# Patient Record
Sex: MALE | Race: BLACK OR AFRICAN AMERICAN | Employment: UNEMPLOYED | URBAN - METROPOLITAN AREA
[De-identification: names, ages, dates, MRNs, and addresses within clinical notes are randomized per-mention and may not be internally consistent; named-entity substitution may affect disease eponyms.]

---

## 2017-03-13 ENCOUNTER — HOSPITAL ENCOUNTER (EMERGENCY)
Age: 42
Discharge: HOME | End: 2017-03-13
Attending: EMERGENCY MEDICINE
Payer: SELF-PAY

## 2017-03-13 ENCOUNTER — APPOINTMENT (OUTPATIENT)
Dept: GENERAL RADIOLOGY | Age: 42
End: 2017-03-13
Attending: EMERGENCY MEDICINE
Payer: SELF-PAY

## 2017-03-13 VITALS
TEMPERATURE: 99.5 F | SYSTOLIC BLOOD PRESSURE: 170 MMHG | HEART RATE: 96 BPM | DIASTOLIC BLOOD PRESSURE: 92 MMHG | RESPIRATION RATE: 20 BRPM | OXYGEN SATURATION: 97 % | HEIGHT: 64 IN | BODY MASS INDEX: 48.4 KG/M2 | WEIGHT: 283.51 LBS

## 2017-03-13 DIAGNOSIS — R06.2 WHEEZING: ICD-10-CM

## 2017-03-13 DIAGNOSIS — J18.9 PNEUMONIA OF LEFT UPPER LOBE DUE TO INFECTIOUS ORGANISM: Primary | ICD-10-CM

## 2017-03-13 LAB
FLUAV AG NPH QL IA: NEGATIVE
FLUBV AG NOSE QL IA: NEGATIVE
GLUCOSE BLD STRIP.AUTO-MCNC: 81 MG/DL (ref 65–100)
SERVICE CMNT-IMP: NORMAL

## 2017-03-13 PROCEDURE — 74011000250 HC RX REV CODE- 250: Performed by: EMERGENCY MEDICINE

## 2017-03-13 PROCEDURE — 74011250637 HC RX REV CODE- 250/637: Performed by: EMERGENCY MEDICINE

## 2017-03-13 PROCEDURE — 71020 XR CHEST PA LAT: CPT

## 2017-03-13 PROCEDURE — 94640 AIRWAY INHALATION TREATMENT: CPT

## 2017-03-13 PROCEDURE — 93005 ELECTROCARDIOGRAM TRACING: CPT

## 2017-03-13 PROCEDURE — 99284 EMERGENCY DEPT VISIT MOD MDM: CPT

## 2017-03-13 PROCEDURE — 74011636637 HC RX REV CODE- 636/637: Performed by: EMERGENCY MEDICINE

## 2017-03-13 PROCEDURE — 82962 GLUCOSE BLOOD TEST: CPT

## 2017-03-13 PROCEDURE — 77030013140 HC MSK NEB VYRM -A

## 2017-03-13 PROCEDURE — 87804 INFLUENZA ASSAY W/OPTIC: CPT | Performed by: EMERGENCY MEDICINE

## 2017-03-13 PROCEDURE — 77030012343 HC CHMB SPCR OPTC MDI MONA -A

## 2017-03-13 RX ORDER — PREDNISONE 10 MG/1
TABLET ORAL
Qty: 21 TAB | Refills: 0 | Status: SHIPPED | OUTPATIENT
Start: 2017-03-13 | End: 2021-02-02 | Stop reason: ALTCHOICE

## 2017-03-13 RX ORDER — ALBUTEROL SULFATE 90 UG/1
2 AEROSOL, METERED RESPIRATORY (INHALATION)
Status: COMPLETED | OUTPATIENT
Start: 2017-03-13 | End: 2017-03-13

## 2017-03-13 RX ORDER — PREDNISONE 20 MG/1
60 TABLET ORAL
Status: COMPLETED | OUTPATIENT
Start: 2017-03-13 | End: 2017-03-13

## 2017-03-13 RX ORDER — AZITHROMYCIN 250 MG/1
500 TABLET, FILM COATED ORAL
Status: COMPLETED | OUTPATIENT
Start: 2017-03-13 | End: 2017-03-13

## 2017-03-13 RX ORDER — ALBUTEROL SULFATE 90 UG/1
2 AEROSOL, METERED RESPIRATORY (INHALATION)
Qty: 1 INHALER | Refills: 0 | Status: SHIPPED | OUTPATIENT
Start: 2017-03-13 | End: 2021-02-02 | Stop reason: ALTCHOICE

## 2017-03-13 RX ORDER — IPRATROPIUM BROMIDE AND ALBUTEROL SULFATE 2.5; .5 MG/3ML; MG/3ML
3 SOLUTION RESPIRATORY (INHALATION)
Status: COMPLETED | OUTPATIENT
Start: 2017-03-13 | End: 2017-03-13

## 2017-03-13 RX ORDER — AZITHROMYCIN 250 MG/1
250 TABLET, FILM COATED ORAL DAILY
Qty: 4 TAB | Refills: 0 | Status: SHIPPED | OUTPATIENT
Start: 2017-03-13 | End: 2021-02-02 | Stop reason: ALTCHOICE

## 2017-03-13 RX ADMIN — ALBUTEROL SULFATE 2 PUFF: 90 AEROSOL, METERED RESPIRATORY (INHALATION) at 22:10

## 2017-03-13 RX ADMIN — AZITHROMYCIN 500 MG: 250 TABLET, FILM COATED ORAL at 21:28

## 2017-03-13 RX ADMIN — ALBUTEROL SULFATE 1 DOSE: 2.5 SOLUTION RESPIRATORY (INHALATION) at 20:54

## 2017-03-13 RX ADMIN — PREDNISONE 60 MG: 20 TABLET ORAL at 21:28

## 2017-03-13 RX ADMIN — IPRATROPIUM BROMIDE AND ALBUTEROL SULFATE 3 ML: .5; 2.5 SOLUTION RESPIRATORY (INHALATION) at 21:28

## 2017-03-13 NOTE — LETTER
[facility] EMERGENCY DEPT 
[address] [facility] [address] [id number] 
[phone] Work/School Note Date: 3/13/2017 To Whom It May concern: 
 
Carmelina Boone was seen and treated today in the emergency room by the following provider(s): 
Attending Provider: Meche Ramon MD.   
 
Carmelina Boone may return to work on 4/15/17. Sincerely, Meche Ramon MD

## 2017-03-13 NOTE — LETTER
21 [facility] EMERGENCY DEPT 
[address] [place] [address] [address] 
[phone] Work/School Note Date: 3/13/2017 To Whom It May concern: 
 
Mana Peterson was seen and treated today in the emergency room by the following provider(s): 
No providers found. Mana Peterson may return to work on 3/17/2017.  
 
Sincerely, 
 
 
 
 
Jenna Saleem MD

## 2017-03-14 LAB
ATRIAL RATE: 108 BPM
CALCULATED P AXIS, ECG09: 47 DEGREES
CALCULATED R AXIS, ECG10: -28 DEGREES
CALCULATED T AXIS, ECG11: 75 DEGREES
DIAGNOSIS, 93000: NORMAL
P-R INTERVAL, ECG05: 126 MS
Q-T INTERVAL, ECG07: 336 MS
QRS DURATION, ECG06: 86 MS
QTC CALCULATION (BEZET), ECG08: 450 MS
VENTRICULAR RATE, ECG03: 108 BPM

## 2017-03-14 NOTE — DISCHARGE INSTRUCTIONS
Pneumonia: Care Instructions  Your Care Instructions    Pneumonia is an infection of the lungs. Most cases are caused by infections from bacteria or viruses. Pneumonia may be mild or very severe. If it is caused by bacteria, you will be treated with antibiotics. It may take a few weeks to a few months to recover fully from pneumonia, depending on how sick you were and whether your overall health is good. Follow-up care is a key part of your treatment and safety. Be sure to make and go to all appointments, and call your doctor if you are having problems. Its also a good idea to know your test results and keep a list of the medicines you take. How can you care for yourself at home? · Take your antibiotics exactly as directed. Do not stop taking the medicine just because you are feeling better. You need to take the full course of antibiotics. · Take your medicines exactly as prescribed. Call your doctor if you think you are having a problem with your medicine. · Get plenty of rest and sleep. You may feel weak and tired for a while, but your energy level will improve with time. · To prevent dehydration, drink plenty of fluids, enough so that your urine is light yellow or clear like water. Choose water and other caffeine-free clear liquids until you feel better. If you have kidney, heart, or liver disease and have to limit fluids, talk with your doctor before you increase the amount of fluids you drink. · Take care of your cough so you can rest. A cough that brings up mucus from your lungs is common with pneumonia. It is one way your body gets rid of the infection. But if coughing keeps you from resting or causes severe fatigue and chest-wall pain, talk to your doctor. He or she may suggest that you take a medicine to reduce the cough. · Use a vaporizer or humidifier to add moisture to your bedroom. Follow the directions for cleaning the machine. · Do not smoke or allow others to smoke around you.  Smoke will make your cough last longer. If you need help quitting, talk to your doctor about stop-smoking programs and medicines. These can increase your chances of quitting for good. · Take an over-the-counter pain medicine, such as acetaminophen (Tylenol), ibuprofen (Advil, Motrin), or naproxen (Aleve). Read and follow all instructions on the label. · Do not take two or more pain medicines at the same time unless the doctor told you to. Many pain medicines have acetaminophen, which is Tylenol. Too much acetaminophen (Tylenol) can be harmful. · If you were given a spirometer to measure how well your lungs are working, use it as instructed. This can help your doctor tell how your recovery is going. · To prevent pneumonia in the future, talk to your doctor about getting a flu vaccine (once a year) and a pneumococcal vaccine (one time only for most people). When should you call for help? Call 911 anytime you think you may need emergency care. For example, call if:  · You have severe trouble breathing. Call your doctor now or seek immediate medical care if:  · You cough up dark brown or bloody mucus (sputum). · You have new or worse trouble breathing. · You are dizzy or lightheaded, or you feel like you may faint. Watch closely for changes in your health, and be sure to contact your doctor if:  · You have a new or higher fever. · You are coughing more deeply or more often. · You are not getting better after 2 days (48 hours). · You do not get better as expected. Where can you learn more? Go to http://gayatri-alli.info/. Enter [id number] in the search box to learn more about \"Pneumonia: Care Instructions. \"  Current as of: May 23, 2016  Content Version: 11.1  © 2074-1468 [employer]. Care instructions adapted under license by [employer] (which disclaims liability or warranty for this information).  If you have questions about a medical condition or this instruction, always ask your healthcare professional. [place] [address] any warranty or liability for your use of this information. Wheezing or Bronchoconstriction: Care Instructions  Your Care Instructions  Wheezing is a whistling noise made during breathing. It occurs when the small airways, or bronchial tubes, that lead to your lungs swell or contract (spasm) and become narrow. This narrowing is called bronchoconstriction. When your airways constrict, it is hard for air to pass through and this makes it hard for you to breathe. Wheezing and bronchoconstriction can be caused by many problems, including:  · An infection such as the flu or a cold. · Allergies such as hay fever. · Diseases such as asthma or chronic obstructive pulmonary disease. · Smoking. Treatment for your wheezing depends on what is causing the problem. Your wheezing may get better without treatment. But you may need to pay attention to things that cause your wheezing and avoid them. Or you may need medicine to help treat the wheezing and to reduce the swelling or to relieve spasms in your lungs. Follow-up care is a key part of your treatment and safety. Be sure to make and go to all appointments, and call your doctor if you are having problems. It is also a good idea to know your test results and keep a list of the medicines you take. How can you care for yourself at home? · Take your medicine exactly as prescribed. Call your doctor if you think you are having a problem with your medicine. You will get more details on the specific medicine your doctor prescribes. · If your doctor prescribed antibiotics, take them as directed. Do not stop taking them just because you feel better. You need to take the full course of antibiotics. · Breathe moist air from a humidifier, hot shower, or sink filled with hot water. This may help ease your symptoms and make it easier for you to breathe.   · If you have congestion in your nose and throat, drinking plenty of fluids, especially hot fluids, may help relieve your symptoms. If you have kidney, heart, or liver disease and have to limit fluids, talk with your doctor before you increase the amount of fluids you drink. · If you have mucus in your airways, it may help to breathe deeply and cough. · Do not smoke or allow others to smoke around you. Smoking can make your wheezing worse. If you need help quitting, talk to your doctor about stop-smoking programs and medicines. These can increase your chances of quitting for good. · Avoid things that may cause your wheezing. These may include colds, smoke, air pollution, dust, pollen, pets, cockroaches, stress, and cold air. When should you call for help? Call 911 anytime you think you may need emergency care. For example, call if:  · You have severe trouble breathing. · You passed out (lost consciousness). Call your doctor now or seek immediate medical care if:  · You cough up yellow, dark brown, or bloody mucus (sputum). · You have new or worse shortness of breath. · Your wheezing is not getting better or it gets worse after you start taking your medicine. Watch closely for changes in your health, and be sure to contact your doctor if:  · You do not get better as expected. Where can you learn more? Go to http://gayatri-alli.info/. Enter 454 [id number] in the search box to learn more about \"Wheezing or Bronchoconstriction: Care Instructions. \"  Current as of: May 23, 2016  Content Version: 11.1  © 9507-6357 [employer]. Care instructions adapted under license by [employer] (which disclaims liability or warranty for this information). If you have questions about a medical condition or this instruction, always ask your healthcare professional. [address] any warranty or liability for your use of this information. We hope that we have addressed all of your medical concerns.  The examination and treatment you received in the Emergency Department were for an emergent problem and were not intended as complete care. It is important that you follow up with your healthcare provider(s) for ongoing care. If your symptoms worsen or do not improve as expected, and you are unable to reach your usual health care provider(s), you should return to the Emergency Department. Today's healthcare is undergoing tremendous change, and patient satisfaction surveys are one of the many tools to assess the quality of medical care. You may receive a survey from the [employer] regarding your experience in the Emergency Department. I hope that your experience has been completely positive, particularly the medical care that I provided. As such, please participate in the survey; anything less than excellent does not meet my expectations or intentions. [facility] and [facility] participate in nationally recognized quality of care measures. If your blood pressure is greater than 120/80, as reported below, we urge that you seek medical care to address the potential of high blood pressure, commonly known as hypertension. Hypertension can be hereditary or can be caused by certain medical conditions, pain, stress, or \"white coat syndrome. \"       Please make an appointment with your health care provider(s) for follow up of your Emergency Department visit. VITALS:   Patient Vitals for the past 8 hrs:   Temp Pulse Resp BP SpO2   03/13/17 2152 - - 20 (!) 170/92 97 %   03/13/17 2028 99.5 °F (37.5 °C) 96 22 (!) 195/113 95 %          Thank you for allowing us to provide you with medical care today. We realize that you have many choices for your emergency care needs. Please choose us in the future for any continued health care needs. Christa Bosch, [address] [place], [place] [address]y 20.   Office: [phone]            Recent Results (from the past 24 hour(s))   GLUCOSE, POC    Collection Time: 03/13/17  8:51 PM   Result Value Ref Range    Glucose (POC) 81 65 - 100 mg/dL    Performed by [facility]    INFLUENZA A & B AG (RAPID TEST)    Collection Time: 03/13/17  8:52 PM   Result Value Ref Range    Influenza A Antigen NEGATIVE  NEG      Influenza B Antigen NEGATIVE  NEG     EKG, 12 LEAD, INITIAL    Collection Time: 03/13/17  9:16 PM   Result Value Ref Range    Ventricular Rate 108 BPM    Atrial Rate 108 BPM    P-R Interval 126 ms    QRS Duration 86 ms    Q-T Interval 336 ms    QTC Calculation (Bezet) 450 ms    Calculated P Axis 47 degrees    Calculated R Axis -28 degrees    Calculated T Axis 75 degrees    Diagnosis       Sinus tachycardia  Possible Left atrial enlargement  Left ventricular hypertrophy  Nonspecific T wave abnormality  Abnormal ECG  When compared with ECG of 25-JUL-2014 10:49,  T wave inversion no longer evident in Inferior leads         Xr Chest Pa Lat    Result Date: 3/13/2017  EXAM:  XR CHEST PA LAT INDICATION:  shortness of breath congestion for 2 days COMPARISON: 7/25/2014. FINDINGS: A single view of the chest demonstrates a subtle area of early airspace process which is most likely in the lingula but may be in the superior segment of the left lower lobe. It is in the left midlung. .  The cardiac and mediastinal contours and pulmonary vascularity are normal.  The bones and soft tissues are within normal limits. IMPRESSION: Subtle area of developing airspace process in the left midlung. Ana Canela

## 2017-03-14 NOTE — ED TRIAGE NOTES
My breathing has been getting bad all of a sudden 30 minutes ago. Gets worse when walking or getting into a vehicle. Having a cold for 2 days.

## 2017-03-14 NOTE — ED PROVIDER NOTES
HPI Comments: Radha Baltazar is a 43 yo M with shortness of breath. He states that 2 days ago he developed flu like symptoms with cough, congestion and chills. He has not checked his temperature at home. Tonight he went to work but on his way there he started to fell short of breath. When he walked back to his car it became worse and so he brought himself to the ED for evaluation. He states that he has noticed wheezing in the past when he is ill but has never been diagnosed with asthma or treated for wheezing before. He denies diabetes. Past Medical History:   Diagnosis Date    Hypertension        History reviewed. No pertinent surgical history. History reviewed. No pertinent family history. Social History     Social History    Marital status: SINGLE     Spouse name: N/A    Number of children: N/A    Years of education: N/A     Occupational History    Not on file. Social History Main Topics    Smoking status: Never Smoker    Smokeless tobacco: Never Used    Alcohol use 0.5 oz/week     1 Cans of beer per week      Comment: rare    Drug use: No    Sexual activity: Not on file     Other Topics Concern    Not on file     Social History Narrative         ALLERGIES: Review of patient's allergies indicates no known allergies. Review of Systems   Constitutional: Positive for chills. Negative for fever. HENT: Positive for congestion. Negative for sore throat. Eyes: Negative for visual disturbance. Respiratory: Positive for cough, shortness of breath and wheezing. Cardiovascular: Negative for chest pain. Gastrointestinal: Negative for abdominal pain. Genitourinary: Negative for dysuria. Musculoskeletal: Negative for back pain. Skin: Negative for rash. Neurological: Negative for headaches.        Vitals:    03/13/17 2028 03/13/17 2152   BP: (!) 195/113 (!) 170/92   Pulse: 96    Resp: 22 20   Temp: 99.5 °F (37.5 °C)    SpO2: 95% 97%   Weight: 128.6 kg (283 lb 8.2 oz) Height: 5' 4\" (1.626 m)             Physical Exam   Constitutional: He appears well-developed and well-nourished. No distress. HENT:   Head: Normocephalic and atraumatic. Nose: Rhinorrhea present. Mouth/Throat: Oropharynx is clear and moist.   Eyes: Conjunctivae and EOM are normal.   Neck: Normal range of motion and phonation normal.   Cardiovascular: Normal rate and intact distal pulses. Pulmonary/Chest: Effort normal. No respiratory distress. He has wheezes. Abdominal: He exhibits no distension. Musculoskeletal: Normal range of motion. He exhibits no tenderness. Neurological: He is alert. He is not disoriented. He exhibits normal muscle tone. Skin: Skin is warm and dry. Nursing note and vitals reviewed. [facility]  ED Course     ED EKG interpretation:  Rhythm: sinus tachycardia; and regular . Rate (approx.): 108; Axis: normal; P wave: normal; QRS interval: normal ; ST/T wave: non-specific changes; Other findings: left ventricular hypertrophy, obtained after albuterol administration. This EKG was interpreted by Pradip Becker MD,ED Provider. 9:21 PM  PAtient reassessed and wheezing has increased after initial duoneb. CXR with small ROBBIN/lingula pneumonia. Flu swab negative. Glucose normal.  Will give azithromycin, prednisone and repeat duoneb and reassess. 10:05 PM  PAtient reassessed and states that he feels much better. Faint wheezing on exam.  Will give Albuterol MDI with teaching and discharge home with prescription for prednisone taper, azithromycin 250x4 days.     Procedures

## 2017-03-15 NOTE — ED NOTES
Pt called and states he just got his medication filled this morning and is not feeling better. Diagnosed with pneumonia 2 days ago. Requesting a note for return to work Friday. I advised pt if he is not feeling better he needs to f/u with pcp or return to ED for reevaluation. Note given for return to work Friday 3/17/2017.

## 2021-02-02 ENCOUNTER — APPOINTMENT (OUTPATIENT)
Dept: GENERAL RADIOLOGY | Age: 46
End: 2021-02-02
Attending: EMERGENCY MEDICINE
Payer: MEDICAID

## 2021-02-02 ENCOUNTER — HOSPITAL ENCOUNTER (EMERGENCY)
Age: 46
Discharge: HOME | End: 2021-02-03
Attending: EMERGENCY MEDICINE
Payer: MEDICAID

## 2021-02-02 VITALS
DIASTOLIC BLOOD PRESSURE: 106 MMHG | HEIGHT: 64 IN | TEMPERATURE: 98 F | HEART RATE: 98 BPM | OXYGEN SATURATION: 95 % | RESPIRATION RATE: 24 BRPM | SYSTOLIC BLOOD PRESSURE: 156 MMHG | BODY MASS INDEX: 53.78 KG/M2 | WEIGHT: 315 LBS

## 2021-02-02 DIAGNOSIS — R06.02 SOB (SHORTNESS OF BREATH): ICD-10-CM

## 2021-02-02 DIAGNOSIS — J45.909 UNCOMPLICATED ASTHMA, UNSPECIFIED ASTHMA SEVERITY, UNSPECIFIED WHETHER PERSISTENT: ICD-10-CM

## 2021-02-02 DIAGNOSIS — R60.9 PERIPHERAL EDEMA: Primary | ICD-10-CM

## 2021-02-02 LAB
ANION GAP SERPL CALC-SCNC: 2 MMOL/L (ref 5–15)
BASOPHILS # BLD: 0 K/UL (ref 0–0.1)
BASOPHILS NFR BLD: 0 % (ref 0–1)
BNP SERPL-MCNC: 93 PG/ML
BUN SERPL-MCNC: 13 MG/DL (ref 6–20)
BUN/CREAT SERPL: 12 (ref 12–20)
CALCIUM SERPL-MCNC: 8.9 MG/DL (ref 8.5–10.1)
CHLORIDE SERPL-SCNC: 106 MMOL/L (ref 97–108)
CO2 SERPL-SCNC: 33 MMOL/L (ref 21–32)
CREAT SERPL-MCNC: 1.11 MG/DL (ref 0.7–1.3)
DIFFERENTIAL METHOD BLD: ABNORMAL
EOSINOPHIL # BLD: 0.3 K/UL (ref 0–0.4)
EOSINOPHIL NFR BLD: 3 % (ref 0–7)
ERYTHROCYTE [DISTWIDTH] IN BLOOD BY AUTOMATED COUNT: 12.6 % (ref 11.5–14.5)
GLUCOSE SERPL-MCNC: 95 MG/DL (ref 65–100)
HCT VFR BLD AUTO: 48 % (ref 36.6–50.3)
HGB BLD-MCNC: 15.4 G/DL (ref 12.1–17)
IMM GRANULOCYTES # BLD AUTO: 0 K/UL (ref 0–0.04)
IMM GRANULOCYTES NFR BLD AUTO: 1 % (ref 0–0.5)
LYMPHOCYTES # BLD: 2.6 K/UL (ref 0.8–3.5)
LYMPHOCYTES NFR BLD: 32 % (ref 12–49)
MCH RBC QN AUTO: 30.5 PG (ref 26–34)
MCHC RBC AUTO-ENTMCNC: 32.1 G/DL (ref 30–36.5)
MCV RBC AUTO: 95 FL (ref 80–99)
MONOCYTES # BLD: 1.1 K/UL (ref 0–1)
MONOCYTES NFR BLD: 13 % (ref 5–13)
NEUTS SEG # BLD: 4.3 K/UL (ref 1.8–8)
NEUTS SEG NFR BLD: 51 % (ref 32–75)
NRBC # BLD: 0 K/UL (ref 0–0.01)
NRBC BLD-RTO: 0 PER 100 WBC
PLATELET # BLD AUTO: 229 K/UL (ref 150–400)
PMV BLD AUTO: 10.9 FL (ref 8.9–12.9)
POTASSIUM SERPL-SCNC: 3.9 MMOL/L (ref 3.5–5.1)
RBC # BLD AUTO: 5.05 M/UL (ref 4.1–5.7)
SODIUM SERPL-SCNC: 141 MMOL/L (ref 136–145)
TROPONIN I SERPL-MCNC: <0.05 NG/ML
WBC # BLD AUTO: 8.3 K/UL (ref 4.1–11.1)

## 2021-02-02 PROCEDURE — 85025 COMPLETE CBC W/AUTO DIFF WBC: CPT

## 2021-02-02 PROCEDURE — 99283 EMERGENCY DEPT VISIT LOW MDM: CPT

## 2021-02-02 PROCEDURE — 36415 COLL VENOUS BLD VENIPUNCTURE: CPT

## 2021-02-02 PROCEDURE — 74011250636 HC RX REV CODE- 250/636: Performed by: EMERGENCY MEDICINE

## 2021-02-02 PROCEDURE — 71045 X-RAY EXAM CHEST 1 VIEW: CPT

## 2021-02-02 PROCEDURE — 83880 ASSAY OF NATRIURETIC PEPTIDE: CPT

## 2021-02-02 PROCEDURE — 84484 ASSAY OF TROPONIN QUANT: CPT

## 2021-02-02 PROCEDURE — 80048 BASIC METABOLIC PNL TOTAL CA: CPT

## 2021-02-02 PROCEDURE — 93005 ELECTROCARDIOGRAM TRACING: CPT

## 2021-02-02 PROCEDURE — 96374 THER/PROPH/DIAG INJ IV PUSH: CPT

## 2021-02-02 RX ORDER — FUROSEMIDE 10 MG/ML
40 INJECTION INTRAMUSCULAR; INTRAVENOUS ONCE
Status: COMPLETED | OUTPATIENT
Start: 2021-02-02 | End: 2021-02-02

## 2021-02-02 RX ORDER — ALBUTEROL SULFATE 90 UG/1
2 AEROSOL, METERED RESPIRATORY (INHALATION)
Qty: 1 INHALER | Refills: 0 | Status: SHIPPED | OUTPATIENT
Start: 2021-02-02 | End: 2022-02-21 | Stop reason: ALTCHOICE

## 2021-02-02 RX ORDER — FUROSEMIDE 40 MG/1
40 TABLET ORAL DAILY
Qty: 20 TAB | Refills: 0 | Status: SHIPPED | OUTPATIENT
Start: 2021-02-02 | End: 2021-02-22

## 2021-02-02 RX ADMIN — FUROSEMIDE 40 MG: 10 INJECTION, SOLUTION INTRAVENOUS at 22:52

## 2021-02-02 NOTE — LETTER
[address] 
[facility] EMERGENCY DEPT 
[address] [facility] [place] [id number] [phone] Work/School Note Date: 2/2/2021 To Whom It May concern: 
 
Marty Hansen was seen and treated today in the emergency room by the following provider(s): 
Attending Provider: Theodore Clemente DO. Marty Hansen may return to work on Thursday 02/04/21. Sincerely, Marilou Luna PA-C

## 2021-02-02 NOTE — ED TRIAGE NOTES
Patient arrives with complaint of SOB, worse with ambulation, abdominal distention, bilateral leg swelling. States that six months ago, PCP had diagnosed \"fluid around my lungs. \" States that he was unable to follow-up with treatment due to financial reasons. Denies chest pain, N/V, abdominal pain. Hx: asthma, HTN.

## 2021-02-03 NOTE — ED PROVIDER NOTES
Patient is a 74-year-old gentleman with a history of hypertension currently on lisinopril and 20 mg of furosemide daily who comes in with worsening dyspnea on exertion and peripheral edema. Patient reports that he was diagnosed with fluid retention 6 months ago but has not followed up with cardiology secondary to financial constraints. He has had progressively worsening symptoms over the last few months but his shortness of breath is acutely worsened to the point where he cannot walk across the room without having to pause. He also reports a history of asthma and that his wheezing has been worsening; he does not have albuterol or any other medications for his asthma. The history is provided by the patient. Shortness of Breath  Associated symptoms include leg swelling. Pertinent negatives include no fever, no chest pain, no vomiting and no abdominal pain. Swelling   Pertinent negatives include no fever, no diarrhea, no vomiting, no constipation and no chest pain. Past Medical History:   Diagnosis Date    Hypertension        No past surgical history on file. No family history on file. Social History     Socioeconomic History    Marital status: SINGLE     Spouse name: Not on file    Number of children: Not on file    Years of education: Not on file    Highest education level: Not on file   Occupational History    Not on file   Social Needs    Financial resource strain: Not on file    Food insecurity     Worry: Not on file     Inability: Not on file    Transportation needs     Medical: Not on file     Non-medical: Not on file   Tobacco Use    Smoking status: Never Smoker    Smokeless tobacco: Never Used   Substance and Sexual Activity    Alcohol use:  Yes     Alcohol/week: 0.8 standard drinks     Types: 1 Cans of beer per week     Comment: rare    Drug use: No    Sexual activity: Not on file   Lifestyle    Physical activity     Days per week: Not on file     Minutes per session: Not on file    Stress: Not on file   Relationships    Social connections     Talks on phone: Not on file     Gets together: Not on file     Attends Zoroastrian service: Not on file     Active member of club or organization: Not on file     Attends meetings of clubs or organizations: Not on file     Relationship status: Not on file    Intimate partner violence     Fear of current or ex partner: Not on file     Emotionally abused: Not on file     Physically abused: Not on file     Forced sexual activity: Not on file   Other Topics Concern    Not on file   Social History Narrative    Not on file         ALLERGIES: Patient has no known allergies. Review of Systems   Constitutional: Negative for chills and fever. Respiratory: Positive for chest tightness and shortness of breath. Cardiovascular: Positive for leg swelling. Negative for chest pain. Gastrointestinal: Negative for abdominal pain, constipation, diarrhea and vomiting. Neurological: Negative for dizziness and light-headedness. All other systems reviewed and are negative. Vitals:    02/02/21 1833   BP: (!) 156/106   Pulse: 98   Resp: 24   Temp: 98 °F (36.7 °C)   SpO2: 95%   Weight: 143.3 kg (316 lb)   Height: 5' 4\" (1.626 m)            Physical Exam  Vitals signs and nursing note reviewed. Constitutional:       General: He is not in acute distress. Appearance: He is well-developed. He is obese. HENT:      Head: Normocephalic and atraumatic. Eyes:      Conjunctiva/sclera: Conjunctivae normal.      Pupils: Pupils are equal, round, and reactive to light. Neck:      Musculoskeletal: Normal range of motion. Cardiovascular:      Rate and Rhythm: Normal rate and regular rhythm. Pulmonary:      Effort: Tachypnea and accessory muscle usage present. Breath sounds: Wheezing present. Abdominal:      General: There is no distension. Palpations: Abdomen is soft. Tenderness: There is no abdominal tenderness. Musculoskeletal: Normal range of motion. General: Swelling present. Right lower leg: Edema present. Left lower leg: Edema present. Skin:     General: Skin is warm and dry. Capillary Refill: Capillary refill takes less than 2 seconds. Neurological:      General: No focal deficit present. Mental Status: He is alert and oriented to person, place, and time. Psychiatric:         Mood and Affect: Mood normal.         Behavior: Behavior normal.          MDM       Procedures    EKG at 2250  Normal sinus rhythm, 89 bpm, no ST changes, no T wave changes, prolonged QT, no ectopy  EKG interpreted by Sanaz Palubmo MD     11:01 PM  Change of shift. Care of patient signed over to Dr. Lindsey Carmona. Bedside handoff complete. Awaiting ambulatory oxygen saturations.

## 2021-02-04 LAB
ATRIAL RATE: 89 BPM
CALCULATED P AXIS, ECG09: 55 DEGREES
CALCULATED R AXIS, ECG10: -3 DEGREES
CALCULATED T AXIS, ECG11: 27 DEGREES
DIAGNOSIS, 93000: NORMAL
P-R INTERVAL, ECG05: 142 MS
Q-T INTERVAL, ECG07: 384 MS
QRS DURATION, ECG06: 90 MS
QTC CALCULATION (BEZET), ECG08: 467 MS
VENTRICULAR RATE, ECG03: 89 BPM

## 2022-02-21 ENCOUNTER — HOSPITAL ENCOUNTER (INPATIENT)
Age: 47
LOS: 4 days | Discharge: HOME | DRG: 058 | End: 2022-02-25
Attending: FAMILY MEDICINE | Admitting: STUDENT IN AN ORGANIZED HEALTH CARE EDUCATION/TRAINING PROGRAM
Payer: MEDICAID

## 2022-02-21 ENCOUNTER — APPOINTMENT (OUTPATIENT)
Dept: CT IMAGING | Age: 47
End: 2022-02-21
Attending: EMERGENCY MEDICINE
Payer: MEDICAID

## 2022-02-21 ENCOUNTER — APPOINTMENT (OUTPATIENT)
Dept: GENERAL RADIOLOGY | Age: 47
End: 2022-02-21
Attending: EMERGENCY MEDICINE
Payer: MEDICAID

## 2022-02-21 ENCOUNTER — HOSPITAL ENCOUNTER (EMERGENCY)
Age: 47
Discharge: ANOTHER HOSPITAL | End: 2022-02-21
Attending: EMERGENCY MEDICINE
Payer: MEDICAID

## 2022-02-21 VITALS
WEIGHT: 314 LBS | BODY MASS INDEX: 53.61 KG/M2 | HEIGHT: 64 IN | TEMPERATURE: 98.9 F | DIASTOLIC BLOOD PRESSURE: 98 MMHG | RESPIRATION RATE: 19 BRPM | HEART RATE: 74 BPM | SYSTOLIC BLOOD PRESSURE: 161 MMHG | OXYGEN SATURATION: 98 %

## 2022-02-21 DIAGNOSIS — G93.89 BRAIN MASS: ICD-10-CM

## 2022-02-21 DIAGNOSIS — G93.89 BRAIN MASS: Primary | ICD-10-CM

## 2022-02-21 DIAGNOSIS — I25.10 CORONARY ARTERY DISEASE INVOLVING NATIVE CORONARY ARTERY OF NATIVE HEART, UNSPECIFIED WHETHER ANGINA PRESENT: ICD-10-CM

## 2022-02-21 DIAGNOSIS — I50.22 HEART FAILURE WITH MID-RANGE EJECTION FRACTION (HCC): ICD-10-CM

## 2022-02-21 DIAGNOSIS — I10 PRIMARY HYPERTENSION: ICD-10-CM

## 2022-02-21 DIAGNOSIS — E78.5 HYPERLIPIDEMIA, UNSPECIFIED HYPERLIPIDEMIA TYPE: ICD-10-CM

## 2022-02-21 LAB
ALBUMIN SERPL-MCNC: 3.7 G/DL (ref 3.5–5)
ALBUMIN/GLOB SERPL: 0.9 {RATIO} (ref 1.1–2.2)
ALP SERPL-CCNC: 64 U/L (ref 45–117)
ALT SERPL-CCNC: 28 U/L (ref 12–78)
ANION GAP SERPL CALC-SCNC: 3 MMOL/L (ref 5–15)
AST SERPL-CCNC: 22 U/L (ref 15–37)
BASOPHILS # BLD: 0.1 K/UL (ref 0–0.1)
BASOPHILS NFR BLD: 1 % (ref 0–1)
BILIRUB SERPL-MCNC: 0.9 MG/DL (ref 0.2–1)
BUN SERPL-MCNC: 16 MG/DL (ref 6–20)
BUN/CREAT SERPL: 14 (ref 12–20)
CALCIUM SERPL-MCNC: 9.3 MG/DL (ref 8.5–10.1)
CHLORIDE SERPL-SCNC: 101 MMOL/L (ref 97–108)
CO2 SERPL-SCNC: 34 MMOL/L (ref 21–32)
COMMENT, HOLDF: NORMAL
COVID-19 RAPID TEST, COVR: NOT DETECTED
CREAT SERPL-MCNC: 1.16 MG/DL (ref 0.7–1.3)
DIFFERENTIAL METHOD BLD: ABNORMAL
EOSINOPHIL # BLD: 0.2 K/UL (ref 0–0.4)
EOSINOPHIL NFR BLD: 2 % (ref 0–7)
ERYTHROCYTE [DISTWIDTH] IN BLOOD BY AUTOMATED COUNT: 12.4 % (ref 11.5–14.5)
GLOBULIN SER CALC-MCNC: 4.1 G/DL (ref 2–4)
GLUCOSE BLD STRIP.AUTO-MCNC: 102 MG/DL (ref 65–117)
GLUCOSE SERPL-MCNC: 106 MG/DL (ref 65–100)
HCT VFR BLD AUTO: 50.5 % (ref 36.6–50.3)
HGB BLD-MCNC: 16.8 G/DL (ref 12.1–17)
IMM GRANULOCYTES # BLD AUTO: 0.1 K/UL (ref 0–0.04)
IMM GRANULOCYTES NFR BLD AUTO: 1 % (ref 0–0.5)
INR PPP: 1.1 (ref 0.9–1.1)
LYMPHOCYTES # BLD: 3.2 K/UL (ref 0.8–3.5)
LYMPHOCYTES NFR BLD: 32 % (ref 12–49)
MCH RBC QN AUTO: 31.2 PG (ref 26–34)
MCHC RBC AUTO-ENTMCNC: 33.3 G/DL (ref 30–36.5)
MCV RBC AUTO: 93.7 FL (ref 80–99)
MONOCYTES # BLD: 0.9 K/UL (ref 0–1)
MONOCYTES NFR BLD: 8 % (ref 5–13)
NEUTS SEG # BLD: 5.9 K/UL (ref 1.8–8)
NEUTS SEG NFR BLD: 56 % (ref 32–75)
NRBC # BLD: 0 K/UL (ref 0–0.01)
NRBC BLD-RTO: 0 PER 100 WBC
PLATELET # BLD AUTO: 313 K/UL (ref 150–400)
PMV BLD AUTO: 11 FL (ref 8.9–12.9)
POTASSIUM SERPL-SCNC: 3.6 MMOL/L (ref 3.5–5.1)
PROT SERPL-MCNC: 7.8 G/DL (ref 6.4–8.2)
PROTHROMBIN TIME: 11.1 SEC (ref 9–11.1)
RBC # BLD AUTO: 5.39 M/UL (ref 4.1–5.7)
SAMPLES BEING HELD,HOLD: NORMAL
SERVICE CMNT-IMP: NORMAL
SODIUM SERPL-SCNC: 138 MMOL/L (ref 136–145)
SOURCE, COVRS: NORMAL
TROPONIN-HIGH SENSITIVITY: 22 NG/L (ref 0–76)
WBC # BLD AUTO: 10.3 K/UL (ref 4.1–11.1)

## 2022-02-21 PROCEDURE — 93005 ELECTROCARDIOGRAM TRACING: CPT

## 2022-02-21 PROCEDURE — 96375 TX/PRO/DX INJ NEW DRUG ADDON: CPT

## 2022-02-21 PROCEDURE — 87040 BLOOD CULTURE FOR BACTERIA: CPT

## 2022-02-21 PROCEDURE — 36415 COLL VENOUS BLD VENIPUNCTURE: CPT

## 2022-02-21 PROCEDURE — 71045 X-RAY EXAM CHEST 1 VIEW: CPT

## 2022-02-21 PROCEDURE — 85025 COMPLETE CBC W/AUTO DIFF WBC: CPT

## 2022-02-21 PROCEDURE — 65660000001 HC RM ICU INTERMED STEPDOWN

## 2022-02-21 PROCEDURE — 96374 THER/PROPH/DIAG INJ IV PUSH: CPT

## 2022-02-21 PROCEDURE — 74011000636 HC RX REV CODE- 636: Performed by: EMERGENCY MEDICINE

## 2022-02-21 PROCEDURE — 82962 GLUCOSE BLOOD TEST: CPT

## 2022-02-21 PROCEDURE — 80053 COMPREHEN METABOLIC PANEL: CPT

## 2022-02-21 PROCEDURE — 99285 EMERGENCY DEPT VISIT HI MDM: CPT

## 2022-02-21 PROCEDURE — 74011250636 HC RX REV CODE- 250/636: Performed by: EMERGENCY MEDICINE

## 2022-02-21 PROCEDURE — 85610 PROTHROMBIN TIME: CPT

## 2022-02-21 PROCEDURE — 74011000258 HC RX REV CODE- 258: Performed by: EMERGENCY MEDICINE

## 2022-02-21 PROCEDURE — 70450 CT HEAD/BRAIN W/O DYE: CPT

## 2022-02-21 PROCEDURE — 87635 SARS-COV-2 COVID-19 AMP PRB: CPT

## 2022-02-21 PROCEDURE — 70496 CT ANGIOGRAPHY HEAD: CPT

## 2022-02-21 PROCEDURE — 84484 ASSAY OF TROPONIN QUANT: CPT

## 2022-02-21 RX ORDER — DEXAMETHASONE SODIUM PHOSPHATE 10 MG/ML
10 INJECTION INTRAMUSCULAR; INTRAVENOUS
Status: COMPLETED | OUTPATIENT
Start: 2022-02-21 | End: 2022-02-21

## 2022-02-21 RX ORDER — DEXAMETHASONE SODIUM PHOSPHATE 4 MG/ML
6 INJECTION, SOLUTION INTRA-ARTICULAR; INTRALESIONAL; INTRAMUSCULAR; INTRAVENOUS; SOFT TISSUE
Status: DISCONTINUED | OUTPATIENT
Start: 2022-02-21 | End: 2022-02-21

## 2022-02-21 RX ORDER — TRAMADOL HYDROCHLORIDE 50 MG/1
TABLET ORAL
COMMUNITY
Start: 2021-12-15 | End: 2022-02-21 | Stop reason: ALTCHOICE

## 2022-02-21 RX ORDER — TORSEMIDE 100 MG/1
100 TABLET ORAL DAILY
COMMUNITY
Start: 2021-08-30

## 2022-02-21 RX ORDER — SPIRONOLACTONE 25 MG/1
25 TABLET ORAL
COMMUNITY
Start: 2021-06-24 | End: 2022-02-21 | Stop reason: ALTCHOICE

## 2022-02-21 RX ORDER — CARVEDILOL 6.25 MG/1
6.25 TABLET ORAL 2 TIMES DAILY
Status: ON HOLD | COMMUNITY
Start: 2022-02-14 | End: 2022-02-25 | Stop reason: SDUPTHER

## 2022-02-21 RX ORDER — FUROSEMIDE 40 MG/1
TABLET ORAL
COMMUNITY
End: 2022-02-21 | Stop reason: ALTCHOICE

## 2022-02-21 RX ORDER — LISINOPRIL 20 MG/1
TABLET ORAL
COMMUNITY
End: 2022-02-21 | Stop reason: ALTCHOICE

## 2022-02-21 RX ADMIN — DEXAMETHASONE SODIUM PHOSPHATE 10 MG: 10 INJECTION INTRAMUSCULAR; INTRAVENOUS at 16:42

## 2022-02-21 RX ADMIN — LEVETIRACETAM 1000 MG: 100 INJECTION, SOLUTION INTRAVENOUS at 17:04

## 2022-02-21 RX ADMIN — IOPAMIDOL 100 ML: 755 INJECTION, SOLUTION INTRAVENOUS at 16:53

## 2022-02-21 NOTE — ED PROVIDER NOTES
HPI   58-year-old male with a past medical history of CHF and hypertension who presents to the emergency department due to episodic periods of lightheadedness, headache, and left lower extremity weakness. Patient has had these episodes for the last 2 days. He says whenever he stands up he notices lightheadedness and that his right ear is \"swooshing\". When he has the symptoms and tries to walk he also notes that he drags his left leg across the floor. His symptoms last for about 5 minutes at a time and then resolved. Last time he had symptoms was 1 hour prior to arrival. He has no associated shortness of breath or chest pain with this. He denies vertigo. He denies any speech deficits or weakness in his other extremities. He denies any numbness. Otherwise patient has no complaints. Past Medical History:   Diagnosis Date    Heart failure ([facility] [address]     Hypertension        History reviewed. No pertinent surgical history. History reviewed. No pertinent family history. Social History     Socioeconomic History    Marital status: SINGLE     Spouse name: Not on file    Number of children: Not on file    Years of education: Not on file    Highest education level: Not on file   Occupational History    Not on file   Tobacco Use    Smoking status: Never Smoker    Smokeless tobacco: Never Used   Substance and Sexual Activity    Alcohol use: Yes     Alcohol/week: 0.8 standard drinks     Types: 1 Cans of beer per week     Comment: rare    Drug use: No    Sexual activity: Not on file   Other Topics Concern    Not on file   Social History Narrative    Not on file     Social Determinants of Health     Financial Resource Strain:     Difficulty of Paying Living Expenses: Not on file   Food Insecurity:     Worried About Running Out of Food in the Last Year: Not on file    Rodrick of Food in the Last Year: Not on file   Transportation Needs:     Lack of Transportation (Medical):  Not on file    Lack of Transportation (Non-Medical): Not on file   Physical Activity:     Days of Exercise per Week: Not on file    Minutes of Exercise per Session: Not on file   Stress:     Feeling of Stress : Not on file   Social Connections:     Frequency of Communication with Friends and Family: Not on file    Frequency of Social Gatherings with Friends and Family: Not on file    Attends Restoration Services: Not on file    Active Member of [address] [place] [address] or Organizations: Not on file    Attends Club or Organization Meetings: Not on file    Marital Status: Not on file   Intimate Partner Violence:     Fear of Current or Ex-Partner: Not on file    Emotionally Abused: Not on file    Physically Abused: Not on file    Sexually Abused: Not on file   Housing Stability:     Unable to Pay for Housing in the Last Year: Not on file    Number of [place] in the Last Year: Not on file    Unstable Housing in the Last Year: Not on file         ALLERGIES: Patient has no known allergies. Review of Systems   A complete review of systems was performed and all systems reviewed are negative unless otherwise documented in the HPI. Vitals:    02/21/22 1533 02/21/22 1600   BP: 133/83 (!) 153/81   Pulse: 74 71   Resp: 20 17   Temp: 98.9 °F (37.2 °C)    SpO2: 98% 99%   Weight: 142.4 kg (314 lb)    Height: 5' 4\" (1.626 m)             Physical Exam  Constitutional:       Comments: Resting comfortably in no acute distress   HENT:      Head: Normocephalic and atraumatic. Right Ear: Tympanic membrane normal.      Left Ear: Tympanic membrane normal.   Eyes:      Comments: Pupils are 3 mm and reactive to light bilaterally. No ophthalmoplegia. Extraocular movements intact. No nystagmus   Neck:      Comments: Trachea midline. No JVD  Cardiovascular:      Comments: Regular rate and rhythm without murmurs. 2+ radial pulses bilaterally  Pulmonary:      Effort: Pulmonary effort is normal. No respiratory distress.       Breath sounds: Normal breath sounds. No wheezing or rales. Abdominal:      General: There is no distension. Palpations: Abdomen is soft. Tenderness: There is no abdominal tenderness. Musculoskeletal:         General: Normal range of motion. Right lower leg: No edema. Left lower leg: No edema. Skin:     General: Skin is warm and dry. Neurological:      Comments: Awake and alert. Speech is normal. GCS 15. Normal speech. No facial droop. No pronator drift. No dysmetria. Sensation grossly intact. Possible subtle weakness with left hip flexion, though the patient says he has lower back pain when he flexes his hip. NIH stroke scale of 0. MDM   25-year-old man who presents with the above chief complaint. Vitals are stable. New level 1 code stroke was called in triage, but on my exam he has no appreciable neurologic deficits apart from some possible subtle left lower extremity weakness. CT scan shows what appears to be a large right-sided brain mass with significant edema in the right frontal lobe and there is report of subfalcine and uncal herniation as well as leftward shift on the midline. However clinically the patient is not herniating. Neurosurgery will be consulted. Teleneurology is also requesting a CTA. They recommended Decadron as well as Keppra. Patient will require transfer to [facility]. I spoke with Dr. Angela Dhaliwal from neurosurgery recommended 10 mg of Decadron as well as 1 g of IV Keppra which was ordered. He recommended admission to [facility]. ICU was not recommended. Patient accepted by Dr. Eleni Wiley. Patient stable condition at the time of transfer.     Procedures

## 2022-02-21 NOTE — ED TRIAGE NOTES
Pt reports dizziness x2 days with associated right ear \"swooshing\" with left leg dragging. Reports sx will lasts 5 minutes then resolve. No prior h/o CVA or TIA. Last episode of above sx was 1 hours PTA. Pt denies dizziness, CP, SOB or other sx currently. Signs and symptoms: above   VAN score: Negative  Last Known Well: 1430  Blood Glucose (EMS acceptable): 102mg/dl  Blood Pressure (EMS acceptable): 133/83  Anticoagulants: None     Code Stroke Level 1 initiated at this time 1531. SBAR handoff provided to RN. Pt transported to CT scan via w/c at 1536.

## 2022-02-21 NOTE — ED NOTES
Interm[place]net dizziness, headache and \"swishing\" in his ears for the past month. Left leg weakness/heaviness when experiencing the symptoms. Pt ambulatory without assistance in the treatment room.

## 2022-02-21 NOTE — Clinical Note
TRANSFER - OUT REPORT:     Verbal report given to: Argenis Priest RN . Report consisted of patient's Situation, Background, Assessment and   Recommendations(SBAR). Opportunity for questions and clarification was provided. Patient transported with a Registered Nurse. Patient transported to: recovery.

## 2022-02-21 NOTE — ED NOTES
No changes in pt's condition since arrival.  Pt sitting up on the stretcher watching tv, texting and talking with family at bedside. Awaiting bed assignment for transfer to [facility]. Call bell within reach.

## 2022-02-21 NOTE — Clinical Note
TRANSFER - IN REPORT:     Verbal report received from: Ihsan Salmon RN . Report consisted of patient's Situation, Background, Assessment and   Recommendations(SBAR). Opportunity for questions and clarification was provided. Assessment completed upon patient's arrival to unit and care assumed. Patient transported with a Registered Nurse.

## 2022-02-22 ENCOUNTER — APPOINTMENT (OUTPATIENT)
Dept: CT IMAGING | Age: 47
DRG: 058 | End: 2022-02-22
Attending: NURSE PRACTITIONER
Payer: MEDICAID

## 2022-02-22 ENCOUNTER — APPOINTMENT (OUTPATIENT)
Dept: MRI IMAGING | Age: 47
DRG: 058 | End: 2022-02-22
Attending: STUDENT IN AN ORGANIZED HEALTH CARE EDUCATION/TRAINING PROGRAM
Payer: MEDICAID

## 2022-02-22 PROBLEM — R73.03 PREDIABETES: Status: ACTIVE | Noted: 2022-02-22

## 2022-02-22 PROBLEM — E66.01 MORBID OBESITY (HCC): Status: ACTIVE | Noted: 2022-02-22

## 2022-02-22 PROBLEM — I10 HYPERTENSION: Status: ACTIVE | Noted: 2022-02-22

## 2022-02-22 PROBLEM — I50.22 HEART FAILURE WITH MID-RANGE EJECTION FRACTION (HCC): Status: ACTIVE | Noted: 2022-02-22

## 2022-02-22 PROBLEM — E78.5 HYPERLIPIDEMIA: Status: ACTIVE | Noted: 2022-02-22

## 2022-02-22 LAB
ALBUMIN SERPL-MCNC: 3.2 G/DL (ref 3.5–5)
ALBUMIN/GLOB SERPL: 0.8 {RATIO} (ref 1.1–2.2)
ALP SERPL-CCNC: 52 U/L (ref 45–117)
ALT SERPL-CCNC: 23 U/L (ref 12–78)
ANION GAP SERPL CALC-SCNC: 3 MMOL/L (ref 5–15)
AST SERPL-CCNC: 19 U/L (ref 15–37)
ATRIAL RATE: 65 BPM
BILIRUB SERPL-MCNC: 0.9 MG/DL (ref 0.2–1)
BUN SERPL-MCNC: 14 MG/DL (ref 6–20)
BUN/CREAT SERPL: 14 (ref 12–20)
CALCIUM SERPL-MCNC: 9 MG/DL (ref 8.5–10.1)
CALCULATED P AXIS, ECG09: 51 DEGREES
CALCULATED R AXIS, ECG10: -16 DEGREES
CALCULATED T AXIS, ECG11: -148 DEGREES
CHLORIDE SERPL-SCNC: 103 MMOL/L (ref 97–108)
CO2 SERPL-SCNC: 30 MMOL/L (ref 21–32)
CREAT SERPL-MCNC: 0.99 MG/DL (ref 0.7–1.3)
DIAGNOSIS, 93000: NORMAL
ERYTHROCYTE [DISTWIDTH] IN BLOOD BY AUTOMATED COUNT: 12.1 % (ref 11.5–14.5)
GLOBULIN SER CALC-MCNC: 4 G/DL (ref 2–4)
GLUCOSE SERPL-MCNC: 140 MG/DL (ref 65–100)
HCT VFR BLD AUTO: 48.1 % (ref 36.6–50.3)
HGB BLD-MCNC: 15.4 G/DL (ref 12.1–17)
MAGNESIUM SERPL-MCNC: 2.2 MG/DL (ref 1.6–2.4)
MCH RBC QN AUTO: 30.1 PG (ref 26–34)
MCHC RBC AUTO-ENTMCNC: 32 G/DL (ref 30–36.5)
MCV RBC AUTO: 94.1 FL (ref 80–99)
NRBC # BLD: 0 K/UL (ref 0–0.01)
NRBC BLD-RTO: 0 PER 100 WBC
P-R INTERVAL, ECG05: 146 MS
PHOSPHATE SERPL-MCNC: 2.7 MG/DL (ref 2.6–4.7)
PLATELET # BLD AUTO: 298 K/UL (ref 150–400)
PMV BLD AUTO: 11.5 FL (ref 8.9–12.9)
POTASSIUM SERPL-SCNC: 3.7 MMOL/L (ref 3.5–5.1)
PROT SERPL-MCNC: 7.2 G/DL (ref 6.4–8.2)
Q-T INTERVAL, ECG07: 412 MS
QRS DURATION, ECG06: 90 MS
QTC CALCULATION (BEZET), ECG08: 428 MS
RBC # BLD AUTO: 5.11 M/UL (ref 4.1–5.7)
SODIUM SERPL-SCNC: 136 MMOL/L (ref 136–145)
VENTRICULAR RATE, ECG03: 65 BPM
WBC # BLD AUTO: 12.3 K/UL (ref 4.1–11.1)

## 2022-02-22 PROCEDURE — 74011000250 HC RX REV CODE- 250: Performed by: STUDENT IN AN ORGANIZED HEALTH CARE EDUCATION/TRAINING PROGRAM

## 2022-02-22 PROCEDURE — 97161 PT EVAL LOW COMPLEX 20 MIN: CPT

## 2022-02-22 PROCEDURE — 74011000250 HC RX REV CODE- 250: Performed by: HOSPITALIST

## 2022-02-22 PROCEDURE — 83735 ASSAY OF MAGNESIUM: CPT

## 2022-02-22 PROCEDURE — 65660000001 HC RM ICU INTERMED STEPDOWN

## 2022-02-22 PROCEDURE — 74011250636 HC RX REV CODE- 250/636: Performed by: NURSE PRACTITIONER

## 2022-02-22 PROCEDURE — 97530 THERAPEUTIC ACTIVITIES: CPT

## 2022-02-22 PROCEDURE — 84100 ASSAY OF PHOSPHORUS: CPT

## 2022-02-22 PROCEDURE — 71260 CT THORAX DX C+: CPT

## 2022-02-22 PROCEDURE — 70553 MRI BRAIN STEM W/O & W/DYE: CPT

## 2022-02-22 PROCEDURE — 74011250636 HC RX REV CODE- 250/636

## 2022-02-22 PROCEDURE — 85027 COMPLETE CBC AUTOMATED: CPT

## 2022-02-22 PROCEDURE — 74011250637 HC RX REV CODE- 250/637: Performed by: STUDENT IN AN ORGANIZED HEALTH CARE EDUCATION/TRAINING PROGRAM

## 2022-02-22 PROCEDURE — 74011250637 HC RX REV CODE- 250/637: Performed by: HOSPITALIST

## 2022-02-22 PROCEDURE — 36415 COLL VENOUS BLD VENIPUNCTURE: CPT

## 2022-02-22 PROCEDURE — A9576 INJ PROHANCE MULTIPACK: HCPCS

## 2022-02-22 PROCEDURE — 97116 GAIT TRAINING THERAPY: CPT

## 2022-02-22 PROCEDURE — 74177 CT ABD & PELVIS W/CONTRAST: CPT

## 2022-02-22 PROCEDURE — 77010033678 HC OXYGEN DAILY

## 2022-02-22 PROCEDURE — 74011000636 HC RX REV CODE- 636: Performed by: RADIOLOGY

## 2022-02-22 PROCEDURE — 80053 COMPREHEN METABOLIC PANEL: CPT

## 2022-02-22 PROCEDURE — 74011250636 HC RX REV CODE- 250/636: Performed by: STUDENT IN AN ORGANIZED HEALTH CARE EDUCATION/TRAINING PROGRAM

## 2022-02-22 PROCEDURE — 97165 OT EVAL LOW COMPLEX 30 MIN: CPT

## 2022-02-22 RX ORDER — ACETAMINOPHEN 325 MG/1
650 TABLET ORAL
Status: DISCONTINUED | OUTPATIENT
Start: 2022-02-22 | End: 2022-02-25 | Stop reason: HOSPADM

## 2022-02-22 RX ORDER — LORAZEPAM 2 MG/ML
2 INJECTION INTRAMUSCULAR
Status: DISCONTINUED | OUTPATIENT
Start: 2022-02-22 | End: 2022-02-25 | Stop reason: HOSPADM

## 2022-02-22 RX ORDER — DEXAMETHASONE SODIUM PHOSPHATE 4 MG/ML
10 INJECTION, SOLUTION INTRA-ARTICULAR; INTRALESIONAL; INTRAMUSCULAR; INTRAVENOUS; SOFT TISSUE EVERY 6 HOURS
Status: DISCONTINUED | OUTPATIENT
Start: 2022-02-22 | End: 2022-02-22

## 2022-02-22 RX ORDER — CARVEDILOL 6.25 MG/1
6.25 TABLET ORAL 2 TIMES DAILY
Status: DISCONTINUED | OUTPATIENT
Start: 2022-02-22 | End: 2022-02-24

## 2022-02-22 RX ORDER — TORSEMIDE 100 MG/1
100 TABLET ORAL DAILY
Status: DISCONTINUED | OUTPATIENT
Start: 2022-02-22 | End: 2022-02-25 | Stop reason: HOSPADM

## 2022-02-22 RX ORDER — DEXAMETHASONE SODIUM PHOSPHATE 4 MG/ML
4 INJECTION, SOLUTION INTRA-ARTICULAR; INTRALESIONAL; INTRAMUSCULAR; INTRAVENOUS; SOFT TISSUE EVERY 6 HOURS
Status: DISCONTINUED | OUTPATIENT
Start: 2022-02-22 | End: 2022-02-24

## 2022-02-22 RX ORDER — SODIUM CHLORIDE 0.9 % (FLUSH) 0.9 %
10 SYRINGE (ML) INJECTION
Status: COMPLETED | OUTPATIENT
Start: 2022-02-22 | End: 2022-02-22

## 2022-02-22 RX ADMIN — IOHEXOL 50 ML: 240 INJECTION, SOLUTION INTRATHECAL; INTRAVASCULAR; INTRAVENOUS; ORAL at 22:00

## 2022-02-22 RX ADMIN — FAMOTIDINE 20 MG: 10 INJECTION INTRAVENOUS at 09:18

## 2022-02-22 RX ADMIN — SODIUM CHLORIDE, PRESERVATIVE FREE 10 ML: 5 INJECTION INTRAVENOUS at 17:46

## 2022-02-22 RX ADMIN — ACETAMINOPHEN 650 MG: 325 TABLET ORAL at 09:31

## 2022-02-22 RX ADMIN — DEXAMETHASONE SODIUM PHOSPHATE 10 MG: 4 INJECTION, SOLUTION INTRA-ARTICULAR; INTRALESIONAL; INTRAMUSCULAR; INTRAVENOUS; SOFT TISSUE at 05:30

## 2022-02-22 RX ADMIN — DEXAMETHASONE SODIUM PHOSPHATE 4 MG: 4 INJECTION, SOLUTION INTRA-ARTICULAR; INTRALESIONAL; INTRAMUSCULAR; INTRAVENOUS; SOFT TISSUE at 18:28

## 2022-02-22 RX ADMIN — IOPAMIDOL 100 ML: 755 INJECTION, SOLUTION INTRAVENOUS at 23:46

## 2022-02-22 RX ADMIN — GADOTERIDOL 20 ML: 279.3 INJECTION, SOLUTION INTRAVENOUS at 17:46

## 2022-02-22 RX ADMIN — CARVEDILOL 6.25 MG: 6.25 TABLET, FILM COATED ORAL at 09:31

## 2022-02-22 RX ADMIN — DEXAMETHASONE SODIUM PHOSPHATE 4 MG: 4 INJECTION, SOLUTION INTRA-ARTICULAR; INTRALESIONAL; INTRAMUSCULAR; INTRAVENOUS; SOFT TISSUE at 12:37

## 2022-02-22 RX ADMIN — CARVEDILOL 6.25 MG: 6.25 TABLET, FILM COATED ORAL at 18:29

## 2022-02-22 RX ADMIN — FAMOTIDINE 20 MG: 10 INJECTION INTRAVENOUS at 22:01

## 2022-02-22 NOTE — ACP (ADVANCE CARE PLANNING)
Advance Care Planning   Advance Care Planning Inpatient Note  [address]    Today's Date: 2022  Unit: [facility] 6S NEURO-SCI TELE    Received request from admission screening. Upon review of chart and communication with care team, patient's decision making abilities are not in question. Patient and family was/were present in the room during visit. Goals of ACP Conversation:  Discuss Advance Care planning documents    Health Care Decision Makers:    No healthcare decision makers have been documented. Click here to complete [employer] including selection of the Healthcare Decision Maker Relationship (ie \"Primary\")    Summary:  No Decision Maker named by patient at this time    Advance Care Planning Documents (Patient Wishes) on file:  None     Assessment:    In basket request for AMD in Room 653. Patient was sitting up in his chair eating and his mother, brother, sister, and other family member were present during the visit. Patient and family members shared in their [place] carroll beliefs. Laurence Grimm shared that he had an Uncle that was a . Very upbeat and positive visit. Laurence Grimm shared that he was interested in AMD, but need more explanation of the documents and process.  explained Advance Directive documentation with family present. VA hierarchy was explained. At this time, Laurence Grimm decided that he wanted to go over the documents with family and requested  return today to complete. Advised of  availability. Interventions:  Provided education on documents for clarity and greater understanding  Discussed and provided education on state decision maker hierarchy  Encouraged ongoing ACP conversation with future decision makers and loved ones  Reviewed but did not complete ACP document    Care Preferences Communicated:  No    Outcomes/Plan:  ACP Discussion Postponed    Rev.  Myrla Apgar, [address] on 2022 at 4:39 PM

## 2022-02-22 NOTE — ED NOTES
TRANSFER - OUT REPORT:    Telephone Verbal report given to Martín Norris RN(name) on Bertha Enrique  being transferred to [facility] (unit) for routine progression of care       Report consisted of patients Situation, Background, Assessment and   Recommendations(SBAR). Information from the following report(s) SBAR, ED Summary, Intake/Output, MAR and Recent Results was reviewed with the receiving nurse. Lines:   Peripheral IV 02/21/22 Right Antecubital (Active)        Opportunity for questions and clarification was provided.       Patient transported with:   Monitor

## 2022-02-22 NOTE — H&P
PLEASE NOTE: I HAVE GENERATED THIS NOTE WITH THE ASSISTANCE OF VOICE-RECOGNITION TECHNOLOGY. PLEASE EXCUSE ANY SPELLING, GRAMMATICAL, AND SYNTAX ERRORS YOU MAY FIND. IF YOU NEED CLARIFICATION ON ANYTHING, PLEASE FEEL FREE TO REACH OUT TO ME.  [address] [facility],[address] [facility] Adult  Hospitalist Group  History and Physical - Dr. Nahun Aceves    Primary Care Provider: None  Date of Service:  See Date Note Was Originated    Chief Complaint: Weakness    Subjective:     51 y.o. male presents with couple of days duration of gradual onset, gradually worsening, severe, constant, left lower extremity weakness that is associated with lightheadedness. Patient denies exacerbating features  and denies remitting features. Review of Systems:  12 point ROS obtained and otherwise negative, except as per HPI and above. Past Medical History:   Diagnosis Date    Heart failure ([address]     Hypertension       No past surgical history on file. Patient denies surgical history  Prior to Admission medications    Medication Sig Start Date End Date Taking? Authorizing Provider   carvediloL (COREG) 6.25 mg tablet Take 6.25 mg by mouth two (2) times a day. 2/14/22   Other, MD Edwin   torsemide (DEMADEX) 100 mg tablet Take 100 mg by mouth. 8/30/21   Other, MD Edwin     No Known Allergies   No family history on file.   Patient denies any family history of dizziness  SOCIAL HISTORY:    Smoking history: Patient denies  Alcohol history: Patient denies    Objective:     Physical Exam:     VS as below    Const'l:          Morbidly obese body habitus, a&o, no acute distress  Head/Neck:       neck supple, no jvd, trachea midline, carotid midline, no cervical/head mass  Eyes:     luis alfredo, nonicteric sclera, eom intact  ENT:      auditory acuity grossly intact, no nasal deformity  Cardio:           Regular rate regular rhythm, no murmurs/rubs/gallops, no carotid bruit, normal s1, s2  Pulm:     no accessory muscle use, equal normal breath sounds bilaterally, clear tab  Abd:       Soft, nontender, nondistended normal bowel sounds x 4 quadrants, no palpable masses  Derm:     no rashes, no ulcers, no lesions  Extr:      No edema, no cyanosis, no calf tenderness, no varicosities  Neuro:    cn II-XII grossly intact, muscle strength intact, sensation intact  Psych:   mood intact, judgement intact    Data Review: All diagnostic labs and studies have been reviewed.     CT head shows mass    Assessment:     Principal Problem:    Brain mass (2/21/2022)    Active Problems:    Hypertension (2/22/2022)      Hyperlipidemia (2/22/2022)      Morbid obesity ([address] (2/22/2022)      Prediabetes (2/22/2022)        Plan:     Respect to the patient's acute brain mass, I have placed the order for an MRI with and without contrast; the patient is on Decadron 10 every 6, and the patient was loaded with Keppra over at [facility]; I also placed neurosurgical consult    Respect to the patient's hypertension, will continue the patient's home medications    Respect to the patient's hyperlipidemia, will continue patient's home medications    Respect the patient's prediabetes, there is no acute intervention    Signed By: Aden Reis DO

## 2022-02-22 NOTE — PROGRESS NOTES
Neurosurgery    Dr. Braulio Brewer to dictate consult. Pt needs MRI brain with and without contrast as well as CT C/A/P for staging to look for primary site of disease. Will make further recs after MRI is completed. [id number] [address] for patient to eat today. Decadron 4 mg q6h.     Roxanne Pena, NP

## 2022-02-22 NOTE — PROGRESS NOTES
Follow up visit to complete AMD in Room 653. Patient was sitting up in his chair alone present during the visit. Trevon completed Advance documentation in alignment of his values and beliefs; original and copies supplied to patient. Trevon verbal expressed his appreciation . Facilitated completion of Advance Directive documentation. Advised of  availability. Visited by: Jovita Loya.  Elise Tiwari, [address] [place] [address] paging Service [phone])

## 2022-02-22 NOTE — PROGRESS NOTES
Transition of Care  1. RUR 6%  2. Disposition     Home w/family vs rehab post neurosurgery   3. Transportation  Family   4. Follow Up PCP/Specialist     Reason for Admission:  Weakness                     RUR Score:   6%                  Plan for utilizing home health:   N/a at this time pending eval post surgery      PCP: First and Last name:  Dr. Toni Kelsey      Name of Practice: [facility]   Are you a current patient: Yes/No: yes    Approximate date of last visit: n/a   Can you participate in a virtual visit with your PCP: n/a                    Current Advanced Directive/Advance Care Plan: No Order      Healthcare Decision Maker:   Click here to complete [address] including selection of the Healthcare Decision Maker Relationship (ie \"Primary\")                             Transition of Care Plan:                      CM verified demographics- CM updating new home/cell#, emergency contact, insurance, PCP. Dr. Toni Kelsey. CM will provide patient with new PCP list in area per pt request.      Living situation:  Home w/parents and child /seven steps to enter home, no steps inside home. ADL's: Independent   DME:  none  Pharmacy: [facility] [address] [place] [address] Rd  Discharge transportation: Family   No previous Home Health/IPR/PT/OT     CM to contact Pastoral Care per pt request for Advanced Directive. Contacted 10:50am-spoke with [address] Management Interventions  PCP Verified by CM: Yes (Dr. Toni Kelsey. Pt wants new PCP)  Mode of Transport at Discharge: Other (see comment) (family )  Transition of Care Consult (CM Consult):  (home)  Physical Therapy Consult: Yes  Occupational Therapy Consult: Yes  Speech Therapy Consult: No  Support Systems: Parent(s),Child(honey)  Confirm Follow Up Transport: Family  Discharge Location  Patient Expects to be Discharged to[de-identified]  (home)      to monitor for any needs.         April Dominguez RN

## 2022-02-22 NOTE — PROGRESS NOTES
Spiritual Care Assessment/Progress Note  [address]      NAME: Munir Kirkland      MRN: [id number]  AGE: 51 y.o. SEX: male  Hindu Affiliation: Aditi Gong   Language: English     2/22/2022     Total Time (in minutes): 48     Spiritual Assessment [facility] through conversation with:         [x]Patient        [x] Family    [] Friend(s)        Reason for Consult: Advance medical directive consult,Affirmation of carroll,Initial/Spiritual assessment, patient floor     Spiritual beliefs: (Please include comment if needed)     [] Identifies with a carroll tradition:         [] Supported by a carroll community:            [] Claims no spiritual orientation:           [] Seeking spiritual identity:                [] Adheres to an individual form of spirituality:           [] Not able to assess:                           Identified resources for coping:      [x] Prayer                               [] Music                  [] Guided Imagery     [x] Family/friends                 [] Pet visits     [] Devotional reading                         [] Unknown     [] Other:                                               Interventions offered during this visit: (See comments for more details)    Patient Interventions: Advance medical directive consult,Affirmation of emotions/emotional suffering,Affirmation of carroll,Coping skills reviewed/reinforced,Initial/Spiritual assessment, patient floor,Life review/legacy,Prayer (actual),Prayer (assurance of)     Family/Friend(s):  Affirmation of emotions/emotional suffering,Affirmation of carroll,Coping skills reviewed/reinforced,Initial Assessment,Prayer (actual),Prayer (assurance of),Life review/legacy     Plan of Care:     [x] Support spiritual and/or cultural needs    [x] Support AMD and/or advance care planning process      [] Support grieving process   [] Coordinate Rites and/or Rituals    [] Coordination with community clergy   [] No spiritual needs identified at this time   [] Detailed Plan of Care below (See Comments)  [] Make referral to Music Therapy  [] Make referral to Pet Therapy     [] Make referral to Addiction services  [] Make referral to [facility]  [] Make referral to Spiritual Care Partner  [] No future visits requested        [] Contact Spiritual Care for further referrals     Comments: In basket request for AMD in Room 653. Patient was sitting up in his chair eating and his mother, brother, sister, and other family member were present during the visit. Patient and family members shared in their [facility] carroll beliefs. José Miguel Monreal shared that he had an Uncle that was a . Very upbeat and positive visit. José Miguel Monreal shared that he was interested in AMD, but need more explanation of the documents and process.  explained Advance Directive documentation with family present. VA hierarchy was explained. At this time, José Miguel Monreal decided that he wanted to go over the documents with family and requested  return today to complete. Advised of  availability. Visited by: Betsy Matute, [address] [place] [address] Road paging Service [phone])

## 2022-02-22 NOTE — ED NOTES
Attempted to call report to [facility], unable to receive at this time    [facility] at bedside to transport     Nursing supervisor made aware

## 2022-02-22 NOTE — ED NOTES
Alissa faxed to [facility] Detail Level: Zone Detail Level: Generalized Detail Level: Detailed

## 2022-02-22 NOTE — PROGRESS NOTES
PHYSICAL THERAPY EVALUATION WITH DISCHARGE  Patient: Jessica Shannon (15 y.o. male)  Date: 2/22/2022  Primary Diagnosis: Brain mass [G93.89]       Precautions:   Fall      ASSESSMENT  Based on the objective data described below, the patient presents with reports of headache \"swishing in ears,\" and LLE dragging with admission on 2/21. Head CT: Large enhancing right frontoparietal mass with surrounding vasogenic edema and right-to-left subfalcine herniation. Neurosurgery consult still pending. Pt received seated EOB and agreeable to therapy. Pt tolerated evaluation well. Pt demonstrated good and equal LE strength, except L hip flexion 4+/5, but with heightened focus was able to improve to 5/5. Pt tolerated transfers independently without AD and tolerated gait training x 300 ft with supervision demonstrating accelerated gait speed. Pt able to complete head turns without LOB or increase in any symptoms. Pt does not require any further acute PT needs at this time. Please reconsult pending hospital course/POC. Functional Outcome Measure: The patient scored Total: 55/56 on the Harrington Balance Assessment which is indicative of low fall risk. Other factors to consider for discharge: awaiting neurosx consult     Further skilled acute physical therapy is not indicated at this time. PLAN :  Recommendation for discharge: (in order for the patient to meet his/her long term goals)  No skilled physical therapy/ follow up rehabilitation needs identified at this time. This discharge recommendation:  Has been made in collaboration with the attending provider and/or case management    IF patient discharges home will need the following DME: none         SUBJECTIVE:   Patient stated I am feeling a whole lot better! Markos Washington    OBJECTIVE DATA SUMMARY:   HISTORY:    Past Medical History:   Diagnosis Date    Heart failure ([address]     Hypertension    No past surgical history on file.     Prior level of function: independent, ambulatory without AD, lives with parents  Personal factors and/or comorbidities impacting plan of care:     Home Situation  Home Environment: Private residence  # Steps to Enter: 7  One/Two Story Residence: One story  Living Alone: No  Support Systems: Parent(s)  Patient Expects to be Discharged to[de-identified]  (home)  Current DME Used/Available at Home: None    EXAMINATION/PRESENTATION/DECISION MAKING:   Critical Behavior:  Neurologic State: Alert  Orientation Level: Oriented X4  Cognition: Appropriate decision making,Appropriate safety awareness,Follows commands     Hearing:     Skin:    Edema:   Range Of Motion:  AROM: Within functional limits           PROM: Within functional limits           Strength:    Strength:  Within functional limits (L hip flexion 4+/5)                    Tone & Sensation:   Tone: Normal              Sensation: Intact               Coordination:  Coordination: Within functional limits  Vision:      Functional Mobility:  Bed Mobility:              Transfers:  Sit to Stand: Independent  Stand to Sit: Independent                       Balance:   Sitting: Intact  Standing: Intact  Ambulation/Gait Training:  Distance (ft): 300 Feet (ft)  Assistive Device: Gait belt  Ambulation - Level of Assistance: Supervision                 Base of Support: Widened     Speed/Ana: Accelerated         Functional Measure  Harrington Balance Test:    Sitting to Standin  Standing Unsupported: 4  Sitting with Back Unsupported: 4  Standing to Sittin  Transfers: 4  Standing Unsupported with Eyes Closed: 4  Standing Unsupported with Feet Together: 4  Reach Forward with Outstretched Arm: 4   Object: 4  Turn to Look Over Shoulders: 4  Turn 360 Degrees: 4  Alternate Foot on Step/Stool: 4  Standing Unsupported One Foot in Front: 4  Stand on One Leg: 3  Total: 55/56         56=Maximum possible score;   0-20=High fall risk  21-40=Moderate fall risk   41-56=Low fall risk        Based on the above components, the patient evaluation is determined to be of the following complexity level: LOW     Pain Rating:  Pt denied pain    Activity Tolerance:   Good    After treatment patient left in no apparent distress:   Sitting in chair, Call bell within reach and Caregiver / family present    COMMUNICATION/EDUCATION:   The patients plan of care was discussed with: Occupational therapist and Registered nurse. Patient was educated regarding His deficit(s) of LLE weakness, impaired vision as this relates to His diagnosis of R frontal mass. He demonstrated Good understanding. Patient and/or family was verbally educated on the BE FAST acronym for signs/symptoms of CVA and TIA. BE FAST was written on patient's communication board  for visual education and reinforcement. All questions answered with patient indicating good understanding. Fall prevention education was provided and the patient/caregiver indicated understanding., Patient/family have participated as able in goal setting and plan of care. and Patient/family agree to work toward stated goals and plan of care.     Thank you for this referral.  Di Adames, PT, DPT   Time Calculation: 27 mins

## 2022-02-22 NOTE — PROGRESS NOTES
[facility] Adult  Hospitalist Group                                                                                          Hospitalist Progress Note  Fara Castro MD  Answering service: [phone] OR [phone] from in house phone        Date of Service:  2022  NAME:  Marcelo Velazquez  :  1975  MRN:  [id number]      Admission Summary:   79-year-old male with a past medical history of CHF and hypertension who presents to the emergency department due to episodic periods of lightheadedness, headache, and left lower extremity weakness. Patient has had these episodes for the last 2 days. He says whenever he stands up he notices lightheadedness and that his right ear is \"swooshing\". When he has the symptoms and tries to walk he also notes that he drags his left leg across the floor. His symptoms last for about 5 minutes at a time and then resolved. Last time he had symptoms was 1 hour prior to arrival. He has no associated shortness of breath or chest pain with this. He denies vertigo. He denies any speech deficits or weakness in his other extremities. He denies any numbness. Otherwise patient has no complaints. Interval history / Subjective:   F/u Right sided mass   Generalized headache 5/10 non radiating  Comfortably sitting on bed  No nausea, vomiting  Assessment & Plan:     Right frontal lobe mass  Cerebral edema/Mass effect  -CT head  Right frontal lobe mass with significant edema in the right frontal lobe resulting in subfalcine and uncal herniation on the right as well as leftward shift of midline structures  -MRI brain pending  -on Decadron/pepcid  -Keppra 1000 mg IV given  at [facility] neurosurgery    History of CHF (diastolic vs systolic? ? )  History of HTN  -Restart home meds    NPO, cardiac diet once cleared by Neurosurgery     Code status: FULL CODE  Prophylaxis: SCD    Plan: Follow Neurosurgery  Care Plan discussed with: Patient  Anticipated Disposition: TBD Hospital Problems  Date Reviewed: 2/22/2022          Codes Class Noted POA    Hypertension ICD-10-CM: I10  ICD-9-CM: 401.9  2/22/2022 Yes        Hyperlipidemia ICD-10-CM: E78.5  ICD-9-CM: 272.4  2/22/2022 Yes        Morbid obesity ([facility] [address] ICD-10-CM: E66.01  ICD-9-CM: 278.01  2/22/2022 Yes        Prediabetes ICD-10-CM: R73.03  ICD-9-CM: 790.29  2/22/2022 Yes        Brain mass ICD-10-CM: G93.89  ICD-9-CM: 348.89  2/21/2022 Unknown                Review of Systems:   A comprehensive review of systems was negative except for that written in the HPI. Vital Signs:    Last 24hrs VS reviewed since prior progress note. Most recent are:  Visit Vitals  BP (!) 145/90 (BP 1 Location: Left upper arm, BP Patient Position: At rest)   Pulse 78   Temp 97.6 °F (36.4 °C)   Resp 20   SpO2 94%       No intake or output data in the 24 hours ending 02/22/22 0725     Physical Examination:     I had a face to face encounter with this patient and independently examined them on 2/22/2022 as outlined below:          Constitutional:  No acute distress    ENT:  Oral mucosa moist, oropharynx benign. Resp:  CTA bilaterally. No wheezing/rhonchi/rales. No accessory muscle use. CV:  Regular rhythm, normal rate, no murmurs, gallops, rubs    GI:  Soft, non distended, non tender. normoactive bowel sounds, no hepatosplenomegaly     Musculoskeletal:  No edema, warm, 2+ pulses throughout    Neurologic:  Moves all extremities.   AAOx3, CN II-XII reviewed            Data Review:    Review and/or order of clinical lab test      Labs:     Recent Labs     02/22/22  0536 02/21/22  1552   WBC 12.3* 10.3   HGB 15.4 16.8   HCT 48.1 50.5*    313     Recent Labs     02/22/22  0536 02/21/22  1552    138   K 3.7 3.6    101   CO2 30 34*   BUN 14 16   CREA 0.99 1.16   * 106*   CA 9.0 9.3   MG 2.2  --    PHOS 2.7  --      Recent Labs     02/22/22  0536 02/21/22  1552   ALT 23 28   AP 52 64   TBILI 0.9 0.9   TP 7.2 7.8   ALB 3.2* 3.7 GLOB 4.0 4.1*     Recent Labs     02/21/22  1552   INR 1.1   PTP 11.1      No results for input(s): FE, TIBC, PSAT, FERR in the last 72 hours. No results found for: FOL, RBCF   No results for input(s): PH, PCO2, PO2 in the last 72 hours. No results for input(s): CPK, CKNDX, TROIQ in the last 72 hours.     No lab exists for component: CPKMB  No results found for: CHOL, CHOLX, CHLST, CHOLV, HDL, HDLP, LDL, LDLC, DLDLP, TGLX, TRIGL, TRIGP, CHHD, CHHDX  Lab Results   Component Value Date/Time    Glucose (POC) 102 02/21/2022 03:33 PM    Glucose (POC) 81 03/13/2017 08:51 PM     No results found for: COLOR, APPRN, SPGRU, REFSG, MARCELLO, PROTU, GLUCU, KETU, BILU, UROU, COREY, LEUKU, GLUKE, EPSU, BACTU, WBCU, RBCU, CASTS, UCRY      Medications Reviewed:     Current Facility-Administered Medications   Medication Dose Route Frequency    dexamethasone (DECADRON) 4 mg/mL injection 10 mg  10 mg IntraVENous Q6H    famotidine (PF) (PEPCID) 20 mg in 0.9% sodium chloride 10 mL injection  20 mg IntraVENous Q12H    LORazepam (ATIVAN) injection 2 mg  2 mg IntraVENous Q2H PRN     ______________________________________________________________________  EXPECTED LENGTH OF STAY: - - -  ACTUAL LENGTH OF STAY:          1                 Amy Brock MD

## 2022-02-22 NOTE — PROGRESS NOTES
OCCUPATIONAL THERAPY EVALUATION/DISCHARGE  Patient: Sachi Mercedes (35 y.o. male)  Date: 2/22/2022  Primary Diagnosis: Brain mass [G93.89]       Precautions:   Fall    ASSESSMENT  Note patient admitted for neurological work-up d/t episodes of diplopia, LLE dragging, and impaired balance with brain MRI positive for R frontal mass and neurosurgery consult pending. Patient reports these episodes occurred with standing and were preceded by b/l ear \"swooshing. \"  Patient presents today for OT evaluation with slight LLE weakness and mildly impaired far vision. Patient reported no other acute symptoms with activity and ambulated ~300 feet independently. He endorses impaired LB access at baseline and was receptive to education on LB AE. Patient does not require additional OT at this time. Please place a new OT order for re-evaluation if patient has brain surgery. Current Level of Function (ADLs/self-care): modified independent    Functional Outcome Measure: The patient scored 100/100 on the Barthel Index outcome measure which is indicative of ~0% impairment in functional performance. PLAN :    Recommendation for discharge: (in order for the patient to meet his/her long term goals)  No skilled occupational therapy/ follow up rehabilitation needs identified at this time. This discharge recommendation:  Has been made in collaboration with the attending provider and/or case management         SUBJECTIVE:   Patient stated I feel okay now.     OBJECTIVE DATA SUMMARY:   HISTORY:   Past Medical History:   Diagnosis Date    Heart failure ([address]     Hypertension    No past surgical history on file.     Prior Level of Function/Environment/Context: independent, ambulatory without AD, no fall  Expanded or extensive additional review of patient history:   Home Situation  Home Environment: Private residence  # Steps to Enter: 7  One/Two Story Residence: One story  Living Alone: No  Support Systems: Parent(s)  Patient Expects to be Discharged to[de-identified]  (home)  Current DME Used/Available at Home: None    Hand dominance: Right    EXAMINATION OF PERFORMANCE DEFICITS:  Cognitive/Behavioral Status:  Neurologic State: Alert  Orientation Level: Oriented X4  Cognition: Appropriate decision making; Appropriate for age attention/concentration; Appropriate safety awareness; Follows commands  Perception: Appears intact  Perseveration: No perseveration noted  Safety/Judgement: Awareness of environment; Insight into deficits    Skin: visible skin appears intact    Edema: none noted    Vision/Perceptual:    Tracking: Able to track stimulus in all quadrants w/o difficulty              Visual Fields:  (intact)  Diplopia: No    Acuity: Able to read clock/calendar on wall without difficulty; Able to read employee name badge without difficulty (reports mild blurriness with far vision)         Range of Motion:    AROM: Within functional limits  PROM: Within functional limits                      Strength:    Strength: Within functional limits (L hip flexion 4+/5)                Coordination:  Coordination: Within functional limits  Fine Motor Skills-Upper: Left Intact; Right Intact    Gross Motor Skills-Upper: Left Intact; Right Intact    Tone & Sensation:    Tone: Normal  Sensation: Intact                      Balance:  Sitting: Intact  Standing: Intact    Functional Mobility and Transfers for ADLs:    Transfers:  Sit to Stand: Independent  Stand to Sit: Independent    ADL Assessment:  Feeding: Independent (inferred)    Oral Facial Hygiene/Grooming: Independent (inferred)    Bathing: Independent (inferred)    Upper Body Dressing: Independent (inferred)    Lower Body Dressing: Modified independent (inferred, impaired LB reach at baseline, educated on AE)    Toileting: Independent (inferred)                ADL Intervention and task modifications:             Cognitive Retraining  Safety/Judgement: Awareness of environment; Insight into deficits      Functional Measure:    Barthel Index:  Bathin  Bladder: 10  Bowels: 10  Groomin  Dressing: 10  Feeding: 10  Mobility: 15  Stairs: 10  Toilet Use: 10  Transfer (Bed to Chair and Back): 15  Total: 100/100      The Barthel ADL Index: Guidelines  1. The index should be used as a record of what a patient does, not as a record of what a patient could do. 2. The main aim is to establish degree of independence from any help, physical or verbal, however minor and for whatever reason. 3. The need for supervision renders the patient not independent. 4. A patient's performance should be established using the best available evidence. Asking the patient, friends/relatives and nurses are the usual sources, but direct observation and common sense are also important. However direct testing is not needed. 5. Usually the patient's performance over the preceding 24-48 hours is important, but occasionally longer periods will be relevant. 6. Middle categories imply that the patient supplies over 50 per cent of the effort. 7. Use of aids to be independent is allowed. Score Interpretation (from [address] [address])    Independent   60-79 Minimally independent   40-59 Partially dependent   20-39 Very dependent   <20 Totally dependent     -Elda Sheridan., Barthel, D.W. (1965). Functional evaluation: the Barthel Index. [facility] ([address] (1997). The Barthel activities of daily living index: self-reporting versus actual performance in the old (> or = 75 years). Journal of [address] [place] [address] 45(7), [facility], .JJeisonM.F, Shannon Pantoja, [facility]. (). Measuring the change in disability after inpatient rehabilitation; comparison of the responsiveness of the Barthel Index and Functional [place] Measure. Journal of Neurology, Neurosurgery, and Psychiatry, 66(4), 517-368.   -Janet Kaye, N.J.A, MARISOL Brice, & Gerson Feliz MJeisonA. (2004) Assessment of post-stroke quality of life in cost-effectiveness studies: The usefulness of the Barthel Index and the EuroQoL-5D. Quality of Life Research, 15, 617-49         Occupational Therapy Evaluation Charge Determination   History Examination Decision-Making   LOW Complexity : Brief history review  LOW Complexity : 1-3 performance deficits relating to physical, cognitive , or psychosocial skils that result in activity limitations and / or participation restrictions  LOW Complexity : No comorbidities that affect functional and no verbal or physical assistance needed to complete eval tasks       Based on the above components, the patient evaluation is determined to be of the following complexity level: LOW   Pain Rating:  Patient did not report pain    Activity Tolerance:   Good    After treatment patient left in no apparent distress:    Sitting in chair, Call bell within reach, Bed / chair alarm activated and Caregiver / family present    COMMUNICATION/EDUCATION:   The patients plan of care was discussed with: Physical therapist and Registered nurse.      Thank you for this referral.  Ras Trejo OT  Time Calculation: 25 mins

## 2022-02-22 NOTE — ACP (ADVANCE CARE PLANNING)
Advance Care Planning   Advance Care Planning Inpatient Note  [address] Department    Today's Date: 2/22/2022  Unit: [facility] 6S NEURO-SCI TELE    Received request from patient. Upon review of chart and communication with care team, patient's decision making abilities are not in question. Patient was/were present in the room during visit. Goals of ACP Conversation:  Discuss Advance Care planning documents  Facilitate a discussion related to patient's goals of care as they align with the patient's values and beliefs    Health Care Decision Makers:    No healthcare decision makers have been documented. Click here to complete [place] including selection of the Healthcare Decision Maker Relationship (ie \"Primary\")    Summary:  Completed [facility] [address] (Patient Wishes) on file:  Healthcare Power of /Advance Directive appointment of Health care agent  Living Will/ Advance Directive  Anatomical Gift/Organ donation     Assessment:     Follow up visit to complete AMD per patient request. Reviewed Advance Directive documentation again. All questions and concerns addressed. Documentation completed in align with patient values and beliefs. Trevon shared he need to have this completed based on his recent health issues. No spiritual issues noted.      Interventions:  Provided education on documents for clarity and greater understanding  Discussed and provided education on state decision maker hierarchy  Assisted in the completion of documents according to patient's wishes at this time  Encouraged ongoing ACP conversation with future decision makers and loved ones    Care Preferences Communicated:  No    Outcomes/Plan:  New Advance Directive completed  Returned original document(s) to patient, as well as copies for distribution to appointed agents  Copy of Advance Directive given to staff to scan into medical record  Teach Back Method used to verify the patient/Healthcare Decision Maker's understanding of key information in the advance directive documents    Rev.  Ulysses Lloyd [facility] on 2/22/2022 at 4:42 PM

## 2022-02-23 LAB
ANION GAP SERPL CALC-SCNC: 4 MMOL/L (ref 5–15)
BASOPHILS # BLD: 0 K/UL (ref 0–0.1)
BASOPHILS NFR BLD: 0 % (ref 0–1)
BUN SERPL-MCNC: 16 MG/DL (ref 6–20)
BUN/CREAT SERPL: 17 (ref 12–20)
CALCIUM SERPL-MCNC: 9.2 MG/DL (ref 8.5–10.1)
CHLORIDE SERPL-SCNC: 103 MMOL/L (ref 97–108)
CO2 SERPL-SCNC: 27 MMOL/L (ref 21–32)
CREAT SERPL-MCNC: 0.95 MG/DL (ref 0.7–1.3)
DIFFERENTIAL METHOD BLD: ABNORMAL
EOSINOPHIL # BLD: 0 K/UL (ref 0–0.4)
EOSINOPHIL NFR BLD: 0 % (ref 0–7)
ERYTHROCYTE [DISTWIDTH] IN BLOOD BY AUTOMATED COUNT: 12.5 % (ref 11.5–14.5)
GLUCOSE SERPL-MCNC: 179 MG/DL (ref 65–100)
HCT VFR BLD AUTO: 46.6 % (ref 36.6–50.3)
HGB BLD-MCNC: 15.4 G/DL (ref 12.1–17)
IMM GRANULOCYTES # BLD AUTO: 0.1 K/UL (ref 0–0.04)
IMM GRANULOCYTES NFR BLD AUTO: 1 % (ref 0–0.5)
LYMPHOCYTES # BLD: 2.1 K/UL (ref 0.8–3.5)
LYMPHOCYTES NFR BLD: 14 % (ref 12–49)
MCH RBC QN AUTO: 30.8 PG (ref 26–34)
MCHC RBC AUTO-ENTMCNC: 33 G/DL (ref 30–36.5)
MCV RBC AUTO: 93.2 FL (ref 80–99)
MONOCYTES # BLD: 0.8 K/UL (ref 0–1)
MONOCYTES NFR BLD: 6 % (ref 5–13)
NEUTS SEG # BLD: 11.8 K/UL (ref 1.8–8)
NEUTS SEG NFR BLD: 79 % (ref 32–75)
NRBC # BLD: 0 K/UL (ref 0–0.01)
NRBC BLD-RTO: 0 PER 100 WBC
PLATELET # BLD AUTO: 308 K/UL (ref 150–400)
PMV BLD AUTO: 11.6 FL (ref 8.9–12.9)
POTASSIUM SERPL-SCNC: 4 MMOL/L (ref 3.5–5.1)
RBC # BLD AUTO: 5 M/UL (ref 4.1–5.7)
SODIUM SERPL-SCNC: 134 MMOL/L (ref 136–145)
WBC # BLD AUTO: 14.8 K/UL (ref 4.1–11.1)

## 2022-02-23 PROCEDURE — 74011250637 HC RX REV CODE- 250/637: Performed by: HOSPITALIST

## 2022-02-23 PROCEDURE — 83036 HEMOGLOBIN GLYCOSYLATED A1C: CPT

## 2022-02-23 PROCEDURE — 65660000001 HC RM ICU INTERMED STEPDOWN

## 2022-02-23 PROCEDURE — 36415 COLL VENOUS BLD VENIPUNCTURE: CPT

## 2022-02-23 PROCEDURE — 74011000250 HC RX REV CODE- 250: Performed by: STUDENT IN AN ORGANIZED HEALTH CARE EDUCATION/TRAINING PROGRAM

## 2022-02-23 PROCEDURE — 74011250636 HC RX REV CODE- 250/636: Performed by: STUDENT IN AN ORGANIZED HEALTH CARE EDUCATION/TRAINING PROGRAM

## 2022-02-23 PROCEDURE — 74011250637 HC RX REV CODE- 250/637: Performed by: STUDENT IN AN ORGANIZED HEALTH CARE EDUCATION/TRAINING PROGRAM

## 2022-02-23 PROCEDURE — 74011250636 HC RX REV CODE- 250/636: Performed by: NURSE PRACTITIONER

## 2022-02-23 PROCEDURE — 80061 LIPID PANEL: CPT

## 2022-02-23 PROCEDURE — 80048 BASIC METABOLIC PNL TOTAL CA: CPT

## 2022-02-23 PROCEDURE — 85025 COMPLETE CBC W/AUTO DIFF WBC: CPT

## 2022-02-23 RX ORDER — LEVETIRACETAM 500 MG/1
500 TABLET ORAL 2 TIMES DAILY
Status: DISCONTINUED | OUTPATIENT
Start: 2022-02-23 | End: 2022-02-25 | Stop reason: HOSPADM

## 2022-02-23 RX ORDER — LEVETIRACETAM 500 MG/1
500 TABLET ORAL 2 TIMES DAILY
Qty: 60 TABLET | Refills: 0 | Status: SHIPPED | OUTPATIENT
Start: 2022-02-23 | End: 2022-02-25 | Stop reason: SDUPTHER

## 2022-02-23 RX ORDER — FAMOTIDINE 20 MG/1
20 TABLET, FILM COATED ORAL 2 TIMES DAILY
Qty: 28 TABLET | Refills: 0 | Status: SHIPPED | OUTPATIENT
Start: 2022-02-23 | End: 2022-02-28

## 2022-02-23 RX ORDER — DEXAMETHASONE 4 MG/1
4 TABLET ORAL 3 TIMES DAILY
Qty: 30 TABLET | Refills: 0 | Status: SHIPPED | OUTPATIENT
Start: 2022-02-23 | End: 2022-02-25 | Stop reason: SDUPTHER

## 2022-02-23 RX ADMIN — CARVEDILOL 6.25 MG: 6.25 TABLET, FILM COATED ORAL at 17:39

## 2022-02-23 RX ADMIN — DEXAMETHASONE SODIUM PHOSPHATE 4 MG: 4 INJECTION, SOLUTION INTRA-ARTICULAR; INTRALESIONAL; INTRAMUSCULAR; INTRAVENOUS; SOFT TISSUE at 11:56

## 2022-02-23 RX ADMIN — DEXAMETHASONE SODIUM PHOSPHATE 4 MG: 4 INJECTION, SOLUTION INTRA-ARTICULAR; INTRALESIONAL; INTRAMUSCULAR; INTRAVENOUS; SOFT TISSUE at 00:33

## 2022-02-23 RX ADMIN — LEVETIRACETAM 500 MG: 500 TABLET, FILM COATED ORAL at 11:56

## 2022-02-23 RX ADMIN — FAMOTIDINE 20 MG: 10 INJECTION INTRAVENOUS at 21:25

## 2022-02-23 RX ADMIN — DEXAMETHASONE SODIUM PHOSPHATE 4 MG: 4 INJECTION, SOLUTION INTRA-ARTICULAR; INTRALESIONAL; INTRAMUSCULAR; INTRAVENOUS; SOFT TISSUE at 17:38

## 2022-02-23 RX ADMIN — CARVEDILOL 6.25 MG: 6.25 TABLET, FILM COATED ORAL at 09:00

## 2022-02-23 RX ADMIN — DEXAMETHASONE SODIUM PHOSPHATE 4 MG: 4 INJECTION, SOLUTION INTRA-ARTICULAR; INTRALESIONAL; INTRAMUSCULAR; INTRAVENOUS; SOFT TISSUE at 05:27

## 2022-02-23 RX ADMIN — FAMOTIDINE 20 MG: 10 INJECTION INTRAVENOUS at 09:00

## 2022-02-23 RX ADMIN — LEVETIRACETAM 500 MG: 500 TABLET, FILM COATED ORAL at 17:38

## 2022-02-23 NOTE — CONSULTS
[address]    Name:  Kashmir Mustafa  MR#:  [id number]  :  1975  ACCOUNT #:  [de-identified]  DATE OF SERVICE:  2022    CHIEF COMPLAINT:  A 76-year-old man transferred from an outside facility with a chief complaint of left lower extremity weakness and headache over several months, worse in the last several days. I have had the opportunity to follow the patient in the hospital for the last couple of days waiting for an MRI which has now been performed. He denies any bowel or bladder deficits or seizure activity, and his weakness has improved on steroids. REVIEW OF SYSTEMS:  Otherwise, noncontributory. No other GI, , respiratory, cardiac, neurologic, or psychiatric complaints. He has a past medical history of congestive heart failure. He takes Coreg and Demadex. He does not smoke. PHYSICAL EXAMINATION:  He is morbidly obese. Cranial nerve function II-XII normal.  Pupils are equal and reactive. Extraocular muscles are intact. Face is symmetric. Palate rises equally. Tongue protrudes midline. Shoulder shrug normal.  Facial sensation normal.  No facial asymmetry. He has some mild left-sided weakness with an upgoing toe on the left side but no ankle clonus. Gait and station are not tested. Cerebellar coordination, finger-to-nose, heel-shin testing is normal.    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 I have recommended surgical intervention, likely having to follow up with radiation and/or chemotherapy. I will discuss this with the patient and his family but that is the best treatment plan for primary tumor. Unfortunately, prognosis is guarded at this time. I would recommend continuing obviously steroids and Keppra for seizure prophylaxis.     Thank you for asking me to see this patient.       Rosa Huang MD      [id number]  D:  02/23/2022 10:02  T:  02/23/2022 13:07  JOB #:  [id number]  CC:  Bonilla Ramirez MD

## 2022-02-23 NOTE — PROGRESS NOTES
Neurosurgery    Plan for surgical resection of brain lesion next week for diagnosis and debulking. Still waiting on OR date and time. Needs cardiology to see him for cardiac clearance due to his CHF history prior to discharge home for surgery. He states he had an ECHO completed last week at [facility] in [place] and his EF was 45% at that time. He has the paperwork in his glove box and will have family bring it. Possible d/c to home tomorrow with planned readmission for surgery next week.     Jayson Escamilla NP

## 2022-02-23 NOTE — PROGRESS NOTES
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

## 2022-02-23 NOTE — PROGRESS NOTES
[facility] Adult  Hospitalist Group                                                                                          Hospitalist Progress Note  Tomás Ellington MD  Answering service: [phone] OR [phone] from in house phone        Date of Service:  2022  NAME:  Sp ESPINOZA:  1975  MRN:  [id number]      Admission Summary:   41-year-old male with a past medical history of CHF and hypertension who presents to the emergency department due to episodic periods of lightheadedness, headache, and left lower extremity weakness. Patient has had these episodes for the last 2 days. He says whenever he stands up he notices lightheadedness and that his right ear is \"swooshing\". When he has the symptoms and tries to walk he also notes that he drags his left leg across the floor. His symptoms last for about 5 minutes at a time and then resolved. Last time he had symptoms was 1 hour prior to arrival. He has no associated shortness of breath or chest pain with this. He denies vertigo. He denies any speech deficits or weakness in his other extremities. He denies any numbness. Otherwise patient has no complaints. Interval history / Subjective:   F/u Right sided mass   No more headache except when sneezes  Comfortably sitting on bed  Had good sleep last night  No nausea, vomiting  Assessment & Plan:     Right frontal lobe mass  Cerebral edema/Mass effect  -CT head  Right frontal lobe mass with significant edema in the right frontal lobe resulting in subfalcine and uncal herniation on the right as well as leftward shift of midline structures  -MRI brain A 4.5 x 3.0 x 5.1 cm heterogeneously enhancing mass with areas of central necrosis and hemorrhage in the parasagittal right frontoparietal lobe  -on Decadron/pepcid  -Keppra 1000 mg IV given  [place] [facility], probable surgery next week.  ?discharge with planned readmission  -spoke to neurosurgery, cardiology clearance before discharge    History of CHF (diastolic vs systolic? ? )  History of HTN  -Restart home meds    Cardiac diet     Code status: FULL CODE  Prophylaxis: SCD    Plan: Cardiology clearance before discharge home   Care Plan discussed with: Patient  Anticipated Disposition: home      Hospital Problems  Date Reviewed: 2/22/2022          Codes Class Noted POA    Hypertension ICD-10-CM: I10  ICD-9-CM: 401.9  2/22/2022 Yes        Hyperlipidemia ICD-10-CM: E78.5  ICD-9-CM: 272.4  2/22/2022 Yes        Morbid obesity ([address] ICD-10-CM: E66.01  ICD-9-CM: 278.01  2/22/2022 Yes        Prediabetes ICD-10-CM: R73.03  ICD-9-CM: 790.29  2/22/2022 Yes        * (Principal) Brain mass ICD-10-CM: V22.82  ICD-9-CM: 348.89  2/21/2022 Yes                Review of Systems:   A comprehensive review of systems was negative except for that written in the HPI. Vital Signs:    Last 24hrs VS reviewed since prior progress note. Most recent are:  Visit Vitals  BP (!) 167/93 (BP 1 Location: Left arm, BP Patient Position: At rest)   Pulse 72   Temp 97.9 °F (36.6 °C)   Resp 20   SpO2 95%       No intake or output data in the 24 hours ending 02/23/22 1124     Physical Examination:     I had a face to face encounter with this patient and independently examined them on 2/23/2022 as outlined below:          Constitutional:  No acute distress    ENT:  Oral mucosa moist, oropharynx benign. Resp:  CTA bilaterally. No wheezing/rhonchi/rales. No accessory muscle use. CV:  Regular rhythm, normal rate, no murmurs, gallops, rubs    GI:  Soft, non distended, non tender. normoactive bowel sounds, no hepatosplenomegaly     Musculoskeletal:  No edema, warm, 2+ pulses throughout    Neurologic:  Moves all extremities.   AAOx3, CN II-XII reviewed            Data Review:    Review and/or order of clinical lab test      Labs:     Recent Labs     02/23/22  0518 02/22/22  0536   WBC 14.8* 12.3*   HGB 15.4 15.4   HCT 46.6 48.1    298     Recent Labs 02/23/22  0518 02/22/22  0536 02/21/22  1552   * 136 138   K 4.0 3.7 3.6    103 101   CO2 27 30 34*   BUN 16 14 16   CREA 0.95 0.99 1.16   * 140* 106*   CA 9.2 9.0 9.3   MG  --  2.2  --    PHOS  --  2.7  --      Recent Labs     02/22/22  0536 02/21/22  1552   ALT 23 28   AP 52 64   TBILI 0.9 0.9   TP 7.2 7.8   ALB 3.2* 3.7   GLOB 4.0 4.1*     Recent Labs     02/21/22  1552   INR 1.1   PTP 11.1      No results for input(s): FE, TIBC, PSAT, FERR in the last 72 hours. No results found for: FOL, RBCF   No results for input(s): PH, PCO2, PO2 in the last 72 hours. No results for input(s): CPK, CKNDX, TROIQ in the last 72 hours.     No lab exists for component: CPKMB  No results found for: CHOL, CHOLX, CHLST, CHOLV, HDL, HDLP, LDL, LDLC, DLDLP, TGLX, TRIGL, TRIGP, CHHD, CHHDX  Lab Results   Component Value Date/Time    Glucose (POC) 102 02/21/2022 03:33 PM    Glucose (POC) 81 03/13/2017 08:51 PM     No results found for: COLOR, APPRN, SPGRU, REFSG, MARCELLO, PROTU, GLUCU, KETU, BILU, UROU, COREY, LEUKU, GLUKE, EPSU, BACTU, WBCU, RBCU, CASTS, UCRY      Medications Reviewed:     Current Facility-Administered Medications   Medication Dose Route Frequency    famotidine (PF) (PEPCID) 20 mg in 0.9% sodium chloride 10 mL injection  20 mg IntraVENous Q12H    LORazepam (ATIVAN) injection 2 mg  2 mg IntraVENous Q2H PRN    carvediloL (COREG) tablet 6.25 mg  6.25 mg Oral BID    torsemide (DEMADEX) tablet 100 mg  100 mg Oral DAILY    acetaminophen (TYLENOL) tablet 650 mg  650 mg Oral Q6H PRN    dexamethasone (DECADRON) 4 mg/mL injection 4 mg  4 mg IntraVENous Q6H     ______________________________________________________________________  EXPECTED LENGTH OF STAY: 3d 19h  ACTUAL LENGTH OF STAY:          2                 Alisha Ramsay MD

## 2022-02-23 NOTE — PROGRESS NOTES
Follow up visit in Room 653. Patient was sitting up in his chair reading. He appeared very cheerful and welcoming to visit by . Bree Hays shared that he had completed some testing yesterday and earlier this morning. He is just waiting on test results. At that time he will be talking about goals of care based on his results. Trevon scanlon shared about his anxiety and requested prayer. He verbally expressed his thanks for visit and prayer. Provided ministry of presence, prayer, and continued cultivating a relationship of care, compassion, and support. Advised of      Visited by: Priti Jimenez, [address] [place] [address] paging Service [phone] ([phone])

## 2022-02-24 ENCOUNTER — APPOINTMENT (OUTPATIENT)
Dept: NON INVASIVE DIAGNOSTICS | Age: 47
DRG: 058 | End: 2022-02-24
Attending: HOSPITALIST
Payer: MEDICAID

## 2022-02-24 LAB
CHOLEST SERPL-MCNC: 180 MG/DL
ECHO AO ROOT DIAM: 3.5 CM
ECHO AO ROOT INDEX: 1.5 CM/M2
ECHO AV AREA PEAK VELOCITY: 2.8 CM2
ECHO AV AREA PEAK VELOCITY: 2.8 CM2
ECHO AV PEAK GRADIENT: 6 MMHG
ECHO AV PEAK VELOCITY: 1.2 M/S
ECHO AV VELOCITY RATIO: 0.75
ECHO EST RA PRESSURE: 3 MMHG
ECHO LA DIAMETER INDEX: 1.72 CM/M2
ECHO LA DIAMETER: 4 CM
ECHO LA TO AORTIC ROOT RATIO: 1.14
ECHO LV EF PHYSICIAN: 35 %
ECHO LV FRACTIONAL SHORTENING: 16 % (ref 28–44)
ECHO LV INTERNAL DIMENSION DIASTOLE INDEX: 2.15 CM/M2
ECHO LV INTERNAL DIMENSION DIASTOLIC: 5 CM (ref 4.2–5.9)
ECHO LV INTERNAL DIMENSION SYSTOLIC INDEX: 1.8 CM/M2
ECHO LV INTERNAL DIMENSION SYSTOLIC: 4.2 CM
ECHO LV IVSD: 1.4 CM (ref 0.6–1)
ECHO LV MASS 2D: 338.8 G (ref 88–224)
ECHO LV MASS INDEX 2D: 145.4 G/M2 (ref 49–115)
ECHO LV POSTERIOR WALL DIASTOLIC: 1.7 CM (ref 0.6–1)
ECHO LV RELATIVE WALL THICKNESS RATIO: 0.68
ECHO LVOT AREA: 4.2 CM2
ECHO LVOT DIAM: 2.3 CM
ECHO LVOT PEAK GRADIENT: 3 MMHG
ECHO LVOT PEAK VELOCITY: 0.9 M/S
ECHO MV A VELOCITY: 0.62 M/S
ECHO MV AREA PHT: 3 CM2
ECHO MV E DECELERATION TIME (DT): 250.9 MS
ECHO MV E VELOCITY: 0.75 M/S
ECHO MV E/A RATIO: 1.21
ECHO MV PRESSURE HALF TIME (PHT): 72.8 MS
ECHO PV MAX VELOCITY: 0.7 M/S
ECHO PV PEAK GRADIENT: 2 MMHG
ECHO RV FREE WALL PEAK S': 9 CM/S
ECHO RV TAPSE: 1.9 CM (ref 1.5–2)
EST. AVERAGE GLUCOSE BLD GHB EST-MCNC: 120 MG/DL
HBA1C MFR BLD: 5.8 % (ref 4–5.6)
HDLC SERPL-MCNC: 36 MG/DL
HDLC SERPL: 5 {RATIO} (ref 0–5)
LDLC SERPL CALC-MCNC: 126.8 MG/DL (ref 0–100)
TRIGL SERPL-MCNC: 86 MG/DL (ref ?–150)
VLDLC SERPL CALC-MCNC: 17.2 MG/DL

## 2022-02-24 PROCEDURE — 74011250636 HC RX REV CODE- 250/636: Performed by: NURSE PRACTITIONER

## 2022-02-24 PROCEDURE — 74011250637 HC RX REV CODE- 250/637: Performed by: HOSPITALIST

## 2022-02-24 PROCEDURE — C8929 TTE W OR WO FOL WCON,DOPPLER: HCPCS

## 2022-02-24 PROCEDURE — 65660000001 HC RM ICU INTERMED STEPDOWN

## 2022-02-24 PROCEDURE — 74011000250 HC RX REV CODE- 250: Performed by: HOSPITALIST

## 2022-02-24 PROCEDURE — 74011250636 HC RX REV CODE- 250/636: Performed by: STUDENT IN AN ORGANIZED HEALTH CARE EDUCATION/TRAINING PROGRAM

## 2022-02-24 PROCEDURE — 74011250636 HC RX REV CODE- 250/636: Performed by: HOSPITALIST

## 2022-02-24 PROCEDURE — 74011000250 HC RX REV CODE- 250: Performed by: STUDENT IN AN ORGANIZED HEALTH CARE EDUCATION/TRAINING PROGRAM

## 2022-02-24 PROCEDURE — 74011250637 HC RX REV CODE- 250/637: Performed by: STUDENT IN AN ORGANIZED HEALTH CARE EDUCATION/TRAINING PROGRAM

## 2022-02-24 PROCEDURE — 93306 TTE W/DOPPLER COMPLETE: CPT | Performed by: SPECIALIST

## 2022-02-24 PROCEDURE — 74011250637 HC RX REV CODE- 250/637: Performed by: NURSE PRACTITIONER

## 2022-02-24 PROCEDURE — 99223 1ST HOSP IP/OBS HIGH 75: CPT | Performed by: SPECIALIST

## 2022-02-24 RX ORDER — DEXAMETHASONE SODIUM PHOSPHATE 4 MG/ML
4 INJECTION, SOLUTION INTRA-ARTICULAR; INTRALESIONAL; INTRAMUSCULAR; INTRAVENOUS; SOFT TISSUE EVERY 8 HOURS
Status: DISCONTINUED | OUTPATIENT
Start: 2022-02-24 | End: 2022-02-25 | Stop reason: HOSPADM

## 2022-02-24 RX ORDER — FAMOTIDINE 20 MG/1
20 TABLET, FILM COATED ORAL EVERY 12 HOURS
Status: DISCONTINUED | OUTPATIENT
Start: 2022-02-24 | End: 2022-02-25 | Stop reason: HOSPADM

## 2022-02-24 RX ORDER — CARVEDILOL 6.25 MG/1
12.5 TABLET ORAL 2 TIMES DAILY
Status: DISCONTINUED | OUTPATIENT
Start: 2022-02-24 | End: 2022-02-25 | Stop reason: HOSPADM

## 2022-02-24 RX ADMIN — LEVETIRACETAM 500 MG: 500 TABLET, FILM COATED ORAL at 09:04

## 2022-02-24 RX ADMIN — PERFLUTREN 1.5 ML: 6.52 INJECTION, SUSPENSION INTRAVENOUS at 14:44

## 2022-02-24 RX ADMIN — DEXAMETHASONE SODIUM PHOSPHATE 4 MG: 4 INJECTION, SOLUTION INTRA-ARTICULAR; INTRALESIONAL; INTRAMUSCULAR; INTRAVENOUS; SOFT TISSUE at 21:11

## 2022-02-24 RX ADMIN — CARVEDILOL 12.5 MG: 6.25 TABLET, FILM COATED ORAL at 17:06

## 2022-02-24 RX ADMIN — LEVETIRACETAM 500 MG: 500 TABLET, FILM COATED ORAL at 17:06

## 2022-02-24 RX ADMIN — DEXAMETHASONE SODIUM PHOSPHATE 4 MG: 4 INJECTION, SOLUTION INTRA-ARTICULAR; INTRALESIONAL; INTRAMUSCULAR; INTRAVENOUS; SOFT TISSUE at 00:48

## 2022-02-24 RX ADMIN — DEXAMETHASONE SODIUM PHOSPHATE 4 MG: 4 INJECTION, SOLUTION INTRA-ARTICULAR; INTRALESIONAL; INTRAMUSCULAR; INTRAVENOUS; SOFT TISSUE at 05:59

## 2022-02-24 RX ADMIN — TORSEMIDE 100 MG: 100 TABLET ORAL at 09:05

## 2022-02-24 RX ADMIN — CARVEDILOL 6.25 MG: 6.25 TABLET, FILM COATED ORAL at 09:04

## 2022-02-24 RX ADMIN — DEXAMETHASONE SODIUM PHOSPHATE 4 MG: 4 INJECTION, SOLUTION INTRA-ARTICULAR; INTRALESIONAL; INTRAMUSCULAR; INTRAVENOUS; SOFT TISSUE at 11:31

## 2022-02-24 RX ADMIN — CARVEDILOL 12.5 MG: 6.25 TABLET, FILM COATED ORAL at 11:24

## 2022-02-24 RX ADMIN — FAMOTIDINE 20 MG: 20 TABLET ORAL at 21:11

## 2022-02-24 RX ADMIN — FAMOTIDINE 20 MG: 10 INJECTION INTRAVENOUS at 09:05

## 2022-02-24 NOTE — PROGRESS NOTES
Neurosurgery Progress Note  Chayo Yang, [facility]          Admit Date: 2022   LOS: 3 days        Daily Progress Note: 2022        Subjective: The patient states his left lower extremity weakness has improved since admission. He is currently getting his ECHO completed. He is in good spirits. Denies numbness, tingling, chest pain, leg pain, nausea, vomiting, difficulty swallowing, headache, and dyspnea. Objective:     Vital signs  Temp (24hrs), Av.8 °F (36.6 °C), Min:96.7 °F (35.9 °C), Max:98.3 °F (36.8 °C)   No intake/output data recorded. No intake/output data recorded. Visit Vitals  /84   Pulse 70   Temp 98.1 °F (36.7 °C)   Resp 15   Ht 5' 4\" (1.626 m)   Wt 136.5 kg (301 lb)   SpO2 97%   BMI 51.67 kg/m²    O2 Flow Rate (L/min): 2 l/min O2 Device: None (Room air)     Pain control  Pain Assessment  Pain Scale 1: Numeric (0 - 10)  Pain Intensity 1: 0    PT/OT  Gait     Gait  Base of Support: Widened  Speed/Ana: Accelerated  Ambulation - Level of Assistance: Supervision  Distance (ft): 300 Feet (ft)  Assistive Device: Gait belt           Physical Exam:  Gen:NAD. Morbidly obese. Neuro: A&Ox3. Follows commands. Speech clear. Affect bright. PERRL. EOMI. Face symmetric. Tongue midline. RIVERA. Strength 5/5 in UE and LE BL. Negative drift. Gait deferred. MRI brain with and without contrast on 22 shows a 4.5 x 3.0 x 5.1 cm heterogeneously enhancing mass with areas of central necrosis and hemorrhage in the parasagittal right frontoparietal lobe as described in detail above. Marked confluent surrounding signal abnormality in the right frontoparietal white matter and extending into the right body of the corpus callosum, with mass effect resulting in 8 mm of leftward midline shift. 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     CT chest/abd/pelvis with contrast on 02/22/22 shows no suspicious mass or lymphadenopathy. 24 hour results:    No results found for this or any previous visit (from the past 24 hour(s)). Assessment:     Principal Problem:    Brain mass (2/21/2022)    Active Problems:    Hypertension (2/22/2022)      Hyperlipidemia (2/22/2022)      Morbid obesity ([address] (2/22/2022)      Prediabetes (2/22/2022)        Plan:   1. Right parietal brain mass   - plan for OR on Wed 03/02 at 0730 AM. Pt to arrive to hospital main entrance and go to the admitting department at [address] instructions written in discharge instructions   - d/c to home on decadron 4 mg po tid and keppra 500 mg bid   - no ASA, ibuprofen containing products prior to surgery  2. Brain compression with cerebral edema   - due to #1   - plans as above  3. HTN   - on Coreg increased by cardiology  4. Morbid obesity as defined by medical criteria of BMI greater than 40   - Body mass index is 51.67 kg/m². - diet and exercise counseling as able  5. History of CHF per pt report   - ECHO pending   - cardiology consulted    Activity: up with assist  DVT ppx: SCDs  Dispo: tbd    Plan d/w Dr. Daxa Jett. Pt will be a planned readmission on 03/02/22. The patient can discharge to home when cleared by cardiology.       Iéns Brown NP

## 2022-02-24 NOTE — PROGRESS NOTES
Problem: Falls - Risk of  Goal: *Absence of Falls  Description: Document Yana Don Fall Risk and appropriate interventions in the flowsheet.   Outcome: Progressing Towards Goal  Note: Fall Risk Interventions:            Medication Interventions: Evaluate medications/consider consulting pharmacy,Patient to call before getting OOB,Teach patient to arise slowly                   Problem: Brain Tumor Day 3 to Discharge  Goal: Activity/Safety  Outcome: Progressing Towards Goal  Goal: Nutrition/Diet  Outcome: Progressing Towards Goal  Goal: Discharge Planning  Outcome: Progressing Towards Goal  Goal: Medications  Outcome: Progressing Towards Goal  Goal: Treatments/Interventions/Procedures  Outcome: Progressing Towards Goal  Goal: Progressive Mobility and Function  Outcome: Progressing Towards Goal  Goal: Psychosocial  Outcome: Progressing Towards Goal     Problem: Heart Failure: Day 4  Goal: Off Pathway (Use only if patient is Off Pathway)  Outcome: Progressing Towards Goal  Goal: Activity/Safety  Outcome: Progressing Towards Goal  Goal: Diagnostic Test/Procedures  Outcome: Progressing Towards Goal  Goal: Nutrition/Diet  Outcome: Progressing Towards Goal  Goal: Discharge Planning  Outcome: Progressing Towards Goal  Goal: Medications  Outcome: Progressing Towards Goal  Goal: Respiratory  Outcome: Progressing Towards Goal  Goal: Treatments/Interventions/Procedures  Outcome: Progressing Towards Goal  Goal: Psychosocial  Outcome: Progressing Towards Goal  Goal: *Oxygen saturation within defined limits  Outcome: Progressing Towards Goal  Goal: *Hemodynamically stable  Outcome: Progressing Towards Goal  Goal: *Optimal pain control at patient's stated goal  Outcome: Progressing Towards Goal  Goal: *Anxiety reduced or absent  Outcome: Progressing Towards Goal  Goal: *Demonstrates progressive activity  Outcome: Progressing Towards Goal

## 2022-02-24 NOTE — CONSULTS
[facility]  Cardiology Care Note                                 Initial visit    Patient Name: Sandrine Cartwright - KZH:[birth year] - XVV:[id number]  Primary Cardiologist: [place] cardiologist previously  Consulting Cardiologist: Lorrayne Opitz, MD  Reason for Consult: History of CHF, preop eval      Assessment/Plan/Discussion:Cardiology Attending:     Patient seen on the day of progress note and examined  reviewed  with Advance Practice Provider (LIZZETTE, NP,PA)     Adalberto Youssef is a 51 y.o. male very nice man denies CHF or CAD symptoms  Denies chest pain, edema, syncope or shortness of breath at rest   Has no tachycardia , palpitations or sense of arrythmia   However had hx of CHF with limited details, he thinks EF was 31 and got better but he is not sure, had stress test but no hx of cath  Now found to have brain mass and plans for resection next week   O: NC AT no JVD, clear lungs RRR smrg no edema, Neuro afocal  Patient Vitals for the past 12 hrs:   Temp Pulse Resp BP SpO2   02/24/22 1750 98.8 °F (37.1 °C) 70 17 (!) 138/95 --   02/24/22 1706 -- 69 -- 137/84 --   02/24/22 1600 -- 60 -- -- --   02/24/22 1429 -- -- -- 127/84 --   02/24/22 1410 98.1 °F (36.7 °C) 70 15 127/84 97 %   02/24/22 1400 -- 70 -- -- --   02/24/22 1000 -- 70 -- -- --   02/24/22 0958 98.3 °F (36.8 °C) 70 14 (!) 167/97 97 %   02/24/22 0904 -- 77 -- (!) 197/100 --      L:  Lab Results   Component Value Date/Time    Creatinine 0.95 02/23/2022 05:18 AM     Lab Results   Component Value Date/Time    HGB 15.4 02/23/2022 05:18 AM     Lab Results   Component Value Date/Time    NT pro-BNP 93 02/02/2021 07:57 PM      A/P:  Echo today shows EF is 35%   There is LVH and it is likely HTNsive  But now that surgery is planned, I think we are best off with a cath for CMY  I will discuss with pt now that echo images are reviewed   If agreeable , will do in ajith Hanks MD        HPI:  Mr. Aldair Urban is a 51 y.o. male with PMH of CHF, HTN, suspected KELL who presents with chief c/o dizziness, headache, vision disturbance. He is found to have a rt frontal lobe mass with cerebral edema/mass effect and surgery is planned for next week. Cardiology has been asked for preop evaluation given history of CHF. Pt reports that he was hospitalized 1 year ago with c/o SOB and swelling. He was told his heart was weak at that time. He reports having a stress test but no heart catheterization was recommended at that time. He states he followed with a [facility] cardiologist in [place] but has since been released, with no need for followup. 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 He states he talked coreg and Torsemide. He states he no longer has the fatigue that he had 1 year ago. He denies any SOB at rest or with walking around the entire [employer] store ( no longer needs to use rollator cart at [employer]). He denies any history of chest pain. States his BLE has much improved from last year. Reports weight loss over past year of at least 10 lbs. No palpitations. No history of DM, HLD. Never smoker. Assessment and Plan     1. Preop evaluation   -HS troponin low (22), no chest pain or EID. -Echo pending. -EKG: NSR with lateral T wave inversion/abnormality- compared to EKG 2/2/21- will review with Dr. Vanessa Liang.   -Dr. Vanessa Liang to see this a.m.    2. History of CHF (per pt report)   -suspect systolic CHF given pt report   -on coreg, torsemide   -appears compensated on exam.    3. HTN   -BP uncontrolled   -Coreg increased today to 12.5 mg BID     4. Morbid obesity Body mass index is 51.73 kg/m². 6. 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  Rt frontal lobe mass/cerebral edema.   -surgery planned next week (plan is to discharge and readmit for surgery)    51 y.o. male with PMH of HTN, CHF who is found to have cerbral mass. No exertional symptoms but EKG with sT/t wave abnormality lateral leads. Will review with Dr. Almas Ortiz. In meantime, echo pending. Prediabetes listed on chart. Check A1C       Patient Active Problem List   Diagnosis Code    Brain mass G93.89    Hypertension I10    Hyperlipidemia E78.5    Morbid obesity ([address] E66.01    Prediabetes R73.03    Heart failure with mid-range ejection fraction ([address] I50.9     No specialty comments available. [] Patient unable to provide secondary to condition    CONSTITUTIONAL: negative   ENT: negative  EYES: vision Changes  CARDIOVASCULAR: some mild chronic edema  RESPIRATORY: negative  GASTROINTESTINAL: negative  GENITOURINARY: negative  MUSCULOSKELETAL: negative  SKIN: negative  NEUROLOGICAL: dizziness  PSYCHOLOGICAL: negative   HEME/LYMPH: negative  ENDOCRINE: negative        Past Medical History:   Diagnosis Date    Heart failure ([address]     Hypertension      No past surgical history on file. Current Facility-Administered Medications   Medication Dose Route Frequency    famotidine (PEPCID) tablet 20 mg  20 mg Oral Q12H    carvediloL (COREG) tablet 12.5 mg  12.5 mg Oral BID    levETIRAcetam (KEPPRA) tablet 500 mg  500 mg Oral BID    LORazepam (ATIVAN) injection 2 mg  2 mg IntraVENous Q2H PRN    torsemide (DEMADEX) tablet 100 mg  100 mg Oral DAILY    acetaminophen (TYLENOL) tablet 650 mg  650 mg Oral Q6H PRN    dexamethasone (DECADRON) 4 mg/mL injection 4 mg  4 mg IntraVENous Q6H       No Known Allergies     FmHx: family history is not on file. Social Hx :  reports that he has never smoked. He has never used smokeless tobacco. He reports current alcohol use of about 0.8 standard drinks of alcohol per week. He reports that he does not use drugs.        Objective:    Physical Exam    Vitals:   Vitals:    02/24/22 0147 02/24/22 0553 02/24/22 0904 02/24/22 0958   BP: (!) 142/89 (!) 163/89 (!) 197/100 (!) 167/97   Pulse: 70 64 77 70   Resp: 18 12  14   Temp: 97.9 °F (36.6 °C) 98 °F (36.7 °C)  98.3 °F (36.8 °C)   SpO2: 97% 95%  97%   Weight: 136.7 kg (301 lb 5.9 oz)          General:    Alert, cooperative, no distress, appears stated age. Neck:   Supple, no carotid bruit and no JVD. Back:     Symmetric,     Lungs:     clear to auscultation bilaterally. Heart[de-identified]    Regular rate and rhythm, S1, S2 normal, no murmur, click, rub or gallop. Abdomen:     Soft, obese,, non-tender. Bowel sounds normal.    Extremities:   Extremities normal, atraumatic, no cyanosis or trace edema. Vascular:   Pulses -2+   Skin:   Skin color normal. No rashes or lesions on visible areas   Neurologic:   CN II-XII grossly intact. Telemetry: normal sinus rhythm      Data Review:     Radiology:   XR Results (most recent):  Results from Hospital Encounter encounter on 02/21/22    XR CHEST PORT    Narrative  INDICATION: . CVA  Additional history: Cerebral vascular accident  COMPARISON: Previous chest xray, 2/2/2021 and 3/13/2017. LIMITATIONS: Portable technique. Algis Broadmai FINDINGS: Single frontal view of the chest.  .  Lines/tubes/surgical: Cardiac monitor leads overly the patient. Heart/mediastinum: Unremarkable. Lungs/pleura: Linear opacity in the left midlung suggesting  scarring/atelectasis. No visualized pleural effusion or pneumothorax. Additional Comments: None. .    Impression  1. No radiographic evidence of acute cardiopulmonary disease. No results for input(s): CPK, TROIQ in the last 72 hours.     No lab exists for component: CKQMB, CPKMB, BMPP  Recent Labs     02/23/22  0518 02/22/22  0536   * 136   K 4.0 3.7    103   CO2 27 30   BUN 16 14   CREA 0.95 0.99   * 140*   PHOS  --  2.7   CA 9.2 9.0     Recent Labs     02/23/22  0518 02/22/22  0536   WBC 14.8* 12.3*   HGB 15.4 15.4   HCT 46.6 48.1    298     Recent Labs     02/22/22  0536 02/21/22  1552   PTP  --  11.1   INR  --  1.1 AP 52 64     No results for input(s): CHOL, LDLC in the last 72 hours. No lab exists for component: TGL, HDLC,  HBA1C  No results for input(s): CRP, TSH, TSHEXT in the last 72 hours. No lab exists for component: ESR    Yoko Aranda.  ONDINA Callejas    [facility] [address] [place] [address] 13, [address] [place] [address],[address]  [facility], [facility]  [phone]      Janae Bee MD

## 2022-02-24 NOTE — PROGRESS NOTES
Transition of Care  1. RUR 6%  2. Disposition Home       3. Transportation  Family   4. Follow Up PCP/Specialist         CM reviewed chart and patient progress discussed in IDRs. Patient expected to d/c home 2/24 or 2/25 pending Cardiology eval.  Patient to be readmitted next week for Neurosurgery. No CM needs at this time.        Leocadia Skiff RN

## 2022-02-24 NOTE — PROGRESS NOTES
[facility] [facility] Adult  Hospitalist Group                                                                                          Hospitalist Progress Note  Tristin Bailey MD  Answering service: [phone] OR [phone] from in house phone        Date of Service:  2022  NAME:  Levi Son  :  1975  MRN:  [id number]      Admission Summary:   59-year-old male with a past medical history of CHF and hypertension who presents to the emergency department due to episodic periods of lightheadedness, headache, and left lower extremity weakness. Patient has had these episodes for the last 2 days. He says whenever he stands up he notices lightheadedness and that his right ear is \"swooshing\". When he has the symptoms and tries to walk he also notes that he drags his left leg across the floor. His symptoms last for about 5 minutes at a time and then resolved. Last time he had symptoms was 1 hour prior to arrival. He has no associated shortness of breath or chest pain with this. He denies vertigo. He denies any speech deficits or weakness in his other extremities. He denies any numbness. Otherwise patient has no complaints. Interval history / Subjective:   F/u Right sided mass   Pleasant gentleman. He says he is feeling fine, his balance is good and ready to go home to return for his surgery next week. His family sarah paper work but does not have the recent echo report   Discussed with NS ONDINA De Guzman for d/c for planned readmission once seen and cleared by cardiology.   Assessment & Plan:     Right frontal lobe mass  Cerebral edema/Mass effect  -CT head  Right frontal lobe mass with significant edema in the right frontal lobe resulting in subfalcine and uncal herniation on the right as well as leftward shift of midline structures  -MRI brain A 4.5 x 3.0 x 5.1 cm heterogeneously enhancing mass with areas of central necrosis and hemorrhage in the parasagittal right frontoparietal lobe  -on Visit Information Date & Time Provider Department Dept. Phone Encounter #  
 10/24/2017 11:00 AM Milena Jeronimo MD Neurology Clinic at [facility] [phone] [id number] Follow-up Instructions Return in about 3 months (around 1/24/2018). Your Appointments 12/13/2017 11:30 AM  
6 MONTH with Amadeo Saucedo MD  
[facility]) Appt Note: $CP 5/31/17ksr [address] [facility]  
[phone] [address] [facility] [address] [id number]  
  
    
 1/31/2018 10:00 AM  
ROUTINE CARE with Brian Fields MD  
[facility]) Appt Note: 6mo f/u  
 [address] [facility] [address]  
[address] [address] [address] [address]  
  
    
 8/1/2018 10:30 AM  
Medicare Physical with Brian Fields MD  
[facility]) Appt Note: medicare wellness [address] [facility] [address]  
[address] [address] [address] Upcoming Health Maintenance Date Due ZOSTER VACCINE AGE 60> 10/18/1991 EYE EXAM RETINAL OR DILATED Q1 10/5/2017 MICROALBUMIN Q1 11/14/2017 HEMOGLOBIN A1C Q6M 2/7/2018 FOOT EXAM Q1 7/31/2018 MEDICARE YEARLY EXAM 8/1/2018 LIPID PANEL Q1 8/7/2018 GLAUCOMA SCREENING Q2Y 10/5/2018 DTaP/Tdap/Td series (2 - Td) 10/15/2026 Allergies as of 10/24/2017  Review Complete On: 10/24/2017 By: Mookie Barnes No Known Allergies Current Immunizations  Reviewed on 10/9/2017 Name Date Influenza High Dose Vaccine PF 10/6/2017, 11/2/2016 Influenza Vaccine 10/27/2014, 10/1/2013 Influenza Vaccine (Quad) PF 10/12/2015 Influenza Vaccine Split 10/31/2011, 10/15/2010 Pneumococcal Conjugate (PCV-13) 4/1/2015  Rabies Immune Globulin 10/15/2016 11:44 PM  
 Decadron/pepcid  -Keppra 1000 mg IV given 2/21 at [facility] neurosurgery, probable surgery next week. ?discharge with planned readmission  -spoke to neurosurgery, cardiology clearance before discharge    History of CHF (diastolic vs systolic? ? )  Hypertension, uncontrolled  -Increased coreg 6.25 mg bid . .. >12 mg bid today,2/24  -continue Torsemide     Cardiac diet     Code status: FULL CODE  Prophylaxis: SCD    Plan: Cardiology clearance before discharge home   Care Plan discussed with: Patient  Anticipated Disposition:home with planned readmission next week       Hospital Problems  Date Reviewed: 2/22/2022          Codes Class Noted POA    Hypertension ICD-10-CM: I10  ICD-9-CM: 401.9  2/22/2022 Yes        Hyperlipidemia ICD-10-CM: E78.5  ICD-9-CM: 272.4  2/22/2022 Yes        Morbid obesity ([address] ICD-10-CM: E66.01  ICD-9-CM: 278.01  2/22/2022 Yes        Prediabetes ICD-10-CM: R73.03  ICD-9-CM: 790.29  2/22/2022 Yes        * (Principal) Brain mass ICD-10-CM: F72.97  ICD-9-CM: 348.89  2/21/2022 Yes                Review of Systems:   A comprehensive review of systems was negative except for that written in the HPI. Vital Signs:    Last 24hrs VS reviewed since prior progress note. Most recent are:  Visit Vitals  BP (!) 197/100   Pulse 77   Temp 98 °F (36.7 °C)   Resp 12   Wt 136.7 kg (301 lb 5.9 oz)   SpO2 95%   BMI 51.73 kg/m²       No intake or output data in the 24 hours ending 02/24/22 0946     Physical Examination:     I had a face to face encounter with this patient and independently examined them on 2/24/2022 as outlined below:          Constitutional:  No acute distress    ENT:  Oral mucosa moist, oropharynx benign. Resp:  CTA bilaterally. No wheezing/rhonchi/rales. No accessory muscle use. CV:  Regular rhythm, normal rate, no murmurs, gallops, rubs    GI:  Soft, non distended, non tender.  normoactive bowel sounds, no hepatosplenomegaly     Musculoskeletal:  Pitting pretibial and pedal Rabies Vaccine IM 10/29/2016  3:14 PM, 10/22/2016  3:02 PM, 10/18/2016  1:41 PM, 10/15/2016 11:00 PM  
 TD Vaccine 10/30/2012 Tdap 10/15/2016 10:48 PM  
 ZZZ-RETIRED (DO NOT USE) Pneumococcal Vaccine (Unspecified Type) 4/1/2004, 10/1/1998 Not reviewed this visit You Were Diagnosed With   
  
 Codes Comments B12 deficiency    -  Primary ICD-10-CM: E53.8 ICD-9-CM: 266.2 Diabetic peripheral neuropathy associated with type 2 diabetes mellitus (HCC)     ICD-10-CM: E11.42 
ICD-9-CM: 250.60, 357.2 Idiopathic small and large fiber sensory neuropathy     ICD-10-CM: G60.8 ICD-9-CM: 356.4 Fourth nerve palsy of right eye     ICD-10-CM: H49.11 ICD-9-CM: 378.53 Sensory ataxic gait     ICD-10-CM: R26.0 ICD-9-CM: 364. 2 Ataxia     ICD-10-CM: R27.0 ICD-9-CM: 785. 3 Degenerative cervical spinal stenosis     ICD-10-CM: M48.02 
ICD-9-CM: 723.0 Cervical arthritis with myelopathy     ICD-10-CM: M47.12 
ICD-9-CM: 721.1 Cerebral microvascular disease     ICD-10-CM: I67.9 ICD-9-CM: 437.9 Bilateral carotid artery stenosis     ICD-10-CM: I65.23 ICD-9-CM: 433.10, 433.30 Vitamin D deficiency     ICD-10-CM: E55.9 ICD-9-CM: 268.9 Vitals BP Pulse Height(growth percentile) Weight(growth percentile) SpO2 BMI  
 120/68 65 5' 9\" (1.753 m) 185 lb (83.9 kg) 96% 27.32 kg/m2 Smoking Status Former Smoker BMI and BSA Data Body Mass Index Body Surface Area  
 27.32 kg/m 2 2.02 m 2 Preferred Pharmacy Pharmacy Name Phone [id number] [address] AT [address] [place] [address] [phone] Your Updated Medication List  
  
   
This list is accurate as of: 10/24/17 11:57 AM.  Always use your most recent med list.  
  
  
  
  
 acetaminophen 500 mg tablet Commonly known as:  TYLENOL Take 1 tablet by mouth every eight (8) hours as needed for Pain. ADVAIR DISKUS 250-50 mcg/dose diskus inhaler Generic drug:  fluticasone-salmeterol Take 1 Puff by inhalation every twelve (12) hours. albuterol 90 mcg/actuation inhaler Commonly known as:  PROVENTIL HFA, VENTOLIN HFA, PROAIR HFA Take 2 Puffs by inhalation every four (4) hours as needed for Wheezing. aspirin 325 mg tablet Commonly known as:  ASPIRIN Take 325 mg by mouth daily. B COMPLEX PO Take 1 Tab by mouth daily. CENTRUM SILVER Tab tablet Generic drug:  multivitamins-minerals-lutein Take 1 Tab by mouth daily. CINNAMON 500 mg Cap Generic drug:  cinnamon bark Take  by mouth. CO Q-10 10 mg Cap Generic drug:  coenzyme q10 Take  by mouth. fexofenadine 180 mg tablet Commonly known as:  Rella Soulier Take 1 Tab by mouth daily. glipiZIDE 5 mg tablet Commonly known as:  GLUCOTROL  
TAKE 1 TABLET BY MOUTH EVERY DAY  
  
 glucose blood VI test strips strip Commonly known as:  ONETOUCH ULTRA TEST  
by Does Not Apply route Daily (before breakfast). E11.9  
  
 lisinopril 5 mg tablet Commonly known as:  PRINIVIL, ZESTRIL  
TAKE 1 TABLET BY MOUTH EVERY DAY  
  
 MAGNESIUM PO Take  by mouth daily. metFORMIN  mg tablet Commonly known as:  GLUCOPHAGE XR Take 3 Tabs by mouth daily (with dinner). metoprolol succinate 25 mg XL tablet Commonly known as:  TOPROL-XL  
TAKE 1/2 TABLET BY MOUTH EVERY DAY  
  
 PEPCID AC 20 mg tablet Generic drug:  famotidine Take 20 mg by mouth daily. simvastatin 20 mg tablet Commonly known as:  ZOCOR  
TAKE 1 TABLET BY MOUTH NIGHTLY SPIRIVA RESPIMAT 2.5 mcg/actuation inhaler Generic drug:  tiotropium bromide Take 2 Puffs by inhalation daily. VITAMIN C PO Take 1,000 mg by mouth daily. VITAMIN D3 PO Take 1,000 Units by mouth daily. We Performed the Following MARIZOL COMPREHENSIVE PLUS PANEL [URY38618 Custom] ANTINEURONAL CELL AB A2426500 CPT(R)] CK X6974683 CPT(R)] edema , warm, 2+ pulses throughout    Neurologic:  Walking in the room,steady on his feet. AAOx3, CN II-XII reviewed            Data Review:    Review and/or order of clinical lab test      Labs:     Recent Labs     02/23/22 0518 02/22/22  0536   WBC 14.8* 12.3*   HGB 15.4 15.4   HCT 46.6 48.1    298     Recent Labs     02/23/22  0518 02/22/22  0536 02/21/22  1552   * 136 138   K 4.0 3.7 3.6    103 101   CO2 27 30 34*   BUN 16 14 16   CREA 0.95 0.99 1.16   * 140* 106*   CA 9.2 9.0 9.3   MG  --  2.2  --    PHOS  --  2.7  --      Recent Labs     02/22/22  0536 02/21/22  1552   ALT 23 28   AP 52 64   TBILI 0.9 0.9   TP 7.2 7.8   ALB 3.2* 3.7   GLOB 4.0 4.1*     Recent Labs     02/21/22  1552   INR 1.1   PTP 11.1      No results for input(s): FE, TIBC, PSAT, FERR in the last 72 hours. No results found for: FOL, RBCF   No results for input(s): PH, PCO2, PO2 in the last 72 hours. No results for input(s): CPK, CKNDX, TROIQ in the last 72 hours.     No lab exists for component: CPKMB  No results found for: CHOL, CHOLX, CHLST, CHOLV, HDL, HDLP, LDL, LDLC, DLDLP, TGLX, TRIGL, TRIGP, CHHD, CHHDX  Lab Results   Component Value Date/Time    Glucose (POC) 102 02/21/2022 03:33 PM    Glucose (POC) 81 03/13/2017 08:51 PM     No results found for: COLOR, APPRN, SPGRU, REFSG, MARCELLO, PROTU, GLUCU, KETU, BILU, UROU, COREY, LEUKU, GLUKE, EPSU, BACTU, WBCU, RBCU, CASTS, UCRY      Medications Reviewed:     Current Facility-Administered Medications   Medication Dose Route Frequency    famotidine (PEPCID) tablet 20 mg  20 mg Oral Q12H    carvediloL (COREG) tablet 12.5 mg  12.5 mg Oral BID    levETIRAcetam (KEPPRA) tablet 500 mg  500 mg Oral BID    LORazepam (ATIVAN) injection 2 mg  2 mg IntraVENous Q2H PRN    torsemide (DEMADEX) tablet 100 mg  100 mg Oral DAILY    acetaminophen (TYLENOL) tablet 650 mg  650 mg Oral Q6H PRN    dexamethasone (DECADRON) 4 mg/mL injection 4 mg  4 mg IntraVENous Q6H GM1 ANTIBODY IGG, IGM [07421 CPT(R)] HEMOGLOBIN A1C WITH EAG [44150 CPT(R)] IMMUNOELECTROPHORESIS [facility].) [id number] CPT(R)] MYELIN ASSOC GLYCOPROTEIN AB, IGM [id number] CPT(R)] REFERRAL TO PHYSICAL THERAPY [CYS23 Custom] Comments: [facility] for gait training and strengthening and balance therapy, see Lonnie Dorsey if possible SED RATE (ESR) [id number] CPT(R)] VITAMIN B12 & FOLATE [03227 CPT(R)] VITAMIN D, 25 HYROXY PANEL [[id number] Custom] Follow-up Instructions Return in about 3 months (around 1/24/2018). To-Do List   
 10/24/2017 Imaging:  DUPLEX CAROTID BILATERAL AMB NEURO   
  
 10/31/2017 Imaging:  MRI BRAIN W WO CONT   
  
 10/31/2017 Imaging:  MRI CERV SPINE WO CONT   
  
 11/23/2017 Neurology:  EMG LIMITED   
  
 11/29/2017 Neurology:  EMG NCV MOTOR WO F/WAVE PER NERVE   
  
 11/29/2017 Neurology:  EM[facility] NCV SENSORY PER NERVE Referral Information Referral ID Referred By Referred To  
  
 [id number] Zandra Heimlich A Not Available Visits Status Start Date End Date 1 New Request 10/24/17 10/24/18 If your referral has a status of pending review or denied, additional information will be sent to support the outcome of this decision. Patient Instructions A Healthy Lifestyle: Care Instructions Your Care Instructions A healthy lifestyle can help you feel good, stay at a healthy weight, and have plenty of energy for both work and play. A healthy lifestyle is something you can share with your whole family. A healthy lifestyle also can lower your risk for serious health problems, such as high blood pressure, heart disease, and diabetes. You can follow a few steps listed below to improve your health and the health of your family. Follow-up care is a key part of your treatment and safety.  Be sure to make and go to all appointments, and call your doctor if you are having ______________________________________________________________________  EXPECTED LENGTH OF STAY: 3d 19h  ACTUAL LENGTH OF STAY:          3                 Steve Mclaughlin MD problems. Its also a good idea to know your test results and keep a list of the medicines you take. How can you care for yourself at home? · Do not eat too much sugar, fat, or fast foods. You can still have dessert and treats now and then. The goal is moderation. · Start small to improve your eating habits. Pay attention to portion sizes, drink less juice and soda pop, and eat more fruits and vegetables. ¨ Eat a healthy amount of food. A 3-ounce serving of meat, for example, is about the size of a deck of cards. Fill the rest of your plate with vegetables and whole grains. ¨ Limit the amount of soda and sports drinks you have every day. Drink more water when you are thirsty. ¨ Eat at least 5 servings of fruits and vegetables every day. It may seem like a lot, but it is not hard to reach this goal. A serving or helping is 1 piece of fruit, 1 cup of vegetables, or 2 cups of leafy, raw vegetables. Have an apple or some carrot sticks as an afternoon snack instead of a candy bar. Try to have fruits and/or vegetables at every meal. 
· Make exercise part of your daily routine. You may want to start with simple activities, such as walking, bicycling, or slow swimming. Try to be active 30 to 60 minutes every day. You do not need to do all 30 to 60 minutes all at once. For example, you can exercise 3 times a day for 10 or 20 minutes. Moderate exercise is safe for most people, but it is always a good idea to talk to your doctor before starting an exercise program. 
· Keep moving. Benji Cluck the lawn, work in the garden, or [place]. Take the stairs instead of the elevator at work. · If you smoke, quit. People who smoke have an increased risk for heart attack, stroke, cancer, and other lung illnesses. Quitting is hard, but there are ways to boost your chance of quitting tobacco for good. ¨ Use nicotine gum, patches, or lozenges. ¨ Ask your doctor about stop-smoking programs and medicines. ¨ Keep trying. In addition to reducing your risk of diseases in the future, you will notice some benefits soon after you stop using tobacco. If you have shortness of breath or asthma symptoms, they will likely get better within a few weeks after you quit. · Limit how much alcohol you drink. Moderate amounts of alcohol (up to 2 drinks a day for men, 1 drink a day for women) are okay. But drinking too much can lead to liver problems, high blood pressure, and other health problems. Family health If you have a family, there are many things you can do together to improve your health. · Eat meals together as a family as often as possible. · Eat healthy foods. This includes fruits, vegetables, lean meats and dairy, and whole grains. · Include your family in your fitness plan. Most people think of activities such as jogging or tennis as the way to fitness, but there are many ways you and your family can be more active. Anything that makes you breathe hard and gets your heart pumping is exercise. Here are some tips: 
¨ Walk to do errands or to take your child to school or the bus. ¨ Go for a family bike ride after dinner instead of watching TV. Where can you learn more? Go to http://jasvir-yaron.info/. Enter G242 in the search box to learn more about \"A Healthy Lifestyle: Care Instructions. \" Current as of: July 26, 2016 Content Version: 11.3 © 5199-0575 Healthwise, Incorporated. Care instructions adapted under license by [employer] (which disclaims liability or warranty for this information). If you have questions about a medical condition or this instruction, always ask your healthcare professional. [place] [address] any warranty or liability for your use of this information. Introducing [facility]! Dear Tamara Denny: Thank you for requesting a [facility] account. Our records indicate that you already have an active [facility] account.   You can access your account anytime at [email or web address] Did you know that you can access your hospital and ER discharge instructions at any time in [employer]? You can also review all of your test results from your hospital stay or ER visit. Additional Information If you have questions, please visit the Frequently Asked Questions section of the [employer] website at [email or web address] Remember, [employer] is NOT to be used for urgent needs. For medical emergencies, dial 911. Now available from your iPhone and Android! Please provide this summary of care documentation to your next provider. Your primary care clinician is listed as Luis [phone] If you have any questions after today's visit, please call [phone].

## 2022-02-24 NOTE — PROGRESS NOTES
Spoke to patient  He is agreeable to cath in am  risks and benefits discussed  2/1000 serious life threatening risk includes stroke, heart attack leading to  death  1/100 prolong hospital stay above and bleeding, groin complications, infection, renals possible but  unlikely unless baseline renal dysfunction, tear in cardiac vessel, tamponade  PCI 5-3/080 similar complications but benefits likely outweigh risk     If normal home post cath  If abnormal will make ad hoc decision on timing of any intervention  (thus need for 30 days of DAPT) with Nsurg

## 2022-02-25 ENCOUNTER — TELEPHONE (OUTPATIENT)
Dept: CARDIOLOGY CLINIC | Age: 47
End: 2022-02-25

## 2022-02-25 VITALS
RESPIRATION RATE: 16 BRPM | BODY MASS INDEX: 49.31 KG/M2 | TEMPERATURE: 97.6 F | HEART RATE: 70 BPM | OXYGEN SATURATION: 95 % | WEIGHT: 288.8 LBS | SYSTOLIC BLOOD PRESSURE: 113 MMHG | HEIGHT: 64 IN | DIASTOLIC BLOOD PRESSURE: 76 MMHG

## 2022-02-25 PROBLEM — I50.22 SYSTOLIC CHF, CHRONIC (HCC): Status: ACTIVE | Noted: 2020-01-01

## 2022-02-25 PROBLEM — I42.8 NICM (NONISCHEMIC CARDIOMYOPATHY) (HCC): Status: ACTIVE | Noted: 2022-02-25

## 2022-02-25 LAB
COVID-19 RAPID TEST, COVR: NOT DETECTED
SARS-COV-2, COV2: NORMAL
SOURCE, COVRS: NORMAL

## 2022-02-25 PROCEDURE — 74011000250 HC RX REV CODE- 250: Performed by: NURSE PRACTITIONER

## 2022-02-25 PROCEDURE — B2111ZZ FLUOROSCOPY OF MULTIPLE CORONARY ARTERIES USING LOW OSMOLAR CONTRAST: ICD-10-PCS | Performed by: INTERNAL MEDICINE

## 2022-02-25 PROCEDURE — 74011250636 HC RX REV CODE- 250/636: Performed by: INTERNAL MEDICINE

## 2022-02-25 PROCEDURE — 74011250637 HC RX REV CODE- 250/637: Performed by: STUDENT IN AN ORGANIZED HEALTH CARE EDUCATION/TRAINING PROGRAM

## 2022-02-25 PROCEDURE — 99152 MOD SED SAME PHYS/QHP 5/>YRS: CPT | Performed by: INTERNAL MEDICINE

## 2022-02-25 PROCEDURE — 74011000250 HC RX REV CODE- 250: Performed by: INTERNAL MEDICINE

## 2022-02-25 PROCEDURE — 87635 SARS-COV-2 COVID-19 AMP PRB: CPT

## 2022-02-25 PROCEDURE — 74011250637 HC RX REV CODE- 250/637: Performed by: HOSPITALIST

## 2022-02-25 PROCEDURE — 93458 L HRT ARTERY/VENTRICLE ANGIO: CPT | Performed by: INTERNAL MEDICINE

## 2022-02-25 PROCEDURE — 99232 SBSQ HOSP IP/OBS MODERATE 35: CPT | Performed by: SPECIALIST

## 2022-02-25 PROCEDURE — C1894 INTRO/SHEATH, NON-LASER: HCPCS | Performed by: INTERNAL MEDICINE

## 2022-02-25 PROCEDURE — 77030013744: Performed by: INTERNAL MEDICINE

## 2022-02-25 PROCEDURE — 74011250636 HC RX REV CODE- 250/636: Performed by: NURSE PRACTITIONER

## 2022-02-25 PROCEDURE — 74011250637 HC RX REV CODE- 250/637: Performed by: NURSE PRACTITIONER

## 2022-02-25 PROCEDURE — 4A023N7 MEASUREMENT OF CARDIAC SAMPLING AND PRESSURE, LEFT HEART, PERCUTANEOUS APPROACH: ICD-10-PCS | Performed by: INTERNAL MEDICINE

## 2022-02-25 PROCEDURE — 74011000636 HC RX REV CODE- 636: Performed by: INTERNAL MEDICINE

## 2022-02-25 PROCEDURE — 77030019569 HC BND COMPR RAD TERU -B: Performed by: INTERNAL MEDICINE

## 2022-02-25 PROCEDURE — 77030040934 HC CATH DIAG DXTERITY MEDT -A: Performed by: INTERNAL MEDICINE

## 2022-02-25 RX ORDER — CARVEDILOL 12.5 MG/1
12.5 TABLET ORAL 2 TIMES DAILY
Qty: 60 TABLET | Refills: 0 | Status: SHIPPED | OUTPATIENT
Start: 2022-02-25 | End: 2022-03-27

## 2022-02-25 RX ORDER — LEVETIRACETAM 500 MG/1
500 TABLET ORAL 2 TIMES DAILY
Qty: 60 TABLET | Refills: 0 | Status: SHIPPED | OUTPATIENT
Start: 2022-02-25 | End: 2022-03-11

## 2022-02-25 RX ORDER — DEXAMETHASONE 4 MG/1
4 TABLET ORAL 3 TIMES DAILY
Qty: 30 TABLET | Refills: 0 | Status: SHIPPED | OUTPATIENT
Start: 2022-02-25 | End: 2022-03-11

## 2022-02-25 RX ORDER — SODIUM CHLORIDE 0.9 % (FLUSH) 0.9 %
5-40 SYRINGE (ML) INJECTION AS NEEDED
Status: DISCONTINUED | OUTPATIENT
Start: 2022-02-25 | End: 2022-02-25 | Stop reason: HOSPADM

## 2022-02-25 RX ORDER — SODIUM CHLORIDE 9 MG/ML
500 INJECTION, SOLUTION INTRAVENOUS CONTINUOUS
Status: DISCONTINUED | OUTPATIENT
Start: 2022-02-25 | End: 2022-02-25 | Stop reason: HOSPADM

## 2022-02-25 RX ORDER — LISINOPRIL 5 MG/1
5 TABLET ORAL DAILY
Qty: 30 TABLET | Refills: 0 | Status: SHIPPED | OUTPATIENT
Start: 2022-02-26 | End: 2022-03-28

## 2022-02-25 RX ORDER — SODIUM CHLORIDE 0.9 % (FLUSH) 0.9 %
5-40 SYRINGE (ML) INJECTION EVERY 8 HOURS
Status: DISCONTINUED | OUTPATIENT
Start: 2022-02-25 | End: 2022-02-25 | Stop reason: HOSPADM

## 2022-02-25 RX ORDER — HEPARIN SODIUM 1000 [USP'U]/ML
INJECTION, SOLUTION INTRAVENOUS; SUBCUTANEOUS AS NEEDED
Status: DISCONTINUED | OUTPATIENT
Start: 2022-02-25 | End: 2022-02-25 | Stop reason: HOSPADM

## 2022-02-25 RX ORDER — LISINOPRIL 5 MG/1
5 TABLET ORAL DAILY
Status: DISCONTINUED | OUTPATIENT
Start: 2022-02-25 | End: 2022-02-25 | Stop reason: HOSPADM

## 2022-02-25 RX ORDER — FENTANYL CITRATE 50 UG/ML
INJECTION, SOLUTION INTRAMUSCULAR; INTRAVENOUS AS NEEDED
Status: DISCONTINUED | OUTPATIENT
Start: 2022-02-25 | End: 2022-02-25 | Stop reason: HOSPADM

## 2022-02-25 RX ORDER — MIDAZOLAM HYDROCHLORIDE 1 MG/ML
INJECTION, SOLUTION INTRAMUSCULAR; INTRAVENOUS AS NEEDED
Status: DISCONTINUED | OUTPATIENT
Start: 2022-02-25 | End: 2022-02-25 | Stop reason: HOSPADM

## 2022-02-25 RX ORDER — LIDOCAINE HYDROCHLORIDE 10 MG/ML
INJECTION INFILTRATION; PERINEURAL AS NEEDED
Status: DISCONTINUED | OUTPATIENT
Start: 2022-02-25 | End: 2022-02-25 | Stop reason: HOSPADM

## 2022-02-25 RX ORDER — GUAIFENESIN 100 MG/5ML
81 LIQUID (ML) ORAL DAILY
Status: DISCONTINUED | OUTPATIENT
Start: 2022-02-25 | End: 2022-02-25 | Stop reason: HOSPADM

## 2022-02-25 RX ADMIN — CARVEDILOL 12.5 MG: 6.25 TABLET, FILM COATED ORAL at 19:00

## 2022-02-25 RX ADMIN — CARVEDILOL 12.5 MG: 6.25 TABLET, FILM COATED ORAL at 08:54

## 2022-02-25 RX ADMIN — TORSEMIDE 100 MG: 100 TABLET ORAL at 08:54

## 2022-02-25 RX ADMIN — LEVETIRACETAM 500 MG: 500 TABLET, FILM COATED ORAL at 19:00

## 2022-02-25 RX ADMIN — DEXAMETHASONE SODIUM PHOSPHATE 4 MG: 4 INJECTION, SOLUTION INTRA-ARTICULAR; INTRALESIONAL; INTRAMUSCULAR; INTRAVENOUS; SOFT TISSUE at 14:34

## 2022-02-25 RX ADMIN — FAMOTIDINE 20 MG: 20 TABLET ORAL at 08:54

## 2022-02-25 RX ADMIN — DEXAMETHASONE SODIUM PHOSPHATE 4 MG: 4 INJECTION, SOLUTION INTRA-ARTICULAR; INTRALESIONAL; INTRAMUSCULAR; INTRAVENOUS; SOFT TISSUE at 06:33

## 2022-02-25 RX ADMIN — SODIUM CHLORIDE 500 ML: 9 INJECTION, SOLUTION INTRAVENOUS at 13:13

## 2022-02-25 RX ADMIN — SODIUM CHLORIDE, PRESERVATIVE FREE 10 ML: 5 INJECTION INTRAVENOUS at 14:35

## 2022-02-25 RX ADMIN — LEVETIRACETAM 500 MG: 500 TABLET, FILM COATED ORAL at 08:54

## 2022-02-25 RX ADMIN — LISINOPRIL 5 MG: 5 TABLET ORAL at 14:34

## 2022-02-25 RX ADMIN — ASPIRIN 81 MG 81 MG: 81 TABLET ORAL at 13:13

## 2022-02-25 NOTE — PROGRESS NOTES
TRANSFER - IN REPORT:    Verbal report received from [facility] tech on [facility]  being received from procedure for routine progression of care. Report consisted of patients Situation, Background, Assessment and Recommendations(SBAR). Information from the following report(s) Procedure Summary, MAR, Accordion, Recent Results and Med Rec Status was reviewed with the receiving clinician. Opportunity for questions and clarification was provided. Assessment completed upon patients arrival to [address] [place] [address] and care assumed. Cardiac Cath Lab Recovery Arrival Note:    [facility] arrived to [facility] recovery area. Patient procedure= LHC. Patient on cardiac monitor, non-invasive blood pressure, SPO2 monitor. On  O2 @ 2 lpm via n/c. IV  of nacl on pump at 25 ml/hr. Patient status doing well without problems, doing well with some problems : no. Patient is A&Ox 4. Patient reports no complaints. PROCEDURE SITE CHECK:    Procedure site:without any bleeding and or hematoma, no pain/discomfort reported at procedure site. No change in patient status. Continue to monitor patient and status.

## 2022-02-25 NOTE — PROGRESS NOTES
Problem: Falls - Risk of  Goal: *Absence of Falls  Description: Document Prabhjot Morales Fall Risk and appropriate interventions in the flowsheet.   2/25/2022 1656 by Margi Meredith RN  Outcome: Resolved/Met  Note: Fall Risk Interventions:            Medication Interventions: Patient to call before getting OOB                2/25/2022 1024 by Margi Meredith RN  Outcome: Progressing Towards Goal  Note: Fall Risk Interventions:            Medication Interventions: Patient to call before getting OOB                   Problem: Patient Education: Go to Patient Education Activity  Goal: Patient/Family Education  Outcome: Resolved/Met     Problem: Discharge Planning  Goal: *Discharge to safe environment  Outcome: Resolved/Met     Problem: Patient Education:  Go to Education Activity  Goal: Patient/Family Education  Outcome: Resolved/Met     Problem: Brain Tumor Day 1  Goal: Activity/Safety  Outcome: Resolved/Met  Goal: Consults, if ordered  Outcome: Resolved/Met  Goal: Diagnostic Test/Procedures  Outcome: Resolved/Met  Goal: Nutrition/Diet  Outcome: Resolved/Met  Goal: Discharge Planning  Outcome: Resolved/Met  Goal: Medications  Outcome: Resolved/Met  Goal: Respiratory  Outcome: Resolved/Met  Goal: Treatments/Interventions/Procedures  Outcome: Resolved/Met  Goal: *Neurologic stability  Outcome: Resolved/Met  Goal: *Progress independence mobility/activities (eg: Mobility precautions)  Outcome: Resolved/Met  Goal: *Adequate oxygenation  Outcome: Resolved/Met  Goal: *Hemodynamically stable without vasoactive medications  Outcome: Resolved/Met     Problem: Brain Tumor Day 2  Goal: Activity/Safety  Outcome: Resolved/Met  Goal: Consults, if ordered  Outcome: Resolved/Met  Goal: Diagnostic Test/Procedures  Outcome: Resolved/Met  Goal: Nutrition/Diet  Outcome: Resolved/Met  Goal: Medications  Outcome: Resolved/Met  Goal: Respiratory  Outcome: Resolved/Met  Goal: Treatments/Interventions/Procedures  Outcome: Resolved/Met  Goal: *Hemodynamically stable without vasoactive medications  Outcome: Resolved/Met  Goal: *Neurologic stability  Outcome: Resolved/Met  Goal: *Progress independence mobility/activities (eg: Mobility precautions)  Outcome: Resolved/Met  Goal: *Adequate oxygenation  Outcome: Resolved/Met  Goal: *Tolerating diet  Outcome: Resolved/Met     Problem: Brain Tumor Day 3 to Discharge  Goal: Activity/Safety  2/25/2022 1656 by Pamela Roy RN  Outcome: Resolved/Met  2/25/2022 1024 by Pamela Roy RN  Outcome: Progressing Towards Goal  Goal: Nutrition/Diet  Outcome: Resolved/Met  Goal: Discharge Planning  2/25/2022 1656 by Pamela Roy RN  Outcome: Resolved/Met  2/25/2022 1024 by Paemla Roy RN  Outcome: Progressing Towards Goal  Goal: Medications  2/25/2022 1656 by Pamela Roy RN  Outcome: Resolved/Met  2/25/2022 1024 by Pamela Roy RN  Outcome: Progressing Towards Goal  Goal: Treatments/Interventions/Procedures  Outcome: Resolved/Met  Goal: Progressive Mobility and Function  Outcome: Resolved/Met  Goal: Psychosocial  Outcome: Resolved/Met     Problem: Brain Tumor Discharge Outcomes  Goal: *Neurologic stability  Outcome: Resolved/Met  Goal: *Progress independence mobility/activities (eg: Mobility precautions)  Outcome: Resolved/Met  Goal: *Adequate oxygenation  Outcome: Resolved/Met  Goal: *Tolerates nutrition therapy  Outcome: Resolved/Met  Goal: *Verbalizes understanding and describes medication purposes and frequencies  Outcome: Resolved/Met  Goal: *Verbalizes understanding of type and use of pain medication  Outcome: Resolved/Met  Goal: *Describes home care/support arrangements established based on need  Outcome: Resolved/Met  Goal: *Describes available resources and support systems  Outcome: Resolved/Met     Problem: Patient Education: Go to Patient Education Activity  Goal: Patient/Family Education  Outcome: Resolved/Met     Problem: Heart Failure: Day 1  Goal: Off Pathway (Use only if patient is Off Pathway)  Outcome: Resolved/Met  Goal: Activity/Safety  Outcome: Resolved/Met  Goal: Consults, if ordered  Outcome: Resolved/Met  Goal: Diagnostic Test/Procedures  Outcome: Resolved/Met  Goal: Nutrition/Diet  Outcome: Resolved/Met  Goal: Discharge Planning  Outcome: Resolved/Met  Goal: Medications  Outcome: Resolved/Met  Goal: Respiratory  Outcome: Resolved/Met  Goal: Treatments/Interventions/Procedures  Outcome: Resolved/Met  Goal: Psychosocial  Outcome: Resolved/Met  Goal: *Oxygen saturation within defined limits  Outcome: Resolved/Met  Goal: *Hemodynamically stable  Outcome: Resolved/Met  Goal: *Optimal pain control at patient's stated goal  Outcome: Resolved/Met  Goal: *Anxiety reduced or absent  Outcome: Resolved/Met     Problem: Heart Failure: Day 2  Goal: Off Pathway (Use only if patient is Off Pathway)  Outcome: Resolved/Met  Goal: Activity/Safety  Outcome: Resolved/Met  Goal: Consults, if ordered  Outcome: Resolved/Met  Goal: Diagnostic Test/Procedures  Outcome: Resolved/Met  Goal: Nutrition/Diet  Outcome: Resolved/Met  Goal: Discharge Planning  Outcome: Resolved/Met  Goal: Medications  Outcome: Resolved/Met  Goal: Respiratory  Outcome: Resolved/Met  Goal: Treatments/Interventions/Procedures  Outcome: Resolved/Met  Goal: Psychosocial  Outcome: Resolved/Met  Goal: *Oxygen saturation within defined limits  Outcome: Resolved/Met  Goal: *Hemodynamically stable  Outcome: Resolved/Met  Goal: *Optimal pain control at patient's stated goal  Outcome: Resolved/Met  Goal: *Anxiety reduced or absent  Outcome: Resolved/Met  Goal: *Demonstrates progressive activity  Outcome: Resolved/Met     Problem: Heart Failure: Day 3  Goal: Off Pathway (Use only if patient is Off Pathway)  Outcome: Resolved/Met  Goal: Activity/Safety  Outcome: Resolved/Met  Goal: Diagnostic Test/Procedures  Outcome: Resolved/Met  Goal: Nutrition/Diet  Outcome: Resolved/Met  Goal: Discharge Planning  Outcome: Resolved/Met  Goal: Medications  Outcome: Resolved/Met  Goal: Respiratory  Outcome: Resolved/Met  Goal: Treatments/Interventions/Procedures  Outcome: Resolved/Met  Goal: Psychosocial  Outcome: Resolved/Met  Goal: *Oxygen saturation within defined limits  Outcome: Resolved/Met  Goal: *Hemodynamically stable  Outcome: Resolved/Met  Goal: *Optimal pain control at patient's stated goal  Outcome: Resolved/Met  Goal: *Anxiety reduced or absent  Outcome: Resolved/Met  Goal: *Demonstrates progressive activity  Outcome: Resolved/Met     Problem: Heart Failure: Day 4  Goal: Off Pathway (Use only if patient is Off Pathway)  Outcome: Resolved/Met  Goal: Activity/Safety  Outcome: Resolved/Met  Goal: Diagnostic Test/Procedures  Outcome: Resolved/Met  Goal: Nutrition/Diet  Outcome: Resolved/Met  Goal: Discharge Planning  Outcome: Resolved/Met  Goal: Medications  Outcome: Resolved/Met  Goal: Respiratory  Outcome: Resolved/Met  Goal: Treatments/Interventions/Procedures  Outcome: Resolved/Met  Goal: Psychosocial  Outcome: Resolved/Met  Goal: *Oxygen saturation within defined limits  Outcome: Resolved/Met  Goal: *Hemodynamically stable  Outcome: Resolved/Met  Goal: *Optimal pain control at patient's stated goal  Outcome: Resolved/Met  Goal: *Anxiety reduced or absent  Outcome: Resolved/Met  Goal: *Demonstrates progressive activity  Outcome: Resolved/Met     Problem: Heart Failure: Day 5  Goal: Off Pathway (Use only if patient is Off Pathway)  Outcome: Resolved/Met  Goal: Activity/Safety  Outcome: Resolved/Met  Goal: Diagnostic Test/Procedures  Outcome: Resolved/Met  Goal: Nutrition/Diet  Outcome: Resolved/Met  Goal: Discharge Planning  Outcome: Resolved/Met  Goal: Medications  Outcome: Resolved/Met  Goal: Respiratory  Outcome: Resolved/Met  Goal: Treatments/Interventions/Procedures  Outcome: Resolved/Met  Goal: Psychosocial  Outcome: Resolved/Met     Problem: Heart Failure: Discharge Outcomes  Goal: *Demonstrates ability to perform prescribed activity without shortness of breath or discomfort  2/25/2022 1656 by Margi Meredith RN  Outcome: Resolved/Met  2/25/2022 1024 by Margi Meredith RN  Outcome: Progressing Towards Goal  Goal: *Left ventricular function assessment completed prior to or during stay, or planned for post-discharge  Outcome: Resolved/Met  Goal: *ACEI prescribed if LVEF less than 40% and no contraindications or ARB prescribed  Outcome: Resolved/Met  Goal: *Verbalizes understanding and describes prescribed diet  Outcome: Resolved/Met  Goal: *Verbalizes understanding/describes prescribed medications  Outcome: Resolved/Met  Goal: *Describes available resources and support systems  Description: (eg: Home Health, Palliative Care, Advanced Medical Directive)  Outcome: Resolved/Met  Goal: *Describes smoking cessation resources  Outcome: Resolved/Met  Goal: *Understands and describes signs and symptoms to report to providers(Stroke Metric)  Outcome: Resolved/Met  Goal: *Describes/verbalizes understanding of follow-up/return appt  Description: (eg: to physicians, diabetes treatment coordinator, and other resources  Outcome: Resolved/Met  Goal: *Describes importance of continuing daily weights and changes to report to physician  Outcome: Resolved/Met

## 2022-02-25 NOTE — PROGRESS NOTES
Problem: Falls - Risk of  Goal: *Absence of Falls  Description: Document Martín Monsoner Fall Risk and appropriate interventions in the flowsheet.   Outcome: Progressing Towards Goal  Note: Fall Risk Interventions:            Medication Interventions: Patient to call before getting OOB                   Problem: Brain Tumor Day 3 to Discharge  Goal: Activity/Safety  Outcome: Progressing Towards Goal  Goal: Discharge Planning  Outcome: Progressing Towards Goal  Goal: Medications  Outcome: Progressing Towards Goal     Problem: Heart Failure: Discharge Outcomes  Goal: *Demonstrates ability to perform prescribed activity without shortness of breath or discomfort  Outcome: Progressing Towards Goal

## 2022-02-25 NOTE — DISCHARGE INSTRUCTIONS
Discharge Instructions       PATIENT ID: Rivka Nelson  MRN: [id number]   YOB: 1975    DATE OF ADMISSION: 2/21/2022  8:55 PM    DATE OF DISCHARGE: 2/25/2022    PRIMARY CARE PROVIDER: Zohra Dominique MD     ATTENDING PHYSICIAN: Roseanna Cortez MD  DISCHARGING PROVIDER: Rian Youssef MD    To contact this individual call [phone] and ask the  to page. If unavailable ask to be transferred the Adult Hospitalist Department. DISCHARGE DIAGNOSES   Brain mass  Cardiomyopathy  Suspect you may have obstructive sleep apnea. We recommend     CONSULTATIONS: IP CONSULT TO NEUROSURGERY    PROCEDURES/SURGERIES: * No surgery found *    PENDING TEST RESULTS:   At the time of discharge the following test results are still pending: none    FOLLOW UP APPOINTMENTS:   Follow-up Information     Follow up With Specialties Details Why Contact Jovanni Willoughby MD Internal Medicine In 1 week  [id number]  [phone]      Cristofer Thakur MD Neurosurgery In 1 week  [address]  [phone]             ADDITIONAL CARE RECOMMENDATIONS:   Follow up with PMD  Follow up with Neurosurgery    DIET: Cardiac Diet    ACTIVITY: Activity as tolerated      DISCHARGE MEDICATIONS:   See Medication Reconciliation Form    · It is important that you take the medication exactly as they are prescribed. · Keep your medication in the bottles provided by the pharmacist and keep a list of the medication names, dosages, and times to be taken in your wallet. · Do not take other medications without consulting your doctor. NOTIFY YOUR PHYSICIAN FOR ANY OF THE FOLLOWING:   Fever over 101 degrees for 24 hours. Chest pain, shortness of breath, fever, chills, nausea, vomiting, diarrhea, change in mentation, falling, weakness, bleeding. Severe pain or pain not relieved by medications.   Or, any other signs or symptoms that you may have questions about.      DISPOSITION:  x  Home With:   OT  PT  HH  RN       SNF/Inpatient Rehab/LTAC    Independent/assisted living    Hospice    Other:       Signed:   Thee Luo MD  2/25/2022      You are on the surgery schedule for Wednesday March 2, 2022 at 7:30 AM. Please [facility] to [facility] admitting department at 5:30 AM. Go into the main entrance to the hospital and admitting is on the left. Nothing to eat or drink after midnight on Tuesday night. Take your Keppra (levetiracetam), dexamethasone, carvedilol with sips of water the morning of surgery. Do not take your torsemide the morning of surgery. Avoid ibuprofen, aspirin, aleve, excedrin, BC powder between discharge and surgery. No smoking or alcohol 24 hours prior to surgery. Shower the night before surgery. Use CHG wipes to wipe down the morning of surgery.

## 2022-02-25 NOTE — PROGRESS NOTES
[facility]  Cardiology Care Note                            established visit    Patient Name: Jd Cisneros - :1975 - [facility]:[id number]  Primary Cardiologist: [place] cardiologist previously  Consulting Cardiologist: Helen Nesbitt MD  Reason for Consult: History of CHF, preop eval      Assessment/Plan/Discussion:   1. I have no objection to the planned  surgery. Patient seems to be at a low risk for reji-operative severe adverse cardiac events. 2. Cath today with no CAD, had elevated LVEDP ,filling pressure, will need CHF fu with his cardiologist after surgery. Currently not volume overloaded    3. CHF systolic chronic NYHA 1  Can add ACE after surgery or fine to dc on lisinopril 5 mg now  PTA was on  Key CAD CHF Meds             carvediloL (COREG) 6.25 mg tablet Take 6.25 mg by mouth two (2) times a day. torsemide (DEMADEX) 100 mg tablet Take 100 mg by mouth. 4. Non-ischemic cardiomyopathy (NICM) likely HCVD with CHF due to HTN/HCVD  5. Significant HTN  Increased Coreg to 12.5 mg BID  BP Readings from Last 6 Encounters:   22 124/78   22 (!) 161/98   21 (!) 156/106   17 (!) 170/92   14 (!) 169/95       6. Morbid obesity and KELL suspected  Needs CPAP sleep study  7. Rt frontal lobe mass/cerebral edema.   -surgery planned next week (plan is to discharge and readmit for surgery)  No objection to surgery and plans     22    ECHO ADULT COMPLETE 2022    Interpretation Summary    Left Ventricle: Left ventricle size is normal. Findings consistent with severe concentric hypertrophy. Moderate global hypokinesis present. Moderately reduced left ventricular systolic function. EF by visual approximation is 35%. Abnormal diastolic function.   Contrast used: Definity.     Signed by: Madiha Paz MD on 2022  5:50 PM     22    CARDIAC PROCEDURE 02/25/2022 2/25/2022    Conclusion  Findings  1. Elevated LVEDP  2. No significant epicardial coronary disease    Access right radial no issues  Contrast 40 cc    Recommendations  1. Guideline directed medical therapy for heart failure with reduced ejection fraction    Signed by: Tona Dunn MD on 2/25/2022 10:41 AM       Prev HPI:  Mr. Shyam Hughes is a 51 y.o. male with PMH of CHF, HTN, suspected KELL who presents with chief c/o dizziness, headache, vision disturbance. He is found to have a rt frontal lobe mass with cerebral edema/mass effect and surgery is planned for next week. Cardiology has been asked for preop evaluation given history of CHF. Pt reports that he was hospitalized 1 year ago with c/o SOB and swelling. He was told his heart was weak at that time. He reports having a stress test but no heart catheterization was recommended at that time. He states he followed with a [facility] cardiologist in [place] but has since been released, with no need for followup. 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 He states he talked coreg and Torsemide. He states he no longer has the fatigue that he had 1 year ago. He denies any SOB at rest or with walking around the entire [employer] store ( no longer needs to use rollator cart at [place]). He denies any history of chest pain. States his BLE has much improved from last year. Reports weight loss over past year of at least 10 lbs. No palpitations. No history of DM, HLD. Never smoker. 51 y.o. male with PMH of HTN, CHF who is found to have cerbral mass. No exertional symptoms but EKG with sT/t wave abnormality lateral leads. 02/21/22    ECHO ADULT COMPLETE 02/24/2022 2/24/2022    Interpretation Summary    Left Ventricle: Left ventricle size is normal. Findings consistent with severe concentric hypertrophy. Moderate global hypokinesis present. Moderately reduced left ventricular systolic function. EF by visual approximation is 35%. Abnormal diastolic function.   Contrast used: Definity. Signed by: Denton Reynolds MD on 2/24/2022  5:50 PM           Patient Active Problem List   Diagnosis Code    Brain mass G93.89    Hypertension I10    Hyperlipidemia E78.5    Morbid obesity ([facility] [address] E66.01    Prediabetes R73.03    Heart failure with mid-range ejection fraction ([facility] [address] [facility] I50.9     No specialty comments available. [] Patient unable to provide secondary to condition    CONSTITUTIONAL: negative   ENT: negative  EYES: vision Changes  CARDIOVASCULAR: some mild chronic edema  RESPIRATORY: negative  GASTROINTESTINAL: negative  GENITOURINARY: negative  MUSCULOSKELETAL: negative  SKIN: negative  NEUROLOGICAL: dizziness  PSYCHOLOGICAL: negative   HEME/LYMPH: negative  ENDOCRINE: negative        Past Medical History:   Diagnosis Date    Heart failure ([facility]     Hypertension      No past surgical history on file. Current Facility-Administered Medications   Medication Dose Route Frequency    0.9% sodium chloride infusion 500 mL  500 mL IntraVENous CONTINUOUS    sodium chloride (NS) flush 5-40 mL  5-40 mL IntraVENous Q8H    sodium chloride (NS) flush 5-40 mL  5-40 mL IntraVENous PRN    aspirin chewable tablet 81 mg  81 mg Oral DAILY    lisinopriL (PRINIVIL, ZESTRIL) tablet 5 mg  5 mg Oral DAILY    famotidine (PEPCID) tablet 20 mg  20 mg Oral Q12H    carvediloL (COREG) tablet 12.5 mg  12.5 mg Oral BID    dexamethasone (DECADRON) 4 mg/mL injection 4 mg  4 mg IntraVENous Q8H    levETIRAcetam (KEPPRA) tablet 500 mg  500 mg Oral BID    LORazepam (ATIVAN) injection 2 mg  2 mg IntraVENous Q2H PRN    torsemide (DEMADEX) tablet 100 mg  100 mg Oral DAILY    acetaminophen (TYLENOL) tablet 650 mg  650 mg Oral Q6H PRN       No Known Allergies     FmHx: family history is not on file. Social Hx :  reports that he has never smoked.  He has never used smokeless tobacco. He reports current alcohol use of about 0.8 standard drinks of alcohol per week. He reports that he does not use drugs. Objective:    Physical Exam    Vitals:   Vitals:    02/25/22 1200 02/25/22 1215 02/25/22 1244 02/25/22 1400   BP: (!) 137/94 (!) 141/87 (!) 158/85 124/78   Pulse: (!) 57 (!) 59 67 71   Resp: 17 16 19 17   Temp:   97.5 °F (36.4 °C) 97.4 °F (36.3 °C)   SpO2: 95% 95% 97% 96%   Weight:       Height:           General:    Alert, cooperative, no distress, appears stated age. Neck:   Supple, no carotid bruit and no JVD. Back:     Symmetric,     Lungs:     clear to auscultation bilaterally. Heart[de-identified]    Regular rate and rhythm, S1, S2 normal, no murmur, click, rub or gallop. Abdomen:     Soft, obese,, non-tender. Bowel sounds normal.    Extremities:   Extremities normal, atraumatic, no cyanosis or trace edema. Vascular:   Pulses -2+   Skin:   Skin color normal. No rashes or lesions on visible areas   Neurologic:   CN II-XII grossly intact. Telemetry: normal sinus rhythm      Data Review:     Radiology:   XR Results (most recent):  Results from Hospital Encounter encounter on 02/21/22    XR CHEST PORT    Narrative  INDICATION: . CVA  Additional history: Cerebral vascular accident  COMPARISON: Previous chest xray, 2/2/2021 and 3/13/2017. LIMITATIONS: Portable technique. Rafia Bloodgood FINDINGS: Single frontal view of the chest.  .  Lines/tubes/surgical: Cardiac monitor leads overly the patient. Heart/mediastinum: Unremarkable. Lungs/pleura: Linear opacity in the left midlung suggesting  scarring/atelectasis. No visualized pleural effusion or pneumothorax. Additional Comments: None. .    Impression  1. No radiographic evidence of acute cardiopulmonary disease. No results for input(s): CPK, TROIQ in the last 72 hours.     No lab exists for component: CKQMB, CPKMB, BMPP  Recent Labs     02/23/22  0518   *   K 4.0      CO2 27   BUN 16   CREA 0.95   *   CA 9.2 Recent Labs     02/23/22  0518   WBC 14.8*   HGB 15.4   HCT 46.6        No results for input(s): PTP, INR, AP, INREXT, INREXT in the last 72 hours. No lab exists for component: PTTP, GPT, SGOT  Recent Labs     02/23/22  0518   CHOL 180   LDLC 126.8*     No results for input(s): CRP, TSH, TSHEXT, TSHEXT in the last 72 hours.     No lab exists for component: ESR    Mayela Madison MD    [facility] [address] [address]  [facility]  [phone]      Piero Gee MD

## 2022-02-25 NOTE — PROGRESS NOTES
[facility]  Hospitalist Group                                                                                          Hospitalist Progress Note  Marii Benson MD  Answering service: [phone] OR [phone] from in house phone        Date of Service:  2022  NAME:  Marcelo Velazquez  :  1975  MRN:  [id number]      Admission Summary:   79-year-old male with a past medical history of CHF and hypertension who presents to the emergency department due to episodic periods of lightheadedness, headache, and left lower extremity weakness. Patient has had these episodes for the last 2 days. He says whenever he stands up he notices lightheadedness and that his right ear is \"swooshing\". When he has the symptoms and tries to walk he also notes that he drags his left leg across the floor. His symptoms last for about 5 minutes at a time and then resolved. Last time he had symptoms was 1 hour prior to arrival. He has no associated shortness of breath or chest pain with this. He denies vertigo. He denies any speech deficits or weakness in his other extremities. He denies any numbness. Otherwise patient has no complaints. Interval history / Subjective:   F/u Right sided mass   Patient returned from cardiac cath, no complaints. Assessment & Plan:     Right frontal lobe mass  Cerebral edema/Mass effect  -CT head  Right frontal lobe mass with significant edema in the right frontal lobe resulting in subfalcine and uncal herniation on the right as well as leftward shift of midline structures  -MRI brain A 4.5 x 3.0 x 5.1 cm heterogeneously enhancing mass with areas of central necrosis and hemorrhage in the parasagittal right frontoparietal lobe  -Will be discharged on Decadron and Keppra. -Planned readmission for 3/1 for craniotomy      Cardiomyopathy, chronic systolic and diastolic CHF.   Compensated  --EF 35% with moderate left ventricular dysfunction and abnormal diastolic function  --Cardiac catheterization own/25, elevated LVEDP but no significant epicardial coronary artery disease  --GDMT. On carvedilol and torsemide. Add lisinopril. Asked cardiology for additional recommendations      Hypertension, uncontrolled  -Increased coreg 6.25 mg bid . .. >12 mg bid today,2/24  --Added lisinopril on/25  -continue Torsemide     Severe obesity  Body mass index is 49.57 kg/m². --Dietary and lifestyle modification. Probable obstructive sleep apnea, obesity hypoventilation syndrome  --Discussed with patient that he will need outpatient sleep study       Code status: FULL CODE  Prophylaxis: SCD    Plan: Cardiology clearance before discharge home   Care Plan discussed with: Patient  Anticipated Disposition:home with planned readmission next week       Hospital Problems  Date Reviewed: 2/22/2022          Codes Class Noted POA    Hypertension ICD-10-CM: I10  ICD-9-CM: 401.9  2/22/2022 Yes        Hyperlipidemia ICD-10-CM: E78.5  ICD-9-CM: 272.4  2/22/2022 Yes        Morbid obesity ([facility] ICD-10-CM: E66.01  ICD-9-CM: 278.01  2/22/2022 Yes        Prediabetes ICD-10-CM: R73.03  ICD-9-CM: 790.29  2/22/2022 Yes        * (Principal) Brain mass ICD-10-CM: C99.12  ICD-9-CM: 348.89  2/21/2022 Yes                Review of Systems:   A comprehensive review of systems was negative except for that written in the HPI. Vital Signs:    Last 24hrs VS reviewed since prior progress note.  Most recent are:  Visit Vitals  /78 (BP 1 Location: Left arm, BP Patient Position: At rest)   Pulse 71   Temp 97.4 °F (36.3 °C)   Resp 17   Ht 5' 4\" (1.626 m)   Wt 131 kg (288 lb 12.8 oz)   SpO2 96%   BMI 49.57 kg/m²         Intake/Output Summary (Last 24 hours) at 2/25/2022 1456  Last data filed at 2/25/2022 1200  Gross per 24 hour   Intake 125 ml   Output 2000 ml   Net -1875 ml        Physical Examination:     I had a face to face encounter with this patient and independently examined them on 2/25/2022 as outlined below:          Constitutional:  No acute distress    ENT:  Oral mucosa moist, oropharynx benign. Resp:  CTA bilaterally. No wheezing/rhonchi/rales. No accessory muscle use. CV:  Regular rhythm, normal rate, no murmurs, gallops, rubs    GI:  Soft, non distended, non tender. normoactive bowel sounds, no hepatosplenomegaly     Musculoskeletal:  Pitting pretibial and pedal edema , warm, 2+ pulses throughout    Neurologic:  Walking in the room,steady on his feet. AAOx3, CN II-XII reviewed            Data Review:    Review and/or order of clinical lab test      Labs:     Recent Labs     02/23/22 0518   WBC 14.8*   HGB 15.4   HCT 46.6        Recent Labs     02/23/22 0518   *   K 4.0      CO2 27   BUN 16   CREA 0.95   *   CA 9.2     No results for input(s): ALT, AP, TBIL, TBILI, TP, ALB, GLOB, GGT, AML, LPSE in the last 72 hours. No lab exists for component: SGOT, GPT, AMYP, HLPSE  No results for input(s): INR, PTP, APTT, INREXT, INREXT in the last 72 hours. No results for input(s): FE, TIBC, PSAT, FERR in the last 72 hours. No results found for: FOL, RBCF   No results for input(s): PH, PCO2, PO2 in the last 72 hours. No results for input(s): CPK, CKNDX, TROIQ in the last 72 hours.     No lab exists for component: CPKMB  Lab Results   Component Value Date/Time    Cholesterol, total 180 02/23/2022 05:18 AM    HDL Cholesterol 36 02/23/2022 05:18 AM    LDL, calculated 126.8 (H) 02/23/2022 05:18 AM    Triglyceride 86 02/23/2022 05:18 AM    CHOL/HDL Ratio 5.0 02/23/2022 05:18 AM     Lab Results   Component Value Date/Time    Glucose (POC) 102 02/21/2022 03:33 PM    Glucose (POC) 81 03/13/2017 08:51 PM     No results found for: COLOR, APPRN, SPGRU, REFSG, MARCELLO, PROTU, GLUCU, KETU, BILU, UROU, COREY, LEUKU, GLUKE, EPSU, BACTU, WBCU, RBCU, CASTS, UCRY      Medications Reviewed:     Current Facility-Administered Medications   Medication Dose Route Frequency    0.9% sodium chloride infusion 500 mL  500 mL IntraVENous CONTINUOUS    sodium chloride (NS) flush 5-40 mL  5-40 mL IntraVENous Q8H    sodium chloride (NS) flush 5-40 mL  5-40 mL IntraVENous PRN    aspirin chewable tablet 81 mg  81 mg Oral DAILY    lisinopriL (PRINIVIL, ZESTRIL) tablet 5 mg  5 mg Oral DAILY    famotidine (PEPCID) tablet 20 mg  20 mg Oral Q12H    carvediloL (COREG) tablet 12.5 mg  12.5 mg Oral BID    dexamethasone (DECADRON) 4 mg/mL injection 4 mg  4 mg IntraVENous Q8H    levETIRAcetam (KEPPRA) tablet 500 mg  500 mg Oral BID    LORazepam (ATIVAN) injection 2 mg  2 mg IntraVENous Q2H PRN    torsemide (DEMADEX) tablet 100 mg  100 mg Oral DAILY    acetaminophen (TYLENOL) tablet 650 mg  650 mg Oral Q6H PRN     ______________________________________________________________________  EXPECTED LENGTH OF STAY: 3d 19h  ACTUAL LENGTH OF STAY:          4                 Jazzmine Aguayo MD

## 2022-02-25 NOTE — PROGRESS NOTES
Cardiac Cath Lab Recovery Arrival Note:      Stoddard Or arrived to Cardiac Cath Lab, Recovery Area. Staff introduced to patient. Patient identifiers verified with NAME and DATE OF BIRTH. Procedure verified with patient. Consent forms reviewed and signed by patient or authorized representative and verified. Allergies verified. Patient and family oriented to department. Patient and family informed of procedure and plan of care. Questions answered with review. Patient prepped for procedure, per orders from physician, prior to arrival.    Patient on cardiac monitor, non-invasive blood pressure, SPO2 monitor. On room air. Patient is A&Ox 4. Patient reports no complaints. Patient in stretcher, in low position, with side rails up, call bell within reach, patient instructed to call if assistance as needed. Patient prep in: [address], [place]. Patient family has pager # [phone]  Family in: outside hospital.   Prep by: Tomasa Berg RN  [phone]  Dr Dung Khan in to talk with pt.   Pre cath teaching completed

## 2022-02-25 NOTE — PROCEDURES
Findings  1. Elevated LVEDP  2. No significant epicardial coronary disease    Access right radial no issues  Contrast 40 cc    Recommendations  1.   Guideline directed medical therapy for heart failure with reduced ejection fraction

## 2022-02-25 NOTE — PROGRESS NOTES
Told pt to fill but not start ACE until after surgery  For follow up , he wants to see me at [facility] office

## 2022-02-25 NOTE — PROGRESS NOTES
TRANSFER - OUT REPORT:    Verbal report given to Enrike Atkinson RN(name) on [facility]  being transferred to NSTU(unit) for routine progression of care       Report consisted of patients Situation, Background, Assessment and   Recommendations(SBAR). Information from the following report(s) Procedure Summary, Intake/Output, MAR, Accordion, Recent Results, Med Rec Status and Cardiac Rhythm sr was reviewed with the receiving nurse. Lines:   Peripheral IV 02/21/22 Right Antecubital (Active)   Site Assessment Other (Comment) 02/25/22 0900   Phlebitis Assessment 0 02/25/22 0900   Infiltration Assessment 0 02/25/22 0900   Dressing Status Loose 02/25/22 0900   Dressing Type Transparent 02/25/22 0900   Hub Color/Line Status Green 02/25/22 0900   Action Taken Dressing changed 02/25/22 0900   Alcohol Cap Used Yes 02/25/22 0800        Opportunity for questions and clarification was provided. Patient transported with:   Monitor  Tech     1230 transferred via stretcher to room 653  Pt has hard copy of d/c instructions with bandaids.   Instructions reviewed with pt

## 2022-02-26 LAB
BACTERIA SPEC CULT: NORMAL
SERVICE CMNT-IMP: NORMAL

## 2022-02-27 NOTE — DISCHARGE SUMMARY
Discharge Summary       PATIENT ID: Janet Mendoza  MRN: [id number]   YOB: 1975    DATE OF ADMISSION: 2/21/2022  8:55 PM    DATE OF DISCHARGE: 02/25/2022   PRIMARY CARE PROVIDER: Naseem Mcneil MD     ATTENDING PHYSICIAN: Stevo Cordova MD    DISCHARGING PROVIDER: Stevo Cordova MD    To contact this individual call [phone] and ask the  to page. If unavailable ask to be transferred the Adult Hospitalist Department. CONSULTATIONS: IP CONSULT TO CARDIOLOGY    PROCEDURES/SURGERIES: Procedure(s):  LEFT HEART CATH / CORONARY ANGIOGRAPHY    DISCHARGE DIAGNOSES:           ADMISSION SUMMARY AND HOSPITAL COURSE:   Admission Summary:   65-year-old male with a past medical history of CHF and hypertension who presents to the emergency department due to episodic periods of lightheadedness, headache, and left lower extremity weakness. Patient has had these episodes for the last 2 days. He says whenever he stands up he notices lightheadedness and that his right ear is \"swooshing\". When he has the symptoms and tries to walk he also notes that he drags his left leg across the floor. His symptoms last for about 5 minutes at a time and then resolved. Last time he had symptoms was 1 hour prior to arrival. He has no associated shortness of breath or chest pain with this. He denies vertigo. He denies any speech deficits or weakness in his other extremities. He denies any numbness. Otherwise patient has no complaints.     Right frontal lobe mass with associated cerebral edema/Mass effect  -CT head 2/21 Right frontal lobe mass with significant edema in the right frontal lobe resulting in subfalcine and uncal herniation on the right as well as leftward shift of midline structures  -MRI brain A 4.5 x 3.0 x 5.1 cm heterogeneously enhancing mass with areas of central necrosis and hemorrhage in the parasagittal right frontoparietal lobe  -Patient is discharged on Decadron and Keppra with planned readmission for 3/1 for craniotomy. Cardiomyopathy, chronic systolic and diastolic CHF. Compensated  --EF 35% with moderate left ventricular dysfunction and abnormal diastolic function  --Cardiac catheterization own/25, elevated LVEDP but no significant epicardial coronary artery disease  --GDMT. On carvedilol and torsemide. Discussed with cardiology, hold lisinopril until after postop and may consider Entresto afterwards. No indication for LifeVest as this is nonischemic cardiomyopathy. Patient asked her to hold aspirin until after surgery as recommended by neurosurgery. Hypertension, uncontrolled, improved  Adjusted BP meds. Severe obesity  Body mass index is 49.57 kg/m². --Dietary and lifestyle modification. Probable obstructive sleep apnea, obesity hypoventilation syndrome  --Discussed with patient that he will need outpatient sleep study         [id number] [place], [place] [address]y. [address] / PLAN:        BMI: Body mass index is 49.57 kg/m². . This patient: Meets criteria for severe obesity given BMI >/= to 40 due to excess calories/nutritional. Weight loss and lifestyle modifications should be encouraged as an outpatient. PENDING TEST RESULTS:   At the time of discharge the following test results are still pending: none         NOTIFY YOUR PHYSICIAN FOR ANY OF THE FOLLOWING:   Fever over 101 degrees for 24 hours. Chest pain, shortness of breath, fever, chills, nausea, vomiting, diarrhea, change in mentation, falling, weakness, bleeding. Severe pain or pain not relieved by medications, as well as any other signs or symptoms that you may have questions about.     FOLLOW UP APPOINTMENTS:    Follow-up Information     Follow up With Specialties Details Why Contact Info    Kalyan Harvey MD Internal Medicine In 1 week  [address]  [phone]      Evelia Rivera MD Neurosurgery In 1 week  [address] [facility] [address]      Gina Levine MD Cardiology, Interventional Cardiology  MD that did cardiac cath on 2/25/2022 [address]  [address] [facility]  [phone]               DIET: Regular Diet, Cardiac Diet and Diabetic Diet    ACTIVITY: Activity as tolerated    EQUIPMENT needed: Not indicated    DISCHARGE MEDICATIONS:  Discharge Medication List as of 2/25/2022  5:36 PM      START taking these medications    Details   famotidine (Pepcid) 20 mg tablet Take 1 Tablet by mouth two (2) times a day., Print, Disp-28 Tablet, R-0         CONTINUE these medications which have CHANGED    Details   carvediloL (COREG) 12.5 mg tablet Take 1 Tablet by mouth two (2) times a day for 30 days. , Normal, Disp-60 Tablet, R-0      levETIRAcetam (KEPPRA) 500 mg tablet Take 1 Tablet by mouth two (2) times a day., Normal, Disp-60 Tablet, R-0      dexAMETHasone (Decadron) 4 mg tablet Take 4 mg by mouth three (3) times daily. 1 tab PO TID, Normal, Disp-30 Tablet, R-0      lisinopriL (PRINIVIL, ZESTRIL) 5 mg tablet Take 1 Tablet by mouth daily for 30 days. , Normal, Disp-30 Tablet, R-0         CONTINUE these medications which have NOT CHANGED    Details   torsemide (DEMADEX) 100 mg tablet Take 100 mg by mouth., Historical Med         STOP taking these medications       spironolactone (ALDACTONE) 25 mg tablet Comments:   Reason for Stopping:         furosemide (Lasix) 40 mg tablet Comments:   Reason for Stopping:         traMADoL (ULTRAM) 50 mg tablet Comments:   Reason for Stopping:         albuterol (PROVENTIL HFA, VENTOLIN HFA, PROAIR HFA) 90 mcg/actuation inhaler Comments:   Reason for Stopping:         inhalational spacing device Comments:   Reason for Stopping:         amLODIPine (NORVASC) 5 mg tablet Comments:   Reason for Stopping:               DISPOSITION:  x  Home With:   OT  PT  HH  RN       Long term SNF/Inpatient Rehab    Independent/assisted living    Hospice    Other:       PATIENT CONDITION AT DISCHARGE:     Functional status    Poor Deconditioned    x Independent      Cognition   x  Lucid     Forgetful     Dementia      Catheters/lines (plus indication)    Carrillo     PICC     PEG    x None      Code status    x Full code     DNR      PHYSICAL EXAMINATION AT DISCHARGE:  General:           Alert and oriented, very pleasant gentleman    HEENT:           Atraumatic, anicteric sclerae, pink conjunctivae                          No oral ulcers, mucosa moist, throat clear, dentition fair  Neck:               Supple, symmetrical  Lungs:             Clear to auscultation bilaterally. No Wheezing or Rhonchi. No rales. Chest wall:      No tenderness  No Accessory muscle use. Heart:              Regular  rhythm,  No  murmur   No edema  Abdomen:        Soft, obese, non-tender. Not distended. Bowel sounds normal  Extremities:     No cyanosis. No clubbing,                            Skin turgor normal, Capillary refill normal  Skin:                Not pale. Not Jaundiced  No rashes   Psych:             Not anxious or agitated.   Neurologic:      Alert, moves all extremities, answers questions appropriately and responds to commands       CHRONIC MEDICAL DIAGNOSES:  Problem List as of 2/25/2022 Date Reviewed: 2/22/2022          Codes Class Noted - Resolved    NICM (nonischemic cardiomyopathy) ([facility] ICD-10-CM: I42.8  ICD-9-CM: 425.4  2/25/2022 - Present    Overview Signed 2/25/2022  6:53 PM by Mehul Oneill MD     cath open cors, high LVEDP, EF on echo was 35% day before             Hypertension ICD-10-CM: I10  ICD-9-CM: 401.9  2/22/2022 - Present        Hyperlipidemia ICD-10-CM: E78.5  ICD-9-CM: 272.4  2/22/2022 - Present        Morbid obesity ([facility] ICD-10-CM: E66.01  ICD-9-CM: 278.01  2/22/2022 - Present        Prediabetes ICD-10-CM: R73.03  ICD-9-CM: 790.29  2/22/2022 - Present        Heart failure with mid-range ejection fraction ([facility] ICD-10-CM: I50.9  ICD-9-CM: 428.9  2/22/2022 - Present        * (Principal) Brain mass ICD-10-CM: G93.89  ICD-9-CM: 348.89  2/21/2022 - Present        Systolic CHF, chronic (HCC) ICD-10-CM: I50.22  ICD-9-CM: 428.22, 428.0  2020 - Present    Overview Signed 2/25/2022  6:53 PM by Surya Candelario MD     EF 31 then, now 35% 2022                   Greater than 40 minutes were spent with the patient on counseling and coordination of care    Signed:    Steve Mclaughlin MD  2/27/2022

## 2022-02-28 RX ORDER — CALCIUM CARBONATE 200(500)MG
1 TABLET,CHEWABLE ORAL AS NEEDED
COMMUNITY

## 2022-02-28 NOTE — PERIOP NOTES
Patient advised to purchase CHG soap and given instructions in use. Preop instructions reviewed and patient verbalizes understanding of instructions. Patient has been given the opportunity to ask additional questions.

## 2022-03-01 ENCOUNTER — ANESTHESIA EVENT (OUTPATIENT)
Dept: SURGERY | Age: 47
DRG: 021 | End: 2022-03-01
Payer: MEDICAID

## 2022-03-02 ENCOUNTER — ANESTHESIA (OUTPATIENT)
Dept: SURGERY | Age: 47
DRG: 021 | End: 2022-03-02
Payer: MEDICAID

## 2022-03-02 ENCOUNTER — APPOINTMENT (OUTPATIENT)
Dept: CT IMAGING | Age: 47
DRG: 021 | End: 2022-03-02
Attending: NEUROLOGICAL SURGERY
Payer: MEDICAID

## 2022-03-02 ENCOUNTER — APPOINTMENT (OUTPATIENT)
Dept: GENERAL RADIOLOGY | Age: 47
DRG: 021 | End: 2022-03-02
Attending: NEUROLOGICAL SURGERY
Payer: MEDICAID

## 2022-03-02 ENCOUNTER — HOSPITAL ENCOUNTER (INPATIENT)
Age: 47
LOS: 9 days | Discharge: NURSING, REHAB OR LONG-TERM CARE | DRG: 021 | End: 2022-03-11
Attending: NEUROLOGICAL SURGERY | Admitting: NEUROLOGICAL SURGERY
Payer: MEDICAID

## 2022-03-02 DIAGNOSIS — Z98.890 S/P CRANIOTOMY: Primary | ICD-10-CM

## 2022-03-02 LAB
ABO + RH BLD: NORMAL
BASE DEFICIT BLD-SCNC: 1.2 MMOL/L
BLOOD GROUP ANTIBODIES SERPL: NORMAL
GAS FLOW.O2 SETTING OXYMISER: 15 BPM
GLUCOSE BLD STRIP.AUTO-MCNC: 121 MG/DL (ref 65–117)
HCO3 BLD-SCNC: 24.4 MMOL/L (ref 22–26)
O2/TOTAL GAS SETTING VFR VENT: 60 %
PCO2 BLD: 42.8 MMHG (ref 35–45)
PH BLD: 7.36 [PH] (ref 7.35–7.45)
PO2 BLD: 213 MMHG (ref 80–100)
SAO2 % BLD: 99.7 % (ref 92–97)
SERVICE CMNT-IMP: ABNORMAL
SPECIMEN EXP DATE BLD: NORMAL
SPECIMEN TYPE: ABNORMAL
VT SETTING VENT: 550 ML

## 2022-03-02 PROCEDURE — 74011000250 HC RX REV CODE- 250: Performed by: NURSE ANESTHETIST, CERTIFIED REGISTERED

## 2022-03-02 PROCEDURE — 74011250636 HC RX REV CODE- 250/636: Performed by: NURSE PRACTITIONER

## 2022-03-02 PROCEDURE — 88342 IMHCHEM/IMCYTCHM 1ST ANTB: CPT

## 2022-03-02 PROCEDURE — 74011250637 HC RX REV CODE- 250/637: Performed by: NURSE PRACTITIONER

## 2022-03-02 PROCEDURE — 82803 BLOOD GASES ANY COMBINATION: CPT

## 2022-03-02 PROCEDURE — 77030002946 HC SUT NRLN J&J -B: Performed by: NEUROLOGICAL SURGERY

## 2022-03-02 PROCEDURE — 88307 TISSUE EXAM BY PATHOLOGIST: CPT

## 2022-03-02 PROCEDURE — 77030020061 HC IV BLD WRMR ADMIN SET 3M -B: Performed by: ANESTHESIOLOGY

## 2022-03-02 PROCEDURE — 77030026438 HC STYL ET INTUB CARD -A: Performed by: ANESTHESIOLOGY

## 2022-03-02 PROCEDURE — 2709999900 HC NON-CHARGEABLE SUPPLY: Performed by: NEUROLOGICAL SURGERY

## 2022-03-02 PROCEDURE — 76060000042 HC ANESTHESIA 5.5 TO 6 HR: Performed by: NEUROLOGICAL SURGERY

## 2022-03-02 PROCEDURE — 77030003029 HC SUT VCRL J&J -B: Performed by: NEUROLOGICAL SURGERY

## 2022-03-02 PROCEDURE — 77010033678 HC OXYGEN DAILY

## 2022-03-02 PROCEDURE — 00B00ZZ EXCISION OF BRAIN, OPEN APPROACH: ICD-10-PCS | Performed by: NEUROLOGICAL SURGERY

## 2022-03-02 PROCEDURE — C1713 ANCHOR/SCREW BN/BN,TIS/BN: HCPCS | Performed by: NEUROLOGICAL SURGERY

## 2022-03-02 PROCEDURE — 74011000258 HC RX REV CODE- 258: Performed by: NURSE ANESTHETIST, CERTIFIED REGISTERED

## 2022-03-02 PROCEDURE — 74011250636 HC RX REV CODE- 250/636: Performed by: ANESTHESIOLOGY

## 2022-03-02 PROCEDURE — 65610000006 HC RM INTENSIVE CARE

## 2022-03-02 PROCEDURE — 77030014355 HC CVR BUR H TI BIOM -C: Performed by: NEUROLOGICAL SURGERY

## 2022-03-02 PROCEDURE — 76010000178 HC OR TIME 5.5 TO 6 HR INTENSV-TIER 1: Performed by: NEUROLOGICAL SURGERY

## 2022-03-02 PROCEDURE — 74011000250 HC RX REV CODE- 250: Performed by: NEUROLOGICAL SURGERY

## 2022-03-02 PROCEDURE — 77030014008 HC SPNG HEMSTAT J&J -C: Performed by: NEUROLOGICAL SURGERY

## 2022-03-02 PROCEDURE — 74011250636 HC RX REV CODE- 250/636: Performed by: NEUROLOGICAL SURGERY

## 2022-03-02 PROCEDURE — 77030004391 HC BUR FLUT MEDT -C: Performed by: NEUROLOGICAL SURGERY

## 2022-03-02 PROCEDURE — 77030008684 HC TU ET CUF COVD -B: Performed by: ANESTHESIOLOGY

## 2022-03-02 PROCEDURE — 77030014007 HC SPNG HEMSTAT J&J -B: Performed by: NEUROLOGICAL SURGERY

## 2022-03-02 PROCEDURE — 77030040361 HC SLV COMPR DVT MDII -B: Performed by: NEUROLOGICAL SURGERY

## 2022-03-02 PROCEDURE — 77030004472 HC BUR TAPR MEDT -B: Performed by: NEUROLOGICAL SURGERY

## 2022-03-02 PROCEDURE — 77030013797 HC KT TRNSDUC PRSSR EDWD -A: Performed by: ANESTHESIOLOGY

## 2022-03-02 PROCEDURE — 77030003160 HC GRFT DURA COLGN MEDT -E: Performed by: NEUROLOGICAL SURGERY

## 2022-03-02 PROCEDURE — 77030014650 HC SEAL MTRX FLOSEL BAXT -C: Performed by: NEUROLOGICAL SURGERY

## 2022-03-02 PROCEDURE — 36415 COLL VENOUS BLD VENIPUNCTURE: CPT

## 2022-03-02 PROCEDURE — 76210000000 HC OR PH I REC 2 TO 2.5 HR: Performed by: NEUROLOGICAL SURGERY

## 2022-03-02 PROCEDURE — 86900 BLOOD TYPING SEROLOGIC ABO: CPT

## 2022-03-02 PROCEDURE — 70450 CT HEAD/BRAIN W/O DYE: CPT

## 2022-03-02 PROCEDURE — 74011250636 HC RX REV CODE- 250/636: Performed by: NURSE ANESTHETIST, CERTIFIED REGISTERED

## 2022-03-02 PROCEDURE — 82962 GLUCOSE BLOOD TEST: CPT

## 2022-03-02 PROCEDURE — 71045 X-RAY EXAM CHEST 1 VIEW: CPT

## 2022-03-02 PROCEDURE — 74011000250 HC RX REV CODE- 250: Performed by: NURSE PRACTITIONER

## 2022-03-02 PROCEDURE — 88331 PATH CONSLTJ SURG 1 BLK 1SPC: CPT

## 2022-03-02 PROCEDURE — 74011000258 HC RX REV CODE- 258: Performed by: NURSE PRACTITIONER

## 2022-03-02 DEVICE — DURA 62100 SUBSTITUTE DUREPAIR 2X2IN NCE
Type: IMPLANTABLE DEVICE | Site: CRANIAL | Status: FUNCTIONAL
Brand: DUREPAIR®

## 2022-03-02 DEVICE — COVER BUR H L DIA18.5MM THK0.5MM 5 H NEURO TI W/O TAB: Type: IMPLANTABLE DEVICE | Site: CRANIAL | Status: FUNCTIONAL

## 2022-03-02 DEVICE — SCREW BNE L4MM DIA1.5MM CORT MAXILLOMANDIBULAR GRN TI SELF: Type: IMPLANTABLE DEVICE | Site: CRANIAL | Status: FUNCTIONAL

## 2022-03-02 DEVICE — PLATE BONE LNG L16MM THK0.6MM 2 H TI STR FOR 1.5MM SCR: Type: IMPLANTABLE DEVICE | Site: CRANIAL | Status: FUNCTIONAL

## 2022-03-02 RX ORDER — LEVETIRACETAM 500 MG/5ML
500 INJECTION, SOLUTION, CONCENTRATE INTRAVENOUS EVERY 12 HOURS
Status: DISCONTINUED | OUTPATIENT
Start: 2022-03-02 | End: 2022-03-02 | Stop reason: SDUPTHER

## 2022-03-02 RX ORDER — SODIUM CHLORIDE 0.9 % (FLUSH) 0.9 %
5-40 SYRINGE (ML) INJECTION EVERY 8 HOURS
Status: DISCONTINUED | OUTPATIENT
Start: 2022-03-02 | End: 2022-03-02 | Stop reason: HOSPADM

## 2022-03-02 RX ORDER — FENTANYL CITRATE 50 UG/ML
25 INJECTION, SOLUTION INTRAMUSCULAR; INTRAVENOUS
Status: DISCONTINUED | OUTPATIENT
Start: 2022-03-02 | End: 2022-03-02 | Stop reason: HOSPADM

## 2022-03-02 RX ORDER — SUCCINYLCHOLINE CHLORIDE 20 MG/ML
INJECTION INTRAMUSCULAR; INTRAVENOUS AS NEEDED
Status: DISCONTINUED | OUTPATIENT
Start: 2022-03-02 | End: 2022-03-02 | Stop reason: HOSPADM

## 2022-03-02 RX ORDER — ACETAMINOPHEN 325 MG/1
650 TABLET ORAL
Status: DISCONTINUED | OUTPATIENT
Start: 2022-03-02 | End: 2022-03-11 | Stop reason: HOSPADM

## 2022-03-02 RX ORDER — ONDANSETRON 2 MG/ML
INJECTION INTRAMUSCULAR; INTRAVENOUS AS NEEDED
Status: DISCONTINUED | OUTPATIENT
Start: 2022-03-02 | End: 2022-03-02 | Stop reason: HOSPADM

## 2022-03-02 RX ORDER — SODIUM CHLORIDE 9 MG/ML
INJECTION, SOLUTION INTRAVENOUS
Status: DISCONTINUED | OUTPATIENT
Start: 2022-03-02 | End: 2022-03-02 | Stop reason: HOSPADM

## 2022-03-02 RX ORDER — SODIUM CHLORIDE 0.9 % (FLUSH) 0.9 %
5-40 SYRINGE (ML) INJECTION AS NEEDED
Status: DISCONTINUED | OUTPATIENT
Start: 2022-03-02 | End: 2022-03-11 | Stop reason: HOSPADM

## 2022-03-02 RX ORDER — DEXAMETHASONE SODIUM PHOSPHATE 4 MG/ML
4 INJECTION, SOLUTION INTRA-ARTICULAR; INTRALESIONAL; INTRAMUSCULAR; INTRAVENOUS; SOFT TISSUE EVERY 6 HOURS
Status: DISCONTINUED | OUTPATIENT
Start: 2022-03-02 | End: 2022-03-04

## 2022-03-02 RX ORDER — FENTANYL CITRATE 50 UG/ML
50 INJECTION, SOLUTION INTRAMUSCULAR; INTRAVENOUS AS NEEDED
Status: DISCONTINUED | OUTPATIENT
Start: 2022-03-02 | End: 2022-03-02 | Stop reason: HOSPADM

## 2022-03-02 RX ORDER — MORPHINE SULFATE 2 MG/ML
2 INJECTION, SOLUTION INTRAMUSCULAR; INTRAVENOUS
Status: DISCONTINUED | OUTPATIENT
Start: 2022-03-02 | End: 2022-03-11 | Stop reason: HOSPADM

## 2022-03-02 RX ORDER — LEVETIRACETAM 500 MG/5ML
1000 INJECTION, SOLUTION, CONCENTRATE INTRAVENOUS EVERY 12 HOURS
Status: DISCONTINUED | OUTPATIENT
Start: 2022-03-02 | End: 2022-03-07

## 2022-03-02 RX ORDER — PROPOFOL 10 MG/ML
INJECTION, EMULSION INTRAVENOUS AS NEEDED
Status: DISCONTINUED | OUTPATIENT
Start: 2022-03-02 | End: 2022-03-02 | Stop reason: HOSPADM

## 2022-03-02 RX ORDER — ONDANSETRON 2 MG/ML
4 INJECTION INTRAMUSCULAR; INTRAVENOUS AS NEEDED
Status: DISCONTINUED | OUTPATIENT
Start: 2022-03-02 | End: 2022-03-02 | Stop reason: HOSPADM

## 2022-03-02 RX ORDER — SODIUM CHLORIDE 9 MG/ML
50 INJECTION, SOLUTION INTRAVENOUS CONTINUOUS
Status: DISCONTINUED | OUTPATIENT
Start: 2022-03-02 | End: 2022-03-02 | Stop reason: HOSPADM

## 2022-03-02 RX ORDER — MIDAZOLAM HYDROCHLORIDE 1 MG/ML
0.5 INJECTION, SOLUTION INTRAMUSCULAR; INTRAVENOUS
Status: DISCONTINUED | OUTPATIENT
Start: 2022-03-02 | End: 2022-03-02 | Stop reason: HOSPADM

## 2022-03-02 RX ORDER — SODIUM CHLORIDE, SODIUM LACTATE, POTASSIUM CHLORIDE, CALCIUM CHLORIDE 600; 310; 30; 20 MG/100ML; MG/100ML; MG/100ML; MG/100ML
75 INJECTION, SOLUTION INTRAVENOUS CONTINUOUS
Status: DISCONTINUED | OUTPATIENT
Start: 2022-03-02 | End: 2022-03-02 | Stop reason: HOSPADM

## 2022-03-02 RX ORDER — OXYCODONE AND ACETAMINOPHEN 5; 325 MG/1; MG/1
1 TABLET ORAL AS NEEDED
Status: DISCONTINUED | OUTPATIENT
Start: 2022-03-02 | End: 2022-03-02 | Stop reason: HOSPADM

## 2022-03-02 RX ORDER — DEXAMETHASONE SODIUM PHOSPHATE 4 MG/ML
INJECTION, SOLUTION INTRA-ARTICULAR; INTRALESIONAL; INTRAMUSCULAR; INTRAVENOUS; SOFT TISSUE AS NEEDED
Status: DISCONTINUED | OUTPATIENT
Start: 2022-03-02 | End: 2022-03-02 | Stop reason: HOSPADM

## 2022-03-02 RX ORDER — SODIUM CHLORIDE 9 MG/ML
75 INJECTION, SOLUTION INTRAVENOUS CONTINUOUS
Status: DISCONTINUED | OUTPATIENT
Start: 2022-03-02 | End: 2022-03-02

## 2022-03-02 RX ORDER — ROCURONIUM BROMIDE 10 MG/ML
INJECTION, SOLUTION INTRAVENOUS AS NEEDED
Status: DISCONTINUED | OUTPATIENT
Start: 2022-03-02 | End: 2022-03-02 | Stop reason: HOSPADM

## 2022-03-02 RX ORDER — HYDROMORPHONE HYDROCHLORIDE 1 MG/ML
0.2 INJECTION, SOLUTION INTRAMUSCULAR; INTRAVENOUS; SUBCUTANEOUS
Status: DISCONTINUED | OUTPATIENT
Start: 2022-03-02 | End: 2022-03-02 | Stop reason: HOSPADM

## 2022-03-02 RX ORDER — CARVEDILOL 12.5 MG/1
12.5 TABLET ORAL 2 TIMES DAILY
Status: DISCONTINUED | OUTPATIENT
Start: 2022-03-02 | End: 2022-03-11 | Stop reason: HOSPADM

## 2022-03-02 RX ORDER — HYDROCODONE BITARTRATE AND ACETAMINOPHEN 5; 325 MG/1; MG/1
1 TABLET ORAL
Status: DISCONTINUED | OUTPATIENT
Start: 2022-03-02 | End: 2022-03-11 | Stop reason: HOSPADM

## 2022-03-02 RX ORDER — LABETALOL HYDROCHLORIDE 5 MG/ML
10 INJECTION, SOLUTION INTRAVENOUS
Status: DISCONTINUED | OUTPATIENT
Start: 2022-03-02 | End: 2022-03-03

## 2022-03-02 RX ORDER — LIDOCAINE HYDROCHLORIDE 10 MG/ML
0.1 INJECTION, SOLUTION EPIDURAL; INFILTRATION; INTRACAUDAL; PERINEURAL AS NEEDED
Status: DISCONTINUED | OUTPATIENT
Start: 2022-03-02 | End: 2022-03-02 | Stop reason: HOSPADM

## 2022-03-02 RX ORDER — MANNITOL 250 MG/ML
INJECTION, SOLUTION INTRAVENOUS AS NEEDED
Status: DISCONTINUED | OUTPATIENT
Start: 2022-03-02 | End: 2022-03-02 | Stop reason: HOSPADM

## 2022-03-02 RX ORDER — BUPIVACAINE HYDROCHLORIDE AND EPINEPHRINE 5; 5 MG/ML; UG/ML
INJECTION, SOLUTION EPIDURAL; INTRACAUDAL; PERINEURAL AS NEEDED
Status: DISCONTINUED | OUTPATIENT
Start: 2022-03-02 | End: 2022-03-02 | Stop reason: HOSPADM

## 2022-03-02 RX ORDER — TORSEMIDE 100 MG/1
100 TABLET ORAL DAILY
Status: DISCONTINUED | OUTPATIENT
Start: 2022-03-03 | End: 2022-03-02

## 2022-03-02 RX ORDER — LORAZEPAM 2 MG/ML
2 INJECTION INTRAMUSCULAR
Status: DISCONTINUED | OUTPATIENT
Start: 2022-03-02 | End: 2022-03-11 | Stop reason: HOSPADM

## 2022-03-02 RX ORDER — INSULIN LISPRO 100 [IU]/ML
INJECTION, SOLUTION INTRAVENOUS; SUBCUTANEOUS EVERY 6 HOURS
Status: DISCONTINUED | OUTPATIENT
Start: 2022-03-02 | End: 2022-03-07

## 2022-03-02 RX ORDER — MAGNESIUM SULFATE 100 %
4 CRYSTALS MISCELLANEOUS AS NEEDED
Status: DISCONTINUED | OUTPATIENT
Start: 2022-03-02 | End: 2022-03-07 | Stop reason: SDUPTHER

## 2022-03-02 RX ORDER — SODIUM CHLORIDE 0.9 % (FLUSH) 0.9 %
5-40 SYRINGE (ML) INJECTION EVERY 8 HOURS
Status: DISCONTINUED | OUTPATIENT
Start: 2022-03-02 | End: 2022-03-11 | Stop reason: HOSPADM

## 2022-03-02 RX ORDER — DIPHENHYDRAMINE HYDROCHLORIDE 50 MG/ML
12.5 INJECTION, SOLUTION INTRAMUSCULAR; INTRAVENOUS AS NEEDED
Status: DISCONTINUED | OUTPATIENT
Start: 2022-03-02 | End: 2022-03-02 | Stop reason: HOSPADM

## 2022-03-02 RX ORDER — LIDOCAINE HYDROCHLORIDE 20 MG/ML
INJECTION, SOLUTION EPIDURAL; INFILTRATION; INTRACAUDAL; PERINEURAL AS NEEDED
Status: DISCONTINUED | OUTPATIENT
Start: 2022-03-02 | End: 2022-03-02 | Stop reason: HOSPADM

## 2022-03-02 RX ORDER — FENTANYL CITRATE 50 UG/ML
INJECTION, SOLUTION INTRAMUSCULAR; INTRAVENOUS AS NEEDED
Status: DISCONTINUED | OUTPATIENT
Start: 2022-03-02 | End: 2022-03-02 | Stop reason: HOSPADM

## 2022-03-02 RX ORDER — INSULIN LISPRO 100 [IU]/ML
INJECTION, SOLUTION INTRAVENOUS; SUBCUTANEOUS
Status: DISCONTINUED | OUTPATIENT
Start: 2022-03-02 | End: 2022-03-02

## 2022-03-02 RX ORDER — HYDRALAZINE HYDROCHLORIDE 20 MG/ML
10 INJECTION INTRAMUSCULAR; INTRAVENOUS
Status: DISCONTINUED | OUTPATIENT
Start: 2022-03-02 | End: 2022-03-03

## 2022-03-02 RX ORDER — SODIUM CHLORIDE 0.9 % (FLUSH) 0.9 %
5-40 SYRINGE (ML) INJECTION AS NEEDED
Status: DISCONTINUED | OUTPATIENT
Start: 2022-03-02 | End: 2022-03-02 | Stop reason: HOSPADM

## 2022-03-02 RX ORDER — ONDANSETRON 2 MG/ML
4 INJECTION INTRAMUSCULAR; INTRAVENOUS
Status: DISCONTINUED | OUTPATIENT
Start: 2022-03-02 | End: 2022-03-11 | Stop reason: HOSPADM

## 2022-03-02 RX ORDER — LISINOPRIL 5 MG/1
5 TABLET ORAL DAILY
Status: DISCONTINUED | OUTPATIENT
Start: 2022-03-03 | End: 2022-03-11 | Stop reason: HOSPADM

## 2022-03-02 RX ORDER — MIDAZOLAM HYDROCHLORIDE 1 MG/ML
1 INJECTION, SOLUTION INTRAMUSCULAR; INTRAVENOUS AS NEEDED
Status: DISCONTINUED | OUTPATIENT
Start: 2022-03-02 | End: 2022-03-02 | Stop reason: HOSPADM

## 2022-03-02 RX ORDER — MORPHINE SULFATE 2 MG/ML
2 INJECTION, SOLUTION INTRAMUSCULAR; INTRAVENOUS
Status: DISCONTINUED | OUTPATIENT
Start: 2022-03-02 | End: 2022-03-02 | Stop reason: HOSPADM

## 2022-03-02 RX ADMIN — ONDANSETRON HYDROCHLORIDE 4 MG: 2 INJECTION, SOLUTION INTRAMUSCULAR; INTRAVENOUS at 12:38

## 2022-03-02 RX ADMIN — SODIUM CHLORIDE, PRESERVATIVE FREE 10 ML: 5 INJECTION INTRAVENOUS at 15:56

## 2022-03-02 RX ADMIN — CEFAZOLIN 2 G: 1 INJECTION, POWDER, FOR SOLUTION INTRAMUSCULAR; INTRAVENOUS at 20:21

## 2022-03-02 RX ADMIN — HYDROCODONE BITARTRATE AND ACETAMINOPHEN 1 TABLET: 5; 325 TABLET ORAL at 16:41

## 2022-03-02 RX ADMIN — FENTANYL CITRATE 100 MCG: 50 INJECTION, SOLUTION INTRAMUSCULAR; INTRAVENOUS at 07:10

## 2022-03-02 RX ADMIN — DEXTROSE 5 MG/HR: 5 SOLUTION INTRAVENOUS at 13:02

## 2022-03-02 RX ADMIN — FENTANYL CITRATE 50 MCG: 50 INJECTION, SOLUTION INTRAMUSCULAR; INTRAVENOUS at 13:17

## 2022-03-02 RX ADMIN — ROCURONIUM BROMIDE 40 MG: 10 SOLUTION INTRAVENOUS at 08:00

## 2022-03-02 RX ADMIN — MIDAZOLAM 2 MG: 1 INJECTION INTRAMUSCULAR; INTRAVENOUS at 07:10

## 2022-03-02 RX ADMIN — SODIUM CHLORIDE: 900 INJECTION, SOLUTION INTRAVENOUS at 07:44

## 2022-03-02 RX ADMIN — ROCURONIUM BROMIDE 20 MG: 10 SOLUTION INTRAVENOUS at 10:04

## 2022-03-02 RX ADMIN — SODIUM CHLORIDE 10 MG/HR: 9 INJECTION, SOLUTION INTRAVENOUS at 13:30

## 2022-03-02 RX ADMIN — LORAZEPAM 2 MG: 2 INJECTION INTRAMUSCULAR; INTRAVENOUS at 16:51

## 2022-03-02 RX ADMIN — ROCURONIUM BROMIDE 20 MG: 10 SOLUTION INTRAVENOUS at 12:02

## 2022-03-02 RX ADMIN — LIDOCAINE HYDROCHLORIDE 100 MG: 20 INJECTION, SOLUTION EPIDURAL; INFILTRATION; INTRACAUDAL; PERINEURAL at 07:57

## 2022-03-02 RX ADMIN — Medication 3 G: at 12:24

## 2022-03-02 RX ADMIN — SUCCINYLCHOLINE CHLORIDE 180 MG: 20 INJECTION, SOLUTION INTRAMUSCULAR; INTRAVENOUS at 07:57

## 2022-03-02 RX ADMIN — LEVETIRACETAM 1000 MG: 100 INJECTION INTRAVENOUS at 16:14

## 2022-03-02 RX ADMIN — SODIUM CHLORIDE 15 MG/HR: 9 INJECTION, SOLUTION INTRAVENOUS at 14:16

## 2022-03-02 RX ADMIN — ROCURONIUM BROMIDE 10 MG: 10 SOLUTION INTRAVENOUS at 07:57

## 2022-03-02 RX ADMIN — NICARDIPINE HYDROCHLORIDE 12.5 MG/HR: 25 INJECTION, SOLUTION INTRAVENOUS at 21:09

## 2022-03-02 RX ADMIN — PROPOFOL 400 MG: 10 INJECTION, EMULSION INTRAVENOUS at 07:57

## 2022-03-02 RX ADMIN — SUGAMMADEX 286 MG: 100 INJECTION, SOLUTION INTRAVENOUS at 12:54

## 2022-03-02 RX ADMIN — FAMOTIDINE 20 MG: 10 INJECTION, SOLUTION INTRAVENOUS at 20:21

## 2022-03-02 RX ADMIN — FENTANYL CITRATE 25 MCG: 50 INJECTION INTRAMUSCULAR; INTRAVENOUS at 14:55

## 2022-03-02 RX ADMIN — MANNITOL 75 G: 12.5 INJECTION, SOLUTION INTRAVENOUS at 08:30

## 2022-03-02 RX ADMIN — MIDAZOLAM 1 MG: 1 INJECTION INTRAMUSCULAR; INTRAVENOUS at 07:18

## 2022-03-02 RX ADMIN — Medication 3 G: at 08:24

## 2022-03-02 RX ADMIN — LEVETIRACETAM 500 MG: 100 INJECTION, SOLUTION INTRAVENOUS at 08:44

## 2022-03-02 RX ADMIN — SODIUM CHLORIDE 5 MG/HR: 9 INJECTION, SOLUTION INTRAVENOUS at 16:51

## 2022-03-02 RX ADMIN — ROCURONIUM BROMIDE 30 MG: 10 SOLUTION INTRAVENOUS at 08:39

## 2022-03-02 RX ADMIN — PROPOFOL 100 MG: 10 INJECTION, EMULSION INTRAVENOUS at 08:27

## 2022-03-02 RX ADMIN — SODIUM CHLORIDE, POTASSIUM CHLORIDE, SODIUM LACTATE AND CALCIUM CHLORIDE 75 ML/HR: 600; 310; 30; 20 INJECTION, SOLUTION INTRAVENOUS at 06:45

## 2022-03-02 RX ADMIN — CARVEDILOL 12.5 MG: 12.5 TABLET, FILM COATED ORAL at 17:28

## 2022-03-02 RX ADMIN — SODIUM CHLORIDE, PRESERVATIVE FREE 10 ML: 5 INJECTION INTRAVENOUS at 21:09

## 2022-03-02 RX ADMIN — ROCURONIUM BROMIDE 20 MG: 10 SOLUTION INTRAVENOUS at 11:09

## 2022-03-02 RX ADMIN — DEXAMETHASONE SODIUM PHOSPHATE 10 MG: 4 INJECTION, SOLUTION INTRAMUSCULAR; INTRAVENOUS at 08:24

## 2022-03-02 RX ADMIN — ROCURONIUM BROMIDE 20 MG: 10 SOLUTION INTRAVENOUS at 09:20

## 2022-03-02 RX ADMIN — HYDROCODONE BITARTRATE AND ACETAMINOPHEN 1 TABLET: 5; 325 TABLET ORAL at 20:20

## 2022-03-02 RX ADMIN — DEXAMETHASONE SODIUM PHOSPHATE 4 MG: 4 INJECTION, SOLUTION INTRAMUSCULAR; INTRAVENOUS at 17:29

## 2022-03-02 RX ADMIN — REMIFENTANIL HYDROCHLORIDE 0.05 MCG/KG/MIN: 1 INJECTION, POWDER, LYOPHILIZED, FOR SOLUTION INTRAVENOUS at 09:00

## 2022-03-02 RX ADMIN — SODIUM CHLORIDE 15 MG/HR: 9 INJECTION, SOLUTION INTRAVENOUS at 13:51

## 2022-03-02 RX ADMIN — FENTANYL CITRATE 100 MCG: 50 INJECTION, SOLUTION INTRAMUSCULAR; INTRAVENOUS at 07:57

## 2022-03-02 RX ADMIN — NICARDIPINE HYDROCHLORIDE 5 MG/HR: 25 INJECTION, SOLUTION INTRAVENOUS at 17:28

## 2022-03-02 RX ADMIN — FENTANYL CITRATE 100 MCG: 50 INJECTION, SOLUTION INTRAMUSCULAR; INTRAVENOUS at 08:27

## 2022-03-02 NOTE — PERIOP NOTES
SURGIFOAM added to the sterile field for use by surgeon     REF: 1975  LOT: [id number]  EXP: 10/28/2025    Floseal Hemostatic Matrix 10mL added to the sterile field for use by surgeon    REF: [id number]  LOT: [id number]  EXP: 12/04/2023

## 2022-03-02 NOTE — ROUTINE PROCESS
Patient: Ursula Pozo MRN: [id number]  SSN: [id number]   YOB: 1975  Age: 51 y.o. Sex: male     Patient is status post Procedure(s):  AIRO GUIDED RIGHT PARIETAL CRANIOTOMY AND TUMOR REMOVAL.     Surgeon(s) and Role:     * Celeste Easton MD - Primary    Local/Dose/Irrigation:  See [facility]              Quad Lumen 03/02/22 Right (Active)        Peripheral IV 03/02/22 Left;Posterior Hand (Active)      Arterial Line 03/02/22 Right Radial artery (Active)                         Dressing/Packing:  Incision 03/02/22 Scalp Right-Dressing/Treatment: Gauze dressing/dressing sponge;Non-adherent (adaptic, 4x4's, hyperfix tape) (03/02/22 1100)    Splint/Cast:  ]    Other:

## 2022-03-02 NOTE — PROGRESS NOTES
Neurosurgery Progress Note  Ilana Braun, [facility]          Admit Date: 3/2/2022   LOS: 0 days        Daily Progress Note: 3/2/2022    POD:Day of Surgery    S/P: Procedure(s):  AIRO GUIDED RIGHT PARIETAL CRANIOTOMY AND TUMOR REMOVAL    Subjective: The patient presented to the hospital last week due to intermittent left leg weakness. His head CT revealed cerebral edema and he transferred to [facility]. His MRI brain revealed a large right parietal brain mass and hi CT scans of his body showed no other evidence of disease. He was discharged to home on steroids and keppra with the intent of a planned readmission for surgery today. He underwent a right parietal craniotomy with resection of tumor on 22. In the recovery room, he states he could not move his left arm or left leg. In the ICU, his left leg has begun working again to where he is antigravity and able to lift it off the bed. He cannot move his LUE. While in the room, he began having a focal seizure of his left arm with rhythmic movements that terminated without intervention after about 20 seconds. Denies chest pain, leg pain, nausea, vomiting, difficulty swallowing, and dyspnea. Objective:     Vital signs  Temp (24hrs), Av.3 °F (36.8 °C), Min:98 °F (36.7 °C), Max:98.5 °F (36.9 °C)    07 -  1900  In: 1500 [I.V.:1500]  Out: 1000 [Urine:950]  No intake/output data recorded. Visit Vitals  /81   Pulse 93   Temp 98.5 °F (36.9 °C)   Resp 24   Ht 5' 4\" (1.626 m)   Wt 142.9 kg (315 lb)   SpO2 95%   BMI 54.07 kg/m²    O2 Flow Rate (L/min): 3 l/min O2 Device: Nasal cannula     Pain control  Pain Assessment  Pain Scale 1: Numeric (0 - 10)  Pain Intensity 1: 7  Pain Onset 1: post op  Pain Location 1: Head  Pain Intervention(s) 1: Medication (see MAR),Emotional support    PT/OT  Gait                 Physical Exam:  Gen:NAD. Neuro: A&Ox3. Follows commands. Speech clear. Affect bright  PERRL. EOMI. Face symmetric.  Tongue midline. RUE/RLE 5/5, LUE 0/5, LLE 3/5  Sensation intact on both sides. Gait deferred. Skin: Right scalp dressing C/D/I    24 hour results:    Recent Results (from the past 24 hour(s))   TYPE & SCREEN    Collection Time: 03/02/22  7:42 AM   Result Value Ref Range    Crossmatch Expiration 03/05/2022,2359     ABO/Rh(D) Anibal Strickland POSITIVE     Antibody screen NEG    POC G3 - PUL    Collection Time: 03/02/22  9:19 AM   Result Value Ref Range    FIO2 (POC) 60 %    pH (POC) 7.36 7.35 - 7.45      pCO2 (POC) 42.8 35.0 - 45.0 MMHG    pO2 (POC) 213 (H) 80 - 100 MMHG    HCO3 (POC) 24.4 22 - 26 MMOL/L    sO2 (POC) 99.7 (H) 92 - 97 %    Base deficit (POC) 1.2 mmol/L    Tidal volume 550 ml    Set Rate 15 bpm    Specimen type (POC) ARTERIAL            Assessment:     Active Problems:    Brain mass (2/21/2022)        Plan:   1. Right parietal brain mass   - s/p crani 03/02   - cont decadron   - cont keppra   - PT/OT in am   - CT head in am   - MRI brain within 24 hours for new baseline   - Intensivist  2. Cerebral edema with brain compression   - due to #1   - plans as above  3. Hx of CHF   - EF of 35% on last ECHO   - concentrate cardene and d/c IVF to prevent fluid overload   - cont Coreg   - Intensivist consulted  4. Focal seizures   - due to #1, 2   - increase keppra to 1000 mg bid   - low threshold to add a second agent   - ativan PRN for seizures lasting greater than 2 minutes or more than 3 in an hour. 5. HTN   - SBP<140   - Concentrated cardene PRN   - cont coreg, lisinopril from home   - Intensivist following  6. Diabetes mellitus, type 2   - SSI and accu-checks   - Intensivist following  7. Morbid obesity as defined by BMI greater than 40   - Body mass index is 53.9 kg/m². - Diet and exercise counseling as appropriate    Activity: up with assist  DVT ppx: SCDs  Dispo: tbd    Plan d/w Dr. Yaya Lawson, ICU NP, ICU nurse.       Brandt Damon, ONDINA

## 2022-03-02 NOTE — ANESTHESIA PROCEDURE NOTES
Central Line Placement    Performed by: Marya Peace DO  Authorized by: Marya Peace DO     Indications: vascular access, central pressure monitoring and need for vasopressors  Preanesthetic Checklist: patient identified, risks and benefits discussed, anesthesia consent, site marked, patient being monitored and timeout performed      Pre-procedure: All elements of maximal sterile barrier technique followed?  Yes    2% Chlorhexidine for cutaneous antisepsis, Hand hygiene performed prior to catheter insertion and Ultrasound guidance    Sterile Ultrasound Technique followed?: Yes            Procedure:   Prep:  Chlorhexidine    Orientation:  Right  Patient position:  Trendelenburg  Catheter type:  Quad lumen  Catheter size:  8.5 Fr  Catheter length:  16 cm  Number of attempts:  1  Successful placement: Yes      Assessment:   Post-procedure:  Catheter secured, sterile dressing applied and sterile dressing with CHG applied  Assessment:  Blood return through all ports, free fluid flow and guidewire removal verified  Insertion:  Uncomplicated  Patient tolerance:  Patient tolerated the procedure well with no immediate complications

## 2022-03-02 NOTE — PROGRESS NOTES
Occupational Therapy   Orders received, chart review completed. Note patient POD #0 s/p RIGHT PARIETAL CRANIOTOMY AND TUMOR REMOVAL. OT will follow up tomorrow for evaluation. Recommend OOB to chair three times a day for meals, self-completion of ADLs as able and medically stable. Thank you.

## 2022-03-02 NOTE — BRIEF OP NOTE
Brief Postoperative Note    Patient: Matthew Elam  YOB: 1975  MRN: [id number]    Date of Procedure: 3/2/2022     Pre-Op Diagnosis: GLIOMA    Post-Op Diagnosis: high grade glioma    Procedure(s):  AIRO GUIDED RIGHT PARIETAL CRANIOTOMY AND TUMOR REMOVAL    Surgeon(s):  Angelina Bundy MD    Surgical Assistant: Surg Asst-1: Teo Duty    Anesthesia: General     Estimated Blood Loss (mL): minimal    Complications: none    Specimens:   ID Type Source Tests Collected by Time Destination   1 : Right Parietal Brain Tumor Frozen Section Brain  Angelina Bundy MD 3/2/2022 1022 Pathology   2 : Right Parietal Brain Tumor Fresh Brain  Angelina Bundy MD 3/2/2022 1116 Pathology        Implants:   Implant Name Type Inv.  Item Serial No.  Lot No. LRB No. Used Action   GRAFT DURA [id number] DURA REGEN MTRX DOMESTIC CLLGN IMPL G - SNA Graft GRAFT DURA [id number] DURA REGEN MTRX DOMESTIC CLLGN IMPL G NA [employer] [id number] Right 1 Implanted   COVER BUR H L DIA18.5MM THK0.5MM 5 H NEURO TI W/O TAB - SNA Plate COVER BUR H L OOW44.9EE THK0.5MM 5 H NEURO TI W/O TAB NA MAYCOL BIOMET MICROFIXATION_WD NA Right 2 Implanted   PLATE BONE LNG P07TP THK0.6MM 2 H TI STR FOR 1.5MM SCR - SNA Plate PLATE BONE LNG G40PC THK0.6MM 2 H TI STR FOR 1.5MM SCR NA MAYCOL [employer]ET MICROFIXATION_WD NA Right 1 Implanted   SCREW BNE L4MM DIA1.5MM KRIS MAXILLOMANDIBULAR GRN TI SELF - SNA Screw SCREW BNE L4MM DIA1.5MM KRIS MAXILLOMANDIBULAR GRN TI SELF NA MAYCOL [employer]ET MICROFIXATION_WD NA Right 8 Implanted       Drains: none    Findings: high grade glioma      Electronically Signed by Cholo Carvajal MD on 3/2/2022 at 12:53 PM

## 2022-03-02 NOTE — PERIOP NOTES
TRANSFER - OUT REPORT:    Verbal report given to VINNY Zavala on Marcelo Velazquez  being transferred to ICU 11 for routine progression of care       Report consisted of patients Situation, Background, Assessment and   Recommendations(SBAR). Time Pre op antibiotic given:0824 & 0934  Anesthesia Stop time: 5671  Carrillo Present on Transfer to floor:yes  Order for Carrillo on Chart:yes  Discharge Prescriptions with Chart:no    Information from the following report(s) Procedure Summary, Intake/Output and MAR was reviewed with the receiving nurse. Opportunity for questions and clarification was provided. Is the patient on 02? YES       L/Min 3       Other     Is the patient on a monitor? YES    Is the nurse transporting with the patient? YES    Surgical Waiting Area notified of patient's transfer from PACU? YES      The following personal items collected during your admission accompanied patient upon transfer:   Dental Appliance: Dental Appliances: None  Vision:    Hearing Aid:    Jewelry: Jewelry: None  Clothing: Clothing: Footwear,Pants,Shirt,Socks,Undergarments  Other Valuables:  Other Valuables: None  Valuables sent to safe:

## 2022-03-02 NOTE — ANESTHESIA PREPROCEDURE EVALUATION
Relevant Problems   No relevant active problems       Anesthetic History   No history of anesthetic complications            Review of Systems / Medical History  Patient summary reviewed, nursing notes reviewed and pertinent labs reviewed    Pulmonary        Sleep apnea           Neuro/Psych             Comments: Brain mass (G93.89) Cardiovascular    Hypertension      CHF    Hyperlipidemia    Exercise tolerance: <4 METS  Comments: NICM (nonischemic cardiomyopathy) (HCC) (I42.8)   GI/Hepatic/Renal  Within defined limits              Endo/Other        Morbid obesity     Other Findings              Physical Exam    Airway  Mallampati: III  TM Distance: 4 - 6 cm  Neck ROM: decreased range of motion, short neck   Mouth opening: Normal     Cardiovascular  Regular rate and rhythm,  S1 and S2 normal,  no murmur, click, rub, or gallop  Rhythm: regular  Rate: normal         Dental  No notable dental hx       Pulmonary  Breath sounds clear to auscultation               Abdominal  GI exam deferred       Other Findings            Anesthetic Plan    ASA: 4  Anesthesia type: general    Monitoring Plan: Arterial line and CVP      Induction: Intravenous  Anesthetic plan and risks discussed with: Patient

## 2022-03-02 NOTE — PROGRESS NOTES
Post op check  Awake alert conversant  Weaker on left as expected  I explained this to the pt and family both pre and post op that the tumor was in an eloquent location but if left in place would certainly continue to grow and cause him harm or even death  Some focal seizure activity in left arm when I just saw him, earlier increased the keppra dose and added PRN ativan  Stable at this time overall

## 2022-03-02 NOTE — ANESTHESIA PROCEDURE NOTES
Arterial Line Placement    Performed by: Noe Rangel DO  Authorized by: Noe Rangel DO     Pre-Procedure  Indications:  Arterial pressure monitoring and blood sampling  Preanesthetic Checklist: patient identified, risks and benefits discussed, anesthesia consent, site marked, patient being monitored, timeout performed and patient being monitored      Procedure:   Prep:  ChloraPrep  Seldinger Technique?: Yes    Orientation:  Right  Location:  Radial artery  Catheter size:  20 G  Number of attempts:  1    Assessment:   Post-procedure:  Line secured and sterile dressing applied  Patient Tolerance:  Patient tolerated the procedure well with no immediate complications  Comment:   Collateral perfusion verified

## 2022-03-02 NOTE — CONSULTS
[facility]    Consult Note  Name: Jessica Shannon   : 1975   MRN: [id number]   Date: 3/2/2022      Patient is asked to be seen by Rommel Rasmussen NP for post op medical management, necessitating the need for possible ICU care. Reason for ICU Admission: post op medical management    HPI:  Jessica Shannon is a 51 y.o. male with a history of CHF with EF 35%, DM, HTN, and Morbid Obesity who presents to ICU post right parietal craniotomy and tumor removal. On cardene and IVF. Arterial line in place. Subjective:   22 - post op transfer from PACU to ICU. Awake alert and oriented. Follows commands. Upper left ext weakness/flaccid. Left lower extremitiy can lift to gravity. 5/5 Strength on right upper and lower extremities. Pupils are round and reactive bilaterally.     POD:Day of Surgery    S/P: Procedure(s):  AIRO GUIDED RIGHT PARIETAL CRANIOTOMY AND TUMOR REMOVAL    Active Problem List:     Problem List  Date Reviewed: 2022          Codes Class    NICM (nonischemic cardiomyopathy) ([facility] ICD-10-CM: I42.8  ICD-9-CM: 425.4     Overview Signed 2022  6:53 PM by Andrea Salazar MD     cath open cors, high LVEDP, EF on echo was 35% day before             Hypertension ICD-10-CM: I10  ICD-9-CM: 401.9         Hyperlipidemia ICD-10-CM: E78.5  ICD-9-CM: 272.4         Morbid obesity ([facility] ICD-10-CM: E66.01  ICD-9-CM: 278.01         Prediabetes ICD-10-CM: R73.03  ICD-9-CM: 790.29         Heart failure with mid-range ejection fraction (HCC) ICD-10-CM: I50.9  ICD-9-CM: 428.9         Brain mass ICD-10-CM: G93.89  ICD-9-CM: 043.37         Systolic CHF, chronic (HCC) ICD-10-CM: I50.22  ICD-9-CM: 428.22, 428.0     Overview Signed 2022  6:53 PM by Andrea Salazar MD     EF 31 then, now 35%                    Past Medical History:    has a past medical history of Adverse effect of anesthesia, Brain mass (2022), Hypertension, NICM (nonischemic cardiomyopathy) ([facility] (2022), Sleep apnea, and Systolic CHF, chronic ([facility] (2020). He has no past medical history of Asthma, Chronic kidney disease, Coagulation disorder ([facility] Diabetes ([facility] Liver disease, Seizures ([facility] or Stroke ([facility] Past Surgical History:    has no past surgical history on file. Home Medications:     Prior to Admission medications    Medication Sig Start Date End Date Taking? Authorizing Provider   calcium carbonate (TUMS) 200 mg calcium (500 mg) chew Take 1 Tablet by mouth as needed. Yes Provider, Historical   carvediloL (COREG) 12.5 mg tablet Take 1 Tablet by mouth two (2) times a day for 30 days. 2/25/22 3/27/22 Yes Mehreen Christopher MD   levETIRAcetam (KEPPRA) 500 mg tablet Take 1 Tablet by mouth two (2) times a day. 2/25/22  Yes Mehreen Christopher MD   dexAMETHasone (Decadron) 4 mg tablet Take 4 mg by mouth three (3) times daily. 1 tab PO TID 2/25/22  Yes Mehreen Christopher MD   lisinopriL (PRINIVIL, ZESTRIL) 5 mg tablet Take 1 Tablet by mouth daily for 30 days. 2/26/22 3/28/22 Yes eMhreen Christopher MD   torsemide (DEMADEX) 100 mg tablet Take 100 mg by mouth daily.  8/30/21  Yes Other, MD Edwin       Allergies/Social/Family History:   No Known Allergies   Social History     Tobacco Use    Smoking status: Never Smoker    Smokeless tobacco: Never Used   Substance Use Topics    Alcohol use: Not Currently     Comment: rare      Family History   Problem Relation Age of Onset    Diabetes Mother     Thyroid Disease Mother     Anxiety Mother     Depression Mother     High Cholesterol Mother     Hypertension Mother     Diabetes Father     Asthma Sister     Anemia Sister     Other Brother         AUTO ACCIDENT    Obesity Brother     Anesth Problems Neg Hx        Review of Systems:   A comprehensive review of systems was negative except for: Neurological: positive for headaches and weakness    Objective:   Vital Signs:  Visit Vitals  /81   Pulse 93   Temp 98.5 °F (36.9 °C)   Resp 24   Ht 5' 4\" (1.626 m)   Wt 142.9 kg (315 lb)   SpO2 95%   BMI 54.07 kg/m²    O2 Flow Rate (L/min): 3 l/min O2 Device: Nasal cannula Temp (24hrs), Av.3 °F (36.8 °C), Min:98 °F (36.7 °C), Max:98.5 °F (36.9 °C)           Intake/Output:     Intake/Output Summary (Last 24 hours) at 3/2/2022 1514  Last data filed at 3/2/2022 1338  Gross per 24 hour   Intake 1500 ml   Output 1000 ml   Net 500 ml       Physical Exam:  General:  alert, cooperative, no distress, appears stated age  Eye:  conjunctivae/corneas clear. PERRL, EOM's intact. Neurologic: Awake alert and oriented. Follows commands. Upper left ext weakness/flaccid. Left lower extremitiy can lift to gravity. 5/5 Strength on right upper and lower extremities. Pupils are round and reactive bilaterally. Speech clear, no facial droop noted. Neck:  normal and no erythema or exudates noted. Lungs:  clear to auscultation bilaterally  Heart:  regular rate and rhythm, S1, S2 normal, no murmur, click, rub or gallop  Abdomen:  soft, non-tender. Bowel sounds normal. No masses,  no organomegaly  Cardiovascular:  Regular rate and rhythm, S1S2 present, without murmur or extra heart sounds, pedal pulses normal and no edema  Skin:  no rash or abnormalities    LABS AND  DATA: Personally reviewed 22     Hemodynamics:   PAP:   CO:     Wedge:   CI:     CVP:    SVR:       PVR:       Ventilator Settings:  Mode Rate Tidal Volume Pressure FiO2 PEEP                    Peak airway pressure:      Minute ventilation:        MEDS: Personally Reviewed 22     Chest X-Ray: personally reviewed and report checked 22  CXR Results  (Last 48 hours)    None        CT Scans: personally reviewed and report checked 22   CT Results  (Last 48 hours)               22 1317  CT HEAD WO CONT Final result    Impression:  Intraoperative CT as above. See operative report for details.                Narrative:  COMPLIANCE ONLY       INDICATION: surgery       FINDINGS:    Portable CT imaging performed in the operating room. ECHO:   02/24/22  Left Ventricle: Left ventricle size is normal. Findings consistent with severe concentric hypertrophy. Moderate global hypokinesis present. Moderately reduced left ventricular systolic function. EF by visual approximation is 35%. Abnormal diastolic function.   Contrast used: Definity. Assessment:     1. 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  2. Non ischemic cardiomyopathy  3. Chronic systolic CHF  EF 72%  4. DM (HbgA1c 5.8)  5. HTN  6. Morbid Obesity   7. Sleep apnea- does not use CPAP    ICU Comprehensive Plan of Care:     Plans for this Shift:     1. Neuro/ Pain/ Sedation   a. Neuro checks per neurosurgery  b. Keppra, Decadron per neurosurgery   2. Resp   a. NC wean as tolerated for sats greater than 92%  b. Albuterol PRN  3. CVS   a. Continue Coreg, Lisinopril. b. PRN Diuretics as needed. Hold Demadex  4. Heme/ Onc  a. Trend CBC  5. GI  a. SUP pepcid  b. NPO  6. Renal/   a. Trend renal indices  7. ID  a. Antibiotics: Pre op Ancef  8. Endocrine  a. SSI PRN  9. Musc/Integ  a. PT/OT - activity when ok with surgery     SBP Goal of: > 90 mmHg and < 140 mmHg  MAP Goal of: > 65 mmHg  Nicardipine (Cardene) - For above SBP/MAP goals  IVFs: NS @ 75 ml/hr  Transfusion Trigger (Hgb): <7 g/dL  Respiratory Goals:   Incentive spirometry  Pulmonary toilet: Albuterol   SpO2 Goal: > 92%  Keep K>4; Mg>2   PT/OT: PT consulted and on board and OT consulted and on board   Discussed Plan of Care/Code Status: Full Code  Appreciate Consultants Input: Neurosurgery  Discussed Care Plan with Bedside RN  Documentation of Current Medications    F - Feeding:  No Advance as tolerated per surgery   A - Analgesia: Fentanyl and Acetaminophen  S - Sedation: None  T - DVT Prophylaxis: SCD's or Sequential Compression Device   H - Head of Bed: > 30 Degrees  U - Ulcer Prophylaxis: Pepcid (famotidine)   G - Glycemic Control: Prevent Hypoglycemia /SSI  S - Spontaneous Breathing Trial: N/A  B - Bowel Regimen: Docusate (Colace)  I - Indwelling Catheter:   Tubes: None  Lines: Peripheral IV, Arterial Line and Central Line  Drains: Carrillo Catheter  D - De-escalation of Antibiotics: Ancef    Multidisciplinary Rounds Completed:  No    ABCDEF Bundle/Checklist Completed:  Yes    SPECIAL EQUIPMENT  None    DISPOSITION  Stay in ICU    CRITICAL CARE CONSULTANT NOTE  I had a face to face encounter with the patient, reviewed and interpreted patient data including clinical events, labs, images, vital signs, I/O's, and examined patient. I have discussed the case and the plan and management of the patient's care with the consulting services, the bedside nurses and the respiratory therapist.      NOTE OF PERSONAL INVOLVEMENT IN CARE   This patient has a high probability of imminent, clinically significant deterioration, which requires the highest level of preparedness to intervene urgently. I participated in the decision-making and personally managed or directed the management of the following life and organ supporting interventions that required my frequent assessment to treat or prevent imminent deterioration. I personally spent 35 minutes of critical care time. This is time spent at this critically ill patient's bedside actively involved in patient care as well as the coordination of care and discussions with the patient's family. This does not include any procedural time which has been billed separately.     Vidya Husain AGABeth [facility]-BC     [address] [facility]

## 2022-03-02 NOTE — ANESTHESIA POSTPROCEDURE EVALUATION
Procedure(s):  AIRO GUIDED RIGHT PARIETAL CRANIOTOMY AND TUMOR REMOVAL. general    Anesthesia Post Evaluation      Multimodal analgesia: multimodal analgesia used between 6 hours prior to anesthesia start to PACU discharge  Patient location during evaluation: PACU  Patient participation: complete - patient participated  Level of consciousness: awake and alert  Pain management: adequate  Airway patency: patent  Anesthetic complications: no  Cardiovascular status: acceptable  Respiratory status: acceptable  Hydration status: acceptable  Comments: Seen, no complaints   Post anesthesia nausea and vomiting:  none  Final Post Anesthesia Temperature Assessment:  Normothermia (36.0-37.5 degrees C)      INITIAL Post-op Vital signs:   Vitals Value Taken Time   /74 03/02/22 1400   Temp 36.9 °C (98.5 °F) 03/02/22 1333   Pulse 92 03/02/22 1411   Resp 24 03/02/22 1411   SpO2 96 % 03/02/22 1411   Vitals shown include unvalidated device data.

## 2022-03-03 ENCOUNTER — APPOINTMENT (OUTPATIENT)
Dept: CT IMAGING | Age: 47
DRG: 021 | End: 2022-03-03
Attending: NURSE PRACTITIONER
Payer: MEDICAID

## 2022-03-03 ENCOUNTER — APPOINTMENT (OUTPATIENT)
Dept: MRI IMAGING | Age: 47
DRG: 021 | End: 2022-03-03
Attending: NURSE PRACTITIONER
Payer: MEDICAID

## 2022-03-03 LAB
ANION GAP SERPL CALC-SCNC: 2 MMOL/L (ref 5–15)
BASOPHILS # BLD: 0.1 K/UL (ref 0–0.1)
BASOPHILS NFR BLD: 0 % (ref 0–1)
BUN SERPL-MCNC: 17 MG/DL (ref 6–20)
BUN/CREAT SERPL: 20 (ref 12–20)
CALCIUM SERPL-MCNC: 8.3 MG/DL (ref 8.5–10.1)
CHLORIDE SERPL-SCNC: 102 MMOL/L (ref 97–108)
CO2 SERPL-SCNC: 30 MMOL/L (ref 21–32)
CREAT SERPL-MCNC: 0.84 MG/DL (ref 0.7–1.3)
DIFFERENTIAL METHOD BLD: ABNORMAL
EOSINOPHIL # BLD: 0 K/UL (ref 0–0.4)
EOSINOPHIL NFR BLD: 0 % (ref 0–7)
ERYTHROCYTE [DISTWIDTH] IN BLOOD BY AUTOMATED COUNT: 12 % (ref 11.5–14.5)
GLUCOSE BLD STRIP.AUTO-MCNC: 135 MG/DL (ref 65–117)
GLUCOSE BLD STRIP.AUTO-MCNC: 159 MG/DL (ref 65–117)
GLUCOSE BLD STRIP.AUTO-MCNC: 161 MG/DL (ref 65–117)
GLUCOSE BLD STRIP.AUTO-MCNC: 165 MG/DL (ref 65–117)
GLUCOSE BLD STRIP.AUTO-MCNC: 216 MG/DL (ref 65–117)
GLUCOSE SERPL-MCNC: 156 MG/DL (ref 65–100)
HCT VFR BLD AUTO: 46.1 % (ref 36.6–50.3)
HGB BLD-MCNC: 15.2 G/DL (ref 12.1–17)
IMM GRANULOCYTES # BLD AUTO: 0.3 K/UL (ref 0–0.04)
IMM GRANULOCYTES NFR BLD AUTO: 2 % (ref 0–0.5)
LYMPHOCYTES # BLD: 1.9 K/UL (ref 0.8–3.5)
LYMPHOCYTES NFR BLD: 11 % (ref 12–49)
MAGNESIUM SERPL-MCNC: 2.4 MG/DL (ref 1.6–2.4)
MCH RBC QN AUTO: 31 PG (ref 26–34)
MCHC RBC AUTO-ENTMCNC: 33 G/DL (ref 30–36.5)
MCV RBC AUTO: 94.1 FL (ref 80–99)
MONOCYTES # BLD: 1.1 K/UL (ref 0–1)
MONOCYTES NFR BLD: 6 % (ref 5–13)
NEUTS SEG # BLD: 13.8 K/UL (ref 1.8–8)
NEUTS SEG NFR BLD: 81 % (ref 32–75)
NRBC # BLD: 0 K/UL (ref 0–0.01)
NRBC BLD-RTO: 0 PER 100 WBC
PHOSPHATE SERPL-MCNC: 3 MG/DL (ref 2.6–4.7)
PLATELET # BLD AUTO: 304 K/UL (ref 150–400)
PMV BLD AUTO: 11 FL (ref 8.9–12.9)
POTASSIUM SERPL-SCNC: 4.1 MMOL/L (ref 3.5–5.1)
RBC # BLD AUTO: 4.9 M/UL (ref 4.1–5.7)
SERVICE CMNT-IMP: ABNORMAL
SODIUM SERPL-SCNC: 134 MMOL/L (ref 136–145)
WBC # BLD AUTO: 17.1 K/UL (ref 4.1–11.1)

## 2022-03-03 PROCEDURE — 74011250636 HC RX REV CODE- 250/636: Performed by: NURSE PRACTITIONER

## 2022-03-03 PROCEDURE — 65660000000 HC RM CCU STEPDOWN

## 2022-03-03 PROCEDURE — 70553 MRI BRAIN STEM W/O & W/DYE: CPT

## 2022-03-03 PROCEDURE — 74011250636 HC RX REV CODE- 250/636

## 2022-03-03 PROCEDURE — 97165 OT EVAL LOW COMPLEX 30 MIN: CPT

## 2022-03-03 PROCEDURE — 74011000250 HC RX REV CODE- 250: Performed by: NEUROLOGICAL SURGERY

## 2022-03-03 PROCEDURE — A9576 INJ PROHANCE MULTIPACK: HCPCS

## 2022-03-03 PROCEDURE — 85025 COMPLETE CBC W/AUTO DIFF WBC: CPT

## 2022-03-03 PROCEDURE — 84100 ASSAY OF PHOSPHORUS: CPT

## 2022-03-03 PROCEDURE — 97112 NEUROMUSCULAR REEDUCATION: CPT

## 2022-03-03 PROCEDURE — 74011250637 HC RX REV CODE- 250/637: Performed by: NURSE PRACTITIONER

## 2022-03-03 PROCEDURE — 70450 CT HEAD/BRAIN W/O DYE: CPT

## 2022-03-03 PROCEDURE — 80048 BASIC METABOLIC PNL TOTAL CA: CPT

## 2022-03-03 PROCEDURE — 77010033678 HC OXYGEN DAILY

## 2022-03-03 PROCEDURE — 74011000250 HC RX REV CODE- 250: Performed by: NURSE PRACTITIONER

## 2022-03-03 PROCEDURE — 97530 THERAPEUTIC ACTIVITIES: CPT

## 2022-03-03 PROCEDURE — 74011000258 HC RX REV CODE- 258: Performed by: NURSE PRACTITIONER

## 2022-03-03 PROCEDURE — 74011250636 HC RX REV CODE- 250/636: Performed by: ANESTHESIOLOGY

## 2022-03-03 PROCEDURE — 74011636637 HC RX REV CODE- 636/637: Performed by: NURSE PRACTITIONER

## 2022-03-03 PROCEDURE — 83735 ASSAY OF MAGNESIUM: CPT

## 2022-03-03 PROCEDURE — 82962 GLUCOSE BLOOD TEST: CPT

## 2022-03-03 PROCEDURE — 74011250637 HC RX REV CODE- 250/637: Performed by: ANESTHESIOLOGY

## 2022-03-03 PROCEDURE — 36415 COLL VENOUS BLD VENIPUNCTURE: CPT

## 2022-03-03 PROCEDURE — 97161 PT EVAL LOW COMPLEX 20 MIN: CPT

## 2022-03-03 RX ORDER — SODIUM CHLORIDE 0.9 % (FLUSH) 0.9 %
10 SYRINGE (ML) INJECTION
Status: COMPLETED | OUTPATIENT
Start: 2022-03-03 | End: 2022-03-03

## 2022-03-03 RX ORDER — SODIUM CHLORIDE 9 MG/ML
75 INJECTION, SOLUTION INTRAVENOUS CONTINUOUS
Status: DISCONTINUED | OUTPATIENT
Start: 2022-03-03 | End: 2022-03-03

## 2022-03-03 RX ORDER — FAMOTIDINE 20 MG/1
20 TABLET, FILM COATED ORAL EVERY 12 HOURS
Status: DISCONTINUED | OUTPATIENT
Start: 2022-03-03 | End: 2022-03-11 | Stop reason: HOSPADM

## 2022-03-03 RX ORDER — TORSEMIDE 100 MG/1
100 TABLET ORAL DAILY
Status: DISCONTINUED | OUTPATIENT
Start: 2022-03-03 | End: 2022-03-11 | Stop reason: HOSPADM

## 2022-03-03 RX ORDER — HYDRALAZINE HYDROCHLORIDE 20 MG/ML
10 INJECTION INTRAMUSCULAR; INTRAVENOUS
Status: DISCONTINUED | OUTPATIENT
Start: 2022-03-03 | End: 2022-03-11 | Stop reason: HOSPADM

## 2022-03-03 RX ORDER — LABETALOL HYDROCHLORIDE 5 MG/ML
10 INJECTION, SOLUTION INTRAVENOUS
Status: DISCONTINUED | OUTPATIENT
Start: 2022-03-03 | End: 2022-03-11 | Stop reason: HOSPADM

## 2022-03-03 RX ADMIN — GADOTERIDOL 20 ML: 279.3 INJECTION, SOLUTION INTRAVENOUS at 19:52

## 2022-03-03 RX ADMIN — FAMOTIDINE 20 MG: 10 INJECTION, SOLUTION INTRAVENOUS at 08:10

## 2022-03-03 RX ADMIN — Medication 2 UNITS: at 11:44

## 2022-03-03 RX ADMIN — LEVETIRACETAM 1000 MG: 100 INJECTION INTRAVENOUS at 08:05

## 2022-03-03 RX ADMIN — NICARDIPINE HYDROCHLORIDE 7.5 MG/HR: 25 INJECTION, SOLUTION INTRAVENOUS at 07:04

## 2022-03-03 RX ADMIN — HYDROCODONE BITARTRATE AND ACETAMINOPHEN 1 TABLET: 5; 325 TABLET ORAL at 05:48

## 2022-03-03 RX ADMIN — LISINOPRIL 5 MG: 5 TABLET ORAL at 08:03

## 2022-03-03 RX ADMIN — DEXAMETHASONE SODIUM PHOSPHATE 4 MG: 4 INJECTION, SOLUTION INTRAMUSCULAR; INTRAVENOUS at 11:45

## 2022-03-03 RX ADMIN — Medication 2 UNITS: at 17:44

## 2022-03-03 RX ADMIN — CARVEDILOL 12.5 MG: 12.5 TABLET, FILM COATED ORAL at 08:03

## 2022-03-03 RX ADMIN — FAMOTIDINE 20 MG: 20 TABLET, FILM COATED ORAL at 21:27

## 2022-03-03 RX ADMIN — CEFAZOLIN 2 G: 1 INJECTION, POWDER, FOR SOLUTION INTRAMUSCULAR; INTRAVENOUS at 04:47

## 2022-03-03 RX ADMIN — DEXAMETHASONE SODIUM PHOSPHATE 4 MG: 4 INJECTION, SOLUTION INTRAMUSCULAR; INTRAVENOUS at 05:48

## 2022-03-03 RX ADMIN — TORSEMIDE 100 MG: 100 TABLET ORAL at 11:43

## 2022-03-03 RX ADMIN — Medication 2 UNITS: at 23:52

## 2022-03-03 RX ADMIN — Medication 2 UNITS: at 05:52

## 2022-03-03 RX ADMIN — DEXAMETHASONE SODIUM PHOSPHATE 4 MG: 4 INJECTION, SOLUTION INTRAMUSCULAR; INTRAVENOUS at 23:43

## 2022-03-03 RX ADMIN — SODIUM CHLORIDE, PRESERVATIVE FREE 10 ML: 5 INJECTION INTRAVENOUS at 19:53

## 2022-03-03 RX ADMIN — SODIUM CHLORIDE 75 ML/HR: 9 INJECTION, SOLUTION INTRAVENOUS at 08:20

## 2022-03-03 RX ADMIN — SODIUM CHLORIDE, PRESERVATIVE FREE 10 ML: 5 INJECTION INTRAVENOUS at 05:48

## 2022-03-03 RX ADMIN — CARVEDILOL 12.5 MG: 12.5 TABLET, FILM COATED ORAL at 17:52

## 2022-03-03 RX ADMIN — DEXAMETHASONE SODIUM PHOSPHATE 4 MG: 4 INJECTION, SOLUTION INTRAMUSCULAR; INTRAVENOUS at 00:21

## 2022-03-03 RX ADMIN — SODIUM CHLORIDE, PRESERVATIVE FREE 10 ML: 5 INJECTION INTRAVENOUS at 21:27

## 2022-03-03 RX ADMIN — NICARDIPINE HYDROCHLORIDE 10 MG/HR: 25 INJECTION, SOLUTION INTRAVENOUS at 01:25

## 2022-03-03 RX ADMIN — LEVETIRACETAM 1000 MG: 100 INJECTION INTRAVENOUS at 21:25

## 2022-03-03 RX ADMIN — CEFAZOLIN 3 G: 10 INJECTION, POWDER, FOR SOLUTION INTRAVENOUS at 11:49

## 2022-03-03 RX ADMIN — DEXAMETHASONE SODIUM PHOSPHATE 4 MG: 4 INJECTION, SOLUTION INTRAMUSCULAR; INTRAVENOUS at 17:44

## 2022-03-03 NOTE — PROGRESS NOTES
Occupational Therapy  03/03/22    Orders received, chart reviewed and patient evaluated by occupational therapy. Pending progression with skilled acute occupational therapy, recommend:  Therapy 3 hours per day 5-7 days per week     Recommend with nursing ADLs with assist, OOB to chair 3x/day and toileting via bedside commode with 2 assist. Thank you for completing as able in order to maintain patient strength, endurance and independence. Full evaluation to follow.      Thank you,   Elizabet Dang, OTGUS, OTR/L

## 2022-03-03 NOTE — PROGRESS NOTES
Neurosurgery Progress Note  Eugene Starks [facility]          Admit Date: 3/2/2022   LOS: 1 day        Daily Progress Note: 3/3/2022    POD: 1    S/P: Procedure(s):  AIRO GUIDED RIGHT PARIETAL CRANIOTOMY AND TUMOR REMOVAL    HPI: The patient presented to the hospital last week due to intermittent left leg weakness. His head CT revealed cerebral edema and he transferred to [facility]. His MRI brain revealed a large right parietal brain mass and hi CT scans of his body showed no other evidence of disease. He was discharged to home on steroids and keppra with the intent of a planned readmission for surgery today. He underwent a right parietal craniotomy with resection of tumor on 22. In the recovery room, he states he could not move his left arm or left leg. In the ICU, his left leg has begun working again to where he is antigravity and able to lift it off the bed. He cannot move his LUE. While in the room, he began having a focal seizure of his left arm with rhythmic movements that terminated without intervention after about 20 seconds. Subjective:   No further seizures. Pt is off the Cardene. Head CT looks good. Denies chest pain, leg pain, nausea, vomiting, difficulty swallowing, and dyspnea.        Objective:     Vital signs  Temp (24hrs), Av.2 °F (36.8 °C), Min:97.9 °F (36.6 °C), Max:98.6 °F (37 °C)    07 -  190  In: -   Out: 700 [Urine:700]  1901 -  0700  In: 1814.3 [I.V.:1814.3]  Out: 2650 [Urine:2600]    Visit Vitals  /75 (BP 1 Location: Left upper arm, BP Patient Position: At rest)   Pulse 67   Temp 98.2 °F (36.8 °C)   Resp 14   Ht 5' 4\" (1.626 m)   Wt 143.8 kg (317 lb 0.3 oz)   SpO2 94%   BMI 54.42 kg/m²    O2 Flow Rate (L/min): 1 l/min O2 Device: Nasal cannula     Pain control  Pain Assessment  Pain Scale 1: Numeric (0 - 10)  Pain Intensity 1: 0  Pain Onset 1: post op  Pain Location 1: Head  Pain Orientation 1: Other (comment)  Pain Description 1: Constant  Pain Intervention(s) 1: Medication (see MAR),Emotional support,Environmental changes,Repositioned    PT/OT  Gait                 Physical Exam:  Gen:NAD. Neuro: A&Ox3. Follows commands. Speech clear. Affect bright  PERRL. EOMI. Face symmetric. Tongue midline. RUE/RLE 5/5, LUE 2+/5 hand intrinsics, 0/5 biceps, triceps, deltoid, LLE 3+/5  Sensation intact on both sides. Gait deferred. Skin: Right scalp dressing C/D/I    CT head without contrast on 03/03/22 shows  Postop right craniotomy and tumor resection. No hemorrhage. Decreased midline shift. 24 hour results:    Recent Results (from the past 24 hour(s))   GLUCOSE, POC    Collection Time: 03/02/22  5:40 PM   Result Value Ref Range    Glucose (POC) 121 (H) 65 - 117 mg/dL    Performed by [address] [place] [address], POC    Collection Time: 03/03/22 12:18 AM   Result Value Ref Range    Glucose (POC) 135 (H) 65 - 117 mg/dL    Performed by Francis Noguera    METABOLIC PANEL, BASIC    Collection Time: 03/03/22  4:24 AM   Result Value Ref Range    Sodium 134 (L) 136 - 145 mmol/L    Potassium 4.1 3.5 - 5.1 mmol/L    Chloride 102 97 - 108 mmol/L    CO2 30 21 - 32 mmol/L    Anion gap 2 (L) 5 - 15 mmol/L    Glucose 156 (H) 65 - 100 mg/dL    BUN 17 6 - 20 MG/DL    Creatinine 0.84 0.70 - 1.30 MG/DL    BUN/Creatinine ratio 20 12 - 20      GFR est AA >60 >60 ml/min/1.73m2    GFR est non-AA >60 >60 ml/min/1.73m2    Calcium 8.3 (L) 8.5 - 10.1 MG/DL   CBC WITH AUTOMATED DIFF    Collection Time: 03/03/22  4:24 AM   Result Value Ref Range    WBC 17.1 (H) 4.1 - 11.1 K/uL    RBC 4.90 4. 10 - 5.70 M/uL    HGB 15.2 12.1 - 17.0 g/dL    HCT 46.1 36.6 - 50.3 %    MCV 94.1 80.0 - 99.0 FL    MCH 31.0 26.0 - 34.0 PG    MCHC 33.0 30.0 - 36.5 g/dL    RDW 12.0 11.5 - 14.5 %    PLATELET 768 664 - 613 K/uL    MPV 11.0 8.9 - 12.9 FL    NRBC 0.0 0  WBC    ABSOLUTE NRBC 0.00 0.00 - 0.01 K/uL    NEUTROPHILS 81 (H) 32 - 75 %    LYMPHOCYTES 11 (L) 12 - 49 %    MONOCYTES 6 5 - 13 %    EOSINOPHILS 0 0 - 7 % BASOPHILS 0 0 - 1 %    IMMATURE GRANULOCYTES 2 (H) 0.0 - 0.5 %    ABS. NEUTROPHILS 13.8 (H) 1.8 - 8.0 K/UL    ABS. LYMPHOCYTES 1.9 0.8 - 3.5 K/UL    ABS. MONOCYTES 1.1 (H) 0.0 - 1.0 K/UL    ABS. EOSINOPHILS 0.0 0.0 - 0.4 K/UL    ABS. BASOPHILS 0.1 0.0 - 0.1 K/UL    ABS. IMM. GRANS. 0.3 (H) 0.00 - 0.04 K/UL    DF AUTOMATED     MAGNESIUM    Collection Time: 03/03/22  4:24 AM   Result Value Ref Range    Magnesium 2.4 1.6 - 2.4 mg/dL   PHOSPHORUS    Collection Time: 03/03/22  4:24 AM   Result Value Ref Range    Phosphorus 3.0 2.6 - 4.7 MG/DL   GLUCOSE, POC    Collection Time: 03/03/22  5:48 AM   Result Value Ref Range    Glucose (POC) 161 (H) 65 - 117 mg/dL    Performed by Carlos Calhoun           Assessment:     Active Problems:    Brain mass (2/21/2022)        Plan:   1. Right parietal brain mass   - s/p crani 03/02   - cont decadron   - cont keppra   - PT/OT   - MRI brain within 24 hours for new baseline   - ok to transfer to [facility]. Hospitalist consult for medical management  2. Cerebral edema with brain compression   - due to #1   - plans as above  3. Hx of CHF   - EF of 35% on last ECHO   - concentrate cardene and d/c IVF to prevent fluid overload   - cont Coreg   - Intensivist following  4. Focal seizures   - due to #1, 2   - increase keppra to 1000 mg bid   - low threshold to add a second agent   - ativan PRN for seizures lasting greater than 2 minutes or more than 3 in an hour. 5. HTN   - SBP<140   - Concentrated cardene PRN   - cont coreg, lisinopril from home. Torsemide restarted. - Intensivist following  6. Diabetes mellitus, type 2   - SSI and accu-checks   - Intensivist following  7. Morbid obesity as defined by BMI greater than 40   - Body mass index is 54.42 kg/m². - Diet and exercise counseling as appropriate    Activity: up with assist  DVT ppx: SCDs  Dispo: tbd    Plan d/w Dr. Nina Blum, intensivist. ICU nurse.  Will eventually need to be seen by oncology and radiation oncology, but patient [place] to [place], so will need to find groups that service that area.       Cassy Shows, NP

## 2022-03-03 NOTE — PROGRESS NOTES
Problem: Mobility Impaired (Adult and Pediatric)  Goal: *Acute Goals and Plan of Care (Insert Text)  Description: FUNCTIONAL STATUS PRIOR TO ADMISSION: Pt was independent without the use of DME.     HOME SUPPORT PRIOR TO ADMISSION: Pt was living with his sister and plans to return there. Physical Therapy Goals  Initiated 3/3/2022  1. Patient will move from supine to sit and sit to supine , scoot up and down, and roll side to side in bed with contact guard assist within 7 day(s). 2.  Patient will transfer from bed to chair and chair to bed with Leah x2 using the least restrictive device within 7 day(s). 3.  Patient will perform sit to stand with CGA within 7 day(s). 4.  Patient will ambulate with Leah x2 for 50 feet with the least restrictive device within 7 day(s). Outcome: Not Met   PHYSICAL THERAPY EVALUATION  Patient: Jorge Lara (72 y.o. male)  Date: 3/3/2022  Primary Diagnosis: Brain mass [G93.89]  Procedure(s) (LRB):  AIRO GUIDED RIGHT PARIETAL CRANIOTOMY AND TUMOR REMOVAL (Right) 1 Day Post-Op   Precautions:  Fall (SBP <140)    ASSESSMENT  Based on the objective data described below, the patient presents with impaired balance, L sided weakness (UE >LE: LLE 4/5 strength), diminished sensation on the L side (UE>LE), unsteady gait and decreased activity tolerance s/p R parietal craniotomy and tumor removal. At baseline, pt is independent without the use of DME and works full time. Pt has been residing with his sister and plans on returning to live with her. This date, pt required Leah x2 for supine to sit and sit <> stand. Pt required modA x2 to side step to chair. Demonstrated increased trunk sway and required increased support when weight shifting to his L side. Required occasional manual cues to facilitate L knee extension d/t buckling. VS remained stable, SBP was 128 with activity. Recommending IPR upon discharge.      Current Level of Function Impacting Discharge (mobility/balance): modA x2 for bed to chair transfer     Functional Outcome Measure: The patient scored 4/56 on the Harrington outcome measure which is indicative of high fall risk. Other factors to consider for discharge: independent baseline, living with his sister, fall risk      Patient will benefit from skilled therapy intervention to address the above noted impairments. PLAN :  Recommendations and Planned Interventions: bed mobility training, transfer training, gait training, therapeutic exercises, neuromuscular re-education, patient and family training/education, and therapeutic activities      Frequency/Duration: Patient will be followed by physical therapy:  5 times a week to address goals. Recommendation for discharge: (in order for the patient to meet his/her long term goals)  Therapy 3 hours per day 5-7 days per week    This discharge recommendation:  Has not yet been discussed the attending provider and/or case management    IF patient discharges home will need the following DME: to be determined (TBD)         SUBJECTIVE:   Patient stated am I doing ok? Markos Washington    OBJECTIVE DATA SUMMARY:   HISTORY:    Past Medical History:   Diagnosis Date    Adverse effect of anesthesia     NEVER HAD ANESTHESIA    Brain mass 02/25/2022    Hypertension     NICM (nonischemic cardiomyopathy) ([facility] [address] 02/25/2022    cath open cors, high LVEDP, EF on echo was 35% day before    Sleep apnea     NO CPAP    Systolic CHF, chronic ([address] 2020    EF 31 then, now 35% 2022   History reviewed. No pertinent surgical history.       Home Situation  Home Environment: Private residence  One/Two Story Residence: One story  Living Alone: No (staying at sisters house)  [place]: Other Family Member(s)  Current DME Used/Available at Home: None  Tub or Shower Type: Tub/Shower combination    EXAMINATION/PRESENTATION/DECISION MAKING:   Critical Behavior:  Neurologic State: Alert  Orientation Level: Oriented X4  Cognition: Follows commands,Appropriate decision making,Appropriate for age attention/concentration,Appropriate safety awareness       Range Of Motion:  AROM: Within functional limits           PROM: Within functional limits           Strength:    Strength: Generally decreased, functional (L LE weakness (MMT 4/5 throughout))                    Tone & Sensation:   Tone: Normal              Sensation: Impaired (diminished on L side (UE>LE))               Coordination:  Coordination: Within functional limits  Vision:      Functional Mobility:  Bed Mobility:     Supine to Sit: Minimum assistance;Assist x2     Scooting: Contact guard assistance  Transfers:  Sit to Stand: Minimum assistance;Assist x2  Stand to Sit: Minimum assistance;Assist x2        Bed to Chair: Moderate assistance;Assist x2              Balance:   Sitting: Intact  Standing: Impaired; With support  Standing - Static: Fair;Constant support  Standing - Dynamic : Poor;Constant support  Ambulation/Gait Training:  Distance (ft): 3 Feet (ft)  Assistive Device: Gait belt  Ambulation - Level of Assistance:  Moderate assistance;Assist x2        Gait Abnormalities: Trunk sway increased        Base of Support: Center of gravity altered         Functional Measure:  Harrington Balance Test:    Sitting to Standin  Standing Unsupported: 0  Sitting with Back Unsupported: 4  Standing to Sittin  Transfers: 0  Standing Unsupported with Eyes Closed: 0  Standing Unsupported with Feet Together: 0  Reach Forward with Outstretched Arm: 0   Object: 0  Turn to Look Over Shoulders: 0  Turn 360 Degrees: 0  Alternate Foot on Step/Stool: 0  Standing Unsupported One Foot in Front: 0  Stand on One Le  Total:          56=Maximum possible score;   0-20=High fall risk  21-40=Moderate fall risk   41-56=Low fall risk                Physical Therapy Evaluation Charge Determination   History Examination Presentation Decision-Making   HIGH Complexity :3+ comorbidities / personal factors will impact the outcome/ POC  HIGH Complexity : 4+ Standardized tests and measures addressing body structure, function, activity limitation and / or participation in recreation  LOW Complexity : Stable, uncomplicated  Other outcome measures Harrington  HIGH       Based on the above components, the patient evaluation is determined to be of the following complexity level: LOW     Activity Tolerance:   Good    After treatment patient left in no apparent distress:   Sitting in chair and Call bell within reach    COMMUNICATION/EDUCATION:   The patients plan of care was discussed with: Occupational therapist and Registered nurse. Fall prevention education was provided and the patient/caregiver indicated understanding., Patient/family have participated as able in goal setting and plan of care. , and Patient/family agree to work toward stated goals and plan of care. Thank you for this referral.  Martha Donis, SPT         Regarding student involvement in patient care:  A student participated in this treatment session. Per CMS Medicare statements and APTA guidelines I certify that the following was true:  1. I was present and directly observed the entire session. 2. I made all skilled judgments and clinical decisions regarding care. 3. I am the practitioner responsible for assessment, treatment, and documentation.

## 2022-03-03 NOTE — PROGRESS NOTES
Bedside and Verbal shift change report given to Chelsey Holt (oncoming nurse) by Noble Mishra (offgoing nurse). Report included the following information SBAR, Kardex, Intake/Output, MAR, Accordion, Recent Results and Dual Neuro Assessment. Patient stable post crani. Cardene gtt stopped and blood pressure parameters increased to SBP <160. No PRNs given. Neurologically patient weaker on left side but able to  left hand slowly and bear partial weight on left side. Patient reports tingling on left side and tongue has felt numb since post op but slowly improving. Patient up to chair with PT/OT. Up to bedside commode with 2 assist and had one bm this afternoon. Patient voiding appropriately in urinal. Nini and central line removed. No seizure activity noted. Report called to [facility] on [facility].  Patient Tx to 461

## 2022-03-03 NOTE — PROGRESS NOTES
Awake alert can lift left leg against gravity  Weak in left hand/arm  Can only wiggle fingers and wrist but that is better than yesterday and not unexpected given location of GBM in the motor area of the right parietal lobe  Continue steroids  CT looks good, less swelling compared to pre op afeb vss  May leave unit when off cardene

## 2022-03-03 NOTE — OP NOTES
[address]   OPERATIVE REPORT    Name:  Yaniv Blood  MR#:  [id number]  :  1975  ACCOUNT #:  [de-identified]  DATE OF SERVICE:  2022    PREOPERATIVE DIAGNOSIS:  High-grade glioma. POSTOPERATIVE DIAGNOSIS:  High-grade glioma. PROCEDURES PERFORMED:  Right parietal craniotomy, use of neuronavigation BrainLAB, and removal of primary glioma. SURGEON:  Sangeetha Cook MD    ASSISTANT:  [facility], Todd Duran. ANESTHESIA:  General.    COMPLICATIONS:  None. SPECIMENS REMOVED:  Tumor. IMPLANTS:  None. ESTIMATED BLOOD LOSS:  Minimal.    PROCEDURE:  Review of imaging studies revealed a contrast enhancing lesion in the white matter of the right parietal lobe consistent with a high-grade glioma. The patient had presented with some left-sided weakness. It was in the region of the motor area. However, it was causing a significant amount of swelling and after discussing risks, benefits, and alternatives of surgery with him, he wished to proceed with surgical removal, understanding risks among others included left-sided hemiplegia and possible subtotal removal of tumor. He was taken to the operating room where he was induced under general endotracheal anesthesia and then underwent an intraoperative CT scan with the [address] for registration for the navigation system. After the final positioning of the table, the right parietal region was shaved, scrubbed, prepped and draped in the usual sterile fashion. A time-out was performed to identify the correct patient and procedure. Appropriate antibiotics administered and sequential stockings applied for DVT prophylaxis. A lazy S incision centered directly over the tumor was made and bleeding easily controlled with bipolar and monopolar electrocautery. Two self-retaining cerebellar retractors were inserted into the edges of the incision.   The midline was identified using the navigation system as well as visually and a free bone flap was elevated just off the midline in the right parietal region centered directly over the tumor. The dura was tacked up to the surrounding bone with 4-0 Nurolon sutures and the dura was then opened in a cruciate fashion centered directly over the tumor, which was noted to be rather superficial.  As I opened the dura, the brain was very swollen. I was able to cauterize the surface under loupe magnification and approximately 1-2 cm below the surface, I came upon the edges of the tumor, it was beefy red in color and did have some margins between normal and abnormal tissue in the more superficial area, but these margins became less distinct deeper in the dissection as the tumor dive deeper into the brain. Self-retaining retractors were inserted into the edges of the incision as I aspirated the tumor with gentle suction and bipolar electrocautery. I sent the specimen to the pathologist, the pathologist called into the room noting that this was a high-grade glioma with final pathology pending. The tumor had gross characteristics of a high-grade glioma as well, as I looked at it. I continued to resect then with the Sonopet ultrasonic aspirator, it was a vascular tumor, but the bleeding was easily controlled with bipolar electrocautery. As I got to the deepest portions of the tumor, it was a little more vascular, but using the navigation system, it appeared that we were able to get around the entire anterior medial and lateral portion of the tumor, posteriorly and very deep, there may be some residual tumor there, but as I was getting into more eloquent areas and deeper into the brain, it was clear that it was safer to not try and resect this very deep portion. The brain relaxed considerably as a result of the tumor removal.  I probed again with the navigation probe and felt that if there is any tumor left, it is deep and posterior.   I was able to line the resection bed around the normal white matter with Surgicel, closed the dura with a graft after we removed all of the self-retaining retractors. The bone flap was replaced and secured with mini plates from the [facility]. The galea closed with 2-0 interrupted inverted Vicryl sutures and a running Rapide suture was used to close the skin. The needle, sponge, and instrument counts were correct at the end of the procedure.       Kurt Ennis MD      [id number]  D:  03/02/2022 13:09  T:  03/02/2022 19:01  JOB #:  [id number]  CC:  Sondra Ramsay MD

## 2022-03-03 NOTE — PROGRESS NOTES
Reviewed chart for transitions of care, and discussed in rounds. CM met with patient at bedside to explain role and offer support. Patient is alert and oriented x4, and confirmed demographics. Baseline:   ADLs/IDALS: Independent   Previous Home Health: None  Previous SNF/IPR:None  ER Contact: Sister Peyton Tenorio ([facility]) [phone], Mother Milly Meredith ([facility]) [phone]    Patient lives independently to include driving with his sister Peyton Tenorio. Patient has no previous HH or equipment needs. . Patient's preferred pharmacy is [facility] in [place]. Transportation TBD    Reason for Readmission:    Scheduled craniotomy          RUR Score/Risk Level:   9%      PCP: First and Last name:  Dr Ky Sebastian    Name of Practice:    Are you a current patient: Yes/No: yes   Approximate date of last visit: over a year   Can you participate in a virtual visit with your PCP: Yes    Is a Care Conference indicated: No    Did you attend your follow up appointment (s): If not, why not: No, per patient he is looking for a new PCP, he just did not go. Resources/supports as identified by patient/family:          Top Challenges facing patient (as identified by patient/family and CM): Finances/Medication cost?   Confirmed insurance    Transportation  Patient drives, has a supportive sister      Support system or lack thereof? Sister Peyton Tenorio , Mother Milly Meredith     Living arrangements? Lives with his sister Jonah Nye         Self-care/ADLs/Cognition? Current Advanced Directive/Advance Care Plan: On file            Plan for utilizing home health:   TBD             Transition of Care Plan:    Based on readmission, the patient's previous Plan of Care   has been evaluated and/or modified. The current Transition of Care Plan is:   Inpatient rehab        Care manager met with patient to introduce self and explain role. Patient lives independently with his sister Peyton Tenorio.  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 AMD  Is scanned into EMR listing his sister Miki Gardner as Primary Health care decision maker and his mother Briana Lang is secondary. Therapy is recommending IPR, provided patient with the list of facilities and he would like to discuss choice for rehab with his sister who plans to visit later today. Care management is continuing to follow. Karie Darling RN,Care Management        Care Management Interventions  PCP Verified by CM:  Yes (Dr Walter Hughes )  Jahaira Pages: No  Discharge Durable Medical Equipment: No  Physical Therapy Consult: Yes  Occupational Therapy Consult: Yes  Speech Therapy Consult: No  Support Systems: Other Family Member(s) (Sister Miki Gardner [id number], Mother Briana Lang ([place]) [phone])

## 2022-03-03 NOTE — PROGRESS NOTES
1930: Bedside shift change report given to Bhargav Best (oncoming nurse) by Surjit Etienne RN (offgoing nurse). Report included the following information SBAR, Intake/Output, MAR, Recent Results, Cardiac Rhythm NSR, Alarm Parameters  and Dual Neuro Assessment. 0515: Patient traveled downstairs for Head CT, tolerated well    0730: Bedside shift change report given to Steffany CASILLAS (oncoming nurse) by Marisa Barger RN (offgoing nurse). Report included the following information SBAR, Intake/Output, MAR, Recent Results, Cardiac Rhythm NSR, Alarm Parameters  and Dual Neuro Assessment.

## 2022-03-03 NOTE — PROGRESS NOTES
[facility]    Progress Note  Name: Gardenia Lee   : 1975   MRN: [id number]   Date: 3/3/2022      Patient is asked to be seen by Nora Carrillo NP for post op medical management, necessitating the need for possible ICU care. Reason for ICU Admission: post op medical management    HPI:  Gardenia Lee is a 51 y.o. male with a history of CHF with EF 35%, DM, HTN, and Morbid Obesity who presents to ICU post right parietal craniotomy and tumor removal. On cardene and IVF. Arterial line in place. Subjective:   22 - post op transfer from PACU to ICU. Awake alert and oriented. Follows commands. Upper left ext weakness/flaccid. Left lower extremitiy can lift to gravity. 5/5 Strength on right upper and lower extremities. Pupils are round and reactive bilaterally.     Overnight Events:  3/3: NAEs    S/P: Procedure(s):  AIRO GUIDED RIGHT PARIETAL CRANIOTOMY AND TUMOR REMOVAL    Active Problem List:     Problem List  Date Reviewed: 2022          Codes Class    NICM (nonischemic cardiomyopathy) ([facility] ICD-10-CM: I42.8  ICD-9-CM: 425.4     Overview Signed 2022  6:53 PM by Sherly Robertson MD     cath open cors, high LVEDP, EF on echo was 35% day before             Hypertension ICD-10-CM: I10  ICD-9-CM: 401.9         Hyperlipidemia ICD-10-CM: E78.5  ICD-9-CM: 272.4         Morbid obesity ([facility] ICD-10-CM: E66.01  ICD-9-CM: 278.01         Prediabetes ICD-10-CM: R73.03  ICD-9-CM: 790.29         Heart failure with mid-range ejection fraction (HCC) ICD-10-CM: I50.9  ICD-9-CM: 428.9         Brain mass ICD-10-CM: G93.89  ICD-9-CM: 851.97         Systolic CHF, chronic (HCC) ICD-10-CM: I50.22  ICD-9-CM: 428.22, 428.0     Overview Signed 2022  6:53 PM by Sherly Robertson MD     EF 31 then, now 35%                    Past Medical History:    has a past medical history of Adverse effect of anesthesia, Brain mass (2022), Hypertension, NICM (nonischemic cardiomyopathy) ([facility] (02/25/2022), Sleep apnea, and Systolic CHF, chronic ([facility] (2020). He has no past medical history of Asthma, Chronic kidney disease, Coagulation disorder ([facility] Diabetes ([facility] Liver disease, Seizures ([facility] or Stroke ([facility] Past Surgical History:    has no past surgical history on file. Home Medications:     Prior to Admission medications    Medication Sig Start Date End Date Taking? Authorizing Provider   calcium carbonate (TUMS) 200 mg calcium (500 mg) chew Take 1 Tablet by mouth as needed. Yes Provider, Historical   carvediloL (COREG) 12.5 mg tablet Take 1 Tablet by mouth two (2) times a day for 30 days. 2/25/22 3/27/22 Yes Emma Olivares MD   levETIRAcetam (KEPPRA) 500 mg tablet Take 1 Tablet by mouth two (2) times a day. 2/25/22  Yes Emma Olivares MD   dexAMETHasone (Decadron) 4 mg tablet Take 4 mg by mouth three (3) times daily. 1 tab PO TID 2/25/22  Yes Emma Olivares MD   lisinopriL (PRINIVIL, ZESTRIL) 5 mg tablet Take 1 Tablet by mouth daily for 30 days. 2/26/22 3/28/22 Yes Emma Olivares MD   torsemide (DEMADEX) 100 mg tablet Take 100 mg by mouth daily.  8/30/21  Yes Other, MD Edwin       Allergies/Social/Family History:   No Known Allergies   Social History     Tobacco Use    Smoking status: Never Smoker    Smokeless tobacco: Never Used   Substance Use Topics    Alcohol use: Not Currently     Comment: rare      Family History   Problem Relation Age of Onset    Diabetes Mother     Thyroid Disease Mother     Anxiety Mother     Depression Mother     High Cholesterol Mother     Hypertension Mother     Diabetes Father     Asthma Sister     Anemia Sister     Other Brother         AUTO ACCIDENT    Obesity Brother     Anesth Problems Neg Hx        Review of Systems:   A comprehensive review of systems was negative except for: Neurological: positive for headaches and weakness    Objective:   Vital Signs:  Visit Vitals  /72   Pulse 61   Temp 97.9 °F (36.6 °C)   Resp 21 Ht 5' 4\" (1.626 m)   Wt 143.8 kg (317 lb 0.3 oz)   SpO2 97%   BMI 54.42 kg/m²    O2 Flow Rate (L/min): 3 l/min O2 Device: Nasal cannula Temp (24hrs), Av.3 °F (36.8 °C), Min:97.9 °F (36.6 °C), Max:98.6 °F (37 °C)           Intake/Output:     Intake/Output Summary (Last 24 hours) at 3/3/2022 9575  Last data filed at 3/3/2022 0447  Gross per 24 hour   Intake 1718.5 ml   Output 3250 ml   Net -1531.5 ml       Physical Exam:  General:  alert, cooperative, no distress, appears stated age  Eye:  conjunctivae/corneas clear. PERRL, EOM's intact. Neurologic: Awake alert and oriented. Follows commands. Upper left ext weakness/flaccid. Left lower extremitiy can lift to gravity. 5/5 Strength on right upper and lower extremities. Pupils are round and reactive bilaterally. Speech clear, no facial droop noted. Neck:  normal and no erythema or exudates noted. Lungs:  clear to auscultation bilaterally  Heart:  regular rate and rhythm, S1, S2 normal, no murmur, click, rub or gallop  Abdomen:  soft, non-tender. Bowel sounds normal. No masses,  no organomegaly  Cardiovascular:  Regular rate and rhythm, S1S2 present, without murmur or extra heart sounds, pedal pulses normal and no edema  Skin:  no rash or abnormalities    LABS AND  DATA: Personally reviewed 22     Hemodynamics:   PAP:   CO:     Wedge:   CI:     CVP:    SVR:       PVR:       Ventilator Settings:  Mode Rate Tidal Volume Pressure FiO2 PEEP                    Peak airway pressure:      Minute ventilation:        MEDS: Personally Reviewed 22     Chest X-Ray: personally reviewed and report checked 22  CXR Results  (Last 48 hours)               22 1511  XR CHEST PORT Final result    Impression:  Right IJ central venous catheter in appropriate position without pneumothorax. Narrative:  EXAM: XR CHEST PORT       HISTORY: central line placement.        COMPARISON: 2022       FINDINGS: Single view(s) of the chest. A right IJ central venous catheter   terminates in the distal SVC. The lungs are well inflated. No focal   consolidation, pleural effusion, or pneumothorax. The heart is mildly enlarged,   but unchanged. The visualized osseous structures are unremarkable. CT Scans: personally reviewed and report checked 03/03/22   CT Results  (Last 48 hours)               03/03/22 0520  CT HEAD WO CONT Final result    Impression:  Postop right craniotomy and tumor resection. No hemorrhage. Decreased midline shift. Narrative:  INDICATION: post-op right parietal craniotomy and removal of primary glioma. COMPARISON: 2/22/2022. EXAM: CT HEAD without contrast.       TECHNIQUE: Unenhanced CT Head is performed. CT dose reduction was achieved   through use of a standardized protocol tailored for this examination and   automatic exposure control for dose modulation. FINDINGS:   There is interval right craniotomy and tumor resection with expected   pneumocephalus in the tumor bed, without hemorrhage or extra-axial fluid   collection. Right cerebral edema remains. Midline shift to the left is   decreased, 3 mm versus 8 mm previously. There is no hydrocephalus. 03/02/22 1317  CT HEAD WO CONT Final result    Impression:  Intraoperative CT as above. See operative report for details. Narrative:  COMPLIANCE ONLY       INDICATION: surgery       FINDINGS:    Portable CT imaging performed in the operating room. ECHO:   02/24/22  Left Ventricle: Left ventricle size is normal. Findings consistent with severe concentric hypertrophy. Moderate global hypokinesis present. Moderately reduced left ventricular systolic function. EF by visual approximation is 35%. Abnormal diastolic function.   Contrast used: Definity. Assessment:     1. 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  2. Non ischemic cardiomyopathy  3. Chronic systolic CHF  EF 52%  4.  DM (HbgA1c 5. 8)  5. HTN  6. Morbid Obesity   7. Sleep apnea- does not use CPAP    ICU Comprehensive Plan of Care:     Plans for this Shift:     1. Neuro/ Pain/ Sedation   a. Neuro checks per neurosurgery  b. Keppra, Decadron per neurosurgery   2. Resp   a. NC wean as tolerated for sats greater than 92%  b. Albuterol PRN  3. CVS   a. Continue Coreg, Lisinopril. b. Torsaemide  4. Heme/ Onc  a. Trend CBC  5. GI  a. SUP pepcid  b. NPO  6. Renal/   a. Trend renal indices  7. ID  a. Antibiotics: Pre op Ancef  8. Endocrine  a. SSI PRN  9. Musc/Integ  a. PT/OT - activity when ok with surgery     SBP Goal of: > 90 mmHg and < 140 mmHg  MAP Goal of: > 65 mmHg  Nicardipine (Cardene) - For above SBP/MAP goals  IVFs: LR @ 75 ml/hr  Transfusion Trigger (Hgb): <7 g/dL  Respiratory Goals:   Incentive spirometry  Pulmonary toilet: Albuterol   SpO2 Goal: > 92%  Keep K>4; Mg>2   PT/OT: PT consulted and on board and OT consulted and on board   Discussed Plan of Care/Code Status: Full Code  Appreciate Consultants Input: Neurosurgery  Discussed Care Plan with Bedside RN  Documentation of Current Medications    F - Feeding:  No Advance as tolerated per surgery   A - Analgesia: Fentanyl and Acetaminophen  S - Sedation: None  T - DVT Prophylaxis: SCD's or Sequential Compression Device   H - Head of Bed: > 30 Degrees  U - Ulcer Prophylaxis: Pepcid (famotidine)   G - Glycemic Control: Prevent Hypoglycemia /SSI  S - Spontaneous Breathing Trial: N/A  B - Bowel Regimen: Docusate (Colace)  I - Indwelling Catheter:   Tubes: None  Lines: Peripheral IV, Arterial Line and Central Line  Drains: Carrillo Catheter  D - De-escalation of Antibiotics: Ancef    Multidisciplinary Rounds Completed:  No    ABCDEF Bundle/Checklist Completed:  Yes    SPECIAL EQUIPMENT  None    DISPOSITION  Transfer to non-ICU bed    CRITICAL CARE CONSULTANT NOTE  I had a face to face encounter with the patient, reviewed and interpreted patient data including clinical events, labs, images, vital signs, I/O's, and examined patient. I have discussed the case and the plan and management of the patient's care with the consulting services, the bedside nurses and the respiratory therapist.      NOTE OF PERSONAL INVOLVEMENT IN CARE   This patient has a high probability of imminent, clinically significant deterioration, which requires the highest level of preparedness to intervene urgently. I participated in the decision-making and personally managed or directed the management of the following life and organ supporting interventions that required my frequent assessment to treat or prevent imminent deterioration. I personally spent 35 minutes of critical care time. This is time spent at this critically ill patient's bedside actively involved in patient care as well as the coordination of care and discussions with the patient's family. This does not include any procedural time which has been billed separately.     Ian Reid,    Staff Intensivist/Anesthesiologist  Critical Care Medicine

## 2022-03-03 NOTE — PROGRESS NOTES
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 HOME SUPPORT: Reports he just bought a house but plans to stay with his sister upon d/c. Occupational Therapy Goals  Initiated 3/3/2022   1. Patient will perform self-feeding with minimal assistance/contact guard assist within 7 day(s). 2.  Patient will perform at least one standing grooming task with moderate assistance within 7 day(s). 3.  Patient will perform lower body dressing with maximal assistance & AE PRN within 7 day(s). 4.  Patient will perform upper body dressing with minimal assistance & AE PRN within 7 day(s). 5.  Patient will perform bathing with moderate assistance & AE PRN within 7 day(s). 6.  Patient will perform toilet transfers with moderate assistance within 7 day(s). 7.  Patient will perform all aspects of toileting with moderate assistance within 7 day(s). 8.  Patient will participate in upper extremity therapeutic exercise/activities with supervision/set-up within 7 day(s). 9.  Patient will utilize energy conservation techniques during functional activities with verbal and visual cues within 7 day(s). 10.  Patient will improve their Fugl Cannon score by 5 points in prep for ADLs within 7 days.         Outcome: Not Met     OCCUPATIONAL THERAPY EVALUATION  Patient: Gardenia Lee (19 y.o. male)  Date: 3/3/2022  Primary Diagnosis: Brain mass [G93.89]  Procedure(s) (LRB):  AIRO GUIDED RIGHT PARIETAL CRANIOTOMY AND TUMOR REMOVAL (Right) 1 Day Post-Op   Precautions: Fall,Seizure (SBP <140)    ASSESSMENT  Based on the objective data described below, the patient presents with decreased balance, strength, activity tolerance, LUE>LLE function & sensation, attention to the L, safety awareness, lower body access, and coordination s/p R parietal crani with tumor removal POD #1. At baseline, he is IND with ADLs and mobility, working 2 jobs, recently bought a house (was living with his parents per chart review), plans to stay with his sister upon rehab completion. He now presents far from this baseline, requiring Min-Mod A x2 for limited OOB mobility d/t LUE>LLE deficits, fair scapular activation and grasp with increased time, trace activation of all other muscles in LUE only. With increased time and mod cues for sequencing, advanced to the chair with Mod A x2, all needs met. He is an excellent IPR candidate, motivated to progress, and would greatly benefit from the neuro-intensive approach offered in this setting. Current Level of Function Impacting Discharge (ADLs/self-care): Mod-Total A for ADLs, Min-Mod A x2 for limited OOB mobility    Functional Outcome Measure: The patient scored Total A-D  Total A-D (Motor Function): 9/66 on the Fugl-Cannon Assessment which is indicative of severe impairment in upper extremity functional status. Other factors to consider for discharge: fall risk, assist of 2, PMH, PLOF     Patient will benefit from skilled therapy intervention to address the above noted impairments. PLAN :  Recommendations and Planned Interventions: self care training, functional mobility training, therapeutic exercise, balance training, visual/perceptual training, therapeutic activities, cognitive retraining, endurance activities, neuromuscular re-education, patient education, home safety training, and family training/education    Frequency/Duration: Patient will be followed by occupational therapy 5 times a week to address goals.     Recommendation for discharge: (in order for the patient to meet his/her long term goals)  Therapy 3 hours per day 5-7 days per week    This discharge recommendation:  A follow-up discussion with the attending provider and/or case management is planned    IF patient discharges home will need the following DME: To be determined (TBD)         SUBJECTIVE:   Patient stated Am I doing okay? You think I have a chance?  reassurance provided for recovery with brain surgery, education provided on neuroplasticity and rehab process post-op    OBJECTIVE DATA SUMMARY:   HISTORY:   Past Medical History:   Diagnosis Date    Adverse effect of anesthesia     NEVER HAD ANESTHESIA    Brain mass 02/25/2022    Hypertension     NICM (nonischemic cardiomyopathy) ([facility] [address] 02/25/2022    cath open cors, high LVEDP, EF on echo was 35% day before    Sleep apnea     NO CPAP    Systolic CHF, chronic ([facility] 2020    EF 31 then, now 35% 2022   History reviewed. No pertinent surgical history. Expanded or extensive additional review of patient history:     Home Situation  Home Environment: Private residence  One/Two Story Residence: One story  Living Alone: No (staying at sisters house)  [place]: Other Family Member(s)  Current DME Used/Available at Home: None  Tub or Shower Type: Tub/Shower combination    Hand dominance: Right    EXAMINATION OF PERFORMANCE DEFICITS:  Cognitive/Behavioral Status:  Neurologic State: Alert  Orientation Level: Oriented X4  Cognition: Appropriate for age attention/concentration; Follows commands;Poor safety awareness  Perception: Cues to attend to left side of body;Cues to maintain midline in sitting;Cues to maintain midline in standing  Perseveration: No perseveration noted  Safety/Judgement: Decreased awareness of environment;Decreased awareness of need for assistance;Decreased awareness of need for safety;Decreased insight into deficits    Skin: Appears intact    Edema: None    Hearing:       Vision/Perceptual:    Tracking: Able to track stimulus in all quadrants w/o difficulty                 Diplopia: No    Acuity: Within Defined Limits;Able to read clock/calendar on wall without difficulty         Range of Motion:  AROM: Generally decreased, functional (LUE w/ at least trace activation in all joints, slight grasp)  PROM: Within functional limits                      Strength:  Strength: Generally decreased, functional (LUE>LLE, trace LUE in all joints, slight grasp)                Coordination:  Coordination: Generally decreased, functional (LUE>LLE decreased)  Fine Motor Skills-Upper: Left Impaired;Right Intact    Gross Motor Skills-Upper: Left Impaired;Right Intact    Tone & Sensation:  Tone: Normal  Sensation: Impaired (diminished on L side (UE>LE))                      Balance:  Sitting: Impaired  Sitting - Static: Good (unsupported)  Sitting - Dynamic: Fair (occasional)  Standing: Impaired; With support  Standing - Static: Fair;Constant support  Standing - Dynamic : Poor;Constant support    Functional Mobility and Transfers for ADLs:  Bed Mobility:  Rolling: Minimum assistance; Moderate assistance (Min A to the L, Mod A to the R)  Supine to Sit: Minimum assistance;Assist x2  Sit to Supine:  (in chair)  Scooting: Contact guard assistance;Minimum assistance    Transfers:  Sit to Stand: Minimum assistance;Assist x2  Stand to Sit: Minimum assistance;Assist x2  Bed to Chair: Moderate assistance;Assist x2  Toilet Transfer : Moderate assistance;Assist x2 (infer to [facility] per mobility)  Assistive Device : Gait Belt    ADL Assessment:  Feeding: Moderate assistance    Oral Facial Hygiene/Grooming: Moderate assistance    Bathing: Maximum assistance    Upper Body Dressing: Moderate assistance    Lower Body Dressing: Total assistance    Toileting: Total assistance                ADL Intervention and task modifications:    Lower Body Dressing Assistance  Dressing Assistance: Total assistance(dependent)  Socks:  Total assistance (dependent)  Leg Crossed Method Used: No  Position Performed: Seated edge of bed         Cognitive Retraining  Safety/Judgement: Decreased awareness of environment;Decreased awareness of need for assistance;Decreased awareness of need for safety;Decreased insight into deficits    Functional Measure:  Fugl-Cannon Assessment of Motor Recovery after Stroke:   Reflex Activity  Flexors/Biceps/Fingers: Can be elicited  Extensors/Triceps: Can be elicited  Reflex Subtotal: 4    Volitional Movement Within Synergies  Shoulder Retraction: Partial  Shoulder Elevation: Partial  Shoulder Abduction (90 degrees): None  Shoulder External Rotation: None  Elbow Flexion: None  Forearm Supination: None  Shoulder Adduction/Internal Rotation: None  Elbow Extension: None  Forearm Pronation: None  Subtotal: 2    Volitional Movement Mixing Synergies  Hand to Lumbar Spine: None  Shoulder Flexion (0-90 degrees): None  Pronation-Supination: None  Subtotal: 0    Volitional Movement With Little or No Synergy  Shoulder Abduction (0-90 degrees): None  Shoulder Flexion ( degrees): None  Pronation/Supination: None  Subtotal : 0    Normal Reflex Activity  Biceps, Triceps, Finger Flexors: Full  Subtotal : 2    Upper Extremity Total   Upper Extremity Total: 8    Wrist  Stability at 15 Degree Dorsiflexion: None  Repeated Dorsiflexion/ Volar Flexion: None  Stability at 15 Degree Dorsiflexion: None  Repeated Dorsiflexion/ Volar Flexion: None  Circumduction: None  Wrist Total: 0    Hand  Mass Flexion: Partial  Mass Extension: None  Grasp A: None  Grasp B: None  Grasp C: None  Grasp D: None  Grasp E: None  Hand Total: 1    Coordination/Speed  Tremor: Marked  Dysmetria: Marked  Time: >5s  Coordination/Speed Total : 0    Total A-D  Total A-D (Motor Function): 9/66     This is a reliable/valid measure of arm function after a neurological event. It has established value to characterize functional status and for measuring spontaneous and therapy-induced recovery; tests proximal and distal motor functions. Fugl-Cannon Assessment - UE scores recorded between five and 30 days post neurologic event can be used to predict UE recovery at six months post neurologic event.   Severe = 0-21 points Moderately Severe = 22-33 points   Moderate = 34-47 points   Mild = 48-66 points  [place] Bosworth, D., PAIGE Arias, & KEEGAN No (1992). Measurement of motor recovery after stroke: Outcome assessment and sample size requirements. Stroke, 23, pp. 5108-4874.   ------------------------------------------------------------------------------------------------------------------------------------------------------------------  MCID:  Stroke:   Erick Santacruz et al, 2001; n = 171; mean age 79 (6) years; assessed within 16 (12) days of stroke, Acute Stroke)  FMA Motor Scores from Admission to Discharge   10 point increase in FMA Upper Extremity = 1.5 change in discharge FIM   10 point increase in FMA Lower Extremity = 1.9 change in discharge FIM  MDC:   Stroke:   Gloria Bonner et al, 2008, n = 14, mean age = 59.9 (14.6) years, assessed on average 14 (6.5) months post stroke, Chronic Stroke)   FMA = 5.2 points for the Upper Extremity portion of the assessment       Barthel Index:  Bathin  Bladder: 10  Bowels: 5  Groomin  Dressin  Feedin  Mobility: 0  Stairs: 0  Toilet Use: 0  Transfer (Bed to Chair and Back): 5  Total: 25/100      The Barthel ADL Index: Guidelines  1. The index should be used as a record of what a patient does, not as a record of what a patient could do. 2. The main aim is to establish degree of independence from any help, physical or verbal, however minor and for whatever reason. 3. The need for supervision renders the patient not independent. 4. A patient's performance should be established using the best available evidence. Asking the patient, friends/relatives and nurses are the usual sources, but direct observation and common sense are also important. However direct testing is not needed. 5. Usually the patient's performance over the preceding 24-48 hours is important, but occasionally longer periods will be relevant.   6. Middle categories imply that the patient supplies over 50 per cent of the effort. 7. Use of aids to be independent is allowed. Score Interpretation (from [facility])    Independent   60-79 Minimally independent   40-59 Partially dependent   20-39 Very dependent   <20 Totally dependent     -Elda Sheridan., Barthel, D.W. (1965). Functional evaluation: the Barthel Index. [address] ([address] (1997). The Barthel activities of daily living index: self-reporting versus actual performance in the old (> or = 75 years). Journal of [address] [place] [address] 45(7), [facility], J..., [facility]., Narinder Carranza. (1999). Measuring the change in disability after inpatient rehabilitation; comparison of the responsiveness of the Barthel Index and Functional Campbell Measure. Journal of Neurology, Neurosurgery, and Psychiatry, 66(4), 097-802. TheMARY GRACE Wynn, MARISOL Brice, & Eden Glasgow, MJeisonA. (2004) Assessment of post-stroke quality of life in cost-effectiveness studies: The usefulness of the Barthel Index and the EuroQoL-5D. Quality of Life Research, 15, 193-00         Occupational Therapy Evaluation Charge Determination   History Examination Decision-Making   LOW Complexity : Brief history review  HIGH Complexity : 5 or more performance deficits relating to physical, cognitive , or psychosocial skils that result in activity limitations and / or participation restrictions MEDIUM Complexity : Patient may present with comorbidities that affect occupational performnce.  Miniml to moderate modification of tasks or assistance (eg, physical or verbal ) with assesment(s) is necessary to enable patient to complete evaluation       Based on the above components, the patient evaluation is determined to be of the following complexity level: LOW   Pain Rating:  None reported    Activity Tolerance:   Fair and desaturates with exertion and requires oxygen    After treatment patient left in no apparent distress:    Sitting in chair and Call bell within reach    COMMUNICATION/EDUCATION:   The patients plan of care was discussed with: Physical therapist and Registered nurse. Patient was educated regarding his deficit(s) of decreased balance, strength, activity tolerance, LUE>LLE function & sensation, attention to the L, safety awareness, lower body access, and coordination as this relates to his diagnosis of s/p crani with tumor removal.  He demonstrated fair understanding as evidenced by verbal discussion & carryover. Home safety education was provided and the patient/caregiver indicated understanding., Patient/family have participated as able in goal setting and plan of care. , and Patient/family agree to work toward stated goals and plan of care. This patients plan of care is appropriate for delegation to JALEESA.     Thank you for this referral.  DMITRIY Eng, OTR/L  Time Calculation: 35 mins

## 2022-03-04 LAB
COMMENT, HOLDF: NORMAL
GLUCOSE BLD STRIP.AUTO-MCNC: 138 MG/DL (ref 65–117)
GLUCOSE BLD STRIP.AUTO-MCNC: 144 MG/DL (ref 65–117)
GLUCOSE BLD STRIP.AUTO-MCNC: 164 MG/DL (ref 65–117)
GLUCOSE BLD STRIP.AUTO-MCNC: 181 MG/DL (ref 65–117)
SAMPLES BEING HELD,HOLD: NORMAL
SERVICE CMNT-IMP: ABNORMAL

## 2022-03-04 PROCEDURE — 74011000258 HC RX REV CODE- 258: Performed by: NURSE PRACTITIONER

## 2022-03-04 PROCEDURE — 97535 SELF CARE MNGMENT TRAINING: CPT

## 2022-03-04 PROCEDURE — 94760 N-INVAS EAR/PLS OXIMETRY 1: CPT

## 2022-03-04 PROCEDURE — 74011250637 HC RX REV CODE- 250/637: Performed by: NEUROLOGICAL SURGERY

## 2022-03-04 PROCEDURE — 74011250636 HC RX REV CODE- 250/636: Performed by: NURSE PRACTITIONER

## 2022-03-04 PROCEDURE — 82962 GLUCOSE BLOOD TEST: CPT

## 2022-03-04 PROCEDURE — 65660000000 HC RM CCU STEPDOWN

## 2022-03-04 PROCEDURE — 36415 COLL VENOUS BLD VENIPUNCTURE: CPT

## 2022-03-04 PROCEDURE — 74011000250 HC RX REV CODE- 250: Performed by: NURSE PRACTITIONER

## 2022-03-04 PROCEDURE — 97112 NEUROMUSCULAR REEDUCATION: CPT

## 2022-03-04 PROCEDURE — 74011636637 HC RX REV CODE- 636/637: Performed by: NURSE PRACTITIONER

## 2022-03-04 PROCEDURE — 74011250637 HC RX REV CODE- 250/637: Performed by: ANESTHESIOLOGY

## 2022-03-04 PROCEDURE — 74011250637 HC RX REV CODE- 250/637: Performed by: NURSE PRACTITIONER

## 2022-03-04 RX ORDER — DEXAMETHASONE SODIUM PHOSPHATE 4 MG/ML
2 INJECTION, SOLUTION INTRA-ARTICULAR; INTRALESIONAL; INTRAMUSCULAR; INTRAVENOUS; SOFT TISSUE EVERY 6 HOURS
Status: DISCONTINUED | OUTPATIENT
Start: 2022-03-04 | End: 2022-03-06

## 2022-03-04 RX ORDER — DEXAMETHASONE SODIUM PHOSPHATE 4 MG/ML
3 INJECTION, SOLUTION INTRA-ARTICULAR; INTRALESIONAL; INTRAMUSCULAR; INTRAVENOUS; SOFT TISSUE EVERY 6 HOURS
Status: DISCONTINUED | OUTPATIENT
Start: 2022-03-04 | End: 2022-03-04

## 2022-03-04 RX ADMIN — SODIUM CHLORIDE, PRESERVATIVE FREE 10 ML: 5 INJECTION INTRAVENOUS at 06:00

## 2022-03-04 RX ADMIN — CARVEDILOL 12.5 MG: 12.5 TABLET, FILM COATED ORAL at 17:32

## 2022-03-04 RX ADMIN — SODIUM CHLORIDE, PRESERVATIVE FREE 10 ML: 5 INJECTION INTRAVENOUS at 14:00

## 2022-03-04 RX ADMIN — DEXAMETHASONE SODIUM PHOSPHATE 4 MG: 4 INJECTION, SOLUTION INTRAMUSCULAR; INTRAVENOUS at 06:44

## 2022-03-04 RX ADMIN — LEVETIRACETAM 1000 MG: 100 INJECTION INTRAVENOUS at 09:59

## 2022-03-04 RX ADMIN — SODIUM CHLORIDE 2250 MG PE: 9 INJECTION, SOLUTION INTRAVENOUS at 12:03

## 2022-03-04 RX ADMIN — FAMOTIDINE 20 MG: 20 TABLET, FILM COATED ORAL at 09:32

## 2022-03-04 RX ADMIN — DEXAMETHASONE SODIUM PHOSPHATE 2 MG: 4 INJECTION, SOLUTION INTRAMUSCULAR; INTRAVENOUS at 23:26

## 2022-03-04 RX ADMIN — SODIUM CHLORIDE, PRESERVATIVE FREE 10 ML: 5 INJECTION INTRAVENOUS at 21:11

## 2022-03-04 RX ADMIN — LEVETIRACETAM 1000 MG: 100 INJECTION INTRAVENOUS at 21:11

## 2022-03-04 RX ADMIN — LISINOPRIL 5 MG: 5 TABLET ORAL at 09:33

## 2022-03-04 RX ADMIN — Medication 2 UNITS: at 11:43

## 2022-03-04 RX ADMIN — CARVEDILOL 12.5 MG: 12.5 TABLET, FILM COATED ORAL at 09:32

## 2022-03-04 RX ADMIN — FAMOTIDINE 20 MG: 20 TABLET, FILM COATED ORAL at 21:03

## 2022-03-04 RX ADMIN — Medication 2 UNITS: at 17:32

## 2022-03-04 RX ADMIN — SODIUM CHLORIDE 100 MG PE: 9 INJECTION, SOLUTION INTRAMUSCULAR; INTRAVENOUS; SUBCUTANEOUS at 21:04

## 2022-03-04 RX ADMIN — DEXAMETHASONE SODIUM PHOSPHATE 3 MG: 4 INJECTION, SOLUTION INTRAMUSCULAR; INTRAVENOUS at 12:29

## 2022-03-04 RX ADMIN — TORSEMIDE 100 MG: 100 TABLET ORAL at 09:33

## 2022-03-04 RX ADMIN — DEXAMETHASONE SODIUM PHOSPHATE 2 MG: 4 INJECTION, SOLUTION INTRAMUSCULAR; INTRAVENOUS at 17:32

## 2022-03-04 NOTE — PROGRESS NOTES
Pt complianing of dizziness after [facility], alert oriented, seems little apprehensive. Siting up on chair. NP Colt Roland notified.

## 2022-03-04 NOTE — PROGRESS NOTES
Bedside shift change report given to Yoli (oncoming nurse) by Alfonzo Mckeon (offgoing nurse). Report included the following information SBAR, Intake/Output and Recent Results.

## 2022-03-04 NOTE — PROGRESS NOTES
Problem: Mobility Impaired (Adult and Pediatric)  Goal: *Acute Goals and Plan of Care (Insert Text)  Description: FUNCTIONAL STATUS PRIOR TO ADMISSION: Pt was independent without the use of DME.     HOME SUPPORT PRIOR TO ADMISSION: Pt was living with his sister and plans to return there. Physical Therapy Goals  Initiated 3/3/2022  1. Patient will move from supine to sit and sit to supine , scoot up and down, and roll side to side in bed with contact guard assist within 7 day(s). 2.  Patient will transfer from bed to chair and chair to bed with Leah x2 using the least restrictive device within 7 day(s). 3.  Patient will perform sit to stand with CGA within 7 day(s). 4.  Patient will ambulate with Leah x2 for 50 feet with the least restrictive device within 7 day(s). Outcome: Progressing Towards Goal   PHYSICAL THERAPY TREATMENT  Patient: Yasmani Sharma (89 y.o. male)  Date: 3/4/2022  Diagnosis: Brain mass [G93.89] <principal problem not specified>  Procedure(s) (LRB):  AIRO GUIDED RIGHT PARIETAL CRANIOTOMY AND TUMOR REMOVAL (Right) 2 Days Post-Op  Precautions: Fall,Seizure (SBP <140)  Chart, physical therapy assessment, plan of care and goals were reviewed. ASSESSMENT  Patient continues with skilled PT services and is progressing towards goals. Pt presents with L sided weakness (UE>LE), impaired balance, unsteady gait, and decreased endurance. Note pt reported he had a seizure this morning, RN was notified and aware of the situation and cleared pt for activity. Session focused on pt taking steps forward for small distances and static standing. Pt was able to ambulate 3ft x3 reps with modAx2. Demonstrated increased trunk lean and required more support when weight shifting to the L. Manual facilitation provided to L knee to prevent hyper extension and buckling during steps. Provided with VC to widen base of support. Able to tolerate static standing for 5 min and required modAx2 to maintain standing. Demonstrated a L sided lean and increased L knee buckling and hyperextension with fatigue. Recommending IPR upon discharge. Current Level of Function Impacting Discharge (mobility/balance): modA x2 ambulate short distances     Other factors to consider for discharge: independent baseline, motivated, fall risk          PLAN :  Patient continues to benefit from skilled intervention to address the above impairments. Continue treatment per established plan of care. to address goals. Recommendation for discharge: (in order for the patient to meet his/her long term goals)  Therapy 3 hours per day 5-7 days per week    This discharge recommendation:  Has not yet been discussed the attending provider and/or case management    IF patient discharges home will need the following DME: to be determined (TBD)       SUBJECTIVE:   Patient stated Dayan Berrios was some work! Paul Meredith    OBJECTIVE DATA SUMMARY:   Critical Behavior:  Neurologic State: Eyes open spontaneously  Orientation Level: Oriented X4  Cognition: Appropriate decision making,Follows commands  Safety/Judgement: Decreased awareness of environment,Decreased awareness of need for assistance,Decreased awareness of need for safety,Decreased insight into deficits  Functional Mobility Training:    Transfers:  Sit to Stand: Minimum assistance;Assist x1  Stand to Sit: Minimum assistance;Assist x1       Balance:  Sitting: Impaired  Sitting - Static: Good (unsupported)  Sitting - Dynamic: Fair (occasional)  Standing: Impaired; With support  Standing - Static: Fair;Constant support  Standing - Dynamic : Poor;Constant support  Ambulation/Gait Training:  Distance (ft): 3 Feet (ft) (x3 reps)  Assistive Device: Gait belt  Ambulation - Level of Assistance:  Moderate assistance;Assist x2        Gait Abnormalities: Trunk sway increased        Base of Support: Center of gravity altered;Narrowed      Activity Tolerance:   Fair    After treatment patient left in no apparent distress:   Sitting in chair, Call bell within reach, and Bed / chair alarm activated    COMMUNICATION/COLLABORATION:   The patients plan of care was discussed with: Occupational therapist and Registered nurse. Cheryle Constable, EAMON   Time Calculation: 31 mins        Regarding student involvement in patient care:  A student participated in this treatment session. Per CMS Medicare statements and APTA guidelines I certify that the following was true:  1. I was present and directly observed the entire session. 2. I made all skilled judgments and clinical decisions regarding care. 3. I am the practitioner responsible for assessment, treatment, and documentation.

## 2022-03-04 NOTE — PROGRESS NOTES
Physical Therapy: Hold     Chart reviewed. Attempted to see for OT treatment however patient reporting he just had a seizure, discussed with RN who confirmed and is discussing with the physician, requesting therapy defer at this time. Will follow up as able & appropriate.      Son Cerna, PT, DPT

## 2022-03-04 NOTE — PROGRESS NOTES
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 HOME SUPPORT: Reports he just bought a house but plans to stay with his sister upon d/c. Occupational Therapy Goals  Initiated 3/3/2022   1. Patient will perform self-feeding with minimal assistance/contact guard assist within 7 day(s). 2.  Patient will perform at least one standing grooming task with moderate assistance within 7 day(s). 3.  Patient will perform lower body dressing with maximal assistance & AE PRN within 7 day(s). 4.  Patient will perform upper body dressing with minimal assistance & AE PRN within 7 day(s). 5.  Patient will perform bathing with moderate assistance & AE PRN within 7 day(s). 6.  Patient will perform toilet transfers with moderate assistance within 7 day(s). 7.  Patient will perform all aspects of toileting with moderate assistance within 7 day(s). 8.  Patient will participate in upper extremity therapeutic exercise/activities with supervision/set-up within 7 day(s). 9.  Patient will utilize energy conservation techniques during functional activities with verbal and visual cues within 7 day(s). 10.  Patient will improve their Fugl Cannon score by 5 points in prep for ADLs within 7 days. Outcome: Progressing Towards Goal    OCCUPATIONAL THERAPY TREATMENT  Patient: Mal Chacon (29 y.o. male)  Date: 3/4/2022  Diagnosis: Brain mass [G93.89] <principal problem not specified>  Procedure(s) (LRB):  AIRO GUIDED RIGHT PARIETAL CRANIOTOMY AND TUMOR REMOVAL (Right) 2 Days Post-Op  Precautions: Fall,Seizure (SBP <140)  Chart, occupational therapy assessment, plan of care, and goals were reviewed.     ASSESSMENT  Patient continues with skilled OT services and is progressing towards goals however remains limited by decreased standing balance, LUE>LLE function & sensation, standing tolerance, safety awareness, coordination, attention to the L, and midline orientation with standing ADLs and functional mobility in prep for OOB ADLs. Slowly improving proximal AROM of L scapula and shoulder with fair scapular AROM antigravity, trace shoulder flexion with distal UE support. Improved transfer with Min A with second assist for safety, requiring Min-Mod A x2 for functional ambulation with BUE support (LUE facilitation), PT assisting with LLE facilitation. Ambulated short distance in bathroom for standing grooming, requiring Mod A d/t balance deficits and decreased activity tolerance. He is highly motivated to progress, making steady gains, is an excellent IPR candidate. Current Level of Function Impacting Discharge (ADLs): Min-Total A for ADLs, Min-Mod A x2 for limited OOB mobility    Other factors to consider for discharge: fall risk, assist of 2, PMH, PLOF         PLAN :  Patient continues to benefit from skilled intervention to address the above impairments. Continue treatment per established plan of care to address goals. Recommend with staff: Recommend with nursing, ADLs with assist, OOB to chair 3x/day, and toileting via  [facility] with 2 assist towards the R side . Thank you for completing as able in order to maintain patient strength, endurance and independence. Recommendation for discharge: (in order for the patient to meet his/her long term goals)  Therapy 3 hours per day 5-7 days per week    This discharge recommendation:  Has been made in collaboration with the attending provider and/or case management    IF patient discharges home will need the following DME: To be determined (TBD)         SUBJECTIVE:   Patient stated Man, that was hard!  re: standing ADLs at sink    OBJECTIVE DATA SUMMARY:   Cognitive/Behavioral Status:  Neurologic State: Alert  Orientation Level: Oriented X4  Cognition: Appropriate for age attention/concentration; Follows commands;Poor safety awareness  Perception: Cues to attend to left side of body;Cues to maintain midline in standing  Perseveration: No perseveration noted  Safety/Judgement: Decreased awareness of environment;Decreased insight into deficits; Decreased awareness of need for safety;Decreased awareness of need for assistance    Functional Mobility and Transfers for ADLs:  Bed Mobility:  Scooting: Contact guard assistance    Transfers:  Sit to Stand: Minimum assistance;Assist x1 (second assist for safety)  Functional Transfers  Bathroom Mobility: Minimum assistance; Moderate assistance (A x2--PT facilitating LLE, incr A with fatigue)       Balance:  Sitting: Impaired  Sitting - Static: Good (unsupported)  Sitting - Dynamic: Fair (occasional)  Standing: Impaired; With support  Standing - Static: Fair;Constant support  Standing - Dynamic : Poor;Constant support    ADL Intervention:       Grooming  Grooming Assistance: Moderate assistance  Position Performed: Standing (at sink)  Washing Face: Moderate assistance; Compensatory technique training;Proximal stability (A for balance & coordination, LUE WB on sink w/ A)  Cues: Physical assistance; Tactile cues provided;Verbal cues provided;Visual cues provided;Visual/perceptual training/retraining      Cognitive Retraining  Orientation Retraining: Awareness of environment  Problem Solving: Awareness of environment  Executive Functions: Executing cognitive plans;Regulating behavior  Organizing/Sequencing: Breaking task down  Attention to Task: Single task  Safety/Judgement: Decreased awareness of environment;Decreased insight into deficits; Decreased awareness of need for safety;Decreased awareness of need for assistance  Cues: Tactile cues provided;Verbal cues provided;Visual cues provided    Neuro Re-Education:   - Attention to LUE and LLE with seated and standing tasks, fair carryover of LUE placement with functional transfers requiring facilitation for placement and management with activity, weight bearing facilitated on arm rest and on sink with standing grooming   - Midline orientation with standing and mobility tasks with Min-Mod A x2 d/t L lateral lean, increased with standing tolerance >3 minutes with quick NM faitgue requiring seated rest breaks   - LUE AROM: good scapular elevation and retraction; LUE AAROM: Trace shoulder flexion against gravity and elbow flexion antigravity with distal LUE supported    Pain:  None    Activity Tolerance:   Good    After treatment patient left in no apparent distress:   Sitting in chair, Call bell within reach, and Bed / chair alarm activated    COMMUNICATION/COLLABORATION:   The patients plan of care was discussed with: Physical therapist and Registered nurse. Patient was educated regarding His deficit(s) of decreased standing balance, LUE>LLE function & sensation, standing tolerance, safety awareness, coordination, attention to the L, and midline orientation as this relates to His diagnosis of s/p crani with tumor removal.  He demonstrated Good understanding as evidenced by verbal discussion & carryover. Patient and/or family was verbally educated on the BE FAST acronym for signs/symptoms of CVA and TIA. BE FAST was written on patient's communication board  for visual education and reinforcement. All questions answered with patient indicating good understanding.      DMITRIY Luque, OTR/L  Time Calculation: 29 mins Detail Level: Detailed Detail Level: Generalized

## 2022-03-04 NOTE — CONSULTS
[address] [facility] Adult  Hospitalist Group    Hospitalist Consult  Primary Care Provider: Viri Riley MD  Consult requested by: Real Mcginnis MD      Subjective:     Joslyn Wilkins is a 51 y.o. male with PMH of chronic systolic heart failure,HTN,DM and other comorbidities admitted to ICU after Right parietal craniotomy, use of neuronavigation BrainLAB, and removal of primary glioma. .  We are asked to see the patient in consult to assist in managing HTN,DM. He denied any chest pain,SOB,nausea/vomiting,headache today    Review of Systems:    A comprehensive review of systems was negative except for that written in the History of Present Illness. Past Medical History:   Diagnosis Date    Adverse effect of anesthesia     NEVER HAD ANESTHESIA    Brain mass 02/25/2022    Hypertension     NICM (nonischemic cardiomyopathy) ([facility] [address] 02/25/2022    cath open cors, high LVEDP, EF on echo was 35% day before    Sleep apnea     NO CPAP    Systolic CHF, chronic ([facility] [address] 2020    EF 31 then, now 35% 2022      History reviewed. No pertinent surgical history. Prior to Admission medications    Medication Sig Start Date End Date Taking? Authorizing Provider   calcium carbonate (TUMS) 200 mg calcium (500 mg) chew Take 1 Tablet by mouth as needed. Yes Provider, Historical   carvediloL (COREG) 12.5 mg tablet Take 1 Tablet by mouth two (2) times a day for 30 days. 2/25/22 3/27/22 Yes Jatinder Mendez MD   levETIRAcetam (KEPPRA) 500 mg tablet Take 1 Tablet by mouth two (2) times a day. 2/25/22  Yes Jatinder Mendez MD   dexAMETHasone (Decadron) 4 mg tablet Take 4 mg by mouth three (3) times daily. 1 tab PO TID 2/25/22  Yes Jatinder Mendez MD   lisinopriL (PRINIVIL, ZESTRIL) 5 mg tablet Take 1 Tablet by mouth daily for 30 days. 2/26/22 3/28/22 Yes Jatinder Mendez MD   torsemide (DEMADEX) 100 mg tablet Take 100 mg by mouth daily.  8/30/21  Yes Other, MD Edwin     No Known Allergies   Family History   Problem Relation Age of Onset    Diabetes Mother     Thyroid Disease Mother     Anxiety Mother     Depression Mother     High Cholesterol Mother     Hypertension Mother     Diabetes Father     Asthma Sister     Anemia Sister     Other Brother         AUTO ACCIDENT    Obesity Brother     Anesth Problems Neg Hx         SOCIAL HISTORY:    Smoking history: never  Alcohol history: does not drink    Objective:       Physical Exam:   Gen:  Well-developed, well-nourished, in no acute distress  HEENT:   rt scalp dressing, EOMI,anicteric, no pallor,hearing intact to voice, moist mucous membranes,sinus non tender  Neck:  Supple, without masses, thyroid non-tender  Resp:  No accessory muscle use, clear breath sounds without wheezes rales or rhonchi  Card:  No murmurs, normal S1, S2 without thrills, bruits or peripheral edema  Abd:  Soft, non-tender, non-distended, normoactive bowel sounds are present  Lymph:  No cervical adenopathy  Musc:  No cyanosis or clubbing  Skin:  No rashes or ulcers, skin turgor is good  Neuro: alert and oriented X 3,LUE minimal movement,RUE,RLE 5/5  Psych:  Alert with good insight. Oriented to person, place, and time       Data Review: All diagnostic labs and studies have been reviewed. CT HEAD WO CONT    Result Date: 3/3/2022  Postop right craniotomy and tumor resection. No hemorrhage. Decreased midline shift. CT HEAD WO CONT    Result Date: 3/2/2022  Intraoperative CT as above. See operative report for details. XR CHEST PORT    Result Date: 3/2/2022  Right IJ central venous catheter in appropriate position without pneumothorax.          Assessment:   Narendra Lou is a 51 y.o. male who presents with high grade glioma sp surgery We are asked to see the patient in consult to assist in managing htn,dm    Active Problems:    Brain mass (2/21/2022)      # Right  brain mass:  -s/p Right parietal craniotomy, use of neuronavigation BrainLAB, and removal of primary glioma.  -final path pending  On keppra and decadron  Mx per primary team        #Steroid induced hyperglycemia:  -monitor glucose level,so far less than 180    #HTN:  BP controlled on current regimen    #Chronic systolic heart failure:  -on GDMT       #Obesity:  Body mass index is 54.42 kg/m².       Signed By: Christian Alarcon MD     Date of Service:  3/3/2022

## 2022-03-04 NOTE — PROGRESS NOTES
[address] [facility] Adult  Hospitalist Group                                                                                          Hospitalist Progress Note  Mayi Weiner MD  Answering service: [phone] OR [phone] from in house phone        Date of Service:  3/4/2022  NAME:  Munir Kirkland  :  1975  MRN:  [id number]      Admission Summary:   Munir Kirkland is a 51 y.o. male with PMH of chronic systolic heart failure,HTN,DM and other comorbidities admitted to ICU after Right parietal craniotomy, use of neuronavigation BrainLAB, and removal of primary glioma. .  We are asked to see the patient in consult to assist in managing HTN,DM.     He denied any chest pain,SOB,nausea/vomiting,headache today    Interval history / Subjective:   Seen for follow-up of consult for medical management. Patient has no acute complaints. Patient is looking pleasant and has been under control in terms of blood sugar and blood pressure. Assessment & Plan: Active Problems:    Brain mass (2022)        # Right  brain mass:  -s/p Right parietal craniotomy, use of neuronavigation BrainLAB, and removal of primary glioma.  -final path pending  On keppra and decadron  Mx per primary team           #Steroid induced hyperglycemia:  -monitor glucose level,so far less than 180     #HTN:  BP controlled on current regimen     #Chronic systolic heart failure:  -on GDMT         #Obesity:  Body mass index is 54.42 kg/m². Code status: full  DVT prophylaxis: scd    Care Plan discussed with: Patient/Family and Nurse  Anticipated Disposition: Home w/Family and TBD  Anticipated Discharge: 24 hours to 48 hours     Hospital Problems  Date Reviewed: 2022          Codes Class Noted POA    Brain mass ICD-10-CM: G93.89  ICD-9-CM: 348.89  2022 Unknown                Review of Systems:   A comprehensive review of systems was negative except for that written in the HPI.        Vital Signs:    Last 24hrs VS reviewed since prior progress note. Most recent are:  Visit Vitals  BP (!) 146/70   Pulse 96   Temp 97.6 °F (36.4 °C)   Resp 18   Ht 5' 4\" (1.626 m)   Wt 143.8 kg (317 lb 0.3 oz)   SpO2 98%   BMI 54.42 kg/m²         Intake/Output Summary (Last 24 hours) at 3/4/2022 1358  Last data filed at 3/4/2022 1119  Gross per 24 hour   Intake --   Output 2950 ml   Net -2950 ml        Physical Examination:     I had a face to face encounter with this patient and independently examined them on 3/4/2022 as outlined below:          Constitutional:  No acute distress, cooperative, pleasant . Bandage on head. ENT:  Oral mucosa moist, oropharynx benign. Resp:  CTA bilaterally. No wheezing/rhonchi/rales. No accessory muscle use   CV:  Regular rhythm, normal rate, no murmurs, gallops, rubs    GI:  Soft, non distended, non tender. normoactive bowel sounds, no hepatosplenomegaly     Musculoskeletal:  No edema, warm, 2+ pulses throughout    Neurologic:  Moves all extremities. AAOx3, CN II-XII reviewed. Some decreased strength in the upper extremity. Data Review:    Review and/or order of clinical lab test  Review and/or order of tests in the radiology section of CPT  Review and/or order of tests in the medicine section of CPT      Labs:     Recent Labs     03/03/22  0424   WBC 17.1*   HGB 15.2   HCT 46.1        Recent Labs     03/03/22  0424   *   K 4.1      CO2 30   BUN 17   CREA 0.84   *   CA 8.3*   MG 2.4   PHOS 3.0     No results for input(s): ALT, AP, TBIL, TBILI, TP, ALB, GLOB, GGT, AML, LPSE in the last 72 hours. No lab exists for component: SGOT, GPT, AMYP, HLPSE  No results for input(s): INR, PTP, APTT, INREXT in the last 72 hours. No results for input(s): FE, TIBC, PSAT, FERR in the last 72 hours. No results found for: FOL, RBCF   No results for input(s): PH, PCO2, PO2 in the last 72 hours. No results for input(s): CPK, CKNDX, TROIQ in the last 72 hours.     No lab exists for component: CPKMB  Lab Results   Component Value Date/Time    Cholesterol, total 180 02/23/2022 05:18 AM    HDL Cholesterol 36 02/23/2022 05:18 AM    LDL, calculated 126.8 (H) 02/23/2022 05:18 AM    Triglyceride 86 02/23/2022 05:18 AM    CHOL/HDL Ratio 5.0 02/23/2022 05:18 AM     Lab Results   Component Value Date/Time    Glucose (POC) 164 (H) 03/04/2022 11:09 AM    Glucose (POC) 138 (H) 03/04/2022 06:08 AM    Glucose (POC) 216 (H) 03/03/2022 11:49 PM    Glucose (POC) 159 (H) 03/03/2022 05:16 PM    Glucose (POC) 165 (H) 03/03/2022 11:37 AM     No results found for: COLOR, APPRN, SPGRU, REFSG, MARCELLO, PROTU, GLUCU, KETU, BILU, UROU, COREY, LEUKU, GLUKE, EPSU, BACTU, WBCU, RBCU, CASTS, UCRY      Medications Reviewed:     Current Facility-Administered Medications   Medication Dose Route Frequency    dexamethasone (DECADRON) 4 mg/mL injection 3 mg  3 mg IntraVENous Q6H    fosphenytoin (CEREBYX) with saline injection 100 mg PE  100 mg PE IntraVENous Q8H    labetaloL (NORMODYNE;TRANDATE) injection 10 mg  10 mg IntraVENous Q15MIN PRN    Or    hydrALAZINE (APRESOLINE) 20 mg/mL injection 10 mg  10 mg IntraVENous Q15MIN PRN    torsemide (DEMADEX) tablet 100 mg  100 mg Oral DAILY    famotidine (PEPCID) tablet 20 mg  20 mg Oral Q12H    carvediloL (COREG) tablet 12.5 mg  12.5 mg Oral BID    lisinopriL (PRINIVIL, ZESTRIL) tablet 5 mg  5 mg Oral DAILY    sodium chloride (NS) flush 5-40 mL  5-40 mL IntraVENous Q8H    sodium chloride (NS) flush 5-40 mL  5-40 mL IntraVENous PRN    acetaminophen (TYLENOL) tablet 650 mg  650 mg Oral Q4H PRN    HYDROcodone-acetaminophen (NORCO) 5-325 mg per tablet 1 Tablet  1 Tablet Oral Q4H PRN    morphine injection 2 mg  2 mg IntraVENous Q2H PRN    ondansetron (ZOFRAN) injection 4 mg  4 mg IntraVENous Q4H PRN    glucose chewable tablet 16 g  4 Tablet Oral PRN    glucagon (GLUCAGEN) injection 1 mg  1 mg IntraMUSCular PRN    insulin lispro (HUMALOG) injection   SubCUTAneous Q6H    dextrose 10 % infusion 0-250 mL  0-250 mL IntraVENous PRN    LORazepam (ATIVAN) injection 2 mg  2 mg IntraVENous Q2H PRN    levETIRAcetam (KEPPRA) injection 1,000 mg  1,000 mg IntraVENous Q12H     ______________________________________________________________________  EXPECTED LENGTH OF STAY: 6d 14h  ACTUAL LENGTH OF STAY:          2                 Nara Grady MD

## 2022-03-04 NOTE — PROGRESS NOTES
Problem: Falls - Risk of  Goal: *Absence of Falls  Description: Document Phoebe Frausto Fall Risk and appropriate interventions in the flowsheet. Outcome: Progressing Towards Goal  Note: Fall Risk Interventions:  Mobility Interventions: Patient to call before getting OOB         Medication Interventions: Teach patient to arise slowly    Elimination Interventions: Call light in reach              Problem: Pressure Injury - Risk of  Goal: *Prevention of pressure injury  Description: Document River Scale and appropriate interventions in the flowsheet.   Outcome: Progressing Towards Goal  Note: Pressure Injury Interventions:  Sensory Interventions: Assess changes in LOC    Moisture Interventions: Offer toileting Q_hr    Activity Interventions: Assess need for specialty bed    Mobility Interventions: Assess need for specialty bed    Nutrition Interventions: Document food/fluid/supplement intake    Friction and Shear Interventions: Lift sheet                Problem: Patient Education: Go to Patient Education Activity  Goal: Patient/Family Education  Outcome: Progressing Towards Goal     Problem: Patient Education: Go to Patient Education Activity  Goal: Patient/Family Education  Outcome: Progressing Towards Goal     Problem: Patient Education: Go to Patient Education Activity  Goal: Patient/Family Education  Outcome: Progressing Towards Goal

## 2022-03-04 NOTE — PROGRESS NOTES
Bedside and Verbal shift change report given to [address] [place] [address] (oncoming nurse) by Dc Vargas RN (offgoing nurse). Report included the following information SBAR, Kardex, OR Summary, Procedure Summary, Intake/Output, Recent Results, Cardiac Rhythm Afib and Dual Neuro Assessment.

## 2022-03-04 NOTE — PROGRESS NOTES
Pt reported seizure activity on left side with jerky and shaky movement,lasted about 2 to 3 minutes. I did not witness the seizure episode. Pt is alert and oriented,following commands, still unable to move left arm. Joe Wylie NP was notified about pt's seizure activity.

## 2022-03-04 NOTE — PROGRESS NOTES
Neurosurgery Progress Note  [facility]          Admit Date: 3/2/2022   LOS: 2 days        Daily Progress Note: 3/4/2022    POD: 2    S/P: Procedure(s):  AIRO GUIDED RIGHT PARIETAL CRANIOTOMY AND TUMOR REMOVAL    HPI: The patient presented to the hospital last week due to intermittent left leg weakness. His head CT revealed cerebral edema and he transferred to [facility]. His MRI brain revealed a large right parietal brain mass and hi CT scans of his body showed no other evidence of disease. He was discharged to home on steroids and keppra with the intent of a planned readmission for surgery today. He underwent a right parietal craniotomy with resection of tumor on 22. In the recovery room, he states he could not move his left arm or left leg. In the ICU, his left leg has begun working again to where he is antigravity and able to lift it off the bed. He cannot move his LUE. While in the room, he began having a focal seizure of his left arm with rhythmic movements that terminated without intervention after about 20 seconds. Subjective:   Patient had left arm focal seizure again this morning. Received loading dose of Cerebyx and felt dizzy and loopy after medicine administered. Denies chest pain, leg pain, nausea, vomiting, difficulty swallowing, and dyspnea.        Objective:     Vital signs  Temp (24hrs), Av °F (36.7 °C), Min:97.6 °F (36.4 °C), Max:98.3 °F (36.8 °C)   701 -  190  In: -   Out: 450 [Urine:450]  1901 -  0700  In: 613.5 [I.V.:613.5]  Out: 4400 [Urine:4400]    Visit Vitals  /60 (BP 1 Location: Right upper arm)   Pulse 67   Temp 98 °F (36.7 °C)   Resp 18   Ht 5' 4\" (1.626 m)   Wt 143.8 kg (317 lb 0.3 oz)   SpO2 98%   BMI 54.42 kg/m²    O2 Flow Rate (L/min): 0 l/min O2 Device: None (Room air)     Pain control  Pain Assessment  Pain Scale 1: Numeric (0 - 10)  Pain Intensity 1: 0  Pain Onset 1: post op  Pain Location 1: Head  Pain Orientation 1: Other (comment)  Pain Description 1: Constant  Pain Intervention(s) 1: Medication (see MAR),Emotional support,Environmental changes,Repositioned    PT/OT  Gait     Gait  Base of Support: Center of gravity altered,Narrowed  Gait Abnormalities: Trunk sway increased  Ambulation - Level of Assistance: Moderate assistance,Assist x2  Distance (ft): 3 Feet (ft) (x3 reps)  Assistive Device: Gait belt           Physical Exam:  Gen:NAD. Neuro: A&Ox3. Follows commands. Speech clear. Affect loopy. PERRL. EOMI. Face symmetric. Tongue midline. RUE/RLE 5/5, LUE 1/5 hand intrinsics, 0/5 biceps, triceps, deltoid, LLE 3+/5  Sensation decreased on left  Gait deferred. Skin: Right scalp incision C/D/I    CT head without contrast on 03/03/22 shows  Postop right craniotomy and tumor resection. No hemorrhage. Decreased midline shift. 24 hour results:    Recent Results (from the past 24 hour(s))   GLUCOSE, POC    Collection Time: 03/03/22  5:16 PM   Result Value Ref Range    Glucose (POC) 159 (H) 65 - 117 mg/dL    Performed by George Garcia, POC    Collection Time: 03/03/22 11:49 PM   Result Value Ref Range    Glucose (POC) 216 (H) 65 - 117 mg/dL    Performed by Erica Stoddarddis Bernheim)    SAMPLES BEING HELD    Collection Time: 03/04/22  1:20 AM   Result Value Ref Range    SAMPLES BEING HELD 1PST,1LAV     COMMENT        Add-on orders for these samples will be processed based on acceptable specimen integrity and analyte stability, which may vary by analyte. GLUCOSE, POC    Collection Time: 03/04/22  6:08 AM   Result Value Ref Range    Glucose (POC) 138 (H) 65 - 117 mg/dL    Performed by [address] [place] [address], POC    Collection Time: 03/04/22 11:09 AM   Result Value Ref Range    Glucose (POC) 164 (H) 65 - 117 mg/dL    Performed by Pattie Vizcarra           Assessment:     Active Problems:    Brain mass (2/21/2022)        Plan:   1.  Right parietal brain mass   - s/p crani 03/02   - cont decadron   - cont keppra   - PT/OT   - Pathology pending. Will need rad onc and med onc in [place]  2. Cerebral edema with brain compression   - due to #1   - plans as above  3. Hx of CHF   - EF of 35% on last ECHO   - cont Coreg   - Hospitalist following  4. Focal seizures   - due to #1, 2   - cont keppra to 1000 mg bid   - add cerebyx loading dose and then 100 mg IV q8h   - ativan PRN for seizures lasting greater than 2 minutes or more than 3 in an hour. 5. HTN   - SBP<140   - cont coreg, lisinopril from home. Torsemide restarted. - Hospitalis tfollowing  6. Diabetes mellitus, type 2   - SSI and accu-checks   - Hospitalist following  7. Morbid obesity as defined by BMI greater than 40   - Body mass index is 54.42 kg/m². - Diet and exercise counseling as appropriate    Activity: up with assist  DVT ppx: SCDs  Dispo: inpt rehab    Plan d/w Dr. Wilma Pacheco, nurse. AED added today.       Elian Foy NP

## 2022-03-04 NOTE — PROGRESS NOTES
Transition of Care Plan: IPR ([facility], [facility], [facility]-referrals pending)    RUR: 10%    PCP F/U: Dr Lissa Lee     Disposition: IPR    Transportation: S    Main Contact: Sister Colton Terrell ([facility]) [phone], Mother Corona Esteves ([facility]) [phone]: Met with patient at bedside. Spoke with him about IPR list that previous CM provided. States he would like this CM to reach out to his sister regarding choice. Spoke with sister Suzanna Park via telephone call. States this CM may send out referrals, but that she will call back with her preference. Will need insurance auth. Referrals sent.      Oswald Victoria RN, CRM

## 2022-03-04 NOTE — PROGRESS NOTES
Occupational Therapy  03/04/22    Chart reviewed. Attempted to see for OT treatment however patient reporting he just had a seizure, discussed with RN who confirmed and is discussing with the physician, requesting therapy defer at this time. Will follow up as able & appropriate.      Thank you,   Sarahi Cardenas, OTGUS, OTR/L

## 2022-03-04 NOTE — PROGRESS NOTES
Awake alert  Ox3  Remains weak on left  Had more left arm focal seizures, added PO phenytoin to regimen  Awaiting rehab transfer  stable

## 2022-03-05 LAB
GLUCOSE BLD STRIP.AUTO-MCNC: 126 MG/DL (ref 65–117)
GLUCOSE BLD STRIP.AUTO-MCNC: 148 MG/DL (ref 65–117)
GLUCOSE BLD STRIP.AUTO-MCNC: 176 MG/DL (ref 65–117)
GLUCOSE BLD STRIP.AUTO-MCNC: 199 MG/DL (ref 65–117)
SERVICE CMNT-IMP: ABNORMAL

## 2022-03-05 PROCEDURE — 74011000250 HC RX REV CODE- 250: Performed by: NURSE PRACTITIONER

## 2022-03-05 PROCEDURE — 74011636637 HC RX REV CODE- 636/637: Performed by: NURSE PRACTITIONER

## 2022-03-05 PROCEDURE — 65660000000 HC RM CCU STEPDOWN

## 2022-03-05 PROCEDURE — 74011250637 HC RX REV CODE- 250/637: Performed by: NEUROLOGICAL SURGERY

## 2022-03-05 PROCEDURE — 74011250636 HC RX REV CODE- 250/636: Performed by: NURSE PRACTITIONER

## 2022-03-05 PROCEDURE — 74011250637 HC RX REV CODE- 250/637: Performed by: ANESTHESIOLOGY

## 2022-03-05 PROCEDURE — 82962 GLUCOSE BLOOD TEST: CPT

## 2022-03-05 PROCEDURE — 74011250637 HC RX REV CODE- 250/637: Performed by: NURSE PRACTITIONER

## 2022-03-05 RX ADMIN — DEXAMETHASONE SODIUM PHOSPHATE 2 MG: 4 INJECTION, SOLUTION INTRAMUSCULAR; INTRAVENOUS at 11:59

## 2022-03-05 RX ADMIN — SODIUM CHLORIDE 100 MG PE: 9 INJECTION, SOLUTION INTRAMUSCULAR; INTRAVENOUS; SUBCUTANEOUS at 03:47

## 2022-03-05 RX ADMIN — LISINOPRIL 5 MG: 5 TABLET ORAL at 08:30

## 2022-03-05 RX ADMIN — CARVEDILOL 12.5 MG: 12.5 TABLET, FILM COATED ORAL at 08:29

## 2022-03-05 RX ADMIN — DEXAMETHASONE SODIUM PHOSPHATE 2 MG: 4 INJECTION, SOLUTION INTRAMUSCULAR; INTRAVENOUS at 17:56

## 2022-03-05 RX ADMIN — SODIUM CHLORIDE 100 MG PE: 9 INJECTION, SOLUTION INTRAMUSCULAR; INTRAVENOUS; SUBCUTANEOUS at 11:59

## 2022-03-05 RX ADMIN — DEXAMETHASONE SODIUM PHOSPHATE 2 MG: 4 INJECTION, SOLUTION INTRAMUSCULAR; INTRAVENOUS at 05:34

## 2022-03-05 RX ADMIN — SODIUM CHLORIDE, PRESERVATIVE FREE 10 ML: 5 INJECTION INTRAVENOUS at 05:37

## 2022-03-05 RX ADMIN — Medication 2 UNITS: at 05:37

## 2022-03-05 RX ADMIN — DEXAMETHASONE SODIUM PHOSPHATE 2 MG: 4 INJECTION, SOLUTION INTRAMUSCULAR; INTRAVENOUS at 23:37

## 2022-03-05 RX ADMIN — LEVETIRACETAM 1000 MG: 100 INJECTION INTRAVENOUS at 21:15

## 2022-03-05 RX ADMIN — CARVEDILOL 12.5 MG: 12.5 TABLET, FILM COATED ORAL at 17:56

## 2022-03-05 RX ADMIN — FAMOTIDINE 20 MG: 20 TABLET, FILM COATED ORAL at 08:30

## 2022-03-05 RX ADMIN — LEVETIRACETAM 1000 MG: 100 INJECTION INTRAVENOUS at 08:30

## 2022-03-05 RX ADMIN — FAMOTIDINE 20 MG: 20 TABLET, FILM COATED ORAL at 21:13

## 2022-03-05 RX ADMIN — SODIUM CHLORIDE 100 MG PE: 9 INJECTION, SOLUTION INTRAMUSCULAR; INTRAVENOUS; SUBCUTANEOUS at 21:13

## 2022-03-05 RX ADMIN — SODIUM CHLORIDE, PRESERVATIVE FREE 10 ML: 5 INJECTION INTRAVENOUS at 21:15

## 2022-03-05 RX ADMIN — SODIUM CHLORIDE, PRESERVATIVE FREE 10 ML: 5 INJECTION INTRAVENOUS at 17:57

## 2022-03-05 RX ADMIN — TORSEMIDE 100 MG: 100 TABLET ORAL at 08:30

## 2022-03-05 NOTE — PROGRESS NOTES
POD 3  Focal seizures yesterday - none today  afeb VSS  Alert  Fluent speech  Weak LUE and LE  Incision C/D/I  S/P: crani and resection GBM  Will need rehab  Decadron 2mg q 6 hours  pepcid 20mg BID  cerebyx 100mg TID  keppra 1000 BID

## 2022-03-05 NOTE — PROGRESS NOTES
[facility]                                                                                          Hospitalist Progress Note  Nara Grady MD  Answering service: [phone] OR [phone] from in house phone        Date of Service:  3/5/2022  NAME:  Sp Suárez  :  1975  MRN:  [id number]      Admission Summary:   Sp Suárez is a 51 y.o. male with PMH of chronic systolic heart failure,HTN,DM and other comorbidities admitted to ICU after Right parietal craniotomy, use of neuronavigation BrainLAB, and removal of primary glioma. .  We are asked to see the patient in consult to assist in managing HTN,DM.     He denied any chest pain,SOB,nausea/vomiting,headache today    Interval history / Subjective:   Seen for follow-up of consult for medical management. No acute complaints. Patient continues to be pleasant in a good mood. Assessment & Plan: Active Problems:    Brain mass (2022)        # Right  brain mass:  -s/p Right parietal craniotomy, use of neuronavigation BrainLAB, and removal of primary glioma.  -final path pending  On keppra and decadron  Mx per primary team           #Steroid induced hyperglycemia:  -monitor glucose level,so far less than 180     #HTN:  BP controlled on current regimen  Slightly higher earlier today between 920-236 systolic. On my recheck blood pressure was 130/73 so we continue on current management.     #Chronic systolic heart failure:  -on GDMT   No symptoms        #Obesity:  Body mass index is 54.42 kg/m².     Code status: full  DVT prophylaxis: scd    Care Plan discussed with: Patient/Family and Nurse  Anticipated Disposition: Home w/Family and TBD  Anticipated Discharge: 24 hours to 48 hours     Hospital Problems  Date Reviewed: 2022          Codes Class Noted POA    Brain mass ICD-10-CM: G93.89  ICD-9-CM: 348.89  2022 Unknown                Review of Systems:   A comprehensive review of systems was negative except for that written in the HPI. Vital Signs:    Last 24hrs VS reviewed since prior progress note. Most recent are:  Visit Vitals  BP (!) 145/85   Pulse 66   Temp 97.9 °F (36.6 °C)   Resp 15   Ht 5' 4\" (1.626 m)   Wt 143.8 kg (317 lb 0.3 oz)   SpO2 97%   BMI 54.42 kg/m²         Intake/Output Summary (Last 24 hours) at 3/5/2022 1450  Last data filed at 3/5/2022 0400  Gross per 24 hour   Intake 1000 ml   Output 1300 ml   Net -300 ml        Physical Examination:     I had a face to face encounter with this patient and independently examined them on 3/5/2022 as outlined below:          Constitutional:  No acute distress, cooperative, pleasant . Bandage on head. ENT:  Oral mucosa moist, oropharynx benign. Resp:  CTA bilaterally. No wheezing/rhonchi/rales. No accessory muscle use   CV:  Regular rhythm, normal rate, no murmurs, gallops, rubs    GI:  Soft, non distended, non tender. normoactive bowel sounds, no hepatosplenomegaly     Musculoskeletal:  No edema, warm, 2+ pulses throughout    Neurologic:  Moves all extremities. AAOx3, CN II-XII reviewed. Some decreased strength in the upper extremity. Data Review:    Review and/or order of clinical lab test  Review and/or order of tests in the radiology section of CPT  Review and/or order of tests in the medicine section of CPT      Labs:     Recent Labs     03/03/22  0424   WBC 17.1*   HGB 15.2   HCT 46.1        Recent Labs     03/03/22  0424   *   K 4.1      CO2 30   BUN 17   CREA 0.84   *   CA 8.3*   MG 2.4   PHOS 3.0     No results for input(s): ALT, AP, TBIL, TBILI, TP, ALB, GLOB, GGT, AML, LPSE in the last 72 hours. No lab exists for component: SGOT, GPT, AMYP, HLPSE  No results for input(s): INR, PTP, APTT, INREXT, INREXT in the last 72 hours. No results for input(s): FE, TIBC, PSAT, FERR in the last 72 hours. No results found for: FOL, RBCF   No results for input(s): PH, PCO2, PO2 in the last 72 hours.   No results for input(s): CPK, CKNDX, TROIQ in the last 72 hours.     No lab exists for component: CPKMB  Lab Results   Component Value Date/Time    Cholesterol, total 180 02/23/2022 05:18 AM    HDL Cholesterol 36 02/23/2022 05:18 AM    LDL, calculated 126.8 (H) 02/23/2022 05:18 AM    Triglyceride 86 02/23/2022 05:18 AM    CHOL/HDL Ratio 5.0 02/23/2022 05:18 AM     Lab Results   Component Value Date/Time    Glucose (POC) 126 (H) 03/05/2022 11:21 AM    Glucose (POC) 176 (H) 03/05/2022 05:33 AM    Glucose (POC) 144 (H) 03/04/2022 11:25 PM    Glucose (POC) 181 (H) 03/04/2022 04:46 PM    Glucose (POC) 164 (H) 03/04/2022 11:09 AM     No results found for: COLOR, APPRN, SPGRU, REFSG, MARCELLO, PROTU, GLUCU, KETU, BILU, UROU, COREY, LEUKU, GLUKE, EPSU, BACTU, WBCU, RBCU, CASTS, UCRY      Medications Reviewed:     Current Facility-Administered Medications   Medication Dose Route Frequency    fosphenytoin (CEREBYX) with saline injection 100 mg PE  100 mg PE IntraVENous Q8H    dexamethasone (DECADRON) 4 mg/mL Oral 2 mg  2 mg Oral Q6H    labetaloL (NORMODYNE;TRANDATE) injection 10 mg  10 mg IntraVENous Q15MIN PRN    Or    hydrALAZINE (APRESOLINE) 20 mg/mL injection 10 mg  10 mg IntraVENous Q15MIN PRN    torsemide (DEMADEX) tablet 100 mg  100 mg Oral DAILY    famotidine (PEPCID) tablet 20 mg  20 mg Oral Q12H    carvediloL (COREG) tablet 12.5 mg  12.5 mg Oral BID    lisinopriL (PRINIVIL, ZESTRIL) tablet 5 mg  5 mg Oral DAILY    sodium chloride (NS) flush 5-40 mL  5-40 mL IntraVENous Q8H    sodium chloride (NS) flush 5-40 mL  5-40 mL IntraVENous PRN    acetaminophen (TYLENOL) tablet 650 mg  650 mg Oral Q4H PRN    HYDROcodone-acetaminophen (NORCO) 5-325 mg per tablet 1 Tablet  1 Tablet Oral Q4H PRN    morphine injection 2 mg  2 mg IntraVENous Q2H PRN    ondansetron (ZOFRAN) injection 4 mg  4 mg IntraVENous Q4H PRN    glucose chewable tablet 16 g  4 Tablet Oral PRN    glucagon (GLUCAGEN) injection 1 mg  1 mg IntraMUSCular PRN  insulin lispro (HUMALOG) injection   SubCUTAneous Q6H    dextrose 10 % infusion 0-250 mL  0-250 mL IntraVENous PRN    LORazepam (ATIVAN) injection 2 mg  2 mg IntraVENous Q2H PRN    levETIRAcetam (KEPPRA) injection 1,000 mg  1,000 mg IntraVENous Q12H     ______________________________________________________________________  EXPECTED LENGTH OF STAY: 6d 14h  ACTUAL LENGTH OF STAY:          [address]  Renee Alvarenga MD

## 2022-03-06 LAB
GLUCOSE BLD STRIP.AUTO-MCNC: 130 MG/DL (ref 65–117)
GLUCOSE BLD STRIP.AUTO-MCNC: 144 MG/DL (ref 65–117)
GLUCOSE BLD STRIP.AUTO-MCNC: 144 MG/DL (ref 65–117)
GLUCOSE BLD STRIP.AUTO-MCNC: 188 MG/DL (ref 65–117)
SERVICE CMNT-IMP: ABNORMAL

## 2022-03-06 PROCEDURE — 74011250637 HC RX REV CODE- 250/637: Performed by: ANESTHESIOLOGY

## 2022-03-06 PROCEDURE — 74011250637 HC RX REV CODE- 250/637: Performed by: NEUROLOGICAL SURGERY

## 2022-03-06 PROCEDURE — 74011636637 HC RX REV CODE- 636/637: Performed by: NURSE PRACTITIONER

## 2022-03-06 PROCEDURE — 82962 GLUCOSE BLOOD TEST: CPT

## 2022-03-06 PROCEDURE — 74011250636 HC RX REV CODE- 250/636: Performed by: NURSE PRACTITIONER

## 2022-03-06 PROCEDURE — 74011000250 HC RX REV CODE- 250: Performed by: NURSE PRACTITIONER

## 2022-03-06 PROCEDURE — 74011250637 HC RX REV CODE- 250/637: Performed by: NURSE PRACTITIONER

## 2022-03-06 PROCEDURE — 65660000000 HC RM CCU STEPDOWN

## 2022-03-06 RX ORDER — DEXAMETHASONE SODIUM PHOSPHATE 4 MG/ML
1 INJECTION, SOLUTION INTRA-ARTICULAR; INTRALESIONAL; INTRAMUSCULAR; INTRAVENOUS; SOFT TISSUE EVERY 6 HOURS
Status: DISCONTINUED | OUTPATIENT
Start: 2022-03-06 | End: 2022-03-07

## 2022-03-06 RX ADMIN — LISINOPRIL 5 MG: 5 TABLET ORAL at 09:09

## 2022-03-06 RX ADMIN — LEVETIRACETAM 1000 MG: 100 INJECTION INTRAVENOUS at 09:09

## 2022-03-06 RX ADMIN — FAMOTIDINE 20 MG: 20 TABLET, FILM COATED ORAL at 09:09

## 2022-03-06 RX ADMIN — DEXAMETHASONE SODIUM PHOSPHATE 2 MG: 4 INJECTION, SOLUTION INTRAMUSCULAR; INTRAVENOUS at 05:07

## 2022-03-06 RX ADMIN — CARVEDILOL 12.5 MG: 12.5 TABLET, FILM COATED ORAL at 09:09

## 2022-03-06 RX ADMIN — SODIUM CHLORIDE, PRESERVATIVE FREE 10 ML: 5 INJECTION INTRAVENOUS at 05:07

## 2022-03-06 RX ADMIN — DEXAMETHASONE SODIUM PHOSPHATE 1 MG: 4 INJECTION, SOLUTION INTRA-ARTICULAR; INTRALESIONAL; INTRAMUSCULAR; INTRAVENOUS; SOFT TISSUE at 23:52

## 2022-03-06 RX ADMIN — FAMOTIDINE 20 MG: 20 TABLET, FILM COATED ORAL at 21:31

## 2022-03-06 RX ADMIN — DEXAMETHASONE SODIUM PHOSPHATE 2 MG: 4 INJECTION, SOLUTION INTRAMUSCULAR; INTRAVENOUS at 12:23

## 2022-03-06 RX ADMIN — LEVETIRACETAM 1000 MG: 100 INJECTION INTRAVENOUS at 21:24

## 2022-03-06 RX ADMIN — SODIUM CHLORIDE 100 MG PE: 9 INJECTION, SOLUTION INTRAMUSCULAR; INTRAVENOUS; SUBCUTANEOUS at 05:06

## 2022-03-06 RX ADMIN — SODIUM CHLORIDE, PRESERVATIVE FREE 10 ML: 5 INJECTION INTRAVENOUS at 21:31

## 2022-03-06 RX ADMIN — SODIUM CHLORIDE, PRESERVATIVE FREE 10 ML: 5 INJECTION INTRAVENOUS at 18:01

## 2022-03-06 RX ADMIN — DEXAMETHASONE SODIUM PHOSPHATE 1 MG: 4 INJECTION, SOLUTION INTRA-ARTICULAR; INTRALESIONAL; INTRAMUSCULAR; INTRAVENOUS; SOFT TISSUE at 18:00

## 2022-03-06 RX ADMIN — CARVEDILOL 12.5 MG: 12.5 TABLET, FILM COATED ORAL at 18:01

## 2022-03-06 RX ADMIN — SODIUM CHLORIDE 100 MG PE: 9 INJECTION, SOLUTION INTRAMUSCULAR; INTRAVENOUS; SUBCUTANEOUS at 21:24

## 2022-03-06 RX ADMIN — SODIUM CHLORIDE 100 MG PE: 9 INJECTION, SOLUTION INTRAMUSCULAR; INTRAVENOUS; SUBCUTANEOUS at 12:24

## 2022-03-06 RX ADMIN — Medication 2 UNITS: at 12:23

## 2022-03-06 RX ADMIN — TORSEMIDE 100 MG: 100 TABLET ORAL at 09:09

## 2022-03-06 NOTE — PROGRESS NOTES
[facility]                                                                                          Hospitalist Progress Note  Mayra Sparks MD  Answering service: [phone] OR [phone] from in house phone        Date of Service:  3/6/2022  NAME:  Marcelo Velazquez  :  1975  MRN:  [id number]      Admission Summary:   Marcelo Velazquez is a 51 y.o. male with PMH of chronic systolic heart failure,HTN,DM and other comorbidities admitted to ICU after Right parietal craniotomy, use of neuronavigation BrainLAB, and removal of primary glioma. .  We are asked to see the patient in consult to assist in managing HTN,DM.     He denied any chest pain,SOB,nausea/vomiting,headache today    Interval history / Subjective:   Seen for follow-up of consult for medical management. No acute complaints and no acute events overnight. Assessment & Plan: Active Problems:    Brain mass (2022)        # Right  brain mass:  -s/p Right parietal craniotomy, use of neuronavigation BrainLAB, and removal of primary glioma.  -final path pending  On keppra and decadron  Mx per primary team           #Steroid induced hyperglycemia:  -monitor glucose level,so far less than 180     #HTN:  BP controlled on current regimen  Slightly higher earlier today between 969-510 systolic. On my recheck blood pressure was 130/73 so we continue on current management.     #Chronic systolic heart failure:  -on GDMT   No symptoms        #Obesity:  Body mass index is 54.42 kg/m². Chronic medical conditions remained under control. Blood sugar has been rising a little bit, but the patient has been eating food brought from the outside by family. Blood sugar still within acceptable range for me. Blood pressure is looking good.     Code status: full  DVT prophylaxis: scd    Care Plan discussed with: Patient/Family and Nurse  Anticipated Disposition: Home w/Family and TBD  Anticipated Discharge: 24 hours to 48 hours Hospital Problems  Date Reviewed: 2/22/2022          Codes Class Noted POA    Brain mass ICD-10-CM: G93.89  ICD-9-CM: 348.89  2/21/2022 Unknown                Review of Systems:   A comprehensive review of systems was negative except for that written in the HPI. Vital Signs:    Last 24hrs VS reviewed since prior progress note. Most recent are:  Visit Vitals  /77   Pulse 77   Temp 97.8 °F (36.6 °C)   Resp 17   Ht 5' 4\" (1.626 m)   Wt 136.2 kg (300 lb 4.3 oz)   SpO2 98%   BMI 51.54 kg/m²         Intake/Output Summary (Last 24 hours) at 3/6/2022 1321  Last data filed at 3/6/2022 1133  Gross per 24 hour   Intake --   Output 500 ml   Net -500 ml        Physical Examination:     I had a face to face encounter with this patient and independently examined them on 3/6/2022 as outlined below:          Constitutional:  No acute distress, cooperative, pleasant . Bandage on head. ENT:  Oral mucosa moist, oropharynx benign. Resp:  CTA bilaterally. No wheezing/rhonchi/rales. No accessory muscle use   CV:  Regular rhythm, normal rate, no murmurs, gallops, rubs    GI:  Soft, non distended, non tender. normoactive bowel sounds, no hepatosplenomegaly     Musculoskeletal:  No edema, warm, 2+ pulses throughout    Neurologic:  Moves all extremities. AAOx3, CN II-XII reviewed. Some decreased strength in the upper extremity. Data Review:    Review and/or order of clinical lab test  Review and/or order of tests in the radiology section of CPT  Review and/or order of tests in the medicine section of CPT      Labs:     No results for input(s): WBC, HGB, HCT, PLT, HGBEXT, HCTEXT, PLTEXT, HGBEXT, HCTEXT, PLTEXT in the last 72 hours. No results for input(s): NA, K, CL, CO2, BUN, CREA, GLU, CA, MG, PHOS, URICA in the last 72 hours. No results for input(s): ALT, AP, TBIL, TBILI, TP, ALB, GLOB, GGT, AML, LPSE in the last 72 hours.     No lab exists for component: SGOT, GPT, AMYP, HLPSE  No results for input(s): INR, PTP, APTT, INREXT, INREXT in the last 72 hours. No results for input(s): FE, TIBC, PSAT, FERR in the last 72 hours. No results found for: FOL, RBCF   No results for input(s): PH, PCO2, PO2 in the last 72 hours. No results for input(s): CPK, CKNDX, TROIQ in the last 72 hours.     No lab exists for component: CPKMB  Lab Results   Component Value Date/Time    Cholesterol, total 180 02/23/2022 05:18 AM    HDL Cholesterol 36 02/23/2022 05:18 AM    LDL, calculated 126.8 (H) 02/23/2022 05:18 AM    Triglyceride 86 02/23/2022 05:18 AM    CHOL/HDL Ratio 5.0 02/23/2022 05:18 AM     Lab Results   Component Value Date/Time    Glucose (POC) 188 (H) 03/06/2022 11:37 AM    Glucose (POC) 130 (H) 03/06/2022 05:08 AM    Glucose (POC) 199 (H) 03/05/2022 11:39 PM    Glucose (POC) 148 (H) 03/05/2022 04:26 PM    Glucose (POC) 126 (H) 03/05/2022 11:21 AM     No results found for: COLOR, APPRN, SPGRU, REFSG, MARCELLO, PROTU, GLUCU, KETU, BILU, UROU, COREY, LEUKU, GLUKE, EPSU, BACTU, WBCU, RBCU, CASTS, UCRY      Medications Reviewed:     Current Facility-Administered Medications   Medication Dose Route Frequency    fosphenytoin (CEREBYX) with saline injection 100 mg PE  100 mg PE IntraVENous Q8H    dexamethasone (DECADRON) 4 mg/mL Oral 2 mg  2 mg Oral Q6H    labetaloL (NORMODYNE;TRANDATE) injection 10 mg  10 mg IntraVENous Q15MIN PRN    Or    hydrALAZINE (APRESOLINE) 20 mg/mL injection 10 mg  10 mg IntraVENous Q15MIN PRN    torsemide (DEMADEX) tablet 100 mg  100 mg Oral DAILY    famotidine (PEPCID) tablet 20 mg  20 mg Oral Q12H    carvediloL (COREG) tablet 12.5 mg  12.5 mg Oral BID    lisinopriL (PRINIVIL, ZESTRIL) tablet 5 mg  5 mg Oral DAILY    sodium chloride (NS) flush 5-40 mL  5-40 mL IntraVENous Q8H    sodium chloride (NS) flush 5-40 mL  5-40 mL IntraVENous PRN    acetaminophen (TYLENOL) tablet 650 mg  650 mg Oral Q4H PRN    HYDROcodone-acetaminophen (NORCO) 5-325 mg per tablet 1 Tablet  1 Tablet Oral Q4H PRN    morphine injection 2 mg  2 mg IntraVENous Q2H PRN    ondansetron (ZOFRAN) injection 4 mg  4 mg IntraVENous Q4H PRN    glucose chewable tablet 16 g  4 Tablet Oral PRN    glucagon (GLUCAGEN) injection 1 mg  1 mg IntraMUSCular PRN    insulin lispro (HUMALOG) injection   SubCUTAneous Q6H    dextrose 10 % infusion 0-250 mL  0-250 mL IntraVENous PRN    LORazepam (ATIVAN) injection 2 mg  2 mg IntraVENous Q2H PRN    levETIRAcetam (KEPPRA) injection 1,000 mg  1,000 mg IntraVENous Q12H     ______________________________________________________________________  EXPECTED LENGTH OF STAY: 6d 14h  ACTUAL LENGTH OF STAY:          Fernando Moody MD

## 2022-03-06 NOTE — PROGRESS NOTES
POD 4  No seizures  afeb VSS  Alert  Fluent speech  Improved strength left hand -  4-/5, remains weak in other muscle groups  Weak left LE -antigravity - improving  Incision C/D/I  S/P: crani and resection GBM  Will need rehab  Decadron 1mg q 6 hours (slow wean)  pepcid 20mg BID  cerebyx 100mg TID  keppra 1000 BID

## 2022-03-07 LAB
GLUCOSE BLD STRIP.AUTO-MCNC: 119 MG/DL (ref 65–117)
GLUCOSE BLD STRIP.AUTO-MCNC: 126 MG/DL (ref 65–117)
GLUCOSE BLD STRIP.AUTO-MCNC: 209 MG/DL (ref 65–117)
SERVICE CMNT-IMP: ABNORMAL

## 2022-03-07 PROCEDURE — 97535 SELF CARE MNGMENT TRAINING: CPT

## 2022-03-07 PROCEDURE — 74011250637 HC RX REV CODE- 250/637: Performed by: NEUROLOGICAL SURGERY

## 2022-03-07 PROCEDURE — 74011250637 HC RX REV CODE- 250/637: Performed by: ANESTHESIOLOGY

## 2022-03-07 PROCEDURE — 82962 GLUCOSE BLOOD TEST: CPT

## 2022-03-07 PROCEDURE — 74011250636 HC RX REV CODE- 250/636: Performed by: NURSE PRACTITIONER

## 2022-03-07 PROCEDURE — 74011250637 HC RX REV CODE- 250/637: Performed by: NURSE PRACTITIONER

## 2022-03-07 PROCEDURE — 97530 THERAPEUTIC ACTIVITIES: CPT

## 2022-03-07 PROCEDURE — 74011000250 HC RX REV CODE- 250: Performed by: NURSE PRACTITIONER

## 2022-03-07 PROCEDURE — 65660000000 HC RM CCU STEPDOWN

## 2022-03-07 PROCEDURE — 97112 NEUROMUSCULAR REEDUCATION: CPT

## 2022-03-07 PROCEDURE — 94760 N-INVAS EAR/PLS OXIMETRY 1: CPT

## 2022-03-07 PROCEDURE — 97116 GAIT TRAINING THERAPY: CPT

## 2022-03-07 PROCEDURE — 74011636637 HC RX REV CODE- 636/637: Performed by: NURSE PRACTITIONER

## 2022-03-07 RX ORDER — MAGNESIUM SULFATE 100 %
4 CRYSTALS MISCELLANEOUS AS NEEDED
Status: DISCONTINUED | OUTPATIENT
Start: 2022-03-07 | End: 2022-03-11 | Stop reason: HOSPADM

## 2022-03-07 RX ORDER — DEXAMETHASONE SODIUM PHOSPHATE 4 MG/ML
1 INJECTION, SOLUTION INTRA-ARTICULAR; INTRALESIONAL; INTRAMUSCULAR; INTRAVENOUS; SOFT TISSUE EVERY 8 HOURS
Status: DISCONTINUED | OUTPATIENT
Start: 2022-03-07 | End: 2022-03-08

## 2022-03-07 RX ORDER — INSULIN LISPRO 100 [IU]/ML
INJECTION, SOLUTION INTRAVENOUS; SUBCUTANEOUS
Status: DISCONTINUED | OUTPATIENT
Start: 2022-03-07 | End: 2022-03-11 | Stop reason: HOSPADM

## 2022-03-07 RX ORDER — PHENYTOIN 50 MG/1
100 TABLET, CHEWABLE ORAL 3 TIMES DAILY
Status: DISCONTINUED | OUTPATIENT
Start: 2022-03-07 | End: 2022-03-11 | Stop reason: HOSPADM

## 2022-03-07 RX ORDER — LEVETIRACETAM 500 MG/1
1000 TABLET ORAL EVERY 12 HOURS
Status: DISCONTINUED | OUTPATIENT
Start: 2022-03-07 | End: 2022-03-11 | Stop reason: HOSPADM

## 2022-03-07 RX ADMIN — DEXAMETHASONE SODIUM PHOSPHATE 1 MG: 4 INJECTION, SOLUTION INTRA-ARTICULAR; INTRALESIONAL; INTRAMUSCULAR; INTRAVENOUS; SOFT TISSUE at 05:09

## 2022-03-07 RX ADMIN — ACETAMINOPHEN 650 MG: 325 TABLET ORAL at 22:28

## 2022-03-07 RX ADMIN — DEXAMETHASONE SODIUM PHOSPHATE 1 MG: 4 INJECTION, SOLUTION INTRAMUSCULAR; INTRAVENOUS at 22:26

## 2022-03-07 RX ADMIN — FAMOTIDINE 20 MG: 20 TABLET, FILM COATED ORAL at 22:26

## 2022-03-07 RX ADMIN — Medication 3 UNITS: at 06:46

## 2022-03-07 RX ADMIN — LEVETIRACETAM 1000 MG: 500 TABLET, FILM COATED ORAL at 22:26

## 2022-03-07 RX ADMIN — TORSEMIDE 100 MG: 100 TABLET ORAL at 08:44

## 2022-03-07 RX ADMIN — LEVETIRACETAM 1000 MG: 100 INJECTION INTRAVENOUS at 08:44

## 2022-03-07 RX ADMIN — SODIUM CHLORIDE, PRESERVATIVE FREE 10 ML: 5 INJECTION INTRAVENOUS at 05:09

## 2022-03-07 RX ADMIN — DEXAMETHASONE SODIUM PHOSPHATE 1 MG: 4 INJECTION, SOLUTION INTRAMUSCULAR; INTRAVENOUS at 12:48

## 2022-03-07 RX ADMIN — LISINOPRIL 5 MG: 5 TABLET ORAL at 08:43

## 2022-03-07 RX ADMIN — CARVEDILOL 12.5 MG: 12.5 TABLET, FILM COATED ORAL at 08:44

## 2022-03-07 RX ADMIN — PHENYTOIN 100 MG: 50 TABLET, CHEWABLE ORAL at 15:35

## 2022-03-07 RX ADMIN — MORPHINE SULFATE 2 MG: 2 INJECTION, SOLUTION INTRAMUSCULAR; INTRAVENOUS at 22:26

## 2022-03-07 RX ADMIN — SODIUM CHLORIDE, PRESERVATIVE FREE 10 ML: 5 INJECTION INTRAVENOUS at 13:03

## 2022-03-07 RX ADMIN — SODIUM CHLORIDE 100 MG PE: 9 INJECTION, SOLUTION INTRAMUSCULAR; INTRAVENOUS; SUBCUTANEOUS at 05:10

## 2022-03-07 RX ADMIN — PHENYTOIN 100 MG: 50 TABLET, CHEWABLE ORAL at 22:27

## 2022-03-07 RX ADMIN — HYDROCODONE BITARTRATE AND ACETAMINOPHEN 1 TABLET: 5; 325 TABLET ORAL at 22:26

## 2022-03-07 RX ADMIN — SODIUM CHLORIDE 100 MG PE: 9 INJECTION, SOLUTION INTRAMUSCULAR; INTRAVENOUS; SUBCUTANEOUS at 11:58

## 2022-03-07 RX ADMIN — CARVEDILOL 12.5 MG: 12.5 TABLET, FILM COATED ORAL at 17:17

## 2022-03-07 RX ADMIN — FAMOTIDINE 20 MG: 20 TABLET, FILM COATED ORAL at 08:44

## 2022-03-07 NOTE — PROGRESS NOTES
Problem: Falls - Risk of  Goal: *Absence of Falls  Description: Document Rick Blades Fall Risk and appropriate interventions in the flowsheet. Outcome: Progressing Towards Goal  Note: Fall Risk Interventions:  Mobility Interventions: Bed/chair exit alarm,Communicate number of staff needed for ambulation/transfer,Patient to call before getting OOB,Strengthening exercises (ROM-active/passive),Utilize walker, cane, or other assistive device         Medication Interventions: Bed/chair exit alarm,Evaluate medications/consider consulting pharmacy,Patient to call before getting OOB,Teach patient to arise slowly    Elimination Interventions: Bed/chair exit alarm,Call light in reach,Patient to call for help with toileting needs,Urinal in reach    History of Falls Interventions: Bed/chair exit alarm,Evaluate medications/consider consulting pharmacy,Investigate reason for fall,Room close to nurse's station (Nurse reported today)         Problem: Patient Education: Go to Patient Education Activity  Goal: Patient/Family Education  Outcome: Progressing Towards Goal     Problem: Pressure Injury - Risk of  Goal: *Prevention of pressure injury  Description: Document River Scale and appropriate interventions in the flowsheet. Outcome: Progressing Towards Goal  Note: Pressure Injury Interventions:  Sensory Interventions: Assess changes in LOC,Avoid rigorous massage over bony prominences,Check visual cues for pain,Float heels,Keep linens dry and wrinkle-free,Minimize linen layers,Pad between skin to skin,Turn and reposition approx.  every two hours (pillows and wedges if needed)    Moisture Interventions: Absorbent underpads,Apply protective barrier, creams and emollients,Limit adult briefs,Minimize layers,Moisture barrier    Activity Interventions: Increase time out of bed,Pressure redistribution bed/mattress(bed type),PT/OT evaluation    Mobility Interventions: Assess need for specialty bed,Float heels,Pressure redistribution bed/mattress (bed type)    Nutrition Interventions: Document food/fluid/supplement intake,Offer support with meals,snacks and hydration    Friction and Shear Interventions: Apply protective barrier, creams and emollients,Lift sheet,Minimize layers                Problem: Patient Education: Go to Patient Education Activity  Goal: Patient/Family Education  Outcome: Progressing Towards Goal     Problem: Patient Education: Go to Patient Education Activity  Goal: Patient/Family Education  Outcome: Progressing Towards Goal     Problem: Patient Education: Go to Patient Education Activity  Goal: Patient/Family Education  Outcome: Progressing Towards Goal

## 2022-03-07 NOTE — PROGRESS NOTES
[facility]  [facility]ospitalist Group                                                                                          Hospitalist Progress Note  Lobito Nicholson MD  Answering service: [phone] from in house phone        Date of Service:  3/7/2022  NAME:  Janet Mendoza  :  1975  MRN:  [id number]      Admission Summary:   Janet Mendoza is a 51 y.o. male with PMH of chronic systolic heart failure,HTN,DM and other comorbidities admitted to ICU after Right parietal craniotomy, use of neuronavigation BrainLAB, and removal of primary glioma. .  We are asked to see the patient in consult to assist in managing HTN,DM.     He denied any chest pain,SOB,nausea/vomiting,headache today    Interval history / Subjective:   Seen for follow-up of consult for medical management. Siting in chair,having lunch,states that minimal movement LUE     Assessment & Plan: Active Problems:    Brain mass (2022)        # Right  brain mass:  -s/p Right parietal craniotomy, use of neuronavigation BrainLAB, and removal of primary glioma.  -final path pending  On keppra and decadron  Mx per primary team           #Steroid induced hyperglycemia:  Prediabetes  -monitor glucose level, SSI     #HTN:  BP controlled on current regimen  .     #Chronic systolic heart failure:  -on GDMT   No symptoms        #Obesity:  Body mass index is 54.42 kg/m². Code status: full  DVT prophylaxis: scd    Care Plan discussed with: Patient/Family and Nurse  Anticipated Disposition: Home w/Family and TBD  Anticipated Discharge: 24 hours to 48 hours     Hospital Problems  Date Reviewed: 2022          Codes Class Noted POA    Brain mass ICD-10-CM: G93.89  ICD-9-CM: 348.89  2022 Unknown                Review of Systems:   A comprehensive review of systems was negative except for that written in the HPI. Vital Signs:    Last 24hrs VS reviewed since prior progress note.  Most recent are:  Visit Vitals  BP 127/75 (BP 1 Location: Right lower arm, BP Patient Position: At rest)   Pulse 76   Temp 97.5 °F (36.4 °C)   Resp 15   Ht 5' 4\" (1.626 m)   Wt 136.2 kg (300 lb 4.3 oz)   SpO2 96%   BMI 51.54 kg/m²       No intake or output data in the 24 hours ending 03/07/22 1519     Physical Examination:     I had a face to face encounter with this patient and independently examined them on 3/7/2022 as outlined below:          Constitutional:  No acute distress, cooperative, pleasant . ENT:  Oral mucosa moist,EOMI,anicteric sclera   Resp:  CTA bilaterally. No wheezing/rhonchi/rales. No accessory muscle use   CV:  Regular rhythm, normal rate, no murmurs, gallops, rubs    GI:  Soft, non distended, non tender. normoactive bowel sounds, no hepatosplenomegaly     Musculoskeletal:  No LE edema    Neurologic:  Moves all extremities. AAOx3 , minimal movement LUE,             Data Review:    Review and/or order of clinical lab test  Review and/or order of tests in the medicine section of CPT      Labs:     No results for input(s): WBC, HGB, HCT, PLT, HGBEXT, HCTEXT, PLTEXT, HGBEXT, HCTEXT, PLTEXT in the last 72 hours. No results for input(s): NA, K, CL, CO2, BUN, CREA, GLU, CA, MG, PHOS, URICA in the last 72 hours. No results for input(s): ALT, AP, TBIL, TBILI, TP, ALB, GLOB, GGT, AML, LPSE in the last 72 hours. No lab exists for component: SGOT, GPT, AMYP, HLPSE  No results for input(s): INR, PTP, APTT, INREXT, INREXT in the last 72 hours. No results for input(s): FE, TIBC, PSAT, FERR in the last 72 hours. No results found for: FOL, RBCF   No results for input(s): PH, PCO2, PO2 in the last 72 hours. No results for input(s): CPK, CKNDX, TROIQ in the last 72 hours.     No lab exists for component: CPKMB  Lab Results   Component Value Date/Time    Cholesterol, total 180 02/23/2022 05:18 AM    HDL Cholesterol 36 02/23/2022 05:18 AM    LDL, calculated 126.8 (H) 02/23/2022 05:18 AM    Triglyceride 86 02/23/2022 05:18 AM    CHOL/HDL Ratio 5.0 02/23/2022 05:18 AM     Lab Results   Component Value Date/Time    Glucose (POC) 119 (H) 03/07/2022 11:56 AM    Glucose (POC) 209 (H) 03/07/2022 06:27 AM    Glucose (POC) 144 (H) 03/06/2022 11:47 PM    Glucose (POC) 144 (H) 03/06/2022 04:24 PM    Glucose (POC) 188 (H) 03/06/2022 11:37 AM     No results found for: COLOR, APPRN, SPGRU, REFSG, MARCELLO, PROTU, GLUCU, KETU, BILU, UROU, COREY, LEUKU, GLUKE, EPSU, BACTU, WBCU, RBCU, CASTS, UCRY      Medications Reviewed:     Current Facility-Administered Medications   Medication Dose Route Frequency    dexamethasone (DECADRON) 4 mg/mL injection 1 mg  1 mg Oral Q8H    phenytoin (DILANTIN) chewable tablet 100 mg  100 mg Oral TID    labetaloL (NORMODYNE;TRANDATE) injection 10 mg  10 mg IntraVENous Q15MIN PRN    Or    hydrALAZINE (APRESOLINE) 20 mg/mL injection 10 mg  10 mg IntraVENous Q15MIN PRN    torsemide (DEMADEX) tablet 100 mg  100 mg Oral DAILY    famotidine (PEPCID) tablet 20 mg  20 mg Oral Q12H    carvediloL (COREG) tablet 12.5 mg  12.5 mg Oral BID    lisinopriL (PRINIVIL, ZESTRIL) tablet 5 mg  5 mg Oral DAILY    sodium chloride (NS) flush 5-40 mL  5-40 mL IntraVENous Q8H    sodium chloride (NS) flush 5-40 mL  5-40 mL IntraVENous PRN    acetaminophen (TYLENOL) tablet 650 mg  650 mg Oral Q4H PRN    HYDROcodone-acetaminophen (NORCO) 5-325 mg per tablet 1 Tablet  1 Tablet Oral Q4H PRN    morphine injection 2 mg  2 mg IntraVENous Q2H PRN    ondansetron (ZOFRAN) injection 4 mg  4 mg IntraVENous Q4H PRN    glucose chewable tablet 16 g  4 Tablet Oral PRN    glucagon (GLUCAGEN) injection 1 mg  1 mg IntraMUSCular PRN    insulin lispro (HUMALOG) injection   SubCUTAneous Q6H    dextrose 10 % infusion 0-250 mL  0-250 mL IntraVENous PRN    LORazepam (ATIVAN) injection 2 mg  2 mg IntraVENous Q2H PRN    levETIRAcetam (KEPPRA) injection 1,000 mg  1,000 mg IntraVENous Q12H     ______________________________________________________________________  EXPECTED LENGTH OF STAY: 6d 14h  ACTUAL LENGTH OF STAY:          5                 Andrea Rodriguez MD

## 2022-03-07 NOTE — PROGRESS NOTES
Neurosurgery Progress Note  Joseph Bray, [facility]          Admit Date: 3/2/2022   LOS: 5 days        Daily Progress Note: 3/7/2022    POD: 5    S/P: Procedure(s):  AIRO GUIDED RIGHT PARIETAL CRANIOTOMY AND TUMOR REMOVAL    HPI: The patient presented to the hospital last week due to intermittent left leg weakness. His head CT revealed cerebral edema and he transferred to [facility]. His MRI brain revealed a large right parietal brain mass and hi CT scans of his body showed no other evidence of disease. He was discharged to home on steroids and keppra with the intent of a planned readmission for surgery today. He underwent a right parietal craniotomy with resection of tumor on 22. In the recovery room, he states he could not move his left arm or left leg. In the ICU, his left leg has begun working again to where he is antigravity and able to lift it off the bed. He cannot move his LUE. While in the room, he began having a focal seizure of his left arm with rhythmic movements that terminated without intervention after about 20 seconds. Subjective:   No further seizures of left arm since adding Cerebyx. Referral made to [facility], [place], operated by [facility] for inpatient rehab. He is getting some movement back in his biceps and triceps. Denies chest pain, leg pain, nausea, vomiting, difficulty swallowing, and dyspnea. Objective:     Vital signs  Temp (24hrs), Av.9 °F (36.6 °C), Min:97.5 °F (36.4 °C), Max:98.2 °F (36.8 °C)   No intake/output data recorded.    1901 -  0700  In: -   Out: 500 [Urine:500]    Visit Vitals  BP (!) 167/93 (BP 1 Location: Right lower arm, BP Patient Position: At rest;Sitting)   Pulse 71   Temp 98.2 °F (36.8 °C)   Resp 19   Ht 5' 4\" (1.626 m)   Wt 136.2 kg (300 lb 4.3 oz)   SpO2 96%   BMI 51.54 kg/m²    O2 Flow Rate (L/min): 0 l/min O2 Device: None (Room air)     Pain control  Pain Assessment  Pain Scale 1: Numeric (0 - 10)  Pain Intensity 1: 0  Pain Onset 1: post op  Pain Location 1: Head  Pain Orientation 1: Other (comment)  Pain Description 1: Constant  Pain Intervention(s) 1: Medication (see MAR),Emotional support,Environmental changes,Repositioned    PT/OT  Gait     Gait  Base of Support: Center of gravity altered,Narrowed  Gait Abnormalities: Trunk sway increased  Ambulation - Level of Assistance: Moderate assistance,Assist x2  Distance (ft): 3 Feet (ft) (x3 reps)  Assistive Device: Gait belt           Physical Exam:  Gen:NAD. Neuro: A&Ox3. Follows commands. Speech clear. Affect bright  PERRL. EOMI. Face symmetric. Tongue midline. RUE/RLE 5/5, LUE 3/5 hand intrinsics, 3/5 biceps, triceps, 1/5 deltoid, LLE 4+/5  Sensation decreased on left. Gait deferred. Skin: Right scalp incision C/D/I with sutures in place. No erythema, edema, or drainage. CT head without contrast on 03/03/22 shows postop right craniotomy and tumor resection. No hemorrhage. Decreased midline shift. MRI brain with and without contrast on 03/03/22 shows no unusual postoperative findings after subtotal resection right posterior frontal/parietal suspected GBM. 24 hour results:    Recent Results (from the past 24 hour(s))   GLUCOSE, POC    Collection Time: 03/06/22  4:24 PM   Result Value Ref Range    Glucose (POC) 144 (H) 65 - 117 mg/dL    Performed by Rui Delaney    GLUCOSE, POC    Collection Time: 03/06/22 11:47 PM   Result Value Ref Range    Glucose (POC) 144 (H) 65 - 117 mg/dL    Performed by Valere Halon ([facility])    GLUCOSE, POC    Collection Time: 03/07/22  6:27 AM   Result Value Ref Range    Glucose (POC) 209 (H) 65 - 117 mg/dL    Performed by Valere Halon [facility])           Assessment:     Active Problems:    Brain mass (2/21/2022)        Plan:   1. Right parietal brain mass   - s/p crani 03/02   - cont decadron taper   - cont keppra   - PT/OT   - Pathology pending. Will need rad onc and med onc. 2. Cerebral edema with brain compression   - due to #1   - plans as above  3.  Hx of CHF   - EF of 35% on last ECHO   - cont Coreg   - Hospitalist following  4. Focal seizures   - due to #1, 2   - cont keppra to 1000 mg bid   - change cerebyx to PO dilantin. Check dilantin level.   - ativan PRN for seizures lasting greater than 2 minutes or more than 3 in an hour. 5. HTN   - SBP<140   - cont coreg, lisinopril from home. Torsemide restarted. - Hospitalis tfollowing  6. Diabetes mellitus, type 2   - -209   - SSI and accu-checks   - Hospitalist following  7. Morbid obesity as defined by BMI greater than 40   - Body mass index is 51.54 kg/m². - Diet and exercise counseling as appropriate    Activity: up with assist  DVT ppx: SCDs  Dispo: inpt rehab    Plan d/w Dr. Matty Arzate, hospitalist.      Zeynep Rodriguez NP    03/07/22 9899    Pathology is still pending. Discussed patient will likely need chemo pill and then 6 weeks of radiation. Spoke with the patient and his wife about their preference for location of oncology and radiation oncology services and they prefer to stick with the [place] area. Will consult [facility] and rad onc.     Kurt 3

## 2022-03-07 NOTE — PROGRESS NOTES
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 HOME SUPPORT: Reports he just bought a house but plans to stay with his sister upon d/c. Occupational Therapy Goals  Initiated 3/3/2022   1. Patient will perform self-feeding with minimal assistance/contact guard assist within 7 day(s). 2.  Patient will perform at least one standing grooming task with moderate assistance within 7 day(s). 3.  Patient will perform lower body dressing with maximal assistance & AE PRN within 7 day(s). 4.  Patient will perform upper body dressing with minimal assistance & AE PRN within 7 day(s). 5.  Patient will perform bathing with moderate assistance & AE PRN within 7 day(s). 6.  Patient will perform toilet transfers with moderate assistance within 7 day(s). 7.  Patient will perform all aspects of toileting with moderate assistance within 7 day(s). 8.  Patient will participate in upper extremity therapeutic exercise/activities with supervision/set-up within 7 day(s). 9.  Patient will utilize energy conservation techniques during functional activities with verbal and visual cues within 7 day(s). 10.  Patient will improve their Fugl Cannon score by 5 points in prep for ADLs within 7 days. Outcome: Progressing Towards Goal     OCCUPATIONAL THERAPY TREATMENT  Patient: Magnus Holman (40 y.o. male)  Date: 3/7/2022  Diagnosis: Brain mass [G93.89] <principal problem not specified>  Procedure(s) (LRB):  AIRO GUIDED RIGHT PARIETAL CRANIOTOMY AND TUMOR REMOVAL (Right) 5 Days Post-Op  Precautions: Fall,Seizure (SBP <140)  Chart, occupational therapy assessment, plan of care, and goals were reviewed.     ASSESSMENT  Patient continues with skilled OT services and is progressing towards goals however remains limited by decreased balance, LUE>LLE function and sensation, standing tolerance, functional reach, attention to the L, and safety awareness with bathing, upper body dressing, and standing grooming tasks completed this session. He continues to progress with RUE AROM, improved shoulder, elbow, and digital function with improved initiation of AROM, continues to require significant effort. Steadily improving activity tolerance, completing upper body bathing and grooming with 2 seated rest breaks d/t NM fatigue and back pain, utilizing LUE hand over hand assist to promote neuroplasticity. He remains an excellent IPR candidate, continues to require Min-Mod A x2 for short distance mobility, very motivated to progress and able to tolerate 3 hours/day of therapy. Current Level of Function Impacting Discharge (ADLs): Min-Max A for ADLs, Min-Mod A x1-2 for limited OOB mobility    Other factors to consider for discharge: fall risk, assist of 2, acute neuro deficits, high PLOF, PMH         PLAN :  Patient continues to benefit from skilled intervention to address the above impairments. Continue treatment per established plan of care to address goals. Recommend with staff: Recommend with nursing, ADLs with assist, OOB to chair 3x/day, and toileting via bedside commode with 2 assist. Thank you for completing as able in order to maintain patient strength, endurance and independence. Recommendation for discharge: (in order for the patient to meet his/her long term goals)  Therapy 3 hours per day 5-7 days per week    This discharge recommendation:  Has been made in collaboration with the attending provider and/or case management    IF patient discharges home will need the following DME: To be determined (TBD)         SUBJECTIVE:   Patient stated This is hard work! I'm so thankful for y'all.     OBJECTIVE DATA SUMMARY:   Cognitive/Behavioral Status:  Neurologic State: Alert  Orientation Level: Oriented X4  Cognition: Appropriate for age attention/concentration; Follows commands;Poor safety awareness  Perception: Cues to attend to left side of body;Cues to maintain midline in standing  Perseveration: No perseveration noted  Safety/Judgement: Decreased awareness of environment;Decreased awareness of need for assistance;Decreased awareness of need for safety;Decreased insight into deficits    Functional Mobility and Transfers for ADLs:  Bed Mobility:  Supine to Sit:  (received in chair)  Scooting: Stand-by assistance;Contact guard assistance (seated)    Transfers:  Sit to Stand: Minimum assistance (second assist for safety)          Balance:  Sitting: Impaired  Sitting - Static: Good (unsupported)  Sitting - Dynamic: Fair (occasional)  Standing: Impaired; With support  Standing - Static: Fair;Constant support  Standing - Dynamic : Poor;Constant support    ADL Intervention:       Grooming  Grooming Assistance: Minimum assistance  Position Performed: Standing (LUE support on counter)  Washing Face: Minimum assistance (A for stand balance with LLE facilitation at counter)  Cues: Physical assistance;Visual cues provided;Verbal cues provided; Tactile cues provided    Upper Body Bathing  Bathing Assistance: Moderate assistance  Position Performed: Standing (LUE supported on counter, A for back & R side)  Cues: Physical assistance;Visual cues provided;Verbal cues provided; Tactile cues provided;Visual/perceptual training/retraining    Lower Body Bathing  Bathing Assistance: Moderate assistance  Lower Body : Moderate assistance; Compensatory technique training (A for LLE d/t body habitus)  Position Performed: Seated in chair  Cues: Physical assistance; Tactile cues provided;Visual cues provided;Verbal cues provided;Visual/perceptual training/retraining    Upper Body Dressing Assistance  Dressing Assistance: [address] [place]: Minimum  assistance; Compensatory technique training;Training to use affected extremity as a gross motor assistance  Cues: Physical assistance; Tactile cues provided;Verbal cues provided;Visual cues provided;Visual/perceptual training/retraining      Cognitive Retraining  Orientation Retraining: Awareness of environment  Problem Solving: Awareness of environment  Executive Functions: Executing cognitive plans  Organizing/Sequencing: Breaking task down  Attention to Task: Single task;Distractibility  Following Commands: Follows one step commands/directions; Follows two step commands/directions; Follows multi-step simple commands/directions  Safety/Judgement: Decreased awareness of environment;Decreased awareness of need for assistance;Decreased awareness of need for safety;Decreased insight into deficits  Cues: Tactile cues provided;Verbal cues provided;Visual cues provided    Neuro Re-Education:   - LUE AROM/AAROM--improved shoulder flexion & abduction against gravity, continues to require distal assist at wrist and forearm to maximize function & prevent injury; improved L hand grasp 4/5 with only 3 second delay for activation (much improved from >20 seconds on evaluation); increased time for elbow flexion>extension in standing with washcloth placed under L hand for improved sliding   - LUE weight bearing with functional transfers and standing at counter with upper body ADLs, able to progress to LUE support only with PT facilitating LLE (d/t knee instability)   - Curtis dressing techniques provided with donning clean gown       Pain:  None    Activity Tolerance:   Good and requires rest breaks d/t back pain and NM fatigue with standing    After treatment patient left in no apparent distress:   Sitting in chair and Call bell within reach    COMMUNICATION/COLLABORATION:   The patients plan of care was discussed with: Physical therapist and Registered nurse.      Patient was educated regarding His deficit(s) of decreased balance, LUE>LLE function and sensation, standing tolerance, functional reach, attention to the L, and safety awareness as this relates to His diagnosis of s/p crani with tumor removal.  He demonstrated Good understanding as evidenced by verbal discussion & carryover. Patient and/or family was verbally educated on the BE FAST acronym for signs/symptoms of CVA and TIA. BE FAST was written on patient's communication board  for visual education and reinforcement. All questions answered with patient indicating good understanding.      DMITRIY Dorantes, OTR/L  Time Calculation: 36 mins

## 2022-03-07 NOTE — PROGRESS NOTES
Transition of Care Plan: IPR-[facility] pending auth-started 03/07     RUR: 10%     PCP F/U: Dr Iron Esteves     Disposition: IPR     Transportation: BLS     Main Contact: Sister Berry Osei ([facility]) [phone], Mother Magdalena Walker ([facility]) [phone]     0176: Spoke with sister this morning. Family has chosen [facility] and would like to proceed with Corina Lee. Jadiel De Los Santos from [facility]. Discussed if medicaid declined IPR, would family be open to SNF.  Sister states that she is a CNA and at this time would prefer to take patient home if IPR was declined by insurance, but she would like to wait and see what happens.      Joshua Awad RN, CRM

## 2022-03-07 NOTE — PROGRESS NOTES
Bedside and Verbal shift change report given to [address] [place] [address] (oncoming nurse) by Rico May (offgoing nurse). Report included the following information SBAR, Kardex, ED Summary, Procedure Summary, Accordion, Med Rec Status, Cardiac Rhythm SR and Dual Neuro Assessment.

## 2022-03-07 NOTE — PROGRESS NOTES
Problem: Mobility Impaired (Adult and Pediatric)  Goal: *Acute Goals and Plan of Care (Insert Text)  Description: FUNCTIONAL STATUS PRIOR TO ADMISSION: Pt was independent without the use of DME.     HOME SUPPORT PRIOR TO ADMISSION: Pt was living with his sister and plans to return there. Physical Therapy Goals  Initiated 3/3/2022  1. Patient will move from supine to sit and sit to supine , scoot up and down, and roll side to side in bed with contact guard assist within 7 day(s). 2.  Patient will transfer from bed to chair and chair to bed with Leah x2 using the least restrictive device within 7 day(s). 3.  Patient will perform sit to stand with CGA within 7 day(s). 4.  Patient will ambulate with Leah x2 for 50 feet with the least restrictive device within 7 day(s). Outcome: Progressing Towards Goal   PHYSICAL THERAPY TREATMENT  Patient: Amirah Santoro (69 y.o. male)  Date: 3/7/2022  Diagnosis: Brain mass [G93.89] <principal problem not specified>  Procedure(s) (LRB):  AIRO GUIDED RIGHT PARIETAL CRANIOTOMY AND TUMOR REMOVAL (Right) 5 Days Post-Op  Precautions: Fall,Seizure (SBP <140)  Chart, physical therapy assessment, plan of care and goals were reviewed. ASSESSMENT  Patient continues with skilled PT services and is progressing towards goals. Pt presents with impaired balance, L sided weakness (UE>LE), unsteady gait and decreased endurance. Session focused on static standing. Pt required modA x2 to ambulate 3 ft x3 reps. Required VC and manual cues to widen base of support. Able to tolerate static standing for 3 min x3 reps, required seated rest breaks d/t back pain and fatigue. Pt required VC to weight shift to his L side to facilitate equal weight bearing. During ambulation and with static standing pt required manual facilitation at his L knee to prevent L knee buckling and hyperextension. Recommending IPR upon discharge.        Current Level of Function Impacting Discharge (mobility/balance): modA x2 ambulate short distances     Other factors to consider for discharge: independent baseline, fall risk, L sided weakness, motivated          PLAN :  Patient continues to benefit from skilled intervention to address the above impairments. Continue treatment per established plan of care. to address goals. Recommendation for discharge: (in order for the patient to meet his/her long term goals)  Therapy 3 hours per day 5-7 days per week    This discharge recommendation:  Has not yet been discussed the attending provider and/or case management    IF patient discharges home will need the following DME: to be determined (TBD)       SUBJECTIVE:   Patient stated that felt like a work out.     OBJECTIVE DATA SUMMARY:   Critical Behavior:  Neurologic State: Alert  Orientation Level: Oriented X4  Cognition: Appropriate for age attention/concentration,Follows commands,Poor safety awareness  Safety/Judgement: Decreased awareness of environment,Decreased awareness of need for assistance,Decreased awareness of need for safety,Decreased insight into deficits  Functional Mobility Training:  Bed Mobility:     Supine to Sit:  (received in chair)     Scooting: Stand-by assistance;Contact guard assistance (seated)        Transfers:  Sit to Stand: Minimum assistance (second assist for safety)  Stand to Sit: Minimum assistance (second assist for safety mod cues for hand placement)                             Balance:  Sitting: Impaired  Sitting - Static: Good (unsupported)  Sitting - Dynamic: Fair (occasional)  Standing: Impaired; With support  Standing - Static: Fair;Constant support  Standing - Dynamic : Poor;Constant support  Ambulation/Gait Training:  Distance (ft): 3 Feet (ft) (x3 reps )  Assistive Device: Gait belt  Ambulation - Level of Assistance:  Moderate assistance;Assist x2        Gait Abnormalities: Trunk sway increased (L knee buckling and hyperextension)        Base of Support: Narrowed    Activity Tolerance: Good- with rest breaks     After treatment patient left in no apparent distress:   Sitting in chair and Call bell within reach    COMMUNICATION/COLLABORATION:   The patients plan of care was discussed with: Occupational therapist and Registered nurse. EAMON Hugo   Time Calculation: 38 mins        Regarding student involvement in patient care:  A student participated in this treatment session. Per CMS Medicare statements and APTA guidelines I certify that the following was true:  1. I was present and directly observed the entire session. 2. I made all skilled judgments and clinical decisions regarding care. 3. I am the practitioner responsible for assessment, treatment, and documentation.

## 2022-03-08 LAB
ALBUMIN SERPL-MCNC: 2.9 G/DL (ref 3.5–5)
ANION GAP SERPL CALC-SCNC: 5 MMOL/L (ref 5–15)
ANION GAP SERPL CALC-SCNC: 7 MMOL/L (ref 5–15)
BASOPHILS # BLD: 0.1 K/UL (ref 0–0.1)
BASOPHILS NFR BLD: 0 % (ref 0–1)
BUN SERPL-MCNC: 28 MG/DL (ref 6–20)
BUN SERPL-MCNC: 31 MG/DL (ref 6–20)
BUN/CREAT SERPL: 27 (ref 12–20)
BUN/CREAT SERPL: 29 (ref 12–20)
CALCIUM SERPL-MCNC: 8.4 MG/DL (ref 8.5–10.1)
CALCIUM SERPL-MCNC: 8.7 MG/DL (ref 8.5–10.1)
CHLORIDE SERPL-SCNC: 88 MMOL/L (ref 97–108)
CHLORIDE SERPL-SCNC: 88 MMOL/L (ref 97–108)
CO2 SERPL-SCNC: 32 MMOL/L (ref 21–32)
CO2 SERPL-SCNC: 34 MMOL/L (ref 21–32)
COVID-19 RAPID TEST, COVR: NOT DETECTED
CREAT SERPL-MCNC: 0.96 MG/DL (ref 0.7–1.3)
CREAT SERPL-MCNC: 1.14 MG/DL (ref 0.7–1.3)
DIFFERENTIAL METHOD BLD: ABNORMAL
EOSINOPHIL # BLD: 0 K/UL (ref 0–0.4)
EOSINOPHIL NFR BLD: 0 % (ref 0–7)
ERYTHROCYTE [DISTWIDTH] IN BLOOD BY AUTOMATED COUNT: 12.3 % (ref 11.5–14.5)
FLUAV RNA SPEC QL NAA+PROBE: NOT DETECTED
FLUBV RNA SPEC QL NAA+PROBE: NOT DETECTED
GLUCOSE BLD STRIP.AUTO-MCNC: 127 MG/DL (ref 65–117)
GLUCOSE BLD STRIP.AUTO-MCNC: 149 MG/DL (ref 65–117)
GLUCOSE BLD STRIP.AUTO-MCNC: 160 MG/DL (ref 65–117)
GLUCOSE BLD STRIP.AUTO-MCNC: 167 MG/DL (ref 65–117)
GLUCOSE SERPL-MCNC: 140 MG/DL (ref 65–100)
GLUCOSE SERPL-MCNC: 201 MG/DL (ref 65–100)
HCT VFR BLD AUTO: 50.3 % (ref 36.6–50.3)
HGB BLD-MCNC: 16.5 G/DL (ref 12.1–17)
IMM GRANULOCYTES # BLD AUTO: 0.3 K/UL (ref 0–0.04)
IMM GRANULOCYTES NFR BLD AUTO: 2 % (ref 0–0.5)
LYMPHOCYTES # BLD: 2.5 K/UL (ref 0.8–3.5)
LYMPHOCYTES NFR BLD: 12 % (ref 12–49)
MCH RBC QN AUTO: 30.7 PG (ref 26–34)
MCHC RBC AUTO-ENTMCNC: 32.8 G/DL (ref 30–36.5)
MCV RBC AUTO: 93.5 FL (ref 80–99)
MONOCYTES # BLD: 1.8 K/UL (ref 0–1)
MONOCYTES NFR BLD: 9 % (ref 5–13)
NEUTS SEG # BLD: 16 K/UL (ref 1.8–8)
NEUTS SEG NFR BLD: 77 % (ref 32–75)
NRBC # BLD: 0 K/UL (ref 0–0.01)
NRBC BLD-RTO: 0 PER 100 WBC
OSMOLALITY SERPL: 290 MOSM/KG H2O
OSMOLALITY UR: 288 MOSM/KG H2O
PHENYTOIN SERPL-MCNC: 14.7 UG/ML (ref 10–20)
PHOSPHATE SERPL-MCNC: 3.3 MG/DL (ref 2.6–4.7)
PLATELET # BLD AUTO: 351 K/UL (ref 150–400)
PMV BLD AUTO: 10.4 FL (ref 8.9–12.9)
POTASSIUM SERPL-SCNC: 3.5 MMOL/L (ref 3.5–5.1)
POTASSIUM SERPL-SCNC: 3.5 MMOL/L (ref 3.5–5.1)
RBC # BLD AUTO: 5.38 M/UL (ref 4.1–5.7)
SARS-COV-2, COV2: NORMAL
SARS-COV-2, COV2: NOT DETECTED
SERVICE CMNT-IMP: ABNORMAL
SODIUM SERPL-SCNC: 127 MMOL/L (ref 136–145)
SODIUM SERPL-SCNC: 127 MMOL/L (ref 136–145)
SODIUM UR-SCNC: 92 MMOL/L
SOURCE, COVRS: NORMAL
WBC # BLD AUTO: 20.8 K/UL (ref 4.1–11.1)

## 2022-03-08 PROCEDURE — 74011250637 HC RX REV CODE- 250/637: Performed by: ANESTHESIOLOGY

## 2022-03-08 PROCEDURE — 87635 SARS-COV-2 COVID-19 AMP PRB: CPT

## 2022-03-08 PROCEDURE — 80048 BASIC METABOLIC PNL TOTAL CA: CPT

## 2022-03-08 PROCEDURE — 74011636637 HC RX REV CODE- 636/637: Performed by: HOSPITALIST

## 2022-03-08 PROCEDURE — 80185 ASSAY OF PHENYTOIN TOTAL: CPT

## 2022-03-08 PROCEDURE — 87636 SARSCOV2 & INF A&B AMP PRB: CPT

## 2022-03-08 PROCEDURE — 97112 NEUROMUSCULAR REEDUCATION: CPT

## 2022-03-08 PROCEDURE — 36415 COLL VENOUS BLD VENIPUNCTURE: CPT

## 2022-03-08 PROCEDURE — 82962 GLUCOSE BLOOD TEST: CPT

## 2022-03-08 PROCEDURE — 74011250637 HC RX REV CODE- 250/637: Performed by: NURSE PRACTITIONER

## 2022-03-08 PROCEDURE — 84300 ASSAY OF URINE SODIUM: CPT

## 2022-03-08 PROCEDURE — 94760 N-INVAS EAR/PLS OXIMETRY 1: CPT

## 2022-03-08 PROCEDURE — 65660000000 HC RM CCU STEPDOWN

## 2022-03-08 PROCEDURE — 80069 RENAL FUNCTION PANEL: CPT

## 2022-03-08 PROCEDURE — 83935 ASSAY OF URINE OSMOLALITY: CPT

## 2022-03-08 PROCEDURE — 83930 ASSAY OF BLOOD OSMOLALITY: CPT

## 2022-03-08 PROCEDURE — 74011250636 HC RX REV CODE- 250/636: Performed by: NURSE PRACTITIONER

## 2022-03-08 PROCEDURE — 85025 COMPLETE CBC W/AUTO DIFF WBC: CPT

## 2022-03-08 PROCEDURE — 74011000250 HC RX REV CODE- 250: Performed by: NURSE PRACTITIONER

## 2022-03-08 RX ORDER — DEXAMETHASONE 1 MG/1
1 TABLET ORAL EVERY 12 HOURS
Status: DISCONTINUED | OUTPATIENT
Start: 2022-03-08 | End: 2022-03-09

## 2022-03-08 RX ADMIN — CARVEDILOL 12.5 MG: 12.5 TABLET, FILM COATED ORAL at 17:39

## 2022-03-08 RX ADMIN — PHENYTOIN 100 MG: 50 TABLET, CHEWABLE ORAL at 08:27

## 2022-03-08 RX ADMIN — LISINOPRIL 5 MG: 5 TABLET ORAL at 08:28

## 2022-03-08 RX ADMIN — PHENYTOIN 100 MG: 50 TABLET, CHEWABLE ORAL at 22:40

## 2022-03-08 RX ADMIN — TORSEMIDE 100 MG: 100 TABLET ORAL at 08:28

## 2022-03-08 RX ADMIN — Medication 2 UNITS: at 11:50

## 2022-03-08 RX ADMIN — LEVETIRACETAM 1000 MG: 500 TABLET, FILM COATED ORAL at 08:28

## 2022-03-08 RX ADMIN — Medication 2 UNITS: at 08:27

## 2022-03-08 RX ADMIN — DEXAMETHASONE 1 MG: 1 TABLET ORAL at 22:40

## 2022-03-08 RX ADMIN — PHENYTOIN 100 MG: 50 TABLET, CHEWABLE ORAL at 16:24

## 2022-03-08 RX ADMIN — DEXAMETHASONE 1 MG: 1 TABLET ORAL at 09:12

## 2022-03-08 RX ADMIN — FAMOTIDINE 20 MG: 20 TABLET, FILM COATED ORAL at 22:40

## 2022-03-08 RX ADMIN — DEXAMETHASONE SODIUM PHOSPHATE 1 MG: 4 INJECTION, SOLUTION INTRAMUSCULAR; INTRAVENOUS at 05:36

## 2022-03-08 RX ADMIN — SODIUM CHLORIDE, PRESERVATIVE FREE 10 ML: 5 INJECTION INTRAVENOUS at 05:36

## 2022-03-08 RX ADMIN — LEVETIRACETAM 1000 MG: 500 TABLET, FILM COATED ORAL at 22:40

## 2022-03-08 RX ADMIN — FAMOTIDINE 20 MG: 20 TABLET, FILM COATED ORAL at 08:28

## 2022-03-08 RX ADMIN — CARVEDILOL 12.5 MG: 12.5 TABLET, FILM COATED ORAL at 08:37

## 2022-03-08 NOTE — PROGRESS NOTES
Transition of Care Plan: [facility]      RUR: 10%     PCP F/U: Dr Christopher      Disposition: [facility]     Transportation: BLS     Main Contact: Sister Nestor Fried [id number], Mother Goldie Menard ([place]) [phone]     1048: Auth received for IPR. [facility] has a bed today. NP wants to check sodium level before discharge. Will need COVID PCR. Have asked RN to obtain. EMTALA info pending. 1222: Not medically ready today d/t sodium levels.  Let Chago Ray at [facility] know.   Cathi Suarez, VINNY, CRM

## 2022-03-08 NOTE — PROGRESS NOTES
[facility]  Hospitalist Group                                                                                          Hospitalist Progress Note  Fco Munoz MD  Answering service: [phone] OR [phone] from in house phone        Date of Service:  3/8/2022  NAME:  Nan Almonte  :  1975  MRN:  [id number]      Admission Summary:   Nan Almonte is a 51 y.o. male with PMH of chronic systolic heart failure,HTN,DM and other comorbidities admitted to ICU after Right parietal craniotomy, use of neuronavigation BrainLAB, and removal of primary glioma. .  We are asked to see the patient in consult to assist in managing HTN,DM.     He denied any chest pain,SOB,nausea/vomiting,headache today    Interval history / Subjective:   Seen for follow-up of consult for medical management. Sitting up,denies acute compliants       Assessment & Plan: Active Problems:    Brain mass (2022)        # Right  brain mass:  -s/p Right parietal craniotomy, use of neuronavigation BrainLAB, and removal of primary glioma.  -final path pending  On keppra and decadron  Mx per primary team           #Steroid induced hyperglycemia:  Prediabetes  -monitor glucose level, SSI  - BG improving      #HTN:  BP controlled on current regimen    Hponatremia  -Renal following,suspect secondary to SIADH from cns lesion  .     #Chronic systolic heart failure:  -on GDMT   -No symptoms        #Obesity:  Body mass index is 54.42 kg/m². Code status: full  DVT prophylaxis: scd    Care Plan discussed with: Patient/Family and Nurse  Anticipated Disposition: Home w/Family and TBD  Anticipated Discharge: 24 hours to 48 hours     Hospital Problems  Date Reviewed: 2022          Codes Class Noted POA    Brain mass ICD-10-CM: G93.89  ICD-9-CM: 348.89  2022 Unknown                Review of Systems:   A comprehensive review of systems was negative except for that written in the HPI.        Vital Signs:    Last 24hrs VS reviewed since prior progress note. Most recent are:  Visit Vitals  /79 (BP 1 Location: Right lower arm, BP Patient Position: At rest)   Pulse 65   Temp 97.7 °F (36.5 °C)   Resp 24   Ht 5' 4\" (1.626 m)   Wt 136.2 kg (300 lb 4.3 oz)   SpO2 95%   BMI 51.54 kg/m²         Intake/Output Summary (Last 24 hours) at 3/8/2022 7264  Last data filed at 3/8/2022 0535  Gross per 24 hour   Intake --   Output 450 ml   Net -450 ml        Physical Examination:     I had a face to face encounter with this patient and independently examined them on 3/8/2022 as outlined below:          Constitutional:  No acute distress, cooperative, pleasant . ENT:  Oral mucosa moist,EOMI,anicteric sclera   Resp:  CTA bilaterally. No wheezing/rhonchi/rales. No accessory muscle use   CV:  Regular rhythm, normal rate, no murmurs, gallops, rubs    GI:  Soft, non distended, non tender. normoactive bowel sounds, no hepatosplenomegaly     Musculoskeletal:  No LE edema    Neurologic:  Moves all extremities. AAOx3 , minimal movement LUE,             Data Review:    Review and/or order of clinical lab test  Review and/or order of tests in the medicine section of CPT      Labs:     Recent Labs     03/08/22  0202   WBC 20.8*   HGB 16.5   HCT 50.3        Recent Labs     03/08/22  0202   *   K 3.5   CL 88*   CO2 32   BUN 31*   CREA 1.14   *   CA 8.4*   PHOS 3.3     Recent Labs     03/08/22  0202   ALB 2.9*     No results for input(s): INR, PTP, APTT, INREXT, INREXT in the last 72 hours. No results for input(s): FE, TIBC, PSAT, FERR in the last 72 hours. No results found for: FOL, RBCF   No results for input(s): PH, PCO2, PO2 in the last 72 hours. No results for input(s): CPK, CKNDX, TROIQ in the last 72 hours.     No lab exists for component: CPKMB  Lab Results   Component Value Date/Time    Cholesterol, total 180 02/23/2022 05:18 AM    HDL Cholesterol 36 02/23/2022 05:18 AM    LDL, calculated 126.8 (H) 02/23/2022 05:18 AM    Triglyceride 86 02/23/2022 05:18 AM    CHOL/HDL Ratio 5.0 02/23/2022 05:18 AM     Lab Results   Component Value Date/Time    Glucose (POC) 160 (H) 03/08/2022 07:13 AM    Glucose (POC) 126 (H) 03/07/2022 05:00 PM    Glucose (POC) 119 (H) 03/07/2022 11:56 AM    Glucose (POC) 209 (H) 03/07/2022 06:27 AM    Glucose (POC) 144 (H) 03/06/2022 11:47 PM     No results found for: COLOR, APPRN, SPGRU, REFSG, MARCELLO, PROTU, GLUCU, KETU, BILU, UROU, COREY, LEUKU, GLUKE, EPSU, BACTU, WBCU, RBCU, CASTS, UCRY      Medications Reviewed:     Current Facility-Administered Medications   Medication Dose Route Frequency    dexamethasone (DECADRON) 4 mg/mL injection 1 mg  1 mg Oral Q8H    phenytoin (DILANTIN) chewable tablet 100 mg  100 mg Oral TID    insulin lispro (HUMALOG) injection   SubCUTAneous AC&HS    glucose chewable tablet 16 g  4 Tablet Oral PRN    glucagon (GLUCAGEN) injection 1 mg  1 mg IntraMUSCular PRN    levETIRAcetam (KEPPRA) tablet 1,000 mg  1,000 mg Oral Q12H    labetaloL (NORMODYNE;TRANDATE) injection 10 mg  10 mg IntraVENous Q15MIN PRN    Or    hydrALAZINE (APRESOLINE) 20 mg/mL injection 10 mg  10 mg IntraVENous Q15MIN PRN    torsemide (DEMADEX) tablet 100 mg  100 mg Oral DAILY    famotidine (PEPCID) tablet 20 mg  20 mg Oral Q12H    carvediloL (COREG) tablet 12.5 mg  12.5 mg Oral BID    lisinopriL (PRINIVIL, ZESTRIL) tablet 5 mg  5 mg Oral DAILY    sodium chloride (NS) flush 5-40 mL  5-40 mL IntraVENous Q8H    sodium chloride (NS) flush 5-40 mL  5-40 mL IntraVENous PRN    acetaminophen (TYLENOL) tablet 650 mg  650 mg Oral Q4H PRN    HYDROcodone-acetaminophen (NORCO) 5-325 mg per tablet 1 Tablet  1 Tablet Oral Q4H PRN    morphine injection 2 mg  2 mg IntraVENous Q2H PRN    ondansetron (ZOFRAN) injection 4 mg  4 mg IntraVENous Q4H PRN    dextrose 10 % infusion 0-250 mL  0-250 mL IntraVENous PRN    LORazepam (ATIVAN) injection 2 mg  2 mg IntraVENous Q2H PRN ______________________________________________________________________  EXPECTED LENGTH OF STAY: 6d 14h  ACTUAL LENGTH OF STAY:          6                 Eleonora Spring MD

## 2022-03-08 NOTE — CONSULTS
51y/o male with ?high grade glioma is seen for med onc eval.  Pt  Examined chart reviewed   Full consult to follow  Malik Tse MD

## 2022-03-08 NOTE — PROGRESS NOTES
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 HOME SUPPORT: Reports he just bought a house but plans to stay with his sister upon d/c. Occupational Therapy Goals  Initiated 3/3/2022   1. Patient will perform self-feeding with minimal assistance/contact guard assist within 7 day(s). 2.  Patient will perform at least one standing grooming task with moderate assistance within 7 day(s). 3.  Patient will perform lower body dressing with maximal assistance & AE PRN within 7 day(s). 4.  Patient will perform upper body dressing with minimal assistance & AE PRN within 7 day(s). 5.  Patient will perform bathing with moderate assistance & AE PRN within 7 day(s). 6.  Patient will perform toilet transfers with moderate assistance within 7 day(s). 7.  Patient will perform all aspects of toileting with moderate assistance within 7 day(s). 8.  Patient will participate in upper extremity therapeutic exercise/activities with supervision/set-up within 7 day(s). 9.  Patient will utilize energy conservation techniques during functional activities with verbal and visual cues within 7 day(s). 10.  Patient will improve their Fugl Cannon score by 5 points in prep for ADLs within 7 days. Outcome: Progressing Towards Goal     OCCUPATIONAL THERAPY TREATMENT  Patient: Jd Cisneros (75 y.o. male)  Date: 3/8/2022  Diagnosis: Brain mass [G93.89] <principal problem not specified>  Procedure(s) (LRB):  AIRO GUIDED RIGHT PARIETAL CRANIOTOMY AND TUMOR REMOVAL (Right) 6 Days Post-Op  Precautions: Fall,Seizure (SBP <140)  Chart, occupational therapy assessment, plan of care, and goals were reviewed.     ASSESSMENT  Patient continues with skilled OT services and is progressing towards goals however remains limited by decreased LUE > LLE function, activity and standing tolerance, attention to the L, coordination, balance, and safety awareness, as well as back pain with standing neuromuscular reeducation tasks. Continues to improve with LUE function against gravity however limited by neuromuscular fatigue, increased time for activation with continued activity. Continues to require min assist of 1-2 for dynamic balance with cross-body reaching and weight shifting with BUE support (occasional assist for LUE placement). He remains an excellent IPR candidate, motivated to progress and can tolerate 3 hours/day of therapy. Current Level of Function Impacting Discharge (ADLs): min-total assist for ADLs, min-mod A x2 for OOB mobility    Other factors to consider for discharge: high PLOF, acute neuro deficits, oncology follow up         PLAN :  Patient continues to benefit from skilled intervention to address the above impairments. Continue treatment per established plan of care to address goals. Recommend with staff: Recommend with nursing, ADLs with assist PRN, OOB to chair 3x/day and toileting via beside commode and 2 assist . Thank you for completing as able in order to maintain patient strength, endurance and independence. Recommendation for discharge: (in order for the patient to meet his/her long term goals)  Therapy 3 hours per day 5-7 days per week    This discharge recommendation:  Has been made in collaboration with the attending provider and/or case management    IF patient discharges home will need the following DME: To be determined (TBD)         SUBJECTIVE:   Patient stated This is hard work.     OBJECTIVE DATA SUMMARY:   Cognitive/Behavioral Status:  Neurologic State: Alert;Eyes open spontaneously  Orientation Level: Oriented X4  Cognition: Appropriate for age attention/concentration             Functional Mobility and Transfers for ADLs:  Bed Mobility:  Scooting: Stand-by assistance    Transfers:  Sit to Stand: Minimum assistance          Balance:  Sitting: Impaired  Sitting - Static: Good (unsupported)  Sitting - Dynamic: Fair (occasional)  Standing: Impaired; With support  Standing - Static: Fair;Constant support  Standing - Dynamic : Poor;Constant support    ADL Intervention:      Upper Body Dressing Assistance  Dressing Assistance: Moderate assistance  Front Opened Shirt: Moderate assistance; Compensatory technique training (A for LUE coordination & around the back)    Neuro Re-Education:   - Attention to LUE with transfer and weightbearing on countertop support--mod cues and mod facilitation for digital extension & joint protection, as well as implementing RUE AAROM of LUE when reached maximum ROM   - LUE AROM--against gravity, slowly improving proximal AROM with increased time and effort, mod-max cues for visual attention to LUE, increased time for muscle activation (distal>proximal); increased difficulty with wrist & forearm activation, able to grasp 1 ADL item with LUE with assist for gravity eliminated positioning and increased time for digital extension   - Standing tolerance & balance--static with BUE support; dynamic with cross body reaching & weight shifting to the L & R with mod cues for coordination and equal BLE weightbearing (see PT note for details)    Pain:  Back pain reported requiring 2 seated rest breaks    Activity Tolerance:   Good and requires rest breaks    After treatment patient left in no apparent distress:   Sitting in chair, Call bell within reach, and Caregiver / family present    COMMUNICATION/COLLABORATION:   The patients plan of care was discussed with: Physical therapist and Registered nurse.      Patient was educated regarding His deficit(s) of decreased LUE > LLE function, activity and standing tolerance, attention to the L, coordination, balance, and safety awareness, as well as back pain as this relates to His diagnosis of s/p crani with tumor removal.  He demonstrated Good understanding as evidenced by verbal discussion & carryover with activity. Patient and/or family was verbally educated on the BE FAST acronym for signs/symptoms of CVA and TIA. BE FAST was written on patient's communication board  for visual education and reinforcement. All questions answered with patient indicating good understanding.      DMITRIY Valenzuela, OTR/L  Time Calculation: 20 mins

## 2022-03-08 NOTE — PROGRESS NOTES
Problem: Mobility Impaired (Adult and Pediatric)  Goal: *Acute Goals and Plan of Care (Insert Text)  Description: FUNCTIONAL STATUS PRIOR TO ADMISSION: Pt was independent without the use of DME.     HOME SUPPORT PRIOR TO ADMISSION: Pt was living with his sister and plans to return there. Physical Therapy Goals  Initiated 3/3/2022  1. Patient will move from supine to sit and sit to supine , scoot up and down, and roll side to side in bed with contact guard assist within 7 day(s). 2.  Patient will transfer from bed to chair and chair to bed with Leah x2 using the least restrictive device within 7 day(s). 3.  Patient will perform sit to stand with CGA within 7 day(s). 4.  Patient will ambulate with Leah x2 for 50 feet with the least restrictive device within 7 day(s). Outcome: Progressing Towards Goal   PHYSICAL THERAPY TREATMENT  Patient: Ursula Pozo (89 y.o. male)  Date: 3/8/2022  Diagnosis: Brain mass [G93.89] <principal problem not specified>  Procedure(s) (LRB):  AIRO GUIDED RIGHT PARIETAL CRANIOTOMY AND TUMOR REMOVAL (Right) 6 Days Post-Op  Precautions: Fall,Seizure (SBP <140)  Chart, physical therapy assessment, plan of care and goals were reviewed. ASSESSMENT  Patient continues with skilled PT services and is progressing towards goals. Pt presents with impaired balance, L sided weakness, unsteady gait and decreased activity tolerance (required frequent seated rest breaks). Session focuses on lateral weight shifting in standing. Pt require Leah for sit to stand from chair. Provided with manual facilitation in static standing to prevent L knee buckling and L knee hyperextension. Performed dynamic standing reaching activities and was given visual and VC to weight shift to the L - pt attempting to compensate by twisting instead of weight shifting L but was able to perform with cuing/correction. Required modA to maintain standing when weight shifting to the L. Recommending IPR upon discharge. Current Level of Function Impacting Discharge (mobility/balance): Leah to sit>stand     Other factors to consider for discharge: motivated, L sided weakness, independent baseline, fall risk          PLAN :  Patient continues to benefit from skilled intervention to address the above impairments. Continue treatment per established plan of care. to address goals. Recommendation for discharge: (in order for the patient to meet his/her long term goals)  Therapy 3 hours per day 5-7 days per week    This discharge recommendation:  Has not yet been discussed the attending provider and/or case management    IF patient discharges home will need the following DME: to be determined (TBD)       SUBJECTIVE:   Patient stated \"Hopefully I am going tomorrow.     OBJECTIVE DATA SUMMARY:   Critical Behavior:  Neurologic State: Alert,Eyes open spontaneously  Orientation Level: Oriented X4  Cognition: Appropriate for age attention/concentration  Safety/Judgement: Decreased awareness of environment,Decreased awareness of need for assistance,Decreased awareness of need for safety,Decreased insight into deficits  Functional Mobility Training:          Transfers:  Sit to Stand: Minimum assistance  Stand to Sit: Minimum assistance          Balance:  Sitting: Impaired  Sitting - Static: Good (unsupported)  Sitting - Dynamic: Fair (occasional)  Standing: Impaired; With support  Standing - Static: Fair;Constant support  Standing - Dynamic : Poor;Constant support      Activity Tolerance:   Fair    After treatment patient left in no apparent distress:   Sitting in chair, Call bell within reach, and Caregiver / family present    COMMUNICATION/COLLABORATION:   The patients plan of care was discussed with: Occupational therapist and Registered nurse. EAMON Andrade   Time Calculation: 23 mins        Regarding student involvement in patient care:  A student participated in this treatment session.  Per CMS Medicare statements and APTA guidelines I certify that the following was true:  1. I was present and directly observed the entire session. 2. I made all skilled judgments and clinical decisions regarding care. 3. I am the practitioner responsible for assessment, treatment, and documentation.

## 2022-03-08 NOTE — PROGRESS NOTES
0730: Bedside shift change report given to Linus Casarez (oncoming nurse) by Arden Ernst (offgoing nurse). Report included the following information SBAR, Kardex, MAR and Cardiac Rhythm NSR.

## 2022-03-08 NOTE — CONSULTS
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 This gentleman presented with vision change and weakness in the left arm and leg. He underwent CT scan and was found to have a large right temporoparietal lesion and was transferred to [address] [place] [address] for craniotomy. He ended up having surgery on 2022 and resection of a 4 x 2.5 cm right parietal lobe lesion took place. He has done well postoperatively. The pathology is not complete, though it is suspicious for high-grade glioma. PAST MEDICAL HISTORY:  Includes,  1. Congestive heart failure, LVEF of 35%. 2.  Hypertension. 3.  Diabetes. 4.  Morbid obesity. PAST SURGICAL HISTORY:  Negative. SOCIAL HISTORY:  He is single. He has 12 children. 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 He has no history of drug or alcohol abuse. FAMILY HISTORY:  Noncontributory. REVIEW OF SYSTEMS:  As noted above, otherwise noncontributory. ALLERGIES:  NO KNOWN ALLERGIES. PHYSICAL EXAMINATION:  GENERAL:  He is a pleasant overweight gentleman, in no apparent distress. HEENT:  He has a healing right craniotomy surgical incision. EOMI. Nonicteric sclerae. NECK:  Supple. LUNGS:  Clear. CARDIAC:  Regular rate and rhythm. ABDOMEN:  Protuberant, nontender. No organomegaly. EXTREMITIES:  No edema. NEUROLOGIC:  A and O x3. Cranial nerves II through XII are intact. He has 4/5 muscle strength in the left upper and left lower extremities, otherwise normal.    IMPRESSION:  Right temporoparietal brain tumor, suspicious for high-grade glioma.     PLAN:  We await the final path before offering a final recommendation regarding treatment plans. This gentleman lives in [place], [place], which will present some difficulties in terms of travel back and forth for radiation. We will continue to follow with you while he is in the hospital on behalf of [facility].       Queenie Feng MD      [email or web address]  D:  03/08/2022 9:08  T:  03/08/2022 10:17  JOB #:  [id number]

## 2022-03-09 LAB
ALBUMIN SERPL-MCNC: 3.1 G/DL (ref 3.5–5)
ANION GAP SERPL CALC-SCNC: 5 MMOL/L (ref 5–15)
ANION GAP SERPL CALC-SCNC: 8 MMOL/L (ref 5–15)
BUN SERPL-MCNC: 32 MG/DL (ref 6–20)
BUN SERPL-MCNC: 32 MG/DL (ref 6–20)
BUN/CREAT SERPL: 26 (ref 12–20)
BUN/CREAT SERPL: 33 (ref 12–20)
CALCIUM SERPL-MCNC: 8.4 MG/DL (ref 8.5–10.1)
CALCIUM SERPL-MCNC: 8.5 MG/DL (ref 8.5–10.1)
CHLORIDE SERPL-SCNC: 88 MMOL/L (ref 97–108)
CHLORIDE SERPL-SCNC: 90 MMOL/L (ref 97–108)
CO2 SERPL-SCNC: 31 MMOL/L (ref 21–32)
CO2 SERPL-SCNC: 33 MMOL/L (ref 21–32)
CREAT SERPL-MCNC: 0.97 MG/DL (ref 0.7–1.3)
CREAT SERPL-MCNC: 1.25 MG/DL (ref 0.7–1.3)
GLUCOSE BLD STRIP.AUTO-MCNC: 110 MG/DL (ref 65–117)
GLUCOSE BLD STRIP.AUTO-MCNC: 111 MG/DL (ref 65–117)
GLUCOSE BLD STRIP.AUTO-MCNC: 131 MG/DL (ref 65–117)
GLUCOSE BLD STRIP.AUTO-MCNC: 179 MG/DL (ref 65–117)
GLUCOSE SERPL-MCNC: 135 MG/DL (ref 65–100)
GLUCOSE SERPL-MCNC: 149 MG/DL (ref 65–100)
PHOSPHATE SERPL-MCNC: 3.6 MG/DL (ref 2.6–4.7)
POTASSIUM SERPL-SCNC: 3.6 MMOL/L (ref 3.5–5.1)
POTASSIUM SERPL-SCNC: 3.9 MMOL/L (ref 3.5–5.1)
SERVICE CMNT-IMP: ABNORMAL
SERVICE CMNT-IMP: ABNORMAL
SERVICE CMNT-IMP: NORMAL
SERVICE CMNT-IMP: NORMAL
SODIUM SERPL-SCNC: 126 MMOL/L (ref 136–145)
SODIUM SERPL-SCNC: 129 MMOL/L (ref 136–145)

## 2022-03-09 PROCEDURE — 97535 SELF CARE MNGMENT TRAINING: CPT

## 2022-03-09 PROCEDURE — 74011000250 HC RX REV CODE- 250: Performed by: NURSE PRACTITIONER

## 2022-03-09 PROCEDURE — 36415 COLL VENOUS BLD VENIPUNCTURE: CPT

## 2022-03-09 PROCEDURE — 94760 N-INVAS EAR/PLS OXIMETRY 1: CPT

## 2022-03-09 PROCEDURE — 74011250637 HC RX REV CODE- 250/637: Performed by: ANESTHESIOLOGY

## 2022-03-09 PROCEDURE — 80069 RENAL FUNCTION PANEL: CPT

## 2022-03-09 PROCEDURE — 74011250636 HC RX REV CODE- 250/636: Performed by: NURSE PRACTITIONER

## 2022-03-09 PROCEDURE — 65660000000 HC RM CCU STEPDOWN

## 2022-03-09 PROCEDURE — 74011250637 HC RX REV CODE- 250/637: Performed by: INTERNAL MEDICINE

## 2022-03-09 PROCEDURE — 82962 GLUCOSE BLOOD TEST: CPT

## 2022-03-09 PROCEDURE — 97112 NEUROMUSCULAR REEDUCATION: CPT

## 2022-03-09 PROCEDURE — 74011250637 HC RX REV CODE- 250/637: Performed by: NURSE PRACTITIONER

## 2022-03-09 PROCEDURE — 80048 BASIC METABOLIC PNL TOTAL CA: CPT

## 2022-03-09 PROCEDURE — 74011636637 HC RX REV CODE- 636/637: Performed by: HOSPITALIST

## 2022-03-09 RX ADMIN — PHENYTOIN 100 MG: 50 TABLET, CHEWABLE ORAL at 09:08

## 2022-03-09 RX ADMIN — CARVEDILOL 12.5 MG: 12.5 TABLET, FILM COATED ORAL at 09:08

## 2022-03-09 RX ADMIN — SODIUM CHLORIDE, PRESERVATIVE FREE 10 ML: 5 INJECTION INTRAVENOUS at 05:15

## 2022-03-09 RX ADMIN — HYDROCODONE BITARTRATE AND ACETAMINOPHEN 1 TABLET: 5; 325 TABLET ORAL at 05:14

## 2022-03-09 RX ADMIN — FAMOTIDINE 20 MG: 20 TABLET, FILM COATED ORAL at 09:08

## 2022-03-09 RX ADMIN — PHENYTOIN 100 MG: 50 TABLET, CHEWABLE ORAL at 16:03

## 2022-03-09 RX ADMIN — Medication 2 UNITS: at 17:29

## 2022-03-09 RX ADMIN — LISINOPRIL 5 MG: 5 TABLET ORAL at 09:08

## 2022-03-09 RX ADMIN — TORSEMIDE 100 MG: 100 TABLET ORAL at 09:08

## 2022-03-09 RX ADMIN — LEVETIRACETAM 1000 MG: 500 TABLET, FILM COATED ORAL at 09:07

## 2022-03-09 RX ADMIN — LEVETIRACETAM 1000 MG: 500 TABLET, FILM COATED ORAL at 22:54

## 2022-03-09 RX ADMIN — SODIUM CHLORIDE, PRESERVATIVE FREE 10 ML: 5 INJECTION INTRAVENOUS at 05:16

## 2022-03-09 RX ADMIN — FAMOTIDINE 20 MG: 20 TABLET, FILM COATED ORAL at 22:54

## 2022-03-09 RX ADMIN — Medication 15 MG: at 12:40

## 2022-03-09 RX ADMIN — DEXAMETHASONE 1 MG: 1 TABLET ORAL at 09:08

## 2022-03-09 RX ADMIN — SODIUM CHLORIDE, PRESERVATIVE FREE 10 ML: 5 INJECTION INTRAVENOUS at 22:56

## 2022-03-09 RX ADMIN — PHENYTOIN 100 MG: 50 TABLET, CHEWABLE ORAL at 22:54

## 2022-03-09 NOTE — PROGRESS NOTES
Neurosurgery Progress Note  Juan Louise, [facility]          Admit Date: 3/2/2022   LOS: 7 days        Daily Progress Note: 3/9/2022    POD: 7    S/P: Procedure(s):  AIRO GUIDED RIGHT PARIETAL CRANIOTOMY AND TUMOR REMOVAL    HPI: The patient presented to the hospital last week due to intermittent left leg weakness. His head CT revealed cerebral edema and he transferred to [facility]. His MRI brain revealed a large right parietal brain mass and hi CT scans of his body showed no other evidence of disease. He was discharged to home on steroids and keppra with the intent of a planned readmission for surgery today. He underwent a right parietal craniotomy with resection of tumor on 22. In the recovery room, he states he could not move his left arm or left leg. In the ICU, his left leg has begun working again to where he is antigravity and able to lift it off the bed. He cannot move his LUE. While in the room, he began having a focal seizure of his left arm with rhythmic movements that terminated without intervention after about 20 seconds. Subjective:   Pt sodium 126 this morning despite 1200 cc fluid restriction yesterday. Nephrology plans to give Tolvaptan today and will need a lab checked again in 6 hours. Denies chest pain, leg pain, nausea, vomiting, difficulty swallowing, and dyspnea. Objective:     Vital signs  Temp (24hrs), Av.9 °F (36.6 °C), Min:97.5 °F (36.4 °C), Max:98.3 °F (36.8 °C)   No intake/output data recorded.    1901 -  0700  In: 900 [P.O.:900]  Out: 3250 [Urine:3250]    Visit Vitals  BP (!) 142/78   Pulse 78   Temp 98 °F (36.7 °C)   Resp 16   Ht 5' 4\" (1.626 m)   Wt 136.2 kg (300 lb 4.3 oz)   SpO2 95%   BMI 51.54 kg/m²    O2 Flow Rate (L/min): 0 l/min O2 Device: None (Room air)     Pain control  Pain Assessment  Pain Scale 1: Numeric (0 - 10)  Pain Intensity 1: 0  Pain Onset 1: post op  Pain Location 1: Head  Pain Orientation 1: Other (comment)  Pain Description 1: Constant  Pain Intervention(s) 1: Medication (see MAR),Emotional support,Environmental changes,Repositioned    PT/OT  Gait     Gait  Base of Support: Narrowed  Gait Abnormalities: Trunk sway increased (L knee buckling and hyperextension)  Ambulation - Level of Assistance: Moderate assistance,Assist x2  Distance (ft): 3 Feet (ft) (x3 reps )  Assistive Device: Gait belt           Physical Exam:  Gen:NAD. Neuro: A&Ox3. Follows commands. Speech clear. Affect bright  PERRL. EOMI. Face symmetric. Tongue midline. RUE/RLE 5/5, LUE 3/5 hand intrinsics, 3/5 biceps, triceps, 2/5 deltoid, LLE 4+/5  Sensation decreased on left. Gait deferred. Skin: Right scalp incision C/D/I with sutures in place. No erythema, edema, or drainage. CT head without contrast on 03/03/22 shows postop right craniotomy and tumor resection. No hemorrhage. Decreased midline shift. MRI brain with and without contrast on 03/03/22 shows no unusual postoperative findings after subtotal resection right posterior frontal/parietal suspected GBM.     24 hour results:    Recent Results (from the past 24 hour(s))   METABOLIC PANEL, BASIC    Collection Time: 03/08/22  9:37 AM   Result Value Ref Range    Sodium 127 (L) 136 - 145 mmol/L    Potassium 3.5 3.5 - 5.1 mmol/L    Chloride 88 (L) 97 - 108 mmol/L    CO2 34 (H) 21 - 32 mmol/L    Anion gap 5 5 - 15 mmol/L    Glucose 140 (H) 65 - 100 mg/dL    BUN 28 (H) 6 - 20 MG/DL    Creatinine 0.96 0.70 - 1.30 MG/DL    BUN/Creatinine ratio 29 (H) 12 - 20      GFR est AA >60 >60 ml/min/1.73m2    GFR est non-AA >60 >60 ml/min/1.73m2    Calcium 8.7 8.5 - 10.1 MG/DL   OSMOLALITY, SERUM/PLASMA    Collection Time: 03/08/22  9:37 AM   Result Value Ref Range    Osmolality, serum/plasma 290 mOsm/kg H2O   SARS-COV-2    Collection Time: 03/08/22  9:39 AM   Result Value Ref Range    SARS-CoV-2 Please find results under separate order     COVID-19 RAPID TEST    Collection Time: 03/08/22  9:39 AM   Result Value Ref Range Specimen source Nasopharyngeal      COVID-19 rapid test Not detected NOTD     SODIUM, UR, RANDOM    Collection Time: 03/08/22 10:10 AM   Result Value Ref Range    Sodium,urine random 92 MMOL/L   OSMOLALITY, UR    Collection Time: 03/08/22 10:10 AM   Result Value Ref Range    Osmolality,urine 288 MOSM/kg H2O   GLUCOSE, POC    Collection Time: 03/08/22 11:38 AM   Result Value Ref Range    Glucose (POC) 167 (H) 65 - 117 mg/dL    Performed by Guillermina Luu (CON)    COVID-19 WITH INFLUENZA A/B    Collection Time: 03/08/22 11:51 AM   Result Value Ref Range    SARS-CoV-2 Not detected NOTD      Influenza A by PCR Not detected NOTD      Influenza B by PCR Not detected NOTD     GLUCOSE, POC    Collection Time: 03/08/22  4:19 PM   Result Value Ref Range    Glucose (POC) 127 (H) 65 - 117 mg/dL    Performed by Guillermina Luu (CON)    GLUCOSE, POC    Collection Time: 03/08/22 10:16 PM   Result Value Ref Range    Glucose (POC) 149 (H) 65 - 117 mg/dL    Performed by [address] [place], BASIC    Collection Time: 03/09/22  5:00 AM   Result Value Ref Range    Sodium 126 (L) 136 - 145 mmol/L    Potassium 3.9 3.5 - 5.1 mmol/L    Chloride 90 (L) 97 - 108 mmol/L    CO2 31 21 - 32 mmol/L    Anion gap 5 5 - 15 mmol/L    Glucose 135 (H) 65 - 100 mg/dL    BUN 32 (H) 6 - 20 MG/DL    Creatinine 0.97 0.70 - 1.30 MG/DL    BUN/Creatinine ratio 33 (H) 12 - 20      GFR est AA >60 >60 ml/min/1.73m2    GFR est non-AA >60 >60 ml/min/1.73m2    Calcium 8.4 (L) 8.5 - 10.1 MG/DL   GLUCOSE, POC    Collection Time: 03/09/22  6:53 AM   Result Value Ref Range    Glucose (POC) 110 65 - 117 mg/dL    Performed by Sherley Conti           Assessment:     Active Problems:    Brain mass (2/21/2022)        Plan:   1. Right parietal brain mass   - s/p crani 03/02   - decadron completed   - cont keppra and dilantin   - PT/OT   - Pathology pending.    -Med onc and rad onc consulted. Tx would not start for 3-4 weeks  2.  Cerebral edema with brain compression   - due to #1   - plans as above  3. Hx of CHF   - EF of 35% on last ECHO   - cont Coreg   - Hospitalist following  4. Focal seizures   - due to #1, 2   - cont keppra to 1000 mg bid   - PO dilantin 100 mg tid. Phenytoin level is therapeutic   - ativan PRN for seizures lasting greater than 2 minutes or more than 3 in an hour. 5. HTN   - SBP<140   - cont coreg, lisinopril from home. Torsemide restarted. - Hospitalist following  6. Diabetes mellitus, type 2   - -167   - SSI and accu-checks   - Hospitalist following  7. Morbid obesity as defined by BMI greater than 40   - Body mass index is 51.54 kg/m². - Diet and exercise counseling as appropriate  8. Hyponatremia   - due to SIADH   - Na 126   - Nephrology assistance appreciated. Pt will get a dose of Tolvaptan today. His fluid restriction will be lifted today and restart again tomorrow during the day. Activity: up with assist  DVT ppx: SCDs  Dispo: inpt rehab    Plan d/w Dr. Nohemy Gross, nurse, case management, nephrology. Will need to hold one more night. Hopefully, pt will be able to transfer to rehab tomorrow.     Pepe Geller NP

## 2022-03-09 NOTE — PROGRESS NOTES
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 HOME SUPPORT: Reports he just bought a house but plans to stay with his sister upon d/c. Occupational Therapy Goals  Initiated 3/3/2022   1. Patient will perform self-feeding with minimal assistance/contact guard assist within 7 day(s). 2.  Patient will perform at least one standing grooming task with moderate assistance within 7 day(s). 3.  Patient will perform lower body dressing with maximal assistance & AE PRN within 7 day(s). 4.  Patient will perform upper body dressing with minimal assistance & AE PRN within 7 day(s). 5.  Patient will perform bathing with moderate assistance & AE PRN within 7 day(s). 6.  Patient will perform toilet transfers with moderate assistance within 7 day(s). 7.  Patient will perform all aspects of toileting with moderate assistance within 7 day(s). 8.  Patient will participate in upper extremity therapeutic exercise/activities with supervision/set-up within 7 day(s). 9.  Patient will utilize energy conservation techniques during functional activities with verbal and visual cues within 7 day(s). 10.  Patient will improve their Fugl Cannon score by 5 points in prep for ADLs within 7 days. Outcome: Progressing Towards Goal     OCCUPATIONAL THERAPY TREATMENT  Patient: Amirah Santoro (37 y.o. male)  Date: 3/9/2022  Diagnosis: Brain mass [G93.89] <principal problem not specified>  Procedure(s) (LRB):  AIRO GUIDED RIGHT PARIETAL CRANIOTOMY AND TUMOR REMOVAL (Right) 7 Days Post-Op  Precautions: Fall,Seizure (SBP <140)  Chart, occupational therapy assessment, plan of care, and goals were reviewed.     ASSESSMENT  Patient continues with skilled OT services and is progressing towards goals however remains limited by decreased balance, strength, coordination, activity & standing tolerance, LUE>LLE function, safety awareness, and distal lower body access with lower body ADLs and LUE neuro re-education tasks completed this session. He continues to require Min A for transfers d/t LLE instability requiring facilitation, Max A for simulated lower body ADLs with reacher use to minimize back pain & maximize reach, anticipate he will require AE for all distal lower body ADLs. He continues to progress with LUE AROM against gravity, quick NM fatigue with rest breaks required. He remains an excellent IPR candidate, motivated to progress. Current Level of Function Impacting Discharge (ADLs): Min-Total A for ADLs, Min A for transfers    Other factors to consider for discharge: fall risk, PMH, PLOF         PLAN :  Patient continues to benefit from skilled intervention to address the above impairments. Continue treatment per established plan of care to address goals. Recommend with staff: Recommend with nursing, ADLs with assist, OOB to chair 3x/day, and toileting via bedside commode with 2 assist. Thank you for completing as able in order to maintain patient strength, endurance and independence. Recommendation for discharge: (in order for the patient to meet his/her long term goals)  Therapy 3 hours per day 5-7 days per week    This discharge recommendation:  Has been made in collaboration with the attending provider and/or case management    IF patient discharges home will need the following DME: To be determined (TBD)         SUBJECTIVE:   Patient stated I have short legs, it's hard for me to reach.     OBJECTIVE DATA SUMMARY:   Cognitive/Behavioral Status:  Neurologic State: Alert  Orientation Level: Oriented X4  Cognition: Appropriate for age attention/concentration; Follows commands;Poor safety awareness  Perception: Cues to attend to left side of body;Cues to maintain midline in standing  Perseveration: No perseveration noted  Safety/Judgement: Awareness of environment;Decreased awareness of need for assistance;Decreased awareness of need for safety;Decreased insight into deficits    Functional Mobility and Transfers for ADLs:  Bed Mobility:  Scooting: Stand-by assistance    Transfers:  Sit to Stand: Minimum assistance (A for LLE support/facilitation)          Balance:  Sitting: Intact  Standing: Impaired; With support  Standing - Static: Fair;Constant support  Standing - Dynamic : Poor;Constant support    ADL Intervention:     Lower Body Dressing Assistance  Dressing Assistance: Maximum assistance  Pants With Elastic Waist: Maximum assistance; Compensatory technique training (simulated with reacher to doff)  Socks: Total assistance (dependent)  Leg Crossed Method Used: No  Position Performed: Bending forward method;Seated in chair;Standing  Cues: Physical assistance;Verbal cues provided; Tactile cues provided;Visual cues provided      Cognitive Retraining  Executive Functions: Executing cognitive plans  Organizing/Sequencing: Breaking task down  Following Commands: Follows one step commands/directions; Follows two step commands/directions; Follows multi-step simple commands/directions  Safety/Judgement: Awareness of environment;Decreased awareness of need for assistance;Decreased awareness of need for safety;Decreased insight into deficits  Cues: Tactile cues provided;Visual cues provided;Verbal cues provided    Neuro Re-Education:   - LUE AROM against gravity--able to achieve shoulder flexion 90*, abduction 70*, and elbow flexion<extension with wrist support    - Curtis dressing techniques with reacher for lower body ADLs        Pain:  Back pain reported    Activity Tolerance:   Good, requires rest break in between activities (kate LUE tasks)    After treatment patient left in no apparent distress:   Sitting in chair, Call bell within reach, and Caregiver / family present    COMMUNICATION/COLLABORATION:   The patients plan of care was discussed with: Physical therapist and Registered nurse. Patient was educated regarding His deficit(s) of decreased balance, strength, coordination, activity & standing tolerance, LUE>LLE function, safety awareness, and distal lower body access as this relates to His diagnosis of s/p crani with tumor removal.  He demonstrated Good understanding as evidenced by verbal discussion & carryover. Patient and/or family was verbally educated on the BE FAST acronym for signs/symptoms of CVA and TIA. BE FAST was written on patient's communication board  for visual education and reinforcement. All questions answered with patient indicating good understanding.      DMITRIY Morton, OTR/L  Time Calculation: 19 mins

## 2022-03-09 NOTE — PROGRESS NOTES
BSSR completed.  Report given to Patty RNs       .      - Patient's bed is locked and in lowest position  - No complaints at this time, all needs and requests addressed  - Call bell with in reach  Rounds with intent completed throughout shift

## 2022-03-09 NOTE — PROGRESS NOTES
yesterday, otherwise would have been able to transfer to rehab facility  Neuro exam stable  No further seizures  Correcting Na slowly  Stable

## 2022-03-09 NOTE — PROGRESS NOTES
Problem: Mobility Impaired (Adult and Pediatric)  Goal: *Acute Goals and Plan of Care (Insert Text)  Description: FUNCTIONAL STATUS PRIOR TO ADMISSION: Pt was independent without the use of DME.     HOME SUPPORT PRIOR TO ADMISSION: Pt was living with his sister and plans to return there. Physical Therapy Goals  Initiated 3/3/2022. Goals re-assessed 3/09/2022  1. Patient will move from supine to sit and sit to supine , scoot up and down, and roll side to side in bed with contact guard assist within 7 day(s). 2.  Patient will transfer from bed to chair and chair to bed with Leah x2 using the least restrictive device within 7 day(s). 3.  Patient will perform sit to stand with CGA within 7 day(s). 4.  Patient will ambulate with Leah x2 for 50 feet with the least restrictive device within 7 day(s). Outcome: Progressing Towards Goal   PHYSICAL THERAPY WEEKLY RE-ASSESSMENT  Patient: Janet Mendoza (86 y.o. male)  Date: 3/9/2022  Diagnosis: Brain mass [G93.89] <principal problem not specified>  Procedure(s) (LRB):  AIRO GUIDED RIGHT PARIETAL CRANIOTOMY AND TUMOR REMOVAL (Right) 7 Days Post-Op  Precautions: Fall,Seizure (SBP <140)  Chart, physical therapy assessment, plan of care and goals were reviewed. ASSESSMENT  Patient continues with skilled PT services and is progressing towards goals. Pt presents with L sided weakness (UE> LE), impaired balance, unsteady gait and decreased activity tolerance. Session focused on weight shifting to the L in standing. Pt required CGA for sit to stand. In standing pt, provided with verbal and visual cues (use of mirror and demonstration) to weight shift to his L. Provided with manual facilitation to prevent L knee hyperextension and buckling. Progressed to pt weight shifting to the L and stepping forward and back with his R foot - RUE on hallway railing for additional support. Required modA to maintain standing and increased L knee buckling.  Recommending IPR upon discharge. Current Level of Function Impacting Discharge (mobility/balance): modA to maintain standing     Other factors to consider for discharge: motivated, fall risk, independent baseline          PLAN :  Patient continues to benefit from skilled intervention to address the above impairments. Continue treatment per established plan of care. to address goals. Recommendation for discharge: (in order for the patient to meet his/her long term goals)  Therapy 3 hours per day 5-7 days per week    This discharge recommendation:  Has not yet been discussed the attending provider and/or case management    IF patient discharges home will need the following DME: to be determined (TBD)       SUBJECTIVE:   Patient stated I know I gotta work.     OBJECTIVE DATA SUMMARY:   Critical Behavior:  Neurologic State: Alert  Orientation Level: Oriented X4  Cognition: Appropriate for age attention/concentration,Appropriate decision making,Appropriate safety awareness  Safety/Judgement: Decreased awareness of environment,Decreased awareness of need for assistance,Decreased awareness of need for safety,Decreased insight into deficits  Functional Mobility Training:  Transfers:  Sit to Stand: Contact guard assistance  Stand to Sit: Minimum assistance;Assist x1    Balance:  Sitting: Intact  Standing: Impaired; With support  Standing - Static: Fair;Constant support  Standing - Dynamic : Poor;Constant support    Activity Tolerance:   Good    After treatment patient left in no apparent distress:   Sitting in chair and Call bell within reach    COMMUNICATION/COLLABORATION:   The patients plan of care was discussed with: Registered nurse. Ashley Boyd, [facility]   Time Calculation: 27 mins        Regarding student involvement in patient care:  A student participated in this treatment session. Per CMS Medicare statements and APTA guidelines I certify that the following was true:  1.  I was present and directly observed the entire session. 2. I made all skilled judgments and clinical decisions regarding care. 3. I am the practitioner responsible for assessment, treatment, and documentation.

## 2022-03-09 NOTE — PROGRESS NOTES
Nephrology Progress Note  Koko Lizama  Date of Admission : 3/2/2022    CC:  Follow up for hyponatremia       Assessment and Plan     Hyponatremia:  - suspect this is 2/2 SIADH from his CNS process  - urine studies support SIADH  - Na dropping despite FR and diuretics  - tolvaptan x 1 now  - repeat Na in 6 hrs     R parietal mass s/p resection on 3/2  Seizures  HFrEF  HTN  Obesity  KELL       Interval History:  Seen and examined. Feeling well. Na 126 this AM.  No cp, sob, n/v/d reported at this time. Current Medications: all current  Medications have been eviewed in EPIC  Review of Systems: Pertinent items are noted in HPI. Objective:  Vitals:    Vitals:    03/09/22 0800 03/09/22 0908 03/09/22 1000 03/09/22 1016   BP:  (!) 142/78 (!) 142/78    Pulse:  78 79 75   Resp:   19    Temp:   98 °F (36.7 °C)    SpO2: 95%  95%    Weight:       Height:         Intake and Output:  03/09 0701 - 03/09 1900  In: -   Out: 175 [Urine:175]  03/07 1901 - 03/09 0700  In: 900 [P.O.:900]  Out: 3250 [Urine:3250]    Physical Examination:    General: NAD,Conversant   Neck:  Supple, no mass  Resp:  Lungs CTA B/L, no wheezing , normal respiratory effort  CV:  RRR,  no murmur or rub, trace LE edema  GI:  Soft, NT, + Bowel sounds, no hepatosplenomegaly  Neurologic:  Non focal  Psych:             AAO x 3 appropriate affect   Skin:  No Rash  :  No cho    []    High complexity decision making was performed  []    Patient is at high-risk of decompensation with multiple organ involvement    Lab Data Personally Reviewed: I have reviewed all the pertinent labs, microbiology data and radiology studies during assessment.     Recent Labs     03/09/22  0500 03/08/22  0937 03/08/22  0202   * 127* 127*   K 3.9 3.5 3.5   CL 90* 88* 88*   CO2 31 34* 32   * 140* 201*   BUN 32* 28* 31*   CREA 0.97 0.96 1.14   CA 8.4* 8.7 8.4*   PHOS  --   --  3.3   ALB  --   --  2.9*     Recent Labs     03/08/22  0202   WBC 20.8*   HGB 16.5   HCT 50.3      No results found for: SDES  Lab Results   Component Value Date/Time    Culture result: NO GROWTH 5 DAYS 02/21/2022 04:34 PM     Recent Results (from the past 24 hour(s))   GLUCOSE, POC    Collection Time: 03/08/22 11:38 AM   Result Value Ref Range    Glucose (POC) 167 (H) 65 - 117 mg/dL    Performed by Dre Danish (CON)    COVID-19 WITH INFLUENZA A/B    Collection Time: 03/08/22 11:51 AM   Result Value Ref Range    SARS-CoV-2 Not detected NOTD      Influenza A by PCR Not detected NOTD      Influenza B by PCR Not detected NOTD     GLUCOSE, POC    Collection Time: 03/08/22  4:19 PM   Result Value Ref Range    Glucose (POC) 127 (H) 65 - 117 mg/dL    Performed by Dre Danish (CON)    GLUCOSE, POC    Collection Time: 03/08/22 10:16 PM   Result Value Ref Range    Glucose (POC) 149 (H) 65 - 117 mg/dL    Performed by [facility] [address]    Collection Time: 03/09/22  5:00 AM   Result Value Ref Range    Sodium 126 (L) 136 - 145 mmol/L    Potassium 3.9 3.5 - 5.1 mmol/L    Chloride 90 (L) 97 - 108 mmol/L    CO2 31 21 - 32 mmol/L    Anion gap 5 5 - 15 mmol/L    Glucose 135 (H) 65 - 100 mg/dL    BUN 32 (H) 6 - 20 MG/DL    Creatinine 0.97 0.70 - 1.30 MG/DL    BUN/Creatinine ratio 33 (H) 12 - 20      GFR est AA >60 >60 ml/min/1.73m2    GFR est non-AA >60 >60 ml/min/1.73m2    Calcium 8.4 (L) 8.5 - 10.1 MG/DL   GLUCOSE, POC    Collection Time: 03/09/22  6:53 AM   Result Value Ref Range    Glucose (POC) 110 65 - 117 mg/dL    Performed by Logan Sanchez MD  [address]  Phone - [phone]   Fax - [phone]  [email or web address]

## 2022-03-09 NOTE — ROUTINE PROCESS
Transition of Care Plan: [facility]R-[facility]      RUR: 10%     PCP F/U: Dr Christopher      Disposition: [facility]     Transportation: BLS     Main Contact: Sister Laurie Keller [phone], Mother Ventura Vazquez ([facility]) [phone]: Spoke with neuro NP. Not medically stable d/t sodium levels. Unable to go to rehab today. Notified daughter and [facility] liaison Jaylen Luna.      Cristino Pacheco RN, CRM

## 2022-03-09 NOTE — PROGRESS NOTES
[facility]                                                                                          Hospitalist Progress Note  Toshia Watt MD  Answering service: [phone] or [phone] from in house phone        Date of Service:  3/9/2022  NAME:  Ina Simeon  :  1975  MRN:  [id number]      Admission Summary:   Ina Simeon is a 51 y.o. male with PMH of chronic systolic heart failure,HTN,DM and other comorbidities admitted to ICU after Right parietal craniotomy, use of neuronavigation BrainLAB, and removal of primary glioma. .  We are asked to see the patient in consult to assist in managing HTN,DM.     He denied any chest pain,SOB,nausea/vomiting,headache today    Interval history / Subjective:   Seen for follow-up of consult for medical management of diabetes, high blood pressure, and other chronic conditions. Patient denies any acute complaints and reports feeling good. He reports a good appetite and weight is       Assessment & Plan: Active Problems:    Brain mass (2022)        # Right  brain mass:  -s/p Right parietal craniotomy, use of neuronavigation BrainLAB, and removal of primary glioma.  -final path pending  On keppra and decadron  Mx per primary team           #Steroid induced hyperglycemia:  Prediabetes  -monitor glucose level, SSI  - BG stable     #HTN:  BP controlled on current regimen    Hponatremia  -Renal following,suspect secondary to SIADH from cns lesion  Worsening  Receiving tolvaptan today  Appreciate nephrology recommendations  .     #Chronic systolic heart failure:  -on GDMT   -No symptoms        #Obesity:  Body mass index is 54.42 kg/m².         Code status: full  DVT prophylaxis: scd    Care Plan discussed with: Patient/Family and Nurse  Anticipated Disposition: Home w/Family and TBD  Anticipated Discharge: 24 hours to 48 hours     Hospital Problems  Date Reviewed: 2022          Codes Class Noted POA    Brain mass ICD-10-CM: G93.89  ICD-9-CM: 348.89  2/21/2022 Unknown                Review of Systems:   A comprehensive review of systems was negative except for that written in the HPI. Vital Signs:    Last 24hrs VS reviewed since prior progress note. Most recent are:  Visit Vitals  BP (!) 142/78 (BP 1 Location: Left upper arm, BP Patient Position: Sitting)   Pulse 75   Temp 98 °F (36.7 °C)   Resp 19   Ht 5' 4\" (1.626 m)   Wt 136.2 kg (300 lb 4.3 oz)   SpO2 95%   BMI 51.54 kg/m²         Intake/Output Summary (Last 24 hours) at 3/9/2022 1215  Last data filed at 3/9/2022 1144  Gross per 24 hour   Intake 300 ml   Output 2550 ml   Net -2250 ml        Physical Examination:     I had a face to face encounter with this patient and independently examined them on 3/9/2022 as outlined below:          Constitutional:  No acute distress, cooperative, pleasant . ENT:  Oral mucosa moist,EOMI,anicteric sclera   Resp:  CTA bilaterally. No wheezing/rhonchi/rales. No accessory muscle use   CV:  Regular rhythm, normal rate, no murmurs, gallops, rubs    GI:  Soft, non distended, non tender. normoactive bowel sounds, no hepatosplenomegaly     Musculoskeletal:  No LE edema    Neurologic:  Moves all extremities. AAOx3 , minimal movement LUE,             Data Review:    Review and/or order of clinical lab test  Review and/or order of tests in the medicine section of CPT      Labs:     Recent Labs     03/08/22  0202   WBC 20.8*   HGB 16.5   HCT 50.3        Recent Labs     03/09/22  0500 03/08/22  0937 03/08/22  0202   * 127* 127*   K 3.9 3.5 3.5   CL 90* 88* 88*   CO2 31 34* 32   BUN 32* 28* 31*   CREA 0.97 0.96 1.14   * 140* 201*   CA 8.4* 8.7 8.4*   PHOS  --   --  3.3     Recent Labs     03/08/22  0202   ALB 2.9*     No results for input(s): INR, PTP, APTT, INREXT, INREXT in the last 72 hours. No results for input(s): FE, TIBC, PSAT, FERR in the last 72 hours.    No results found for: FOL, RBCF   No results for input(s): PH, PCO2, PO2 in the last 72 hours. No results for input(s): CPK, CKNDX, TROIQ in the last 72 hours.     No lab exists for component: CPKMB  Lab Results   Component Value Date/Time    Cholesterol, total 180 02/23/2022 05:18 AM    HDL Cholesterol 36 02/23/2022 05:18 AM    LDL, calculated 126.8 (H) 02/23/2022 05:18 AM    Triglyceride 86 02/23/2022 05:18 AM    CHOL/HDL Ratio 5.0 02/23/2022 05:18 AM     Lab Results   Component Value Date/Time    Glucose (POC) 111 03/09/2022 11:53 AM    Glucose (POC) 110 03/09/2022 06:53 AM    Glucose (POC) 149 (H) 03/08/2022 10:16 PM    Glucose (POC) 127 (H) 03/08/2022 04:19 PM    Glucose (POC) 167 (H) 03/08/2022 11:38 AM     No results found for: COLOR, APPRN, SPGRU, REFSG, MARCELLO, PROTU, GLUCU, KETU, BILU, UROU, COREY, LEUKU, GLUKE, EPSU, BACTU, WBCU, RBCU, CASTS, UCRY      Medications Reviewed:     Current Facility-Administered Medications   Medication Dose Route Frequency    tolvaptan (SAMSCA) tablet (0.5 X 30 MG) 15 mg  15 mg Oral ONCE    phenytoin (DILANTIN) chewable tablet 100 mg  100 mg Oral TID    insulin lispro (HUMALOG) injection   SubCUTAneous AC&HS    glucose chewable tablet 16 g  4 Tablet Oral PRN    glucagon (GLUCAGEN) injection 1 mg  1 mg IntraMUSCular PRN    levETIRAcetam (KEPPRA) tablet 1,000 mg  1,000 mg Oral Q12H    labetaloL (NORMODYNE;TRANDATE) injection 10 mg  10 mg IntraVENous Q15MIN PRN    Or    hydrALAZINE (APRESOLINE) 20 mg/mL injection 10 mg  10 mg IntraVENous Q15MIN PRN    torsemide (DEMADEX) tablet 100 mg  100 mg Oral DAILY    famotidine (PEPCID) tablet 20 mg  20 mg Oral Q12H    carvediloL (COREG) tablet 12.5 mg  12.5 mg Oral BID    lisinopriL (PRINIVIL, ZESTRIL) tablet 5 mg  5 mg Oral DAILY    sodium chloride (NS) flush 5-40 mL  5-40 mL IntraVENous Q8H    sodium chloride (NS) flush 5-40 mL  5-40 mL IntraVENous PRN    acetaminophen (TYLENOL) tablet 650 mg  650 mg Oral Q4H PRN    HYDROcodone-acetaminophen (NORCO) 5-325 mg per tablet 1 Tablet  1 Tablet Oral Q4H PRN    morphine injection 2 mg  2 mg IntraVENous Q2H PRN    ondansetron (ZOFRAN) injection 4 mg  4 mg IntraVENous Q4H PRN    dextrose 10 % infusion 0-250 mL  0-250 mL IntraVENous PRN    LORazepam (ATIVAN) injection 2 mg  2 mg IntraVENous Q2H PRN     ______________________________________________________________________  EXPECTED LENGTH OF STAY: 6d 14h  ACTUAL LENGTH OF STAY:          7                 Kristina Coleman MD

## 2022-03-09 NOTE — PROGRESS NOTES
Awake alert  Strength improving in left arm still with some increased tone  Able to lift left leg off bed against gravity  Sitting up in chair  Na still low, 126  Incision healing well.

## 2022-03-10 LAB
ANION GAP SERPL CALC-SCNC: 5 MMOL/L (ref 5–15)
BUN SERPL-MCNC: 32 MG/DL (ref 6–20)
BUN/CREAT SERPL: 31 (ref 12–20)
CALCIUM SERPL-MCNC: 8.7 MG/DL (ref 8.5–10.1)
CHLORIDE SERPL-SCNC: 90 MMOL/L (ref 97–108)
CO2 SERPL-SCNC: 36 MMOL/L (ref 21–32)
CREAT SERPL-MCNC: 1.02 MG/DL (ref 0.7–1.3)
GLUCOSE BLD STRIP.AUTO-MCNC: 115 MG/DL (ref 65–117)
GLUCOSE BLD STRIP.AUTO-MCNC: 119 MG/DL (ref 65–117)
GLUCOSE BLD STRIP.AUTO-MCNC: 130 MG/DL (ref 65–117)
GLUCOSE BLD STRIP.AUTO-MCNC: 148 MG/DL (ref 65–117)
GLUCOSE SERPL-MCNC: 143 MG/DL (ref 65–100)
POTASSIUM SERPL-SCNC: 3.9 MMOL/L (ref 3.5–5.1)
SERVICE CMNT-IMP: ABNORMAL
SERVICE CMNT-IMP: NORMAL
SODIUM SERPL-SCNC: 130 MMOL/L (ref 136–145)
SODIUM SERPL-SCNC: 131 MMOL/L (ref 136–145)

## 2022-03-10 PROCEDURE — 80048 BASIC METABOLIC PNL TOTAL CA: CPT

## 2022-03-10 PROCEDURE — 74011250637 HC RX REV CODE- 250/637: Performed by: NURSE PRACTITIONER

## 2022-03-10 PROCEDURE — 74011000250 HC RX REV CODE- 250: Performed by: NURSE PRACTITIONER

## 2022-03-10 PROCEDURE — 84295 ASSAY OF SERUM SODIUM: CPT

## 2022-03-10 PROCEDURE — 74011250637 HC RX REV CODE- 250/637: Performed by: ANESTHESIOLOGY

## 2022-03-10 PROCEDURE — 65660000000 HC RM CCU STEPDOWN

## 2022-03-10 PROCEDURE — 82962 GLUCOSE BLOOD TEST: CPT

## 2022-03-10 PROCEDURE — 36415 COLL VENOUS BLD VENIPUNCTURE: CPT

## 2022-03-10 RX ADMIN — PHENYTOIN 100 MG: 50 TABLET, CHEWABLE ORAL at 16:49

## 2022-03-10 RX ADMIN — FAMOTIDINE 20 MG: 20 TABLET, FILM COATED ORAL at 09:14

## 2022-03-10 RX ADMIN — LISINOPRIL 5 MG: 5 TABLET ORAL at 09:14

## 2022-03-10 RX ADMIN — FAMOTIDINE 20 MG: 20 TABLET, FILM COATED ORAL at 21:05

## 2022-03-10 RX ADMIN — PHENYTOIN 100 MG: 50 TABLET, CHEWABLE ORAL at 21:05

## 2022-03-10 RX ADMIN — CARVEDILOL 12.5 MG: 12.5 TABLET, FILM COATED ORAL at 09:14

## 2022-03-10 RX ADMIN — SODIUM CHLORIDE, PRESERVATIVE FREE 10 ML: 5 INJECTION INTRAVENOUS at 21:06

## 2022-03-10 RX ADMIN — TORSEMIDE 100 MG: 100 TABLET ORAL at 09:14

## 2022-03-10 RX ADMIN — LEVETIRACETAM 1000 MG: 500 TABLET, FILM COATED ORAL at 09:14

## 2022-03-10 RX ADMIN — PHENYTOIN 100 MG: 50 TABLET, CHEWABLE ORAL at 09:14

## 2022-03-10 RX ADMIN — SODIUM CHLORIDE, PRESERVATIVE FREE 10 ML: 5 INJECTION INTRAVENOUS at 14:29

## 2022-03-10 RX ADMIN — LEVETIRACETAM 1000 MG: 500 TABLET, FILM COATED ORAL at 21:05

## 2022-03-10 RX ADMIN — HYDROCODONE BITARTRATE AND ACETAMINOPHEN 1 TABLET: 5; 325 TABLET ORAL at 01:31

## 2022-03-10 RX ADMIN — SODIUM CHLORIDE, PRESERVATIVE FREE 10 ML: 5 INJECTION INTRAVENOUS at 07:09

## 2022-03-10 NOTE — PROGRESS NOTES
Problem: Falls - Risk of  Goal: *Absence of Falls  Description: Document Sofia Fothergill Fall Risk and appropriate interventions in the flowsheet.   Outcome: Progressing Towards Goal  Note: Fall Risk Interventions:  Mobility Interventions: Bed/chair exit alarm,Patient to call before getting OOB         Medication Interventions: Evaluate medications/consider consulting pharmacy    Elimination Interventions: Bed/chair exit alarm,Call light in reach,Patient to call for help with toileting needs    History of Falls Interventions: Bed/chair exit alarm,Consult care management for discharge planning,Utilize gait belt for transfer/ambulation,Room close to nurse's station,Door open when patient unattended         Problem: Brain Tumor Day 3 to Discharge  Goal: Nutrition/Diet  Outcome: Progressing Towards Goal  Goal: Discharge Planning  Outcome: Progressing Towards Goal  Goal: Medications  Outcome: Progressing Towards Goal  Goal: Treatments/Interventions/Procedures  Outcome: Progressing Towards Goal

## 2022-03-10 NOTE — PROGRESS NOTES
Spiritual Care Partner Volunteer visited patient in [place] on 3.10.22.     Documented by Leti Willard, Staff , on 3.10.22  Please call LAMONT ([phone]) to page  if needed

## 2022-03-10 NOTE — PROGRESS NOTES
Hospitalist Progress Note. Chart reviewed. Discussed with Neurosurgery. Steroid induced Hyperglycemia:   Prediabetes, Ha1c <6:   -no insulin needed on discharge. Now off steroids    Chronic systolic heart failure, EF 35%:   -continue lisinopril, BB for GDMT. Unable to tolerate further medications. Blood pressures are acceptable     Rest per primary. Hospitalist will sign off.          Jana Barbosa, DO

## 2022-03-10 NOTE — PROGRESS NOTES
Neurosurgery Progress Note  [facility]          Admit Date: 3/2/2022   LOS: 8 days        Daily Progress Note: 3/10/2022    POD: 8    S/P: Procedure(s):  AIRO GUIDED RIGHT PARIETAL CRANIOTOMY AND TUMOR REMOVAL    HPI: The patient presented to the hospital last week due to intermittent left leg weakness. His head CT revealed cerebral edema and he transferred to [facility]. His MRI brain revealed a large right parietal brain mass and hi CT scans of his body showed no other evidence of disease. He was discharged to home on steroids and keppra with the intent of a planned readmission for surgery today. He underwent a right parietal craniotomy with resection of tumor on 22. In the recovery room, he states he could not move his left arm or left leg. In the ICU, his left leg has begun working again to where he is antigravity and able to lift it off the bed. He cannot move his LUE. While in the room, he began having a focal seizure of his left arm with rhythmic movements that terminated without intervention after about 20 seconds. Subjective:   Pt sodium improved after a dose of tolvaptan. He is ready to go to rehab, unfortunately, there is no available bed. Path came back consistent with glioma, but has been sent out to [facility] for further evaluation. Denies chest pain, leg pain, nausea, vomiting, difficulty swallowing, and dyspnea.        Objective:     Vital signs  Temp (24hrs), Av.2 °F (36.8 °C), Min:98 °F (36.7 °C), Max:98.5 °F (36.9 °C)   03/10 0701 - 03/10 190  In: 400 [P.O.:400]  Out: 725 [Urine:725]  1901 - 03/10 0700  In: 2150 [P.O.:2150]  Out: 3350 [Urine:3350]    Visit Vitals  /69   Pulse 72   Temp 98.2 °F (36.8 °C)   Resp 25   Ht 5' 4\" (1.626 m)   Wt 136.2 kg (300 lb 4.3 oz)   SpO2 98%   BMI 51.54 kg/m²    O2 Flow Rate (L/min): 0 l/min O2 Device: None (Room air)     Pain control  Pain Assessment  Pain Scale 1: Numeric (0 - 10)  Pain Intensity 1: 5  Pain Onset 1: post op  Pain Location 1: Head  Pain Orientation 1:  (top of head)  Pain Description 1: Aching  Pain Intervention(s) 1: Medication (see MAR)    PT/OT  Gait     Gait  Base of Support: Narrowed  Gait Abnormalities: Trunk sway increased (L knee buckling and hyperextension)  Ambulation - Level of Assistance: Moderate assistance,Assist x2  Distance (ft): 3 Feet (ft) (x3 reps )  Assistive Device: Gait belt           Physical Exam:  Gen:NAD. Neuro: A&Ox3. Follows commands. Speech clear. Affect bright  PERRL. EOMI. Face symmetric. Tongue midline. RUE/RLE 5/5, LUE 3/5 hand intrinsics, 3/5 biceps, triceps, 2/5 deltoid, LLE 4+/5  Sensation decreased on left. Gait deferred. Skin: Right scalp incision C/D/I with sutures in place. No erythema, edema, or drainage. CT head without contrast on 03/03/22 shows postop right craniotomy and tumor resection. No hemorrhage. Decreased midline shift. MRI brain with and without contrast on 03/03/22 shows no unusual postoperative findings after subtotal resection right posterior frontal/parietal suspected GBM.     24 hour results:    Recent Results (from the past 24 hour(s))   GLUCOSE, POC    Collection Time: 03/09/22 11:53 AM   Result Value Ref Range    Glucose (POC) 111 65 - 117 mg/dL    Performed by [employer]    GLUCOSE, POC    Collection Time: 03/09/22  5:01 PM   Result Value Ref Range    Glucose (POC) 179 (H) 65 - 117 mg/dL    Performed by [employer]    RENAL FUNCTION PANEL    Collection Time: 03/09/22  5:26 PM   Result Value Ref Range    Sodium 129 (L) 136 - 145 mmol/L    Potassium 3.6 3.5 - 5.1 mmol/L    Chloride 88 (L) 97 - 108 mmol/L    CO2 33 (H) 21 - 32 mmol/L    Anion gap 8 5 - 15 mmol/L    Glucose 149 (H) 65 - 100 mg/dL    BUN 32 (H) 6 - 20 MG/DL    Creatinine 1.25 0.70 - 1.30 MG/DL    BUN/Creatinine ratio 26 (H) 12 - 20      GFR est AA >60 >60 ml/min/1.73m2    GFR est non-AA >60 >60 ml/min/1.73m2    Calcium 8.5 8.5 - 10.1 MG/DL    Phosphorus 3.6 2.6 - 4.7 MG/DL Albumin 3.1 (L) 3.5 - 5.0 g/dL   GLUCOSE, POC    Collection Time: 03/09/22 10:41 PM   Result Value Ref Range    Glucose (POC) 131 (H) 65 - 117 mg/dL    Performed by Symone Sewell (Trv)    METABOLIC PANEL, BASIC    Collection Time: 03/10/22  3:14 AM   Result Value Ref Range    Sodium 131 (L) 136 - 145 mmol/L    Potassium 3.9 3.5 - 5.1 mmol/L    Chloride 90 (L) 97 - 108 mmol/L    CO2 36 (H) 21 - 32 mmol/L    Anion gap 5 5 - 15 mmol/L    Glucose 143 (H) 65 - 100 mg/dL    BUN 32 (H) 6 - 20 MG/DL    Creatinine 1.02 0.70 - 1.30 MG/DL    BUN/Creatinine ratio 31 (H) 12 - 20      GFR est AA >60 >60 ml/min/1.73m2    GFR est non-AA >60 >60 ml/min/1.73m2    Calcium 8.7 8.5 - 10.1 MG/DL   GLUCOSE, POC    Collection Time: 03/10/22  7:08 AM   Result Value Ref Range    Glucose (POC) 130 (H) 65 - 117 mg/dL    Performed by Symone Sewell (Trvlr)    SODIUM    Collection Time: 03/10/22  9:51 AM   Result Value Ref Range    Sodium 130 (L) 136 - 145 mmol/L          Assessment:     Active Problems:    Brain mass (2/21/2022)        Plan:   1. Right parietal brain mass   - s/p crani 03/02   - decadron completed 03/09   - cont keppra and dilantin. Will eventually be able to wean the dilantin as an outpatient.   - PT/OT   - Pathology consistent with glioma. Sent to [facility] for [facility] evaluation.   - Med onc and rad onc consulted. Tx would not start for 3-4 weeks  2. Cerebral edema with brain compression   - due to #1   - plans as above  3. Hx of CHF   - EF of 35% on last ECHO   - cont Coreg, lisinopril   - Hospitalist following  4. Focal seizures   - due to #1, 2   - cont keppra to 1000 mg bid   - PO dilantin 100 mg tid. Phenytoin level is therapeutic   - ativan PRN for seizures lasting greater than 2 minutes or more than 3 in an hour. 5. HTN   - SBP<140   - cont coreg, lisinopril from home. Torsemide restarted. - Hospitalist following  6. Diabetes mellitus, type 2   - -179   - SSI and accu-checks   - Hospitalist following  7.  Morbid obesity as defined by BMI greater than 40   - Body mass index is 51.54 kg/m². - Diet and exercise counseling as appropriate  8. Hyponatremia   - due to SIADH   - Na 130   - s/p Tolvaptan 03/09   - Cont 1500 cc fluid restriction   - Nephrology consulted    Activity: up with assist  DVT ppx: SCDs  Dispo: inpt rehab    Plan d/w Dr. Susie Alva, nurse, hospitalist, case management, nephrology. Pt cleared from medical standpoint and ready to go to rehab today. Unfortunately, no bed available at [facility] for transfer.     Lauri Suárez NP

## 2022-03-10 NOTE — PROGRESS NOTES
Transition of Care Plan: [facility]      RUR: 10%     PCP F/U: Dr Christopher      Disposition: [facility]R-[facility]     Transportation: BLS     Main Contact: Sister Peyton Tenorio [phone], Mother Milly Meredith ([facility]) [phone]: Medically stable but no bed today. Plan for discharge to [facility] tomorrow.      Dixon Ballard RN, CRM

## 2022-03-10 NOTE — PROGRESS NOTES
Hematology-Oncology Progress Note    Ina Auburn  1975  [id number]  3/10/2022    Follow-up for: high grade glioma     [x]        Chart notes since last visit reviewed   [x]        Medications reviewed for allergies and interactions       Case discussed with the following:         []                            [x]        Nursing Staff                                                                         []        Pathologist                                                                        []        FAMILY      Subjective:     Spoke with patient who complains of: no problems over night, strength improving.     Objective:     Patient Vitals for the past 24 hrs:   BP Temp Pulse Resp SpO2   03/10/22 0610 (!) 99/50 98.2 °F (36.8 °C) 73 25 98 %   03/10/22 0600 -- -- 71 -- --   03/10/22 0400 -- -- 82 -- --   03/10/22 0200 106/66 98 °F (36.7 °C) 78 23 94 %   03/09/22 2359 -- -- 75 -- --   03/09/22 2257 (!) 107/57 98.4 °F (36.9 °C) 75 19 96 %   03/09/22 2200 -- -- 73 -- --   03/09/22 2000 -- -- 85 -- --   03/09/22 1800 -- -- 82 -- --   03/09/22 1745 (!) 91/51 98.5 °F (36.9 °C) 87 18 96 %   03/09/22 1713 -- -- 77 -- --   03/09/22 1603 (!) 126/100 -- 82 -- --   03/09/22 1413 -- -- 79 -- --   03/09/22 1341 103/68 98.1 °F (36.7 °C) 73 20 95 %   03/09/22 1223 -- -- 80 -- --   03/09/22 1016 -- -- 75 -- --   03/09/22 1000 (!) 142/78 98 °F (36.7 °C) 79 19 95 %   03/09/22 0908 (!) 142/78 -- 78 -- --       REVIEW OF SYSTEMS:    Constitutional: negative fever, negative chills, negative weight loss  Eyes:   negative visual changes  ENT:   negative sore throat, tongue or lip swelling  Respiratory:  negative cough, negative dyspnea  Cards:  negative for chest pain, palpitations, lower extremity edema  GI:   negative for nausea, vomiting, diarrhea, and abdominal pain  Neuro:  negative for headaches, dizziness, vertigo  []                        Full ROS o/w normal/non contributor    Constitutional:  Patient looks  [] Sick  []        Frail  [x]        Better                                                 []        Depressed    HEENT:  [x]   NC                         []   AT               []    ALOPECIA           Eyes: []   Normal               []    Icteric  Oropharynx: []    Normal                  []  Thrush               []   Dry  Mucositis: []    None                 Grade: []        I  []        II  []        III  []        IV  Neck:   [x]   Supple                  []  Rigid               JVD:    []   ABSENT       []   PRESENT  Lymphadenopathy:   []   None Noted            []   PRESENT    Chest:  []   Clear               []    Rhonchi                      Dec'd @     []  Right Base           []   Left Base    CV:             []   Regular              []  Irregular               []   Tachy                []   Murmur  Abdominal:   []    Soft              []   NON-tender               []   Tender      BS:    []   ABSENT                   []   PRESENT  Liver:     []  NON-palp                  []   EDGE- palp  Spleen: []   NON-palp                   []  EDGE - palp  Mass:   []   ABSENT                          []  PRESENT  Extr:    []  Lymphedema             []   Cyanosis      []  Clubbing  Edema:     []   NONE       []   PRESENT  Skin:  Intact []           Purpura []        Rash: []   ABSENT       []  PRESENT  Neuro:  [x]        Normal  4/5 strength LUE/RLE[]        Confused      Available labs reviewed:  Labs:    Recent Results (from the past 24 hour(s))   GLUCOSE, POC    Collection Time: 03/09/22 11:53 AM   Result Value Ref Range    Glucose (POC) 111 65 - 117 mg/dL    Performed by Margie Gutierres    GLUCOSE, POC    Collection Time: 03/09/22  5:01 PM   Result Value Ref Range    Glucose (POC) 179 (H) 65 - 117 mg/dL    Performed by Margie Gutierres    RENAL FUNCTION PANEL    Collection Time: 03/09/22  5:26 PM   Result Value Ref Range    Sodium 129 (L) 136 - 145 mmol/L    Potassium 3.6 3.5 - 5.1 mmol/L    Chloride 88 (L) 97 - 108 mmol/L    CO2 33 (H) 21 - 32 mmol/L    Anion gap 8 5 - 15 mmol/L    Glucose 149 (H) 65 - 100 mg/dL    BUN 32 (H) 6 - 20 MG/DL    Creatinine 1.25 0.70 - 1.30 MG/DL    BUN/Creatinine ratio 26 (H) 12 - 20      GFR est AA >60 >60 ml/min/1.73m2    GFR est non-AA >60 >60 ml/min/1.73m2    Calcium 8.5 8.5 - 10.1 MG/DL    Phosphorus 3.6 2.6 - 4.7 MG/DL    Albumin 3.1 (L) 3.5 - 5.0 g/dL   GLUCOSE, POC    Collection Time: 03/09/22 10:41 PM   Result Value Ref Range    Glucose (POC) 131 (H) 65 - 117 mg/dL    Performed by Keily Hi (Tr[employer])    METABOLIC PANEL, BASIC    Collection Time: 03/10/22  3:14 AM   Result Value Ref Range    Sodium 131 (L) 136 - 145 mmol/L    Potassium 3.9 3.5 - 5.1 mmol/L    Chloride 90 (L) 97 - 108 mmol/L    CO2 36 (H) 21 - 32 mmol/L    Anion gap 5 5 - 15 mmol/L    Glucose 143 (H) 65 - 100 mg/dL    BUN 32 (H) 6 - 20 MG/DL    Creatinine 1.02 0.70 - 1.30 MG/DL    BUN/Creatinine ratio 31 (H) 12 - 20      GFR est AA >60 >60 ml/min/1.73m2    GFR est non-AA >60 >60 ml/min/1.73m2    Calcium 8.7 8.5 - 10.1 MG/DL   GLUCOSE, POC    Collection Time: 03/10/22  7:08 AM   Result Value Ref Range    Glucose (POC) 130 (H) 65 - 117 mg/dL    Performed by Keily Hi (Trvlr)        Available Xrays reviewed:    Chemotherapy monitored and toxicities assessed:    Assessment and Plan   1. High grade glioma? Jerry Barr Awaiting final path. (still)  Radiation has made appt for him to be seen on the 16th. Pt states he will be going to [address] [place] [address] for rehab. .  I will make appt for pt to see me late next week,  We will  Continue to follow with you.     Tim Bailon MD

## 2022-03-10 NOTE — PROGRESS NOTES
Hematology-Oncology Progress Note    Mal Chacon  1975  [id number]  3/9/2022    Follow-up for: high grade glioma     [x]        Chart notes since last visit reviewed   [x]        Medications reviewed for allergies and interactions       Case discussed with the following:         []                            []        Nursing Staff                                                                         []        Pathologist                                                                        []        FAMILY      Subjective:     Spoke with patient who complains of: pt is comfortable, has more mobility in Left hand, no c/o    Objective:   Patient Vitals for the past 24 hrs:   BP Temp Pulse Resp SpO2   03/09/22 1800 -- -- 82 -- --   03/09/22 1745 (!) 91/51 98.5 °F (36.9 °C) 87 18 96 %   03/09/22 1713 -- -- 77 -- --   03/09/22 1603 (!) 126/100 -- 82 -- --   03/09/22 1413 -- -- 79 -- --   03/09/22 1341 103/68 98.1 °F (36.7 °C) 73 20 95 %   03/09/22 1223 -- -- 80 -- --   03/09/22 1016 -- -- 75 -- --   03/09/22 1000 (!) 142/78 98 °F (36.7 °C) 79 19 95 %   03/09/22 0908 (!) 142/78 -- 78 -- --   03/09/22 0800 -- -- -- -- 95 %   03/09/22 0600 128/81 98 °F (36.7 °C) 65 16 --   03/09/22 0400 -- -- 73 -- --   03/09/22 0300 122/82 97.7 °F (36.5 °C) 70 16 --   03/09/22 0200 -- -- 75 -- --   03/08/22 2205 -- -- 68 -- --   03/08/22 2200 129/87 98.3 °F (36.8 °C) 70 18 --   03/08/22 2000 -- -- 74 -- --       REVIEW OF SYSTEMS:    Constitutional: negative fever, negative chills, negative weight loss  Eyes:   negative visual changes  ENT:   negative sore throat, tongue or lip swelling  Respiratory:  negative cough, negative dyspnea  Cards:  negative for chest pain, palpitations, lower extremity edema  GI:   negative for nausea, vomiting, diarrhea, and abdominal pain  Neuro:  negative for headaches, dizziness, vertigo  []                        Full ROS o/w normal/non contributor    Constitutional:  Patient looks  [] Sick  []        Frail  [x]        Better                                                 []        Depressed    HEENT:  [x]   NC                         []   AT               []    ALOPECIA           Eyes: []   Normal               []    Icteric  Oropharynx: []    Normal                  []  Thrush               []   Dry  Mucositis: []    None                 Grade: []        I  []        II  []        III  []        IV  Neck:   [x]   Supple                  []  Rigid               JVD:    []   ABSENT       []   PRESENT  Lymphadenopathy:   []   None Noted            []   PRESENT    Chest:  []   Clear               []    Rhonchi                      Dec'd @     []  Right Base           []   Left Base    CV:             []   Regular              []  Irregular               []   Tachy                []   Murmur  Abdominal:   []    Soft              []   NON-tender               []   Tender      BS:    []   ABSENT                   []   PRESENT  Liver:     []  NON-palp                  []   EDGE- palp  Spleen: []   NON-palp                   []  EDGE - palp  Mass:   []   ABSENT                          []  PRESENT  Extr:    []  Lymphedema             []   Cyanosis      []  Clubbing  Edema:     []   NONE       []   PRESENT  Skin:  Intact []           Purpura []        Rash: []   ABSENT       []  PRESENT  Neuro:  [x]        Normal  4/5 strength LUE/RLE[]        Confused      Available labs reviewed:  Labs:    Recent Results (from the past 24 hour(s))   GLUCOSE, POC    Collection Time: 03/08/22 10:16 PM   Result Value Ref Range    Glucose (POC) 149 (H) 65 - 117 mg/dL    Performed by Don Croft    METABOLIC PANEL, BASIC    Collection Time: 03/09/22  5:00 AM   Result Value Ref Range    Sodium 126 (L) 136 - 145 mmol/L    Potassium 3.9 3.5 - 5.1 mmol/L    Chloride 90 (L) 97 - 108 mmol/L    CO2 31 21 - 32 mmol/L    Anion gap 5 5 - 15 mmol/L    Glucose 135 (H) 65 - 100 mg/dL    BUN 32 (H) 6 - 20 MG/DL    Creatinine 0. 97 0.70 - 1.30 MG/DL    BUN/Creatinine ratio 33 (H) 12 - 20      GFR est AA >60 >60 ml/min/1.73m2    GFR est non-AA >60 >60 ml/min/1.73m2    Calcium 8.4 (L) 8.5 - 10.1 MG/DL   GLUCOSE, POC    Collection Time: 03/09/22  6:53 AM   Result Value Ref Range    Glucose (POC) 110 65 - 117 mg/dL    Performed by [address] [place], POC    Collection Time: 03/09/22 11:53 AM   Result Value Ref Range    Glucose (POC) 111 65 - 117 mg/dL    Performed by Villa Garg    GLUCOSE, POC    Collection Time: 03/09/22  5:01 PM   Result Value Ref Range    Glucose (POC) 179 (H) 65 - 117 mg/dL    Performed by Villa Garg    RENAL FUNCTION PANEL    Collection Time: 03/09/22  5:26 PM   Result Value Ref Range    Sodium 129 (L) 136 - 145 mmol/L    Potassium 3.6 3.5 - 5.1 mmol/L    Chloride 88 (L) 97 - 108 mmol/L    CO2 33 (H) 21 - 32 mmol/L    Anion gap 8 5 - 15 mmol/L    Glucose 149 (H) 65 - 100 mg/dL    BUN 32 (H) 6 - 20 MG/DL    Creatinine 1.25 0.70 - 1.30 MG/DL    BUN/Creatinine ratio 26 (H) 12 - 20      GFR est AA >60 >60 ml/min/1.73m2    GFR est non-AA >60 >60 ml/min/1.73m2    Calcium 8.5 8.5 - 10.1 MG/DL    Phosphorus 3.6 2.6 - 4.7 MG/DL    Albumin 3.1 (L) 3.5 - 5.0 g/dL       Available Xrays reviewed:    Chemotherapy monitored and toxicities assessed:    Assessment and Plan   1. High grade glioma? [facility] Awaiting final path. Will need to see radiation therapy prior to discharge,  We will  Continue to follow with you.     Myra Mireles MD

## 2022-03-10 NOTE — PROGRESS NOTES
Problem: Falls - Risk of  Goal: *Absence of Falls  Description: Document Baylee Bauman Fall Risk and appropriate interventions in the flowsheet.   Outcome: Progressing Towards Goal  Note: Fall Risk Interventions:  Mobility Interventions: Bed/chair exit alarm,Communicate number of staff needed for ambulation/transfer,Patient to call before getting OOB         Medication Interventions: Bed/chair exit alarm,Patient to call before getting OOB,Teach patient to arise slowly    Elimination Interventions: Bed/chair exit alarm,Call light in reach,Patient to call for help with toileting needs    History of Falls Interventions: Consult care management for discharge planning,Door open when patient unattended         Problem: Patient Education: Go to Patient Education Activity  Goal: Patient/Family Education  Outcome: Progressing Towards Goal     Problem: Patient Education: Go to Patient Education Activity  Goal: Patient/Family Education  Outcome: Progressing Towards Goal     Problem: Patient Education: Go to Patient Education Activity  Goal: Patient/Family Education  Outcome: Progressing Towards Goal     Problem: Brain Tumor Day 3 to Discharge  Goal: Discharge Planning  Outcome: Progressing Towards Goal  Goal: Medications  Outcome: Progressing Towards Goal  Goal: Treatments/Interventions/Procedures  Outcome: Progressing Towards Goal  Goal: Progressive Mobility and Function  Outcome: Progressing Towards Goal

## 2022-03-11 VITALS
WEIGHT: 300.27 LBS | SYSTOLIC BLOOD PRESSURE: 97 MMHG | OXYGEN SATURATION: 98 % | HEART RATE: 77 BPM | BODY MASS INDEX: 51.26 KG/M2 | HEIGHT: 64 IN | DIASTOLIC BLOOD PRESSURE: 60 MMHG | RESPIRATION RATE: 15 BRPM | TEMPERATURE: 97.9 F

## 2022-03-11 LAB
ALBUMIN SERPL-MCNC: 3.2 G/DL (ref 3.5–5)
ANION GAP SERPL CALC-SCNC: 6 MMOL/L (ref 5–15)
BUN SERPL-MCNC: 29 MG/DL (ref 6–20)
BUN/CREAT SERPL: 28 (ref 12–20)
CALCIUM SERPL-MCNC: 9.2 MG/DL (ref 8.5–10.1)
CHLORIDE SERPL-SCNC: 91 MMOL/L (ref 97–108)
CO2 SERPL-SCNC: 36 MMOL/L (ref 21–32)
CREAT SERPL-MCNC: 1.05 MG/DL (ref 0.7–1.3)
GLUCOSE BLD STRIP.AUTO-MCNC: 133 MG/DL (ref 65–117)
GLUCOSE BLD STRIP.AUTO-MCNC: 135 MG/DL (ref 65–117)
GLUCOSE SERPL-MCNC: 116 MG/DL (ref 65–100)
PHOSPHATE SERPL-MCNC: 3.4 MG/DL (ref 2.6–4.7)
POTASSIUM SERPL-SCNC: 3.8 MMOL/L (ref 3.5–5.1)
SERVICE CMNT-IMP: ABNORMAL
SERVICE CMNT-IMP: ABNORMAL
SODIUM SERPL-SCNC: 133 MMOL/L (ref 136–145)

## 2022-03-11 PROCEDURE — 36415 COLL VENOUS BLD VENIPUNCTURE: CPT

## 2022-03-11 PROCEDURE — 74011250637 HC RX REV CODE- 250/637: Performed by: NURSE PRACTITIONER

## 2022-03-11 PROCEDURE — 74011250637 HC RX REV CODE- 250/637: Performed by: ANESTHESIOLOGY

## 2022-03-11 PROCEDURE — 74011000250 HC RX REV CODE- 250: Performed by: NURSE PRACTITIONER

## 2022-03-11 PROCEDURE — 80069 RENAL FUNCTION PANEL: CPT

## 2022-03-11 PROCEDURE — 82962 GLUCOSE BLOOD TEST: CPT

## 2022-03-11 RX ORDER — LEVETIRACETAM 1000 MG/1
1000 TABLET ORAL EVERY 12 HOURS
Qty: 60 TABLET | Refills: 0 | Status: ON HOLD
Start: 2022-03-11 | End: 2022-03-23 | Stop reason: SDUPTHER

## 2022-03-11 RX ORDER — ACETAMINOPHEN 325 MG/1
650 TABLET ORAL
Qty: 10 TABLET | Refills: 0 | Status: SHIPPED
Start: 2022-03-11

## 2022-03-11 RX ORDER — PHENYTOIN 50 MG/1
100 TABLET, CHEWABLE ORAL 3 TIMES DAILY
Qty: 90 TABLET | Refills: 0 | Status: SHIPPED
Start: 2022-03-11 | End: 2022-03-23

## 2022-03-11 RX ORDER — HYDROCODONE BITARTRATE AND ACETAMINOPHEN 5; 325 MG/1; MG/1
1 TABLET ORAL
Qty: 12 TABLET | Refills: 0 | Status: SHIPPED | OUTPATIENT
Start: 2022-03-11 | End: 2022-03-14

## 2022-03-11 RX ADMIN — TORSEMIDE 100 MG: 100 TABLET ORAL at 08:28

## 2022-03-11 RX ADMIN — PHENYTOIN 100 MG: 50 TABLET, CHEWABLE ORAL at 08:27

## 2022-03-11 RX ADMIN — CARVEDILOL 12.5 MG: 12.5 TABLET, FILM COATED ORAL at 08:27

## 2022-03-11 RX ADMIN — LEVETIRACETAM 1000 MG: 500 TABLET, FILM COATED ORAL at 08:28

## 2022-03-11 RX ADMIN — LISINOPRIL 5 MG: 5 TABLET ORAL at 08:28

## 2022-03-11 RX ADMIN — FAMOTIDINE 20 MG: 20 TABLET, FILM COATED ORAL at 08:28

## 2022-03-11 RX ADMIN — SODIUM CHLORIDE, PRESERVATIVE FREE 10 ML: 5 INJECTION INTRAVENOUS at 05:15

## 2022-03-11 NOTE — PROGRESS NOTES
Transition of Care Plan: TARYN   RN to call report to [phone], HUBERT     RUR: 10%     PCP F/U: Dr MARSHALL [facility]     Disposition: [id number]     Transportation: [facility]-3pm     Main Contact: Sister Samson Lester [phone], Mother Logan Velez ([facility]) [phone]     2686: Left message for Rubi Navarrete at [facility] [place] to return call regarding discharge today. 1031: [facility] eta 3pm. RN notified. EMTALA info pending.      1129: EMTALA info received    Ian Ruiz RN, CRM

## 2022-03-11 NOTE — DISCHARGE INSTRUCTIONS
After Hospital Care Plan:  Discharge Instructions Craniotomy  [facility]    Patient Name: Bakari Roblero    Date of procedure: 3/2/2022  Date of discharge: 3/9/2022    Procedure: Procedure(s):  AIRO GUIDED RIGHT PARIETAL CRANIOTOMY AND TUMOR REMOVAL  PCP: Sancho Paul MD    Follow up appointments  Follow up with your neurosurgeon in 10-14 days. Call [phone] to make an appointment as soon as you get home from the hospital.  Follow-up care is a key part of your treatment and safety. Be sure to make and go to all appointments, and call your doctor if you are having problems. It's also a good idea to know your test results and keep a list of the medicines you take. A craniotomy is surgery to open your skull to fix a problem in your brain. It can be done for many reasons. For example, you may need a craniotomy if your brain or blood vessels are damaged or if you have a tumor or an infection in your brain. You will probably feel very tired for several weeks after surgery. You may also have headaches or problems concentrating. It can take 4 to 8 weeks to recover from surgery. Your cuts (incisions) may be sore for about 5 days after surgery. You may also have numbness and shooting pains near your wound, or swelling and bruising around your eyes. As your wound starts to heal, it may begin to itch. Medicines and ice packs can help with headaches, pain, swelling, and itching. The stitches that hold your incisions together may go away on their own or will be removed in 7 to 10 days. This depends on the type of stitches the doctor uses. Staples are usually removed in 10-14 days. It is common for your scalp to swell with fluid. After the swelling goes down, you may have a dent in your head. Some kinds of plates stay attached to hold the skull flap to your head. If your head was shaved, you may wear clean hats or scarves on your head until your hair grows back.      This care sheet gives you a general idea about how long it will take for you to recover. But each person recovers at a different pace. Follow the steps below to get better as quickly as possible. When to call your Neurosurgeon:   You have trouble thinking clearly.  You are sleeping more than you are awake.  You have a fever with a stiff neck or a severe headache.  You have any sudden vision changes.  Nausea or vomiting, severe headache.  Loss of bowel or bladder function, inability to urinate.  Increased weakness-greater than before your surgery.  Severe pain or pain not relieved by medications.  Signs of a blood clot in your leg-calf pain, tenderness, redness, swelling of lower leg.  Signs of infection-if your incision is red; continues to have drainage; drainage has a foul odor or if you have a persistent fever over 101 degrees for 24 hours.  You fall and hit your head. When to call your Primary Care Physician:   Concerns about medical conditions such as diabetes, high blood pressure, asthma, congestive heart failure.  Call if blood sugars are elevated, persistent headache or dizziness, coughing or congestion, constipation or diarrhea, burning with urination, abnormal heart rate. When to call 911 and go to the nearest emergency room:   Acute onset of chest pain, shortness of breath, difficulty breathing   You passed out (lost consciousness).  It is hard to think, move, speak, or see.  Your body is jerking or shaking. Activity   Rest when you feel tired. It is normal to want to sleep during the day. It is a good idea to plan to take a nap every day. Getting enough sleep will help you recover.  Try not to lie flat when you rest or sleep. You can use a wedge pillow, or you can put a rolled towel or foam padding under your pillow. You can also raise the head of your bed by putting bricks or wooden blocks under the bed legs.  After lying down, bring your head up slowly.  This can prevent headaches or dizziness.  Try to walk each day. Start by walking a little more than you did the day before. Bit by bit, increase the amount you walk. Walking boosts blood flow and helps prevent pneumonia and constipation.  Avoid heavy lifting until your doctor says it is okay.  Do not drive for 2 to 3 weeks or until your doctor says it is okay. When you begin driving again, start with short, familiar routes in the daytime.  Ask your doctor if it is safe for you to travel by plane.  Avoid risky activities, such as climbing a ladder, for 3 months after surgery.  Avoid strenuous activities, such as bicycle riding, jogging, weight lifting, or aerobic exercise, for 3 months or until your doctor says it is okay.  Do not play any rough or contact sports for 3 months or until your doctor says it is okay.  You may engage in sexual activity. Diet   Resume usual diet; drink plenty of fluids; eat foods high in fiber   It is important to have regular bowel movements. Pain medications may cause constipation. You may want to take a stool softener (such as Senokot-S or Colace) to prevent constipation.  If constipation occurs, take a laxative (such as Dulcolax tablets, Milk of Magnesia, or a suppository). Laxatives should only be used if the above preventable measures have failed and you still have not had a bowel movement after three days   Try to avoid constipation and straining with bowel movements. Incision Care      You can wash your hair now. But do not soak your head or swim for 2 to 3 weeks.  Do not dye or color your hair for 4 weeks after your surgery.  Do not rub or apply any lotions or ointments to your incision site.  Do not scrub your wound    Medicines   If your doctor or nurse practitioner prescribed antibiotics, take them as directed. Do not stop taking them just because you feel better. You need to take the full course of antibiotics.    If you get medicines to prevent seizures, take them exactly as directed.  If the doctor or nurse practitioner gave you a prescription medicine for pain, take it as prescribed.  If you are not taking a prescription pain medicine, ask your doctor or nurse practitioner if you can take an over-the-counter medicine.    Pain Medication Safety  DO:   Read the Medication Guide    Take your medicine exactly as prescribed    Store your medicine away from children and in a safe place    Call your healthcare provider for medical advice about side effects. You may report side effects to FDA at 6-059-DKD-6389.  Please be aware that many medications contain Tylenol. We do not want you to over medicate so please read the information below as a guide. Do not take more than 4 Grams of Tylenol in a 24 hour period if you are under age 79 or 3 Grams in 24 hours if you are over 79years old. (There are 1000 milligrams in one Gram)  o Percocet contains 325 mg of Tylenol per tablet (do not take more than 12 tablets in 24 hours)  o Lortab contains 500 mg of Tylenol per tablet (do not take more than 8 tablets in 24 hours)  o Norco contains 325 mg of Tylenol per tablet (do not take more than 12 tablets in 24 hours). DO NOT:   Do not give your medicine to others.  Do not take medicine unless it was prescribed for you.  Do not stop taking your medicine without talking to your healthcare provider.  Do not break, chew, crush, dissolve, or inject your medicine. If you cannot swallow your medicine whole, talk to your healthcare provider.  Do not drink alcohol while taking this medicine.  Do not take anti-inflammatory medications or aspirin unless instructed by your physician.

## 2022-03-11 NOTE — PROGRESS NOTES
Hematology-Oncology Progress Note    Jorge Lara  1975  [id number]  3/11/2022    Follow-up for: high grade glioma     [x]        Chart notes since last visit reviewed   [x]        Medications reviewed for allergies and interactions       Case discussed with the following:         []                            []        Nursing Staff                                                                         []        Pathologist                                                                        []        FAMILY      Subjective:     Spoke with patient who complains of: no problems over night, strength improving.     Objective:     Patient Vitals for the past 24 hrs:   BP Temp Pulse Resp SpO2   03/11/22 0615 -- -- 78 -- --   03/11/22 0600 (!) 100/54 98.2 °F (36.8 °C) 78 17 96 %   03/11/22 0400 -- -- 73 -- --   03/11/22 0215 -- -- 77 -- --   03/11/22 0200 (!) 102/59 98.2 °F (36.8 °C) 79 25 95 %   03/11/22 0000 -- -- 82 -- --   03/10/22 2200 99/72 98.2 °F (36.8 °C) 86 26 96 %   03/10/22 2000 -- -- 86 -- 96 %   03/10/22 1805 96/60 98.3 °F (36.8 °C) 81 18 --   03/10/22 1649 95/62 -- 78 -- --   03/10/22 1414 (!) 96/57 98.2 °F (36.8 °C) 81 20 97 %   03/10/22 1400 -- -- 79 -- --   03/10/22 1200 -- -- 80 -- --   03/10/22 1030 111/66 98.8 °F (37.1 °C) 81 18 94 %   03/10/22 1000 -- -- 72 -- --   03/10/22 0914 117/69 -- 74 -- --       REVIEW OF SYSTEMS:    Constitutional: negative fever, negative chills, negative weight loss  Eyes:   negative visual changes  ENT:   negative sore throat, tongue or lip swelling  Respiratory:  negative cough, negative dyspnea  Cards:  negative for chest pain, palpitations, lower extremity edema  GI:   negative for nausea, vomiting, diarrhea, and abdominal pain  Neuro:  negative for headaches, dizziness, vertigo  []                        Full ROS o/w normal/non contributor    Constitutional:  Patient looks  []        Sick  []        Frail  [x]        Better []        Depressed    HEENT:  [x]   NC                         []   AT               []    ALOPECIA           Eyes: []   Normal               []    Icteric  Oropharynx: []    Normal                  []  Thrush               []   Dry  Mucositis: []    None                 Grade: []        I  []        II  []        III  []        IV  Neck:   [x]   Supple                  []  Rigid               JVD:    []   ABSENT       []   PRESENT  Lymphadenopathy:   []   None Noted            []   PRESENT    Chest:  []   Clear               []    Rhonchi                      Dec'd @     []  Right Base           []   Left Base    CV:             []   Regular              []  Irregular               []   Tachy                []   Murmur  Abdominal:   []    Soft              []   NON-tender               []   Tender      BS:    []   ABSENT                   []   PRESENT  Liver:     []  NON-palp                  []   EDGE- palp  Spleen: []   NON-palp                   []  EDGE - palp  Mass:   []   ABSENT                          []  PRESENT  Extr:    []  Lymphedema             []   Cyanosis      []  Clubbing  Edema:     []   NONE       []   PRESENT  Skin:  Intact []           Purpura []        Rash: []   ABSENT       []  PRESENT  Neuro:  [x]        Normal  4/5 strength LUE/RLE[]        Confused      Available labs reviewed:  Labs:    Recent Results (from the past 24 hour(s))   SODIUM    Collection Time: 03/10/22  9:51 AM   Result Value Ref Range    Sodium 130 (L) 136 - 145 mmol/L   GLUCOSE, POC    Collection Time: 03/10/22 11:36 AM   Result Value Ref Range    Glucose (POC) 115 65 - 117 mg/dL    Performed by Rock Ernestina BETHEA)    GLUCOSE, POC    Collection Time: 03/10/22  4:59 PM   Result Value Ref Range    Glucose (POC) 119 (H) 65 - 117 mg/dL    Performed by Miguel Hoffman    GLUCOSE, POC    Collection Time: 03/10/22  9:09 PM   Result Value Ref Range    Glucose (POC) 148 (H) 65 - 117 mg/dL    Performed by Dafne Lincoln ([place])    GLUCOSE, POC    Collection Time: 03/11/22  7:15 AM   Result Value Ref Range    Glucose (POC) 135 (H) 65 - 117 mg/dL    Performed by Dafne Lincoln ([place])        Available Xrays reviewed:    Chemotherapy monitored and toxicities assessed:    Assessment and Plan   1.  glioma no necrosis identified. .   Path sent to [facility] for [facility] opinion Radiation has made appt for him to be seen on the 16th. Pt states he will be going to [address] [place] [address] for rehab. . possibly today. .  I will make appt for pt to see me in 2 weeks    Nell Kim MD

## 2022-03-11 NOTE — DISCHARGE SUMMARY
Discharge SNF/Rehab Instructions/LTAC       PATIENT ID: Joslyn Wilkins  MRN: [id number]   YOB: 1975    DATE OF ADMISSION: 3/2/2022  5:25 AM    DATE OF DISCHARGE: 3/11/2022    PRIMARY CARE PROVIDER: Viri Riley MD       ATTENDING PHYSICIAN: Martín Hernández MD  DISCHARGING PROVIDER: Inés Brown NP     To contact this individual call [phone] and ask the  to page. CONSULTATIONS: IP CONSULT TO INTENSIVIST  IP CONSULT TO ONCOLOGY  IP CONSULT TO RADIATION ONCOLOGY  IP CONSULT TO NEPHROLOGY  IP CONSULT TO HOSPITALIST    PROCEDURES/SURGERIES: Procedure(s):  AIRO GUIDED RIGHT PARIETAL CRANIOTOMY AND TUMOR REMOVAL    ADMITTING [address] [place] [address] COURSE:     Brain mass    The patient presented to the hospital at the end of February 2022 due to intermittent left leg weakness. His head CT revealed cerebral edema and he transferred to [facility]. His MRI brain revealed a large right parietal brain mass and his CT scans of his body showed no other evidence of disease. He was discharged to home on steroids and keppra with the intent of a planned readmission for surgery on 03/02/22. He underwent a right parietal craniotomy with resection of tumor with Dr. Oscar Shelton. Intraoperative findings can be found in his operative report. Pathology is consistent with glioma though no necrosis was seen. It was sent to [facility] for further evaluation. Post-operatively, the patient transferred to ICU. He had focal seizures of his left upper extremity in the immediate post-op period. His keppra was increased to 1000 mg bid. He continued to have focal seizures overnight, so he was loaded with Cerebyx and placed on dilantin 100 mg tid. After this, he no longer had focal seizures. He was afebrile. Intensivist and hospitalist followed for his congestive heart failure, hypertension and steroid-induced hyperglycemia. His vital signs were stable. He transferred out of ICU on POD1.  He continued to have improvement in his left sided weakness though he is still pretty weak on that side. He developed hyponatremia with a sodium of 127. 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 Nephrology was consulted. He was initiated on a fluid restriction. He did receive a dose of Tolvaptan on 03/09 and was off the fluid restriction at that time. He is now back on a 1500 fluid restriction. His sodium is 133 at discharge. He was seen by PT/OT and inpatient rehab was recommended. He is ready for discharge to rehab on POD9. He has an appt to follow-up with Dr. Inna Jansen of radiation oncology on 03/28/22 at 10:00. He is to see his oncologist, Dr. Shahana Brush in 2 weeks and to see his surgeon, Dr. Peng Garrett after discharge from rehab. DISCHARGE DIAGNOSES / PLAN:       . Right parietal brain mass              - s/p crani 03/02              - decadron completed 03/09              - cont keppra and dilantin. Will eventually be able to wean the dilantin as an outpatient.              - PT/OT              - Pathology consistent with glioma. Sent to [facility] for further evaluation.              - Med onc and rad onc consulted. Tx would not start for 3-4 weeks  2. Cerebral edema with brain compression              - due to #1              - plans as above  3. Hx of CHF              - EF of 35% on last ECHO              - cont Coreg, lisinopril              - Hospitalist following  4. Focal seizures - resolved              - due to #1, 2              - cont keppra to 1000 mg bid              - PO dilantin 100 mg tid. Phenytoin level is therapeutic  5. HTN              - cont coreg, lisinopril, torsemide              - Hospitalist following  6. Steroid-induced hyperglycemia - resolved              - -148              - Hgb A1c 5.8              - off steroids  7. Morbid obesity as defined by BMI greater than 40              - Body mass index is 51.54 kg/m². - Diet and exercise counseling as appropriate  8.  Hyponatremia              - due to SIADH - Na 133              - s/p Tolvaptan 03/09              - Cont 1500 cc fluid restriction        - follow up with nephrology after discharge from rehab       PENDING TEST RESULTS:   At the time of discharge the following test results are still pending: Final pthology from [facility]    FOLLOW UP APPOINTMENTS:    Follow-up Information     Follow up With Specialties Details Why Contact Info    Angelina Bundy MD Neurosurgery Schedule an appointment as soon as possible for a visit after discharge from rehab [address] [facility] [address]      Jessenia Gonzales MD  Go on 3/28/2022 at 10am to discuss radiation [place]. [address] [phone]  [phone]  Ground floor, go to outpatient registration first Thomas Morgan MD Hematology and Oncology, Hematology, Oncology Schedule an appointment as soon as possible for a visit in 2 weeks to discuss chemotherapy [id number] [place] [address]  [address] [facility]   [address] [facility] [facility]      Mere Delgadillo MD Nephrology Schedule an appointment as soon as possible for a visit after [facility]  [phone]             ADDITIONAL CARE RECOMMENDATIONS: none    DIET: regular - 1500 cc fluid restriction in 24 hour period    ACTIVITY: per rehab    WOUND CARE: ok to shower and get incision wet. Sutures are dissolvable and will break down on their own in 2-3 weeks    EQUIPMENT needed: per rehab      DISCHARGE MEDICATIONS:   See Medication Reconciliation Form      NOTIFY YOUR PHYSICIAN FOR ANY OF THE FOLLOWING:   Fever over 101 degrees for 24 hours. Chest pain, shortness of breath, fever, chills, nausea, vomiting, diarrhea, change in mentation, falling, weakness, bleeding. Severe pain or pain not relieved by medications. Or, any other signs or symptoms that you may have questions about.     DISPOSITION:    Home With:   OT  PT  HH  RN      X Inpatient Rehab    Independent/assisted living    Hospice    Other:       PATIENT CONDITION AT DISCHARGE:     Functional status    Poor    X Deconditioned     Independent      Cognition    X Lucid     Forgetful     Dementia      Catheters/lines (plus indication)    Carrillo     PICC     PEG    X None      Code status   X  Full code     DNR      PHYSICAL EXAMINATION AT DISCHARGE:    Gen:NAD. Neuro: A&Ox3. Follows commands. Speech clear. Affect bright  PERRL. EOMI. Face symmetric. Tongue midline. RUE/RLE 5/5, LUE 3/5 hand intrinsics, 3/5 biceps, triceps, 2/5 deltoid, LLE 4+/5  Sensation decreased on left. Gait deferred. Skin: Right scalp incision C/D/I with sutures in place. No erythema, edema, or drainage. Heart RRR  Lungs CTA BL  Abd soft, NT, active bowel sounds  Ext no edema      CHRONIC MEDICAL DIAGNOSES:  Problem List as of 3/11/2022 Date Reviewed: 2/22/2022          Codes Class Noted - Resolved    NICM (nonischemic cardiomyopathy) ([facility] ICD-10-CM: I42.8  ICD-9-CM: 425.4  2/25/2022 - Present    Overview Signed 2/25/2022  6:53 PM by Koki Ruiz MD     cath open cors, high LVEDP, EF on echo was 35% day before             Hypertension ICD-10-CM: I10  ICD-9-CM: 401.9  2/22/2022 - Present        Hyperlipidemia ICD-10-CM: E78.5  ICD-9-CM: 272.4  2/22/2022 - Present        Morbid obesity ([facility] ICD-10-CM: E66.01  ICD-9-CM: 278.01  2/22/2022 - Present        Prediabetes ICD-10-CM: R73.03  ICD-9-CM: 790.29  2/22/2022 - Present        Heart failure with mid-range ejection fraction (HCC) ICD-10-CM: I50.9  ICD-9-CM: 428.9  2/22/2022 - Present        Brain mass ICD-10-CM: G93.89  ICD-9-CM: 348.89  2/21/2022 - Present        Systolic CHF, chronic ([facility] ICD-10-CM: I50.22  ICD-9-CM: 428.22, 428.0  2020 - Present    Overview Signed 2/25/2022  6:53 PM by Koki Ruiz MD     EF 31 then, now 35% 2022                     CDMP Checked: Yes X     PROBLEM LIST Updated:   Yes X         Signed: Vin Rascon NP  3/11/2022  [id number]

## 2022-03-11 NOTE — PROGRESS NOTES
Stable  Awaiting transfer to rehab  Hopefully today that will occur  Incision intact  Seen by oncology

## 2022-03-11 NOTE — PROGRESS NOTES
Nephrology Progress Note  Nan Almonte  Date of Admission : 3/2/2022    CC:  Follow up for hyponatremia       Assessment and Plan     Hyponatremia:  - suspect this is 2/2 SIADH from his CNS process  - urine studies support SIADH  - Na stable after tolvaptan on 3/9  - ok for d/c to [facility]  - would cont torsemide and FR 1.5L/d  - we can see him at [facility] if needed      R parietal mass s/p resection on 3/2 - path pending  Seizures  HFrEF  HTN  Obesity  KELL       Interval History:  Seen and examined. Feeling well. Na 130 this AM.  No cp, sob, n/v/d reported at this time. Awaiting bed at [facility]    Current Medications: all current  Medications have been eviewed in EPIC  Review of Systems: Pertinent items are noted in HPI. Objective:  Vitals:    Vitals:    03/11/22 0215 03/11/22 0400 03/11/22 0600 03/11/22 0615   BP:   (!) 100/54    Pulse: 77 73 78 78   Resp:   17    Temp:   98.2 °F (36.8 °C)    SpO2:   96%    Weight:       Height:         Intake and Output:  03/10 1901 - 03/11 0700  In: 120 [P.O.:120]  Out: 200 [Urine:200]  03/09 0701 - 03/10 1900  In: 3460 [P.O.:3460]  Out: 5825 [Urine:5825]    Physical Examination:    General: NAD,Conversant   Neck:  Supple, no mass  Resp:  Lungs CTA B/L, no wheezing , normal respiratory effort  CV:  RRR,  no murmur or rub, trace LE edema  GI:  Soft, NT, + Bowel sounds, no hepatosplenomegaly  Neurologic:  Non focal  Psych:             AAO x 3 appropriate affect   Skin:  No Rash  :  No cho    []    High complexity decision making was performed  []    Patient is at high-risk of decompensation with multiple organ involvement    Lab Data Personally Reviewed: I have reviewed all the pertinent labs, microbiology data and radiology studies during assessment.     Recent Labs     03/10/22  0951 03/10/22  0314 03/09/22  1726 03/09/22  0500 03/09/22  0500   * 131* 129*   < > 126*   K  --  3.9 3.6  --  3.9   CL  --  90* 88*  --  90*   CO2  --  36* 33*  --  31   GLU  --  143* 149*  --  135* BUN  --  32* 32*  --  32*   CREA  --  1.02 1.25  --  0.97   CA  --  8.7 8.5  --  8.4*   PHOS  --   --  3.6  --   --    ALB  --   --  3.1*  --   --     < > = values in this interval not displayed. No results for input(s): WBC, HGB, HCT, PLT, HGBEXT, HCTEXT, PLTEXT, HGBEXT, HCTEXT, PLTEXT in the last 72 hours. No results found for: SDES  Lab Results   Component Value Date/Time    Culture result: NO GROWTH 5 DAYS 02/21/2022 04:34 PM     Recent Results (from the past 24 hour(s))   GLUCOSE, POC    Collection Time: 03/10/22  7:08 AM   Result Value Ref Range    Glucose (POC) 130 (H) 65 - 117 mg/dL    Performed by Manuel Messing ([facility])    SODIUM    Collection Time: 03/10/22  9:51 AM   Result Value Ref Range    Sodium 130 (L) 136 - 145 mmol/L   GLUCOSE, POC    Collection Time: 03/10/22 11:36 AM   Result Value Ref Range    Glucose (POC) 115 65 - 117 mg/dL    Performed by Stas Aguiar (CON)    GLUCOSE, POC    Collection Time: 03/10/22  4:59 PM   Result Value Ref Range    Glucose (POC) 119 (H) 65 - 117 mg/dL    Performed by Vicente Vieira    GLUCOSE, POC    Collection Time: 03/10/22  9:09 PM   Result Value Ref Range    Glucose (POC) 148 (H) 65 - 117 mg/dL    Performed by Gregorio Modi ([facility])                  Orly Mc MD  [address]  Phone - [phone]   Fax - [phone]  [email or web address]

## 2022-03-17 ENCOUNTER — TRANSCRIBE ORDER (OUTPATIENT)
Dept: SCHEDULING | Age: 47
End: 2022-03-17

## 2022-03-17 DIAGNOSIS — T81.49XA SURGICAL SITE INFECTION: Primary | ICD-10-CM

## 2022-03-18 ENCOUNTER — APPOINTMENT (OUTPATIENT)
Dept: CT IMAGING | Age: 47
DRG: 720 | End: 2022-03-18
Attending: EMERGENCY MEDICINE
Payer: MEDICAID

## 2022-03-18 ENCOUNTER — APPOINTMENT (OUTPATIENT)
Dept: MRI IMAGING | Age: 47
DRG: 720 | End: 2022-03-18
Attending: EMERGENCY MEDICINE
Payer: MEDICAID

## 2022-03-18 ENCOUNTER — HOSPITAL ENCOUNTER (INPATIENT)
Age: 47
LOS: 5 days | Discharge: NURSING, REHAB OR LONG-TERM CARE | DRG: 720 | End: 2022-03-23
Attending: EMERGENCY MEDICINE | Admitting: STUDENT IN AN ORGANIZED HEALTH CARE EDUCATION/TRAINING PROGRAM
Payer: MEDICAID

## 2022-03-18 ENCOUNTER — APPOINTMENT (OUTPATIENT)
Dept: GENERAL RADIOLOGY | Age: 47
DRG: 720 | End: 2022-03-18
Attending: EMERGENCY MEDICINE
Payer: MEDICAID

## 2022-03-18 DIAGNOSIS — T81.40XA POSTOPERATIVE INFECTION, UNSPECIFIED TYPE, INITIAL ENCOUNTER: Primary | ICD-10-CM

## 2022-03-18 PROBLEM — I10 HYPERTENSION: Status: ACTIVE | Noted: 2022-02-22

## 2022-03-18 PROBLEM — E78.5 HYPERLIPIDEMIA: Status: ACTIVE | Noted: 2022-02-22

## 2022-03-18 PROBLEM — I50.22 HEART FAILURE WITH MID-RANGE EJECTION FRACTION (HCC): Status: ACTIVE | Noted: 2022-02-22

## 2022-03-18 PROBLEM — R65.10 SIRS (SYSTEMIC INFLAMMATORY RESPONSE SYNDROME) (HCC): Status: ACTIVE | Noted: 2022-03-18

## 2022-03-18 PROBLEM — R73.03 PREDIABETES: Status: ACTIVE | Noted: 2022-02-22

## 2022-03-18 LAB
ALBUMIN SERPL-MCNC: 2.8 G/DL (ref 3.5–5)
ALBUMIN/GLOB SERPL: 0.6 {RATIO} (ref 1.1–2.2)
ALP SERPL-CCNC: 96 U/L (ref 45–117)
ALT SERPL-CCNC: 120 U/L (ref 12–78)
ANION GAP SERPL CALC-SCNC: 5 MMOL/L (ref 5–15)
APPEARANCE UR: CLEAR
AST SERPL-CCNC: 63 U/L (ref 15–37)
ATRIAL RATE: 92 BPM
BACTERIA URNS QL MICRO: NEGATIVE /HPF
BASOPHILS # BLD: 0.1 K/UL (ref 0–0.1)
BASOPHILS NFR BLD: 1 % (ref 0–1)
BILIRUB SERPL-MCNC: 0.5 MG/DL (ref 0.2–1)
BILIRUB UR QL CFM: NEGATIVE
BUN SERPL-MCNC: 21 MG/DL (ref 6–20)
BUN/CREAT SERPL: 17 (ref 12–20)
CALCIUM SERPL-MCNC: 9.2 MG/DL (ref 8.5–10.1)
CALCULATED P AXIS, ECG09: 60 DEGREES
CALCULATED R AXIS, ECG10: -15 DEGREES
CALCULATED T AXIS, ECG11: 36 DEGREES
CHLORIDE SERPL-SCNC: 94 MMOL/L (ref 97–108)
CO2 SERPL-SCNC: 31 MMOL/L (ref 21–32)
COLOR UR: ABNORMAL
CREAT SERPL-MCNC: 1.25 MG/DL (ref 0.7–1.3)
DIAGNOSIS, 93000: NORMAL
DIFFERENTIAL METHOD BLD: ABNORMAL
EOSINOPHIL # BLD: 0 K/UL (ref 0–0.4)
EOSINOPHIL NFR BLD: 0 % (ref 0–7)
EPITH CASTS URNS QL MICRO: ABNORMAL /LPF
ERYTHROCYTE [DISTWIDTH] IN BLOOD BY AUTOMATED COUNT: 12.4 % (ref 11.5–14.5)
GLOBULIN SER CALC-MCNC: 4.9 G/DL (ref 2–4)
GLUCOSE SERPL-MCNC: 98 MG/DL (ref 65–100)
GLUCOSE UR STRIP.AUTO-MCNC: NEGATIVE MG/DL
HCT VFR BLD AUTO: 43.3 % (ref 36.6–50.3)
HGB BLD-MCNC: 13.9 G/DL (ref 12.1–17)
HGB UR QL STRIP: NEGATIVE
HYALINE CASTS URNS QL MICRO: ABNORMAL /LPF (ref 0–5)
IMM GRANULOCYTES # BLD AUTO: 0.2 K/UL (ref 0–0.04)
IMM GRANULOCYTES NFR BLD AUTO: 2 % (ref 0–0.5)
KETONES UR QL STRIP.AUTO: NEGATIVE MG/DL
LACTATE SERPL-SCNC: 0.8 MMOL/L (ref 0.4–2)
LEUKOCYTE ESTERASE UR QL STRIP.AUTO: ABNORMAL
LYMPHOCYTES # BLD: 0.6 K/UL (ref 0.8–3.5)
LYMPHOCYTES NFR BLD: 7 % (ref 12–49)
MCH RBC QN AUTO: 30.9 PG (ref 26–34)
MCHC RBC AUTO-ENTMCNC: 32.1 G/DL (ref 30–36.5)
MCV RBC AUTO: 96.2 FL (ref 80–99)
MONOCYTES # BLD: 0.3 K/UL (ref 0–1)
MONOCYTES NFR BLD: 4 % (ref 5–13)
NEUTS SEG # BLD: 6.9 K/UL (ref 1.8–8)
NEUTS SEG NFR BLD: 86 % (ref 32–75)
NITRITE UR QL STRIP.AUTO: NEGATIVE
NRBC # BLD: 0 K/UL (ref 0–0.01)
NRBC BLD-RTO: 0 PER 100 WBC
P-R INTERVAL, ECG05: 148 MS
PH UR STRIP: 5.5 [PH] (ref 5–8)
PLATELET # BLD AUTO: 211 K/UL (ref 150–400)
PMV BLD AUTO: 11 FL (ref 8.9–12.9)
POTASSIUM SERPL-SCNC: 3.4 MMOL/L (ref 3.5–5.1)
PROT SERPL-MCNC: 7.7 G/DL (ref 6.4–8.2)
PROT UR STRIP-MCNC: 30 MG/DL
Q-T INTERVAL, ECG07: 340 MS
QRS DURATION, ECG06: 82 MS
QTC CALCULATION (BEZET), ECG08: 420 MS
RBC # BLD AUTO: 4.5 M/UL (ref 4.1–5.7)
RBC #/AREA URNS HPF: ABNORMAL /HPF (ref 0–5)
RBC MORPH BLD: ABNORMAL
SODIUM SERPL-SCNC: 130 MMOL/L (ref 136–145)
SP GR UR REFRACTOMETRY: 1.01
UA: UC IF INDICATED,UAUC: ABNORMAL
UROBILINOGEN UR QL STRIP.AUTO: 2 EU/DL (ref 0.2–1)
VENTRICULAR RATE, ECG03: 92 BPM
WBC # BLD AUTO: 8.1 K/UL (ref 4.1–11.1)
WBC URNS QL MICRO: ABNORMAL /HPF (ref 0–4)

## 2022-03-18 PROCEDURE — 96368 THER/DIAG CONCURRENT INF: CPT

## 2022-03-18 PROCEDURE — 74011250636 HC RX REV CODE- 250/636: Performed by: EMERGENCY MEDICINE

## 2022-03-18 PROCEDURE — 74011000250 HC RX REV CODE- 250: Performed by: EMERGENCY MEDICINE

## 2022-03-18 PROCEDURE — 96366 THER/PROPH/DIAG IV INF ADDON: CPT

## 2022-03-18 PROCEDURE — 87086 URINE CULTURE/COLONY COUNT: CPT

## 2022-03-18 PROCEDURE — 83605 ASSAY OF LACTIC ACID: CPT

## 2022-03-18 PROCEDURE — A9576 INJ PROHANCE MULTIPACK: HCPCS

## 2022-03-18 PROCEDURE — U0005 INFEC AGEN DETEC AMPLI PROBE: HCPCS

## 2022-03-18 PROCEDURE — 65660000000 HC RM CCU STEPDOWN

## 2022-03-18 PROCEDURE — 80053 COMPREHEN METABOLIC PANEL: CPT

## 2022-03-18 PROCEDURE — 74011250637 HC RX REV CODE- 250/637: Performed by: STUDENT IN AN ORGANIZED HEALTH CARE EDUCATION/TRAINING PROGRAM

## 2022-03-18 PROCEDURE — 36415 COLL VENOUS BLD VENIPUNCTURE: CPT

## 2022-03-18 PROCEDURE — 96365 THER/PROPH/DIAG IV INF INIT: CPT

## 2022-03-18 PROCEDURE — 74011250637 HC RX REV CODE- 250/637: Performed by: EMERGENCY MEDICINE

## 2022-03-18 PROCEDURE — 71045 X-RAY EXAM CHEST 1 VIEW: CPT

## 2022-03-18 PROCEDURE — 81001 URINALYSIS AUTO W/SCOPE: CPT

## 2022-03-18 PROCEDURE — 74011250636 HC RX REV CODE- 250/636

## 2022-03-18 PROCEDURE — 70553 MRI BRAIN STEM W/O & W/DYE: CPT

## 2022-03-18 PROCEDURE — 85025 COMPLETE CBC W/AUTO DIFF WBC: CPT

## 2022-03-18 PROCEDURE — 74011000250 HC RX REV CODE- 250: Performed by: STUDENT IN AN ORGANIZED HEALTH CARE EDUCATION/TRAINING PROGRAM

## 2022-03-18 PROCEDURE — 99285 EMERGENCY DEPT VISIT HI MDM: CPT

## 2022-03-18 PROCEDURE — 84145 PROCALCITONIN (PCT): CPT

## 2022-03-18 PROCEDURE — 87040 BLOOD CULTURE FOR BACTERIA: CPT

## 2022-03-18 PROCEDURE — 93005 ELECTROCARDIOGRAM TRACING: CPT

## 2022-03-18 RX ORDER — LEVOFLOXACIN 5 MG/ML
750 INJECTION, SOLUTION INTRAVENOUS EVERY 24 HOURS
Status: DISCONTINUED | OUTPATIENT
Start: 2022-03-19 | End: 2022-03-23 | Stop reason: HOSPADM

## 2022-03-18 RX ORDER — ONDANSETRON 2 MG/ML
4 INJECTION INTRAMUSCULAR; INTRAVENOUS
Status: DISCONTINUED | OUTPATIENT
Start: 2022-03-18 | End: 2022-03-23 | Stop reason: HOSPADM

## 2022-03-18 RX ORDER — ONDANSETRON 4 MG/1
4 TABLET, ORALLY DISINTEGRATING ORAL
Status: DISCONTINUED | OUTPATIENT
Start: 2022-03-18 | End: 2022-03-23 | Stop reason: HOSPADM

## 2022-03-18 RX ORDER — SODIUM CHLORIDE 0.9 % (FLUSH) 0.9 %
5-40 SYRINGE (ML) INJECTION AS NEEDED
Status: DISCONTINUED | OUTPATIENT
Start: 2022-03-18 | End: 2022-03-23 | Stop reason: HOSPADM

## 2022-03-18 RX ORDER — LEVOFLOXACIN 5 MG/ML
750 INJECTION, SOLUTION INTRAVENOUS
Status: COMPLETED | OUTPATIENT
Start: 2022-03-18 | End: 2022-03-18

## 2022-03-18 RX ORDER — ACETAMINOPHEN 650 MG/1
650 SUPPOSITORY RECTAL
Status: DISCONTINUED | OUTPATIENT
Start: 2022-03-18 | End: 2022-03-23 | Stop reason: HOSPADM

## 2022-03-18 RX ORDER — SODIUM CHLORIDE 0.9 % (FLUSH) 0.9 %
5-10 SYRINGE (ML) INJECTION AS NEEDED
Status: DISCONTINUED | OUTPATIENT
Start: 2022-03-18 | End: 2022-03-23 | Stop reason: HOSPADM

## 2022-03-18 RX ORDER — ACETAMINOPHEN 650 MG/1
975 SUPPOSITORY RECTAL
Status: COMPLETED | OUTPATIENT
Start: 2022-03-18 | End: 2022-03-18

## 2022-03-18 RX ORDER — POLYETHYLENE GLYCOL 3350 17 G/17G
17 POWDER, FOR SOLUTION ORAL DAILY PRN
Status: DISCONTINUED | OUTPATIENT
Start: 2022-03-18 | End: 2022-03-23 | Stop reason: HOSPADM

## 2022-03-18 RX ORDER — SODIUM CHLORIDE 0.9 % (FLUSH) 0.9 %
5-40 SYRINGE (ML) INJECTION EVERY 8 HOURS
Status: DISCONTINUED | OUTPATIENT
Start: 2022-03-18 | End: 2022-03-23 | Stop reason: HOSPADM

## 2022-03-18 RX ORDER — ACETAMINOPHEN 325 MG/1
650 TABLET ORAL
Status: DISCONTINUED | OUTPATIENT
Start: 2022-03-18 | End: 2022-03-23 | Stop reason: HOSPADM

## 2022-03-18 RX ADMIN — ACETAMINOPHEN 650 MG: 325 TABLET ORAL at 22:31

## 2022-03-18 RX ADMIN — ACETAMINOPHEN 975 MG: 650 SUPPOSITORY RECTAL at 17:05

## 2022-03-18 RX ADMIN — LEVOFLOXACIN 750 MG: 750 INJECTION, SOLUTION INTRAVENOUS at 20:41

## 2022-03-18 RX ADMIN — GADOTERIDOL 20 ML: 279.3 INJECTION, SOLUTION INTRAVENOUS at 18:50

## 2022-03-18 RX ADMIN — SODIUM CHLORIDE, PRESERVATIVE FREE 10 ML: 5 INJECTION INTRAVENOUS at 18:50

## 2022-03-18 RX ADMIN — Medication 10 ML: at 22:06

## 2022-03-18 RX ADMIN — SODIUM CHLORIDE 1000 ML: 9 INJECTION, SOLUTION INTRAVENOUS at 16:40

## 2022-03-18 RX ADMIN — CEFEPIME 2 G: 2 INJECTION, POWDER, FOR SOLUTION INTRAVENOUS at 20:13

## 2022-03-18 NOTE — ED TRIAGE NOTES
craniotomy on 03/02/22 to remove brain tumor. Started running fever 3 days ago. Had CT this am. Showed something new on CT.  Was told to come to ED

## 2022-03-18 NOTE — ED PROVIDER NOTES
59-year-old male with past medical history significant for glioblastoma status post partial resection on March 2, 2022 presents with complaints of 3 days fever and today had a repeat head CT which showed something unusual and was sent to the emergency department for evaluation. CT department was performed at [facility]. Patient reports poor appetite x3 days. Tylenol taken today at approximately 1 PM.  Denies nausea, vomiting, diarrhea, constipation, URI complaints, headache. Denies urinary complaints. Patient reports rehab center reported that his urine was unremarkable 2 days ago. Denies tobacco use, drug use, alcohol use  [facility]  [facility]             Past Medical History:   Diagnosis Date    Adverse effect of anesthesia     NEVER HAD ANESTHESIA    Brain mass 02/25/2022    Hypertension     NICM (nonischemic cardiomyopathy) ([facility] 02/25/2022    cath open cors, high LVEDP, EF on echo was 35% day before    Sleep apnea     NO CPAP    Systolic CHF, chronic ([facility] 2020    EF 31 then, now 35% 2022       No past surgical history on file.       Family History:   Problem Relation Age of Onset    Diabetes Mother     Thyroid Disease Mother     Anxiety Mother     Depression Mother     High Cholesterol Mother     Hypertension Mother     Diabetes Father     Asthma Sister     Anemia Sister     Other Brother         AUTO ACCIDENT    Obesity Brother     Anesth Problems Neg Hx        Social History     Socioeconomic History    Marital status: SINGLE     Spouse name: Not on file    Number of children: Not on file    Years of education: Not on file    Highest education level: Not on file   Occupational History    Not on file   Tobacco Use    Smoking status: Never Smoker    Smokeless tobacco: Never Used   Vaping Use    Vaping Use: Never used   Substance and Sexual Activity    Alcohol use: Not Currently     Comment: rare    Drug use: No    Sexual activity: Not on file Other Topics Concern    Not on file   Social History Narrative    Not on file     Social Determinants of Health     Financial Resource Strain:     Difficulty of Paying Living Expenses: Not on file   Food Insecurity:     Worried About Running Out of Food in the Last Year: Not on file    Rodrick of Food in the Last Year: Not on file   Transportation Needs:     Lack of Transportation (Medical): Not on file    Lack of Transportation (Non-Medical): Not on file   Physical Activity:     Days of Exercise per Week: Not on file    Minutes of Exercise per Session: Not on file   Stress:     Feeling of Stress : Not on file   Social Connections:     Frequency of Communication with Friends and Family: Not on file    Frequency of Social Gatherings with Friends and Family: Not on file    Attends Synagogue Services: Not on file    Active Member of [address] [place] [address] or Organizations: Not on file    Attends Club or Organization Meetings: Not on file    Marital Status: Not on file   Intimate Partner Violence:     Fear of Current or Ex-Partner: Not on file    Emotionally Abused: Not on file    Physically Abused: Not on file    Sexually Abused: Not on file   Housing Stability:     Unable to Pay for Housing in the Last Year: Not on file    Number of [place] in the Last Year: Not on file    Unstable Housing in the Last Year: Not on file         ALLERGIES: Patient has no known allergies. Review of Systems   Constitutional: Positive for appetite change and fever. Negative for chills. HENT: Negative for congestion, nosebleeds and sore throat. Eyes: Negative for pain and discharge. Respiratory: Negative for cough and shortness of breath. Cardiovascular: Negative for chest pain and palpitations. Gastrointestinal: Negative for abdominal pain, constipation, nausea and vomiting. Genitourinary: Negative for decreased urine volume, dysuria, flank pain and urgency.    Musculoskeletal: Negative for gait problem and myalgias. Skin: Negative for rash and wound. Neurological: Negative for seizures, syncope and headaches. Hematological: Does not bruise/bleed easily. Psychiatric/Behavioral: Negative for confusion, self-injury and suicidal ideas. Vitals:    03/18/22 1441   BP: 114/72   Pulse: 91   Resp: 16   Temp: (!) 101.4 °F (38.6 °C)   SpO2: 96%   Weight: 132.3 kg (291 lb 10.7 oz)            Physical Exam  Vitals and nursing note reviewed. Constitutional:       Appearance: He is well-developed. Comments: Febrile   HENT:      Head: Normocephalic and atraumatic. Eyes:      Pupils: Pupils are equal, round, and reactive to light. Cardiovascular:      Rate and Rhythm: Normal rate and regular rhythm. Heart sounds: Normal heart sounds. Pulmonary:      Effort: Pulmonary effort is normal. No respiratory distress. Breath sounds: Normal breath sounds. No wheezing. Abdominal:      General: Bowel sounds are normal.      Palpations: Abdomen is soft. Tenderness: There is no abdominal tenderness. There is no guarding or rebound. Musculoskeletal:         General: Normal range of motion. Cervical back: Normal range of motion and neck supple. Skin:     General: Skin is warm and dry. Neurological:      Mental Status: He is alert and oriented to person, place, and time. Psychiatric:         Behavior: Behavior normal.          MDM  Number of Diagnoses or Management Options  Diagnosis management comments: 11-year-old male presents with fever x3 days after recent partial resection of brain tumor on March 2, 2022. Patient is febrile, nontoxic, hemodynamically stable, baseline left-sided weakness. Plan-sepsis work-up, consult neurosurgery.        Amount and/or Complexity of Data Reviewed  Clinical lab tests: ordered and reviewed  Tests in the radiology section of CPT®: ordered and reviewed  Discuss the patient with other providers: yes  Independent visualization of images, tracings, or specimens: yes    Patient Progress  Patient progress: stable         Procedures        4:32 PM  Hansa Vargas MD spoke with Dr. Priscila Mcclellan, Consult for Neurosurgery. Discussed available diagnostic tests and clinical findings. He/She is in agreement with care plans as outlined. He/she recommends MRI brain w/ and without contrast to identify possible source of fever. 7:50 PM  Hansa Vargas MD spoke with Dr. Priscila Mcclellan, Consult for Neurosurgery. Discussed available diagnostic tests and clinical findings. He/She is in agreement with care plans as outlined. He/she reviewed MRI images. Advises admission to hospitalist and cefepime now. Awaiting formal radiology read. No definitive fluid collection for drainage identified. [facility] Consult for Admission  7:51 PM    ED Room Number: LG10/03  Patient Name and age:  Ursula Pozo 51 y.o.  male  Working Diagnosis:   1.  Postoperative infection, unspecified type, initial encounter        COVID-19 Suspicion:  no  Sepsis present:  no  Reassessment needed: no  Code Status:  Full Code  Readmission: yes  Isolation Requirements:  yes  Recommended Level of Care:  telemetry  [address] [place] [address] ED - 21

## 2022-03-18 NOTE — CONSULTS
Mri reviewed. This soon after craniotomy somewhat equivocal.I do not see clear organized abscess or infxn but this will need to be reviewed by appropriate radiology. There is no fluid under scalp or large sd colloection  Started on cefepime? Yesterday at [facility]. Wbc normalized. Recommend med admit, will follow closely.

## 2022-03-19 ENCOUNTER — APPOINTMENT (OUTPATIENT)
Dept: VASCULAR SURGERY | Age: 47
DRG: 720 | End: 2022-03-19
Attending: NEUROLOGICAL SURGERY
Payer: MEDICAID

## 2022-03-19 PROBLEM — I42.8 NICM (NONISCHEMIC CARDIOMYOPATHY) (HCC): Status: ACTIVE | Noted: 2022-02-25

## 2022-03-19 PROBLEM — E66.01 MORBID OBESITY (HCC): Status: ACTIVE | Noted: 2022-02-22

## 2022-03-19 PROBLEM — G93.89 BRAIN MASS: Status: ACTIVE | Noted: 2022-02-21

## 2022-03-19 PROBLEM — I50.22 SYSTOLIC CHF, CHRONIC (HCC): Status: ACTIVE | Noted: 2020-01-01

## 2022-03-19 LAB
ALBUMIN SERPL-MCNC: 2.5 G/DL (ref 3.5–5)
ALBUMIN/GLOB SERPL: 0.5 {RATIO} (ref 1.1–2.2)
ALP SERPL-CCNC: 92 U/L (ref 45–117)
ALT SERPL-CCNC: 150 U/L (ref 12–78)
ANION GAP SERPL CALC-SCNC: 6 MMOL/L (ref 5–15)
AST SERPL-CCNC: 93 U/L (ref 15–37)
BACTERIA SPEC CULT: NORMAL
BASOPHILS # BLD: 0 K/UL (ref 0–0.1)
BASOPHILS NFR BLD: 0 % (ref 0–1)
BILIRUB SERPL-MCNC: 0.5 MG/DL (ref 0.2–1)
BUN SERPL-MCNC: 21 MG/DL (ref 6–20)
BUN/CREAT SERPL: 15 (ref 12–20)
CALCIUM SERPL-MCNC: 8.9 MG/DL (ref 8.5–10.1)
CHLORIDE SERPL-SCNC: 97 MMOL/L (ref 97–108)
CO2 SERPL-SCNC: 28 MMOL/L (ref 21–32)
CREAT SERPL-MCNC: 1.36 MG/DL (ref 0.7–1.3)
DIFFERENTIAL METHOD BLD: ABNORMAL
EOSINOPHIL # BLD: 0 K/UL (ref 0–0.4)
EOSINOPHIL NFR BLD: 0 % (ref 0–7)
ERYTHROCYTE [DISTWIDTH] IN BLOOD BY AUTOMATED COUNT: 12.2 % (ref 11.5–14.5)
GLOBULIN SER CALC-MCNC: 4.6 G/DL (ref 2–4)
GLUCOSE SERPL-MCNC: 105 MG/DL (ref 65–100)
HCT VFR BLD AUTO: 41.8 % (ref 36.6–50.3)
HGB BLD-MCNC: 13.6 G/DL (ref 12.1–17)
IMM GRANULOCYTES # BLD AUTO: 0.1 K/UL (ref 0–0.04)
IMM GRANULOCYTES NFR BLD AUTO: 2 % (ref 0–0.5)
LYMPHOCYTES # BLD: 0.8 K/UL (ref 0.8–3.5)
LYMPHOCYTES NFR BLD: 12 % (ref 12–49)
MCH RBC QN AUTO: 31.1 PG (ref 26–34)
MCHC RBC AUTO-ENTMCNC: 32.5 G/DL (ref 30–36.5)
MCV RBC AUTO: 95.4 FL (ref 80–99)
MONOCYTES # BLD: 0.5 K/UL (ref 0–1)
MONOCYTES NFR BLD: 8 % (ref 5–13)
NEUTS SEG # BLD: 5.4 K/UL (ref 1.8–8)
NEUTS SEG NFR BLD: 78 % (ref 32–75)
NRBC # BLD: 0 K/UL (ref 0–0.01)
NRBC BLD-RTO: 0 PER 100 WBC
PLATELET # BLD AUTO: 184 K/UL (ref 150–400)
PMV BLD AUTO: 10.1 FL (ref 8.9–12.9)
POTASSIUM SERPL-SCNC: 3.5 MMOL/L (ref 3.5–5.1)
PROCALCITONIN SERPL-MCNC: 0.57 NG/ML
PROT SERPL-MCNC: 7.1 G/DL (ref 6.4–8.2)
RBC # BLD AUTO: 4.38 M/UL (ref 4.1–5.7)
RBC MORPH BLD: ABNORMAL
SARS-COV-2, XPLCVT: NOT DETECTED
SERVICE CMNT-IMP: NORMAL
SODIUM SERPL-SCNC: 131 MMOL/L (ref 136–145)
SOURCE, COVRS: NORMAL
WBC # BLD AUTO: 6.8 K/UL (ref 4.1–11.1)

## 2022-03-19 PROCEDURE — 74011000250 HC RX REV CODE- 250: Performed by: HOSPITALIST

## 2022-03-19 PROCEDURE — 74011250637 HC RX REV CODE- 250/637: Performed by: HOSPITALIST

## 2022-03-19 PROCEDURE — 93970 EXTREMITY STUDY: CPT

## 2022-03-19 PROCEDURE — 74011250636 HC RX REV CODE- 250/636: Performed by: HOSPITALIST

## 2022-03-19 PROCEDURE — 80053 COMPREHEN METABOLIC PANEL: CPT

## 2022-03-19 PROCEDURE — 36415 COLL VENOUS BLD VENIPUNCTURE: CPT

## 2022-03-19 PROCEDURE — 74011250636 HC RX REV CODE- 250/636: Performed by: STUDENT IN AN ORGANIZED HEALTH CARE EDUCATION/TRAINING PROGRAM

## 2022-03-19 PROCEDURE — 74011000250 HC RX REV CODE- 250: Performed by: STUDENT IN AN ORGANIZED HEALTH CARE EDUCATION/TRAINING PROGRAM

## 2022-03-19 PROCEDURE — 65660000000 HC RM CCU STEPDOWN

## 2022-03-19 PROCEDURE — 85025 COMPLETE CBC W/AUTO DIFF WBC: CPT

## 2022-03-19 RX ORDER — LISINOPRIL 5 MG/1
5 TABLET ORAL DAILY
Status: DISCONTINUED | OUTPATIENT
Start: 2022-03-20 | End: 2022-03-23 | Stop reason: HOSPADM

## 2022-03-19 RX ORDER — CALCIUM CARBONATE 200(500)MG
200 TABLET,CHEWABLE ORAL 2 TIMES DAILY WITH MEALS
Status: DISCONTINUED | OUTPATIENT
Start: 2022-03-19 | End: 2022-03-21 | Stop reason: DRUGHIGH

## 2022-03-19 RX ORDER — TORSEMIDE 100 MG/1
100 TABLET ORAL DAILY
Status: DISCONTINUED | OUTPATIENT
Start: 2022-03-20 | End: 2022-03-23 | Stop reason: HOSPADM

## 2022-03-19 RX ORDER — CARVEDILOL 12.5 MG/1
12.5 TABLET ORAL 2 TIMES DAILY
Status: DISCONTINUED | OUTPATIENT
Start: 2022-03-19 | End: 2022-03-23 | Stop reason: HOSPADM

## 2022-03-19 RX ORDER — LEVETIRACETAM 500 MG/1
1000 TABLET ORAL EVERY 12 HOURS
Status: DISCONTINUED | OUTPATIENT
Start: 2022-03-19 | End: 2022-03-23

## 2022-03-19 RX ORDER — PHENYTOIN 50 MG/1
100 TABLET, CHEWABLE ORAL 3 TIMES DAILY
Status: DISCONTINUED | OUTPATIENT
Start: 2022-03-19 | End: 2022-03-23

## 2022-03-19 RX ADMIN — CALCIUM CARBONATE (ANTACID) CHEW TAB 500 MG 200 MG: 500 CHEW TAB at 18:38

## 2022-03-19 RX ADMIN — PHENYTOIN 100 MG: 50 TABLET, CHEWABLE ORAL at 22:40

## 2022-03-19 RX ADMIN — CARVEDILOL 12.5 MG: 12.5 TABLET, FILM COATED ORAL at 18:38

## 2022-03-19 RX ADMIN — CEFEPIME 2 G: 2 INJECTION, POWDER, FOR SOLUTION INTRAVENOUS at 10:03

## 2022-03-19 RX ADMIN — LEVOFLOXACIN 750 MG: 5 INJECTION, SOLUTION INTRAVENOUS at 22:42

## 2022-03-19 RX ADMIN — APIXABAN 10 MG: 5 TABLET, FILM COATED ORAL at 18:38

## 2022-03-19 RX ADMIN — CEFEPIME 2 G: 2 INJECTION, POWDER, FOR SOLUTION INTRAVENOUS at 18:38

## 2022-03-19 RX ADMIN — LEVETIRACETAM 1000 MG: 500 TABLET, FILM COATED ORAL at 15:35

## 2022-03-19 RX ADMIN — LEVETIRACETAM 1000 MG: 500 TABLET, FILM COATED ORAL at 22:40

## 2022-03-19 RX ADMIN — PHENYTOIN 100 MG: 50 TABLET, CHEWABLE ORAL at 15:35

## 2022-03-19 RX ADMIN — Medication 10 ML: at 22:42

## 2022-03-19 RX ADMIN — Medication 10 ML: at 06:57

## 2022-03-19 NOTE — PROGRESS NOTES
Temp lower this am.  Prelim mri reading without obvious abscess or infxn. No h/a. No pain.   Will check le doppler

## 2022-03-19 NOTE — ROUTINE PROCESS
TRANSFER - OUT REPORT:    Verbal report given to eric rn(name) on [facility]  being transferred to [address] [place] [address](unit) for routine progression of care       Report consisted of patients Situation, Background, Assessment and   Recommendations(SBAR). Information from the following report(s) SBAR, Kardex, ED Summary and MAR was reviewed with the receiving nurse. Lines:   Peripheral IV 03/18/22 Posterior;Right Wrist (Active)        Opportunity for questions and clarification was provided.       Patient transported with:   [employer]

## 2022-03-19 NOTE — H&P
History & Physical    Primary Care Provider: Sixto Hare MD  Source of Information: Patient and chart review    History of Presenting Illness:   Bertha Enrique is a 51 y.o. male with history of glioblastoma status post partial resection on March 2, hypertension, HFrEF, epilepsy, severe morbid obesity who presents to hospital with complaints of fevers and chills. Patient presents from [facility] rehab where he notes 3 days of intermittent fevers and chills. He reported this to short-term staff and was sent to [address] [place] [address] where he was evaluated and had a CT scan which he states showed some unknown acute abnormality. He was referred back to the ED for further evaluation and treatment. The patient denies anychest or abdominal pain, nausea, vomiting, cough, congestion, recent illness, palpitations, or dysuria. Remarkable vitals on ER Presentations: Temperature 101.4  Labs Remarkable for: Potassium 3.4, sodium 130, albumin 2.8  ER Images: Chest x-ray showed no acute process. MRI brain with and without contrast: New blood collection in the right cerebral hemisphere. Review of Systems:  A comprehensive review of systems was negative except for that written in the History of Present Illness. Past Medical History:   Diagnosis Date    Adverse effect of anesthesia     NEVER HAD ANESTHESIA    Brain mass 02/25/2022    Hypertension     NICM (nonischemic cardiomyopathy) ([address] 02/25/2022    cath open cors, high LVEDP, EF on echo was 35% day before    Sleep apnea     NO CPAP    Systolic CHF, chronic ([address] 2020    EF 31 then, now 35% 2022      No past surgical history on file. Prior to Admission medications    Medication Sig Start Date End Date Taking? Authorizing Provider   levETIRAcetam 1,000 mg tablet Take 1 Tablet by mouth every twelve (12) hours.  3/11/22   Melchor Contreras NP   acetaminophen (TYLENOL) 325 mg tablet Take 2 Tablets by mouth every four (4) hours as needed for Pain. 3/11/22   Lonell Amparo, NP   phenytoin (DILANTIN) 50 mg chewable tablet Take 2 Tablets by mouth three (3) times daily. 3/11/22   Lonell Amparo, NP   calcium carbonate (TUMS) 200 mg calcium (500 mg) chew Take 1 Tablet by mouth as needed. Provider, Historical   carvediloL (COREG) 12.5 mg tablet Take 1 Tablet by mouth two (2) times a day for 30 days. 2/25/22 3/27/22  Lady Krueger MD   lisinopriL (PRINIVIL, ZESTRIL) 5 mg tablet Take 1 Tablet by mouth daily for 30 days. 2/26/22 3/28/22  Lorraine Valerio MD   torsemide (DEMADEX) 100 mg tablet Take 100 mg by mouth daily. 8/30/21   Other, MD Edwin     No Known Allergies   Family History   Problem Relation Age of Onset    Diabetes Mother     Thyroid Disease Mother     Anxiety Mother     Depression Mother     High Cholesterol Mother     Hypertension Mother     Diabetes Father     Asthma Sister     Anemia Sister     Other Brother         AUTO ACCIDENT    Obesity Brother     Anesth Problems Neg Hx         SOCIAL HISTORY:  Patient resides:  Independently    Assisted Living    SNF / REHAB x   With family care       Smoking history:   None x   Former    Chronic      Alcohol history:   None x   Social    Chronic      Ambulates:   Independently    w/cane    w/walker x   w/wc x   CODE STATUS:  DNR    Full x   Other      Objective:     Physical Exam:     Visit Vitals  /72   Pulse 91   Temp (!) 101.4 °F (38.6 °C)   Resp 16   Wt 132.3 kg (291 lb 10.7 oz)   SpO2 96%   BMI 50.06 kg/m²      O2 Device: None (Room air)    General:  Alert, cooperative, no distress, appears stated age. Head:  Normocephalic, without obvious abnormality, atraumatic. Eyes:  Conjunctivae/corneas clear. PERRL, EOMs intact. Nose: Nares normal. Septum midline. Mucosa normal.        Neck: Supple, symmetrical, trachea midline. Lungs:   Clear to auscultation bilaterally. Chest wall:  No tenderness or deformity.    Heart:  Regular rate and rhythm, S1, S2 normal   Abdomen:   Soft, non-tender. Bowel sounds normal. No masses,  No organomegaly. Extremities: Extremities normal, atraumatic, no cyanosis or edema. Pulses: 2+ and symmetric all extremities. Skin: Skin color, texture, turgor normal. No rashes or lesions   Neurologic: CNII-XII intact. EKG: Sinus rhythm  Data Review:     Recent Days:  Recent Labs     03/18/22  1606   WBC 8.1   HGB 13.9   HCT 43.3        Recent Labs     03/18/22  1606   *   K 3.4*   CL 94*   CO2 31   GLU 98   BUN 21*   CREA 1.25   CA 9.2   ALB 2.8*   *     No results for input(s): PH, PCO2, PO2, HCO3, FIO2 in the last 72 hours.     24 Hour Results:  Recent Results (from the past 24 hour(s))   URINALYSIS W/ REFLEX CULTURE    Collection Time: 03/18/22  4:06 PM    Specimen: Miscellaneous sample; Urine    Urine specimen   Result Value Ref Range    Color DARK YELLOW      Appearance CLEAR CLEAR      Specific gravity 1.010      pH (UA) 5.5 5.0 - 8.0      Protein 30 (A) NEG mg/dL    Glucose Negative NEG mg/dL    Ketone Negative NEG mg/dL    Blood Negative NEG      Urobilinogen 2.0 (H) 0.2 - 1.0 EU/dL    Nitrites Negative NEG      Leukocyte Esterase TRACE (A) NEG      UA:UC IF INDICATED URINE CULTURE ORDERED (A) CNI      WBC 10-20 0 - 4 /hpf    RBC 0-5 0 - 5 /hpf    Epithelial cells FEW FEW /lpf    Bacteria Negative NEG /hpf    Hyaline cast 0-2 0 - 5 /lpf   METABOLIC PANEL, COMPREHENSIVE    Collection Time: 03/18/22  4:06 PM   Result Value Ref Range    Sodium 130 (L) 136 - 145 mmol/L    Potassium 3.4 (L) 3.5 - 5.1 mmol/L    Chloride 94 (L) 97 - 108 mmol/L    CO2 31 21 - 32 mmol/L    Anion gap 5 5 - 15 mmol/L    Glucose 98 65 - 100 mg/dL    BUN 21 (H) 6 - 20 MG/DL    Creatinine 1.25 0.70 - 1.30 MG/DL    BUN/Creatinine ratio 17 12 - 20      GFR est AA >60 >60 ml/min/1.73m2    GFR est non-AA >60 >60 ml/min/1.73m2    Calcium 9.2 8.5 - 10.1 MG/DL    Bilirubin, total 0.5 0.2 - 1.0 MG/DL    ALT (SGPT) 120 (H) 12 - 78 U/L AST (SGOT) 63 (H) 15 - 37 U/L    Alk. phosphatase 96 45 - 117 U/L    Protein, total 7.7 6.4 - 8.2 g/dL    Albumin 2.8 (L) 3.5 - 5.0 g/dL    Globulin 4.9 (H) 2.0 - 4.0 g/dL    A-G Ratio 0.6 (L) 1.1 - 2.2     CBC WITH AUTOMATED DIFF    Collection Time: 03/18/22  4:06 PM   Result Value Ref Range    WBC 8.1 4.1 - 11.1 K/uL    RBC 4.50 4. 10 - 5.70 M/uL    HGB 13.9 12.1 - 17.0 g/dL    HCT 43.3 36.6 - 50.3 %    MCV 96.2 80.0 - 99.0 FL    MCH 30.9 26.0 - 34.0 PG    MCHC 32.1 30.0 - 36.5 g/dL    RDW 12.4 11.5 - 14.5 %    PLATELET 104 961 - 329 K/uL    MPV 11.0 8.9 - 12.9 FL    NRBC 0.0 0  WBC    ABSOLUTE NRBC 0.00 0.00 - 0.01 K/uL    NEUTROPHILS 86 (H) 32 - 75 %    LYMPHOCYTES 7 (L) 12 - 49 %    MONOCYTES 4 (L) 5 - 13 %    EOSINOPHILS 0 0 - 7 %    BASOPHILS 1 0 - 1 %    IMMATURE GRANULOCYTES 2 (H) 0.0 - 0.5 %    ABS. NEUTROPHILS 6.9 1.8 - 8.0 K/UL    ABS. LYMPHOCYTES 0.6 (L) 0.8 - 3.5 K/UL    ABS. MONOCYTES 0.3 0.0 - 1.0 K/UL    ABS. EOSINOPHILS 0.0 0.0 - 0.4 K/UL    ABS. BASOPHILS 0.1 0.0 - 0.1 K/UL    ABS. IMM.  GRANS. 0.2 (H) 0.00 - 0.04 K/UL    DF SMEAR SCANNED      RBC COMMENTS MACROCYTOSIS  1+       LACTIC ACID    Collection Time: 03/18/22  4:06 PM   Result Value Ref Range    Lactic acid 0.8 0.4 - 2.0 MMOL/L   BILIRUBIN, CONFIRM    Collection Time: 03/18/22  4:06 PM   Result Value Ref Range    Bilirubin UA, confirm Negative NEG     EKG, 12 LEAD, INITIAL    Collection Time: 03/18/22  4:53 PM   Result Value Ref Range    Ventricular Rate 92 BPM    Atrial Rate 92 BPM    P-R Interval 148 ms    QRS Duration 82 ms    Q-T Interval 340 ms    QTC Calculation (Bezet) 420 ms    Calculated P Axis 60 degrees    Calculated R Axis -15 degrees    Calculated T Axis 36 degrees    Diagnosis       Normal sinus rhythm  When compared with ECG of 21-FEB-2022 16:27,  Nonspecific T wave abnormality no longer evident in Inferior leads  T wave inversion no longer evident in Lateral leads  Confirmed by Joy Cartwright ([id number]) on 3/18/2022 6:30:52 PM           Imaging:     Assessment:     Amirah Santoro is a 51 y.o. male with history of glioblastoma status post partial resection on March 2, hypertension, HFrEF, epilepsy, severe morbid obesity who is admitted for fever with concerns of postoperative infection. Plan:       Fever  -Unclear etiology of fever but given history of recent glioblastoma resection, will need to rule out intracranial source of infection. -UA and chest x-ray unremarkable  -Check respiratory viral panel, procalcitonin, blood and urine cultures.  -Empiric cefepime and Levaquin  -Await final read of brain: No definitive concerns for abscess collection  -Neurosurgery consult  -We will consider CT chest, abdomen pelvis      Right parietal brain mass /glioblastoma  - s/p crani 03/02  -cont keppra and dilantin.   -Oncology consult    Hx of CHF  -Compensated.  -On Coreg and lisinopril                HTN   - cont coreg, lisinopril, torsemide         Morbid obesity as defined by BMI greater than 40  - Body mass index is 51.54 kg/m². - Diet and exercise counseling as appropriate    Hyponatremia  -Due to SIADH. -Sodium 130 today and stable  -1.5 L daily fluid restriction                        FEN/GI -  Po hydration.   Fluid restrictions 1.5 L daily  Activity -as tolerated with PT  DVT prophylaxis -SCDs  GI prophylaxis -not indicated  Disposition -home    CODE STATUS: Full code       Signed By: Darrel Burden MD     March 18, 2022

## 2022-03-19 NOTE — PROGRESS NOTES
[facility]                                                                                          Hospitalist Progress Note  Ba Larose MD  Answering service: [phone] OR 36 from in house phone        Date of Service:  3/19/2022  NAME:  Amirah Santoro  :  1975  MRN:  [id number]      Admission Summary:   Amirah Santoro is a 51 y.o. male with history of glioblastoma status post partial resection on , hypertension, HFrEF, epilepsy, severe morbid obesity who presents to hospital with complaints of fevers and chills. Patient presents from [facility] rehab where he notes 3 days of intermittent fevers and chills. He reported this to short-term staff and was sent to [facility] where he was evaluated and had a CT scan which he states showed some unknown acute abnormality. He was referred back to the ED for further evaluation and treatment. Interval history / Subjective:   Patient seen and examined fever curve reviewed  Seen by neurosurgery as well reviewed MRI     Assessment & Plan:     Fever possibly UTI  --Urine WBC 10-20 leukocyte esterase trace nitrate negative  -Unlikely from recent glioblastoma resection and intracranial infection  -UA and chest x-ray unremarkable, check lower extremity Dopplers  -Check respiratory viral panel, procalcitonin negative, blood and urine cultures no growth to date. Covid test negative  -Empiric cefepime and Levaquin  -Await final read of brain: No definitive concerns for abscess collection  -Neurosurgery consult  -We will consider CT chest, abdomen pelvis     Right parietal brain mass /glioblastoma  - s/p crani   -cont keppra and dilantin.   -Oncology consult     Hx of CHF  -Compensated.  -On Coreg and lisinopril                HTN  - cont coreg, lisinopril, torsemide         Morbid obesity as defined by BMI greater than 40  - Body mass index is 51.54 kg/m².   - Diet and exercise counseling as appropriate     Hyponatremia  -Due to SIADH. -Sodium 131 today and stable  -1.5 L daily fluid restriction    DVT scan - soleal vein thrombosis acute - will d/w NSGY for use of a/c , pt had recent crani d/w Dr. Fer Moses to a/c                   Code status: Full  DVT prophylaxis: on a/c    Care Plan discussed with: Patient/Family and Nurse  Anticipated Disposition: SNF/LTC  Anticipated Discharge: Greater than 48 hours     Hospital Problems  Date Reviewed: 2/22/2022          Codes Class Noted POA    SIRS (systemic inflammatory response syndrome) ([facility] [address] ICD-10-CM: R65.10  ICD-9-CM: 995.90  3/18/2022 Unknown                Review of Systems:   A comprehensive review of systems was negative. Vital Signs:    Last 24hrs VS reviewed since prior progress note.  Most recent are:  Visit Vitals  /64 (BP 1 Location: Right upper arm, BP Patient Position: At rest)   Pulse 90   Temp 99.3 °F (37.4 °C)   Resp 19   Wt 132.3 kg (291 lb 10.7 oz)   SpO2 90%   BMI 50.06 kg/m²       No intake or output data in the 24 hours ending 03/19/22 1306     Physical Examination:     I had a face to face encounter with this patient and independently examined them on 3/19/2022 as outlined below:          General : alert x 3, awake, no acute distress, resting in bed, pleasant   HEENT: PEERL, EOMI, moist mucus membrane, TM clear  Neck: supple, no JVD, no meningeal signs  Chest: Clear to auscultation bilaterally   CVS: S1 S2 heard, Capillary refill less than 2 seconds  Abd: soft/ Non tender, non distended, BS physiological,   Ext: no clubbing, no cyanosis, no edema, brisk 2+ DP pulses  Neuro/Psych: pleasant mood and affect, CN 2-12 grossly intact  Skin: warm     Data Review:    I personally reviewed  Image and labs      Labs:     Recent Labs     03/19/22  0302 03/18/22  1606   WBC 6.8 8.1   HGB 13.6 13.9   HCT 41.8 43.3    211     Recent Labs     03/19/22  0302 03/18/22  1606   * 130*   K 3.5 3.4*   CL 97 94*   CO2 28 31   BUN 21* 21*   CREA 1.36* 1.25   * 98   CA 8.9 9.2     Recent Labs     03/19/22  0302 03/18/22  1606   * 120*   AP 92 96   TBILI 0.5 0.5   TP 7.1 7.7   ALB 2.5* 2.8*   GLOB 4.6* 4.9*     No results for input(s): INR, PTP, APTT, INREXT in the last 72 hours. No results for input(s): FE, TIBC, PSAT, FERR in the last 72 hours. No results found for: FOL, RBCF   No results for input(s): PH, PCO2, PO2 in the last 72 hours. No results for input(s): CPK, CKNDX, TROIQ in the last 72 hours.     No lab exists for component: CPKMB  Lab Results   Component Value Date/Time    Cholesterol, total 180 02/23/2022 05:18 AM    HDL Cholesterol 36 02/23/2022 05:18 AM    LDL, calculated 126.8 (H) 02/23/2022 05:18 AM    Triglyceride 86 02/23/2022 05:18 AM    CHOL/HDL Ratio 5.0 02/23/2022 05:18 AM     Lab Results   Component Value Date/Time    Glucose (POC) 133 (H) 03/11/2022 11:13 AM    Glucose (POC) 135 (H) 03/11/2022 07:15 AM    Glucose (POC) 148 (H) 03/10/2022 09:09 PM    Glucose (POC) 119 (H) 03/10/2022 04:59 PM    Glucose (POC) 115 03/10/2022 11:36 AM     Lab Results   Component Value Date/Time    Color DARK YELLOW 03/18/2022 04:06 PM    Appearance CLEAR 03/18/2022 04:06 PM    Specific gravity 1.010 03/18/2022 04:06 PM    pH (UA) 5.5 03/18/2022 04:06 PM    Protein 30 (A) 03/18/2022 04:06 PM    Glucose Negative 03/18/2022 04:06 PM    Ketone Negative 03/18/2022 04:06 PM    Urobilinogen 2.0 (H) 03/18/2022 04:06 PM    Nitrites Negative 03/18/2022 04:06 PM    Leukocyte Esterase TRACE (A) 03/18/2022 04:06 PM    Epithelial cells FEW 03/18/2022 04:06 PM    Bacteria Negative 03/18/2022 04:06 PM    WBC 10-20 03/18/2022 04:06 PM    RBC 0-5 03/18/2022 04:06 PM         Medications Reviewed:     Current Facility-Administered Medications   Medication Dose Route Frequency    cefepime (MAXIPIME) 2 g in sterile water (preservative free) 10 mL IV syringe  2 g IntraVENous Q8H    sodium chloride (NS) flush 5-10 mL  5-10 mL IntraVENous PRN  levoFLOXacin (LEVAQUIN) 750 mg in D5W IVPB  750 mg IntraVENous Q24H    sodium chloride (NS) flush 5-40 mL  5-40 mL IntraVENous Q8H    sodium chloride (NS) flush 5-40 mL  5-40 mL IntraVENous PRN    acetaminophen (TYLENOL) tablet 650 mg  650 mg Oral Q6H PRN    Or    acetaminophen (TYLENOL) suppository 650 mg  650 mg Rectal Q6H PRN    polyethylene glycol (MIRALAX) packet 17 g  17 g Oral DAILY PRN    ondansetron (ZOFRAN ODT) tablet 4 mg  4 mg Oral Q8H PRN    Or    ondansetron (ZOFRAN) injection 4 mg  4 mg IntraVENous Q6H PRN     ______________________________________________________________________  EXPECTED LENGTH OF STAY: - - -  ACTUAL LENGTH OF STAY:          1      Please note that this dictation was completed with [employer], the computer voice recognition software. Quite often unanticipated grammatical, syntax, homophones, and other interpretive errors are inadvertently transcribed by the computer software. Please disregard these errors. Please excuse any errors that have escaped final proofreading.                 Arvin Swanson MD

## 2022-03-19 NOTE — PROGRESS NOTES
Problem: Falls - Risk of  Goal: *Absence of Falls  Description: Document Pb Zhong Fall Risk and appropriate interventions in the flowsheet.   Outcome: Resolved/Met  Note: Fall Risk Interventions:  Mobility Interventions: Strengthening exercises (ROM-active/passive)         Medication Interventions: Utilize gait belt for transfers/ambulation,Teach patient to arise slowly    Elimination Interventions: Call light in reach,Stay With Me (per policy)              Problem: Patient Education: Go to Patient Education Activity  Goal: Patient/Family Education  Outcome: Resolved/Met

## 2022-03-19 NOTE — PROGRESS NOTES
Renal Dosing/Monitoring  Medication: cefepime   Current regimen:  2 g IV every 12 hr  Recent Labs     03/19/22  0302 03/18/22  1606   CREA 1.36* 1.25   BUN 21* 21*     Estimated CrCl:  84 ml/min  Plan: Change to 2 g IV every 8 hours per [facility] P&T Committee Protocol with respect to renal function. Pharmacy will continue to monitor patient daily and will make dosage adjustments based upon changing renal function.     Modesto Bryant, PharmD, BCPP, [facility] Specialist, [facility]

## 2022-03-19 NOTE — PROGRESS NOTES
2350: TRANSFER - IN REPORT:    Verbal report received from [facility]) on Raymond  being received from ED(unit) for routine progression of care      Report consisted of patients Situation, Background, Assessment and   Recommendations(SBAR). Information from the following report(s) SBAR, Kardex, Intake/Output, MAR, Recent Results, Cardiac Rhythm NSR and Alarm Parameters  was reviewed with the receiving nurse. Opportunity for questions and clarification was provided. Assessment completed upon patients arrival to unit and care assumed. 0730: [facility]side [facility]) by Morena Lloyd RN (offgoing nurse). Report included the following information SBAR, Kardex, Intake/Output, MAR, Recent Results, Cardiac Rhythm NSR and Alarm Parameters .

## 2022-03-20 PROCEDURE — 74011000250 HC RX REV CODE- 250: Performed by: HOSPITALIST

## 2022-03-20 PROCEDURE — 97165 OT EVAL LOW COMPLEX 30 MIN: CPT

## 2022-03-20 PROCEDURE — 74011250636 HC RX REV CODE- 250/636: Performed by: HOSPITALIST

## 2022-03-20 PROCEDURE — 74011250636 HC RX REV CODE- 250/636: Performed by: STUDENT IN AN ORGANIZED HEALTH CARE EDUCATION/TRAINING PROGRAM

## 2022-03-20 PROCEDURE — 97161 PT EVAL LOW COMPLEX 20 MIN: CPT

## 2022-03-20 PROCEDURE — 65660000000 HC RM CCU STEPDOWN

## 2022-03-20 PROCEDURE — 97116 GAIT TRAINING THERAPY: CPT

## 2022-03-20 PROCEDURE — 74011000250 HC RX REV CODE- 250: Performed by: STUDENT IN AN ORGANIZED HEALTH CARE EDUCATION/TRAINING PROGRAM

## 2022-03-20 PROCEDURE — 74011250637 HC RX REV CODE- 250/637: Performed by: HOSPITALIST

## 2022-03-20 PROCEDURE — 97535 SELF CARE MNGMENT TRAINING: CPT

## 2022-03-20 RX ADMIN — LISINOPRIL 5 MG: 5 TABLET ORAL at 08:48

## 2022-03-20 RX ADMIN — Medication 10 ML: at 17:05

## 2022-03-20 RX ADMIN — TORSEMIDE 100 MG: 100 TABLET ORAL at 08:48

## 2022-03-20 RX ADMIN — LEVOFLOXACIN 750 MG: 5 INJECTION, SOLUTION INTRAVENOUS at 21:31

## 2022-03-20 RX ADMIN — PHENYTOIN 100 MG: 50 TABLET, CHEWABLE ORAL at 17:05

## 2022-03-20 RX ADMIN — APIXABAN 10 MG: 5 TABLET, FILM COATED ORAL at 08:47

## 2022-03-20 RX ADMIN — CARVEDILOL 12.5 MG: 12.5 TABLET, FILM COATED ORAL at 17:04

## 2022-03-20 RX ADMIN — PHENYTOIN 100 MG: 50 TABLET, CHEWABLE ORAL at 21:30

## 2022-03-20 RX ADMIN — LEVETIRACETAM 1000 MG: 500 TABLET, FILM COATED ORAL at 21:31

## 2022-03-20 RX ADMIN — Medication 10 ML: at 22:29

## 2022-03-20 RX ADMIN — CEFEPIME 2 G: 2 INJECTION, POWDER, FOR SOLUTION INTRAVENOUS at 11:53

## 2022-03-20 RX ADMIN — PHENYTOIN 100 MG: 50 TABLET, CHEWABLE ORAL at 08:48

## 2022-03-20 RX ADMIN — APIXABAN 10 MG: 5 TABLET, FILM COATED ORAL at 17:04

## 2022-03-20 RX ADMIN — CARVEDILOL 12.5 MG: 12.5 TABLET, FILM COATED ORAL at 08:43

## 2022-03-20 RX ADMIN — CALCIUM CARBONATE (ANTACID) CHEW TAB 500 MG 200 MG: 500 CHEW TAB at 08:48

## 2022-03-20 RX ADMIN — LEVETIRACETAM 1000 MG: 500 TABLET, FILM COATED ORAL at 08:49

## 2022-03-20 RX ADMIN — SODIUM CHLORIDE, PRESERVATIVE FREE 10 ML: 5 INJECTION INTRAVENOUS at 03:45

## 2022-03-20 RX ADMIN — CEFEPIME 2 G: 2 INJECTION, POWDER, FOR SOLUTION INTRAVENOUS at 03:45

## 2022-03-20 RX ADMIN — CALCIUM CARBONATE (ANTACID) CHEW TAB 500 MG 200 MG: 500 CHEW TAB at 17:05

## 2022-03-20 NOTE — PROGRESS NOTES
Nursing Shift Note [id number] 2000: oncoming shift report received    2300: new peripheral IV placed    0200: Patient has had two witnessed episodes of nonsustained tachycardia while at rest, HR briefly up as high as 160, 180, quickly returned back to 100. Patient denied any change in sensation during episodes. 0800: Bedside and Verbal shift change report given to Henrique Ortiz RN (oncoming nurse) by Calos Cruz RN (offgoing nurse). Report included the following information SBAR, Kardex, Procedure Summary, Intake/Output, MAR, Accordion, Recent Results, Med Rec Status and Cardiac Rhythm NSR, Stach.

## 2022-03-20 NOTE — PROGRESS NOTES
Problem: Mobility Impaired (Adult and Pediatric)  Goal: *Acute Goals and Plan of Care (Insert Text)  Description: FUNCTIONAL STATUS PRIOR TO ADMISSION:  Prior to 3/2/2022, pt was independent ambulating, lived with his sister and fiance. He has been in inpatient rehab where he has been ambulating with a RW w/ therapy. HOME SUPPORT: The patient lived with sister/fiance who are able to assist.      Physical Therapy Goals  Initiated 3/20/2022  1. Patient will move from supine to sit and sit to supine  in bed with supervision/ set up within 7 day(s). 2.  Patient will transfer from bed to chair and chair to bed with supervision/set-up using the least restrictive device within 7 day(s). 3.  Patient will perform sit to stand with supervision/set-up w/ safe technique within 7 day(s). 4.  Patient will ambulate with contact guard assist for 100 feet with the least restrictive device within 7 day(s). Outcome: Not Met  PHYSICAL THERAPY EVALUATION  Patient: Gardenia Lee (46 y.o. male)  Date: 3/20/2022  Primary Diagnosis: SIRS (systemic inflammatory response syndrome) ([facility]) [R65.10]        Precautions: fall        ASSESSMENT  Based on the objective data described below, the patient presents with left sided weakness (UE>>LE), impaired balance, decreased LUE AROM, and generally decreased endurance and activity tolerance limiting his functional independence. Patient demonstrates awareness of his fall risk and need for assistance. He is able to stand and ambulate short distance with the RW with CGA to Min A. Gait with the walker is slow and altered with left hand  weak and lt hand falling off the walker. Pt required Min A to maintain his left  on the walker. Effort to ambulate across the room was taxing and took extra time. Patient is from [facility]. Anticipate return to [facility] to allow pt to improve strength, balance, and functional moblity before returning home.       Current Level of Function Impacting Discharge (mobility/balance): CGA sit<->stand; Min A bed to chair; CGA ambulating 40 ft with RW    Functional Outcome Measure: The patient scored 50/100 on the Barthel; outcome measure which is indicative of 50% impairment in functional tasks and mobility. Other factors to consider for discharge: From [facility] Replaced by [facility]     Patient will benefit from skilled therapy intervention to address the above noted impairments. PLAN :  Recommendations and Planned Interventions: bed mobility training, transfer training, gait training, therapeutic exercises, neuromuscular re-education, patient and family training/education, and therapeutic activities      Frequency/Duration: Patient will be followed by physical therapy:  5 times a week to address goals. Recommendation for discharge: (in order for the patient to meet his/her long term goals)  Therapy 3 hours per day 5-7 days per week    This discharge recommendation:  Has been made in collaboration with the attending provider and/or case management    IF patient discharges home will need the following DME: to be determined (TBD) by IPR         SUBJECTIVE:   Patient stated I could use more water. Give me 500. Do me a favor and subtract that on the board    OBJECTIVE DATA SUMMARY:   HISTORY:    Past Medical History:   Diagnosis Date    Adverse effect of anesthesia     NEVER HAD ANESTHESIA    Brain mass 02/25/2022    Hypertension     NICM (nonischemic cardiomyopathy) ([facility] [address] 02/25/2022    cath open cors, high LVEDP, EF on echo was 35% day before    Sleep apnea     NO CPAP    Systolic CHF, chronic ([address] 2020    EF 31 then, now 35% 2022   No past surgical history on file.     Personal factors and/or comorbidities impacting plan of care: PMH/ HPI    Home Situation  Home Environment: Private residence  # Steps to Enter: 0  One/Two Story Residence: Two story, live on 1st floor  Living Alone: No  Support Systems: Spouse/Significant Other,Other Family Member(s)  Patient Expects to be Discharged to[de-identified] Rehab hospital/unit acute  Current DME Used/Available at Home: None  Tub or Shower Type: Tub/Shower combination    EXAMINATION/PRESENTATION/DECISION MAKING:   Critical Behavior:  Neurologic State: Alert  Orientation Level: Oriented X4  Cognition: Follows commands  Safety/Judgement: Awareness of environment  Hearing: Auditory  Auditory Impairment: None    Range Of Motion:  AROM: Generally decreased, functional (LUE gross should flexion/wrist extension deficits)                 Strength:    Strength: Generally decreased, functional                   Tone & Sensation:   Tone: Abnormal (increased tone to LUE/LLE)              Sensation: Impaired (Decreased LUE/LLE sensation reported)               Coordination:  Coordination: Generally decreased, functional (LUE grossly decreased)  Vision:      Functional Mobility:  Bed Mobility:  N/t* Received/ remained sitting up in the chair           Transfers:  Sit to Stand: Contact guard assistance  Stand to Sit: Contact guard assistance  Bed to Chair: Minimum assistance (as per OT)              Balance:   Sitting: Intact  Standing: Impaired; With support  Standing - Static: Good;Fair;Constant support  Standing - Dynamic : Fair;Constant support  Ambulation/Gait Training:  Distance (ft): 40 Feet (ft)  Assistive Device: Gait belt;Walker, rolling  Ambulation - Level of Assistance: Contact guard assistance        Gait Abnormalities: Hemiplegic; Step to gait        Base of Support: Widened     Speed/Ana: Slow  Step Length: Left shortened;Right shortened  Swing Pattern: Left asymmetrical  Decreased left hand  w/ intermittent loss of . Pt correcting on his own. Therapeutic Exercises/ Activity:   Reviewed UE exs from OT session just prior to PT. Pt demonstrating good recall and independently performed 5 reps.     Instructed in safety/ fall prevention    Functional Measure:  Barthel Index:    Bathin  Bladder: 10  Bowels: 10  Groomin  Dressin  Feedin  Mobility: 0  Stairs: 0  Toilet Use: 5  Transfer (Bed to Chair and Back): 10  Total: 50/100       The Barthel ADL Index: Guidelines  1. The index should be used as a record of what a patient does, not as a record of what a patient could do. 2. The main aim is to establish degree of independence from any help, physical or verbal, however minor and for whatever reason. 3. The need for supervision renders the patient not independent. 4. A patient's performance should be established using the best available evidence. Asking the patient, friends/relatives and nurses are the usual sources, but direct observation and common sense are also important. However direct testing is not needed. 5. Usually the patient's performance over the preceding 24-48 hours is important, but occasionally longer periods will be relevant. 6. Middle categories imply that the patient supplies over 50 per cent of the effort. 7. Use of aids to be independent is allowed. Score Interpretation (from [facility])    Independent   60-79 Minimally independent   40-59 Partially dependent   20-39 Very dependent   <20 Totally dependent     -Elda Sheridan., Barthel, DJeisonW. (1965). Functional evaluation: the Barthel Index. [address] [facility] [employer] ([address] (). The Barthel activities of daily living index: self-reporting versus actual performance in the old (> or = 75 years). Journal of [address] [place] [address] 45(7), [facility], CEDF, Tyler Barraza., Florian Tapia. (). Measuring the change in disability after inpatient rehabilitation; comparison of the responsiveness of the Barthel Index and Functional Cutler Measure. Journal of Neurology, Neurosurgery, and Psychiatry, 66(4), 525-852. Celestina Walton, N.J.A, MARISOL Brice, & Dany Quiroz M.A. (2004) Assessment of post-stroke quality of life in cost-effectiveness studies:  The usefulness of the Barthel Index and the EuroQoL-5D. Quality of Life Research, 15, 353-62            Physical Therapy Evaluation Charge Determination   History Examination Presentation Decision-Making   MEDIUM  Complexity : 1-2 comorbidities / personal factors will impact the outcome/ POC  LOW Complexity : 1-2 Standardized tests and measures addressing body structure, function, activity limitation and / or participation in recreation  LOW Complexity : Stable, uncomplicated  Other outcome measures Barthel 50/100  MEDIUM      Based on the above components, the patient evaluation is determined to be of the following complexity level: LOW     Pain Rating:  None indicated    Activity Tolerance:   Fair, requires rest breaks, and mild sob w/ activity    After treatment patient left in no apparent distress:   Sitting in chair and Call bell within reach    COMMUNICATION/EDUCATION:   The patients plan of care was discussed with: Occupational therapist.     Fall prevention education was provided and the patient/caregiver indicated understanding., Patient/family have participated as able in goal setting and plan of care. , and Patient/family agree to work toward stated goals and plan of care.     Thank you for this referral.  Jose Gordon, PT   Time Calculation: 21 mins

## 2022-03-20 NOTE — PROGRESS NOTES
[address] [facility] Adult  Hospitalist Group                                                                                          Hospitalist Progress Note  Jany Jurado MD  Answering service: [phone] from in house phone        Date of Service:  3/20/2022  NAME:  Venkatesh Johnson  :  1975  MRN:  [id number]      Admission Summary:   Venkatesh Johnson is a 51 y.o. male with history of glioblastoma status post partial resection on , hypertension, HFrEF, epilepsy, severe morbid obesity who presents to hospital with complaints of fevers and chills. Patient presents from [facility] where he notes 3 days of intermittent fevers and chills. He reported this to short-term staff and was sent to [facility] where he was evaluated and had a CT scan which he states showed some unknown acute abnormality. He was referred back to the ED for further evaluation and treatment. Interval history / Subjective:   Patient seen and examined fever curve reviewed  Seen by neurosurgery as well reviewed MRI  Patient has a DVT and started on Eliquis     Assessment & Plan:     Fever possibly UTI  --Urine WBC 10-20 leukocyte esterase trace nitrate negative  -Unlikely from recent glioblastoma resection and intracranial infection  -UA and chest x-ray unremarkable  -Check respiratory viral panel, procalcitonin negative, blood and urine cultures no growth to date. Covid test negative  -Empiric cefepime and Levaquin --will DC cefepime continue Levaquin  -Await final read of brain: No definitive concerns for abscess collection  -Neurosurgery consult     Right parietal brain mass /glioblastoma  - s/p crani   -cont keppra and dilantin.   -Oncology consult     Hx of CHF  -Compensated.  -On Coreg and lisinopril                HTN  - cont coreg, lisinopril, torsemide         Morbid obesity as defined by BMI greater than 40  - Body mass index is 51.54 kg/m².   - Diet and exercise counseling as appropriate     Hyponatremia  -Due to SIADH. -Sodium 131 today and stable  -1.5 L daily fluid restriction    DVT scan - soleal vein thrombosis acute   --d/w NSGY for use of a/c , pt had recent crani d/w Dr. Nasir Johnson to a/c                   Code status: Full  DVT prophylaxis: on a/c    Care Plan discussed with: Patient/Family and Nurse  Anticipated Disposition: SNF/LTC  Anticipated Discharge: Greater than 48 hours     Hospital Problems  Date Reviewed: 2/22/2022          Codes Class Noted POA    SIRS (systemic inflammatory response syndrome) ([facility] [address] ICD-10-CM: R65.10  ICD-9-CM: 995.90  3/18/2022 Unknown                Review of Systems:   A comprehensive review of systems was negative. Vital Signs:    Last 24hrs VS reviewed since prior progress note.  Most recent are:  Visit Vitals  /85 (BP 1 Location: Left upper arm, BP Patient Position: At rest)   Pulse 90   Temp 98 °F (36.7 °C)   Resp 16   Wt 129.1 kg (284 lb 9.6 oz)   SpO2 93%   BMI 48.85 kg/m²         Intake/Output Summary (Last 24 hours) at 3/20/2022 1124  Last data filed at 3/20/2022 2517  Gross per 24 hour   Intake 2115 ml   Output 800 ml   Net 1315 ml        Physical Examination:     I had a face to face encounter with this patient and independently examined them on 3/20/2022 as outlined below:          General : alert x 3, awake, no acute distress, resting in bed, pleasant   HEENT: PEERL, EOMI, moist mucus membrane, TM clear  Neck: supple, no JVD, no meningeal signs  Chest: Clear to auscultation bilaterally   CVS: S1 S2 heard, Capillary refill less than 2 seconds  Abd: soft/ Non tender, non distended, BS physiological,   Ext: no clubbing, no cyanosis, no edema, brisk 2+ DP pulses  Neuro/Psych: pleasant mood and affect, CN 2-12 grossly intact  Skin: warm     Data Review:    I personally reviewed  Image and labs      Labs:     Recent Labs     03/19/22  0302 03/18/22  1606   WBC 6.8 8.1   HGB 13.6 13.9   HCT 41.8 43.3    211     Recent Labs     03/19/22  0302 03/18/22  1606   * 130*   K 3.5 3.4*   CL 97 94*   CO2 28 31   BUN 21* 21*   CREA 1.36* 1.25   * 98   CA 8.9 9.2     Recent Labs     03/19/22  0302 03/18/22  1606   * 120*   AP 92 96   TBILI 0.5 0.5   TP 7.1 7.7   ALB 2.5* 2.8*   GLOB 4.6* 4.9*     No results for input(s): INR, PTP, APTT, INREXT, INREXT in the last 72 hours. No results for input(s): FE, TIBC, PSAT, FERR in the last 72 hours. No results found for: FOL, RBCF   No results for input(s): PH, PCO2, PO2 in the last 72 hours. No results for input(s): CPK, CKNDX, TROIQ in the last 72 hours.     No lab exists for component: CPKMB  Lab Results   Component Value Date/Time    Cholesterol, total 180 02/23/2022 05:18 AM    HDL Cholesterol 36 02/23/2022 05:18 AM    LDL, calculated 126.8 (H) 02/23/2022 05:18 AM    Triglyceride 86 02/23/2022 05:18 AM    CHOL/HDL Ratio 5.0 02/23/2022 05:18 AM     Lab Results   Component Value Date/Time    Glucose (POC) 133 (H) 03/11/2022 11:13 AM    Glucose (POC) 135 (H) 03/11/2022 07:15 AM    Glucose (POC) 148 (H) 03/10/2022 09:09 PM    Glucose (POC) 119 (H) 03/10/2022 04:59 PM    Glucose (POC) 115 03/10/2022 11:36 AM     Lab Results   Component Value Date/Time    Color DARK YELLOW 03/18/2022 04:06 PM    Appearance CLEAR 03/18/2022 04:06 PM    Specific gravity 1.010 03/18/2022 04:06 PM    pH (UA) 5.5 03/18/2022 04:06 PM    Protein 30 (A) 03/18/2022 04:06 PM    Glucose Negative 03/18/2022 04:06 PM    Ketone Negative 03/18/2022 04:06 PM    Urobilinogen 2.0 (H) 03/18/2022 04:06 PM    Nitrites Negative 03/18/2022 04:06 PM    Leukocyte Esterase TRACE (A) 03/18/2022 04:06 PM    Epithelial cells FEW 03/18/2022 04:06 PM    Bacteria Negative 03/18/2022 04:06 PM    WBC 10-20 03/18/2022 04:06 PM    RBC 0-5 03/18/2022 04:06 PM         Medications Reviewed:     Current Facility-Administered Medications   Medication Dose Route Frequency    cefepime (MAXIPIME) 2 g in sterile water (preservative free) 10 mL IV syringe  2 g IntraVENous Q8H    carvediloL (COREG) tablet 12.5 mg  12.5 mg Oral BID    levETIRAcetam (KEPPRA) tablet 1,000 mg  1,000 mg Oral Q12H    lisinopriL (PRINIVIL, ZESTRIL) tablet 5 mg  5 mg Oral DAILY    phenytoin (DILANTIN) chewable tablet 100 mg  100 mg Oral TID    torsemide (DEMADEX) tablet 100 mg  100 mg Oral DAILY    calcium carbonate (TUMS) chewable tablet 200 mg [elemental]  200 mg Oral BID WITH MEALS    apixaban (ELIQUIS) tablet 10 mg  10 mg Oral BID    sodium chloride (NS) flush 5-10 mL  5-10 mL IntraVENous PRN    levoFLOXacin (LEVAQUIN) 750 mg in D5W IVPB  750 mg IntraVENous Q24H    sodium chloride (NS) flush 5-40 mL  5-40 mL IntraVENous Q8H    sodium chloride (NS) flush 5-40 mL  5-40 mL IntraVENous PRN    acetaminophen (TYLENOL) tablet 650 mg  650 mg Oral Q6H PRN    Or    acetaminophen (TYLENOL) suppository 650 mg  650 mg Rectal Q6H PRN    polyethylene glycol (MIRALAX) packet 17 g  17 g Oral DAILY PRN    ondansetron (ZOFRAN ODT) tablet 4 mg  4 mg Oral Q8H PRN    Or    ondansetron (ZOFRAN) injection 4 mg  4 mg IntraVENous Q6H PRN     ______________________________________________________________________  EXPECTED LENGTH OF STAY: - - -  ACTUAL LENGTH OF STAY:          2      Please note that this dictation was completed with [employer], the computer voice recognition software. Quite often unanticipated grammatical, syntax, homophones, and other interpretive errors are inadvertently transcribed by the computer software. Please disregard these errors. Please excuse any errors that have escaped final proofreading.                 Newton Pozo MD

## 2022-03-20 NOTE — PROGRESS NOTES
Problem: Self Care Deficits Care Plan (Adult)  Goal: *Acute Goals and Plan of Care (Insert Text)  Description: FUNCTIONAL STATUS PRIOR TO ADMISSION: Pt reports he was Independent prior to sx on 3/2/2022, reporting he lives with his sister and fiance normally staying on 2nd floor with tub-shower combo; however, plans to stay on first floor when rehab completed. Reports [facility] was assisting with DME needs. HOME SUPPORT: The patient lived with sister/fiance who are able to provide PRN ADL/IADL assist.    Occupational Therapy Goals  Initiated 3/20/2022  1. Patient will perform RW grooming with contact guard assist within 7 day(s). 2.  Patient will perform seated bathing with supervision/set-up within 7 day(s). 3.  Patient will perform RW lower body dressing with contact guard assist within 7 day(s). 4.  Patient will perform RW toilet transfers with contact guard assist within 7 day(s). 5.  Patient will perform all aspects of toileting with contact guard assist within 7 day(s). Outcome: Not Met    OCCUPATIONAL THERAPY EVALUATION  Patient: Joslyn Wilkins (5 y.o. male)  Date: 3/20/2022  Primary Diagnosis: SIRS (systemic inflammatory response syndrome) ([facility]) [R65.10]        Precautions:       ASSESSMENT  Based on the objective data described below, the patient presents with LUE/LLE weakness (decreased shoulder flexion/wrist extension noted; however good compensation), decreased strength/endurance, decreased mobility/balance, decreased full body reaching and decreased activity tolerance, all of which limit pt's ability to complete self-care routine at level congruent with PLOF. Currently, pt is S/U with additional time for seating self-feeding/grooming/UB dressing, with Mod A for bathing/LE dressing/toileting. Pt noted with good CGA sit to stand, as well as, good Min A ability with short distance ambulation.   Pt also noted with good understanding of gravity eliminated LUE shoulder AROM challenges, with education provided regarding self-ROM and good engagement noted. Pt with decreased L  strength; however, able to manage RW, using wrist flexion to compensate for decreased digital flexion. Pt benefits from skilled OT to address functional deficits during acute hospitalization, with reporting therapist believing pt would benefit from return to [facility] upon discharge. Of note, history of glioblastoma status post partial resection on March 2, 2022. Current Level of Function Impacting Discharge (ADLs/self-care): S/U with additional time for seating self-feeding/grooming/UB dressing, with Mod A for bathing/LE dressing/toileting    Functional Outcome Measure: The patient scored 50/100 on the Barthel Index outcome measure. Other factors to consider for discharge: history of glioblastoma status post partial resection on March 2     Patient will benefit from skilled therapy intervention to address the above noted impairments. PLAN :  Recommendations and Planned Interventions: self care training, functional mobility training, therapeutic exercise, balance training, therapeutic activities, endurance activities, and patient education    Frequency/Duration: Patient will be followed by occupational therapy 5 times a week to address goals. Recommendation for discharge: (in order for the patient to meet his/her long term goals)  Therapy 3 hours per day 5-7 days per week    This discharge recommendation:  Has not yet been discussed the attending provider and/or case management    IF patient discharges home will need the following DME: TBD       SUBJECTIVE:   Patient stated I have a few more days left at [address] [place] [address] then I'll go back home and stay on the first floor at my house.     OBJECTIVE DATA SUMMARY:   HISTORY:   Past Medical History:   Diagnosis Date    Adverse effect of anesthesia     NEVER HAD ANESTHESIA    Brain mass 02/25/2022    Hypertension     NICM (nonischemic cardiomyopathy) ([facility] [address] 02/25/2022    cath open cors, high LVEDP, EF on echo was 35% day before    Sleep apnea     NO CPAP    Systolic CHF, chronic ([address] 2020    EF 31 then, now 35% 2022   No past surgical history on file. Expanded or extensive additional review of patient history:     Home Situation  Home Environment: Private residence  # Steps to Enter: 0  One/Two Story Residence: Two story, live on 1st floor  Living Alone: No  Support Systems: Spouse/Significant Other,Other Family Member(s)  Patient Expects to be Discharged to[de-identified] Rehab hospital/unit acute  Current DME Used/Available at Home: None  Tub or Shower Type: Tub/Shower combination    Hand dominance: Right    EXAMINATION OF PERFORMANCE DEFICITS:  Cognitive/Behavioral Status:  Neurologic State: Alert  Orientation Level: Oriented X4  Cognition: Follows commands  Perception: Appears intact  Perseveration: No perseveration noted  Safety/Judgement: Awareness of environment    Hearing: Auditory  Auditory Impairment: None    Range of Motion:  AROM: Generally decreased, functional (LUE gross should flexion/wrist extension deficits)  PROM: Generally decreased, functional                      Strength:  Strength: Generally decreased, functional (LUE 2-/5 shoulder flexion and wrist extension)                Coordination:  Coordination: Generally decreased, functional (LUE grossly decreased)  Fine Motor Skills-Upper: Left Impaired;Right Intact    Gross Motor Skills-Upper: Left Impaired;Right Intact    Tone & Sensation:  Tone: Abnormal (increased tone to LUE/LLE)  Sensation: Impaired (Decreased LUE/LLE sensation reported)                      Balance:  Sitting: Intact  Standing: Impaired; With support  Standing - Static: Good;Fair;Constant support  Standing - Dynamic : Fair;Constant support    Functional Mobility and Transfers for ADLs:  Transfers:  Sit to Stand: Contact guard assistance  Stand to Sit: Contact guard assistance  Bed to Chair: Minimum assistance    ADL Assessment:  Feeding: Setup; Additional time    Oral Facial Hygiene/Grooming: Setup; Additional time    Bathing: Moderate assistance    Upper Body Dressing: Setup    Lower Body Dressing: Moderate assistance    Toileting: Moderate assistance    ADL Intervention and task modifications:  Patient instructed and indicated understanding the benefits of maintaining activity tolerance, functional mobility, and independence with self care tasks during acute stay  to ensure safe return home and to baseline. Encouraged patient to increase frequency and duration OOB, be out of bed for all meals, perform daily ADLs (as approved by RN/MD regarding bathing etc), and performing functional mobility to/from bathroom. Pt educated on safe transfer techniques, with specific emphasis on proper hand placement to push up from seated surface rather than attempt to pull self up, fully positioning self in-front of desired seated location, feeling chair on back of legs and reaching back with 1-2 UE to slowly lower self to seated position. Educated on BUE self-rom with cues for wrist positioning and hold above head stretch for greater duration. Cognitive Retraining  Safety/Judgement: Awareness of environment    Functional Measure:    Barthel Index:  Bathin  Bladder: 10  Bowels: 10  Groomin  Dressin  Feedin  Mobility: 0  Stairs: 0  Toilet Use: 5  Transfer (Bed to Chair and Back): 10  Total: 50/100      The Barthel ADL Index: Guidelines  1. The index should be used as a record of what a patient does, not as a record of what a patient could do. 2. The main aim is to establish degree of independence from any help, physical or verbal, however minor and for whatever reason. 3. The need for supervision renders the patient not independent. 4. A patient's performance should be established using the best available evidence.  Asking the patient, friends/relatives and nurses are the usual sources, but direct observation and common sense are also important. However direct testing is not needed. 5. Usually the patient's performance over the preceding 24-48 hours is important, but occasionally longer periods will be relevant. 6. Middle categories imply that the patient supplies over 50 per cent of the effort. 7. Use of aids to be independent is allowed. Score Interpretation (from [address] [facility])    Independent   60-79 Minimally independent   40-59 Partially dependent   20-39 Very dependent   <20 Totally dependent     -Elda Sheridan., Barthel, DJeisonW. (1965). Functional evaluation: the Barthel Index. [address] ([address] (1997). The Barthel activities of daily living index: self-reporting versus actual performance in the old (> or = 75 years). Journal [address] 45(7), [address] [facility], [place], Angie Charles., [facility]. (1999). Measuring the change in disability after inpatient rehabilitation; comparison of the responsiveness of the Barthel Index and Functional [place] Measure. Journal of Neurology, Neurosurgery, and Psychiatry, 66(4), 597-921. Laurie Castro, N.J.A, MARISOL Brice, & Mildred Garcia M.A. (2004) Assessment of post-stroke quality of life in cost-effectiveness studies: The usefulness of the Barthel Index and the EuroQoL-5D.  Quality of Life Research, 15, 183-43        Occupational Therapy Evaluation Charge Determination   History Examination Decision-Making   LOW Complexity : Brief history review  HIGH Complexity : 5 or more performance deficits relating to physical, cognitive , or psychosocial skils that result in activity limitations and / or participation restrictions LOW Complexity : No comorbidities that affect functional and no verbal or physical assistance needed to complete eval tasks       Based on the above components, the patient evaluation is determined to be of the following complexity level: LOW   Pain Rating:  No c/o pain    Activity Tolerance: Good, Fair, and requires rest breaks    After treatment patient left in no apparent distress:    Sitting in chair and Call bell within reach    COMMUNICATION/EDUCATION:   The patients plan of care was discussed with: Physical therapist and Registered nurse. Home safety education was provided and the patient/caregiver indicated understanding., Patient/family have participated as able in goal setting and plan of care. , and Patient/family agree to work toward stated goals and plan of care. This patients plan of care is appropriate for delegation to [facility].     Thank you for this referral.  Georgette Antoine OT  Time Calculation: 20 mins

## 2022-03-20 NOTE — PROGRESS NOTES
DVT on doppler eliquis started. Temp could be from dvt or urine. Clinically and radiographically I do not this this is surgical infxn at this time.

## 2022-03-21 PROCEDURE — 65660000000 HC RM CCU STEPDOWN

## 2022-03-21 PROCEDURE — 74011250637 HC RX REV CODE- 250/637: Performed by: HOSPITALIST

## 2022-03-21 PROCEDURE — 74011000250 HC RX REV CODE- 250: Performed by: STUDENT IN AN ORGANIZED HEALTH CARE EDUCATION/TRAINING PROGRAM

## 2022-03-21 PROCEDURE — 74011250636 HC RX REV CODE- 250/636: Performed by: STUDENT IN AN ORGANIZED HEALTH CARE EDUCATION/TRAINING PROGRAM

## 2022-03-21 RX ORDER — CALCIUM CARBONATE 200(500)MG
200 TABLET,CHEWABLE ORAL
Status: DISCONTINUED | OUTPATIENT
Start: 2022-03-21 | End: 2022-03-23 | Stop reason: HOSPADM

## 2022-03-21 RX ADMIN — Medication 10 ML: at 16:59

## 2022-03-21 RX ADMIN — LEVETIRACETAM 1000 MG: 500 TABLET, FILM COATED ORAL at 23:00

## 2022-03-21 RX ADMIN — TORSEMIDE 100 MG: 100 TABLET ORAL at 08:32

## 2022-03-21 RX ADMIN — CALCIUM CARBONATE (ANTACID) CHEW TAB 500 MG 200 MG: 500 CHEW TAB at 16:58

## 2022-03-21 RX ADMIN — LISINOPRIL 5 MG: 5 TABLET ORAL at 08:32

## 2022-03-21 RX ADMIN — PHENYTOIN 100 MG: 50 TABLET, CHEWABLE ORAL at 23:00

## 2022-03-21 RX ADMIN — Medication 10 ML: at 22:00

## 2022-03-21 RX ADMIN — PHENYTOIN 100 MG: 50 TABLET, CHEWABLE ORAL at 08:32

## 2022-03-21 RX ADMIN — CARVEDILOL 12.5 MG: 12.5 TABLET, FILM COATED ORAL at 17:00

## 2022-03-21 RX ADMIN — LEVOFLOXACIN 750 MG: 5 INJECTION, SOLUTION INTRAVENOUS at 22:30

## 2022-03-21 RX ADMIN — CALCIUM CARBONATE (ANTACID) CHEW TAB 500 MG 200 MG: 500 CHEW TAB at 08:32

## 2022-03-21 RX ADMIN — PHENYTOIN 100 MG: 50 TABLET, CHEWABLE ORAL at 16:58

## 2022-03-21 RX ADMIN — APIXABAN 10 MG: 5 TABLET, FILM COATED ORAL at 08:33

## 2022-03-21 RX ADMIN — LEVETIRACETAM 1000 MG: 500 TABLET, FILM COATED ORAL at 08:32

## 2022-03-21 RX ADMIN — CARVEDILOL 12.5 MG: 12.5 TABLET, FILM COATED ORAL at 08:33

## 2022-03-21 RX ADMIN — APIXABAN 10 MG: 5 TABLET, FILM COATED ORAL at 17:00

## 2022-03-21 RX ADMIN — Medication 5 ML: at 06:00

## 2022-03-21 NOTE — PROGRESS NOTES
Bedside shift change report given to Richie Vivas RN (oncoming nurse) by Latoya Gage RN (offgoing nurse). Report included the following information SBAR, Kardex, ED Summary, Intake/Output, MAR, Accordion, Recent Results, Med Rec Status, Cardiac Rhythm SR and Alarm Parameters .

## 2022-03-21 NOTE — PROGRESS NOTES
[facility]  Hospitalist Group                                                                                          Hospitalist Progress Note  Horacio Rowe MD  Answering service: [phone] from in house phone        Date of Service:  3/21/2022  NAME:  Sp Suárez  :  1975  MRN:  [id number]      Admission Summary:   Sp Suárez is a 51 y.o. male with history of glioblastoma status post partial resection on , hypertension, HFrEF, epilepsy, severe morbid obesity who presents to hospital with complaints of fevers and chills. [facility] where he notes 3 days of intermittent fevers and chills. He reported this to short-term staff and was sent to [facility] where he was evaluated and had a CT scan which he states showed some unknown acute abnormality. He was referred back to the ED for further evaluation and treatment. Interval history / Subjective:   Patient seen and examined fever curve reviewed  Seen by neurosurgery as well reviewed MRI  Patient has a DVT and started on Eliquis  Ready for discharge discussed with CM back to [facility] hospital    Labs requested     Assessment & Plan:     Fever possibly UTI  --Urine WBC 10-20 leukocyte esterase trace nitrate negative  -Unlikely from recent glioblastoma resection and intracranial infection  -UA and chest x-ray unremarkable  - Covid test negative  -Empiric cefepime and Levaquin --will DC cefepime continue Levaquin  -Await final read of brain: No definitive concerns for abscess collection  -Neurosurgery consult - APPRECIATED RECOMMENDATION     Right parietal brain mass /glioblastoma  - s/p crani   -cont keppra and dilantin.   -Oncology consult     Hx of CHF  -Compensated.  -On Coreg and lisinopril                HTN  - cont coreg, lisinopril, torsemide         Morbid obesity as defined by BMI greater than 40  - Body mass index is 51.54 kg/m².   - Diet and exercise counseling as appropriate     Hyponatremia  -Due to SIADH. -Sodium 131 today and stable  -1.5 L daily fluid restriction    DVT scan - soleal vein thrombosis acute   --d/w NSGY for use of a/c , pt had recent crani d/w Dr. Sly Monroy to a/c                   Code status: Full  DVT prophylaxis: on a/c    Care Plan discussed with: Patient/Family and Nurse  Anticipated Disposition: SNF/LTC  Anticipated Discharge: Greater than 48 hours     Hospital Problems  Date Reviewed: 2/22/2022          Codes Class Noted POA    SIRS (systemic inflammatory response syndrome) ([facility] [address] ICD-10-CM: R65.10  ICD-9-CM: 995.90  3/18/2022 Unknown                Review of Systems:   A comprehensive review of systems was negative. Vital Signs:    Last 24hrs VS reviewed since prior progress note.  Most recent are:  Visit Vitals  /88 (BP 1 Location: Left upper arm)   Pulse 91   Temp 98 °F (36.7 °C)   Resp 16   Wt 128.5 kg (283 lb 4.7 oz)   SpO2 96%   BMI 48.63 kg/m²         Intake/Output Summary (Last 24 hours) at 3/21/2022 1415  Last data filed at 3/21/2022 1222  Gross per 24 hour   Intake 1335 ml   Output 2650 ml   Net -1315 ml        Physical Examination:     I had a face to face encounter with this patient and independently examined them on 3/21/2022 as outlined below:          General : alert x 3, awake, no acute distress, resting in bed, pleasant   HEENT: PEERL, EOMI, moist mucus membrane, TM clear  Neck: supple, no JVD, no meningeal signs  Chest: Clear to auscultation bilaterally   CVS: S1 S2 heard, Capillary refill less than 2 seconds  Abd: soft/ Non tender, non distended, BS physiological,   Ext: no clubbing, no cyanosis, no edema, brisk 2+ DP pulses  Neuro/Psych: pleasant mood and affect, CN 2-12 grossly intact  Skin: warm     Data Review:    I personally reviewed  Image and labs      Labs:     Recent Labs     03/19/22  0302 03/18/22  1606   WBC 6.8 8.1   HGB 13.6 13.9   HCT 41.8 43.3    211     Recent Labs 03/19/22  0302 03/18/22  1606   * 130*   K 3.5 3.4*   CL 97 94*   CO2 28 31   BUN 21* 21*   CREA 1.36* 1.25   * 98   CA 8.9 9.2     Recent Labs     03/19/22  0302 03/18/22  1606   * 120*   AP 92 96   TBILI 0.5 0.5   TP 7.1 7.7   ALB 2.5* 2.8*   GLOB 4.6* 4.9*     No results for input(s): INR, PTP, APTT, INREXT, INREXT in the last 72 hours. No results for input(s): FE, TIBC, PSAT, FERR in the last 72 hours. No results found for: FOL, RBCF   No results for input(s): PH, PCO2, PO2 in the last 72 hours. No results for input(s): CPK, CKNDX, TROIQ in the last 72 hours.     No lab exists for component: CPKMB  Lab Results   Component Value Date/Time    Cholesterol, total 180 02/23/2022 05:18 AM    HDL Cholesterol 36 02/23/2022 05:18 AM    LDL, calculated 126.8 (H) 02/23/2022 05:18 AM    Triglyceride 86 02/23/2022 05:18 AM    CHOL/HDL Ratio 5.0 02/23/2022 05:18 AM     Lab Results   Component Value Date/Time    Glucose (POC) 133 (H) 03/11/2022 11:13 AM    Glucose (POC) 135 (H) 03/11/2022 07:15 AM    Glucose (POC) 148 (H) 03/10/2022 09:09 PM    Glucose (POC) 119 (H) 03/10/2022 04:59 PM    Glucose (POC) 115 03/10/2022 11:36 AM     Lab Results   Component Value Date/Time    Color DARK YELLOW 03/18/2022 04:06 PM    Appearance CLEAR 03/18/2022 04:06 PM    Specific gravity 1.010 03/18/2022 04:06 PM    pH (UA) 5.5 03/18/2022 04:06 PM    Protein 30 (A) 03/18/2022 04:06 PM    Glucose Negative 03/18/2022 04:06 PM    Ketone Negative 03/18/2022 04:06 PM    Urobilinogen 2.0 (H) 03/18/2022 04:06 PM    Nitrites Negative 03/18/2022 04:06 PM    Leukocyte Esterase TRACE (A) 03/18/2022 04:06 PM    Epithelial cells FEW 03/18/2022 04:06 PM    Bacteria Negative 03/18/2022 04:06 PM    WBC 10-20 03/18/2022 04:06 PM    RBC 0-5 03/18/2022 04:06 PM         Medications Reviewed:     Current Facility-Administered Medications   Medication Dose Route Frequency    carvediloL (COREG) tablet 12.5 mg  12.5 mg Oral BID    levETIRAcetam (KEPPRA) tablet 1,000 mg  1,000 mg Oral Q12H    lisinopriL (PRINIVIL, ZESTRIL) tablet 5 mg  5 mg Oral DAILY    phenytoin (DILANTIN) chewable tablet 100 mg  100 mg Oral TID    torsemide (DEMADEX) tablet 100 mg  100 mg Oral DAILY    calcium carbonate (TUMS) chewable tablet 200 mg [elemental]  200 mg Oral BID WITH MEALS    apixaban (ELIQUIS) tablet 10 mg  10 mg Oral BID    sodium chloride (NS) flush 5-10 mL  5-10 mL IntraVENous PRN    levoFLOXacin (LEVAQUIN) 750 mg in D5W IVPB  750 mg IntraVENous Q24H    sodium chloride (NS) flush 5-40 mL  5-40 mL IntraVENous Q8H    sodium chloride (NS) flush 5-40 mL  5-40 mL IntraVENous PRN    acetaminophen (TYLENOL) tablet 650 mg  650 mg Oral Q6H PRN    Or    acetaminophen (TYLENOL) suppository 650 mg  650 mg Rectal Q6H PRN    polyethylene glycol (MIRALAX) packet 17 g  17 g Oral DAILY PRN    ondansetron (ZOFRAN ODT) tablet 4 mg  4 mg Oral Q8H PRN    Or    ondansetron (ZOFRAN) injection 4 mg  4 mg IntraVENous Q6H PRN     ______________________________________________________________________  EXPECTED LENGTH OF STAY: - - -  ACTUAL LENGTH OF STAY:          3      Please note that this dictation was completed with [employer], the computer voice recognition software. Quite often unanticipated grammatical, syntax, homophones, and other interpretive errors are inadvertently transcribed by the computer software. Please disregard these errors. Please excuse any errors that have escaped final proofreading.                 Ulysses Roger MD

## 2022-03-21 NOTE — PROGRESS NOTES
Verbal shift change report given to Truett Bloch (oncoming nurse) by Deborah Pate RN (offgoing nurse). Report included the following information SBAR, Kardex, Procedure Summary, Intake/Output, Cardiac Rhythm SR and Quality Measures.

## 2022-03-21 NOTE — PROGRESS NOTES
Transition of Care Plan  RUR 15%    Readmission Assessment  Number of days since last admission?: 1-7 days  Previous disposition: Acute Rehab  Who is being interviewed?: Patient  What was the patient's/caregiver's perception as to why they think they needed to return back to the hospital?: Other (Comment) (developed fever)  Did you see a specialist, such as Cardiac, Pulmonary, Orthopedic Physician, etc. after you left the hospital?: No (in acute rehab  saw rehab physician)  Who advised the patient to return to the hospital?: Acute Rehab  Does the patient report anything that got in the way of taking their medications?: No  In our efforts to provide the best possible care to you and others like you, can you think of anything that we could have done to help you after you left the hospital the first time, so that you might not have needed to return so soon?: Other (Comment) (developed fever)     Patient readmitted from [facility]    Disposition   Return to [facility] where he was receiving rehab  Referral sent today via [address] [place] [address]  [phone]    Transportation  [facility] ([address] [place] [address]) phone [phone]    Medical follow up   PCP and specialist    Contact  MPOA   Sister  Julissa Ash  [phone]  [facility]    Mother  Norman Gali [phone]    Reason for Readmission:     SIRS  Fever and chills  Hx of craniotomy and tumor removal 3/2/22  Hospitalized from 3/2/22--3/11/22         RUR Score/Risk Level:     15%    PCP: First and Last name:   Papo Maurer    Name of Practice:    Are you a current patient: Yes/No: yes   Approximate date of last visit: over a year   Can you participate in a virtual visit with your PCP:     Is a Care Conference indicated:   NO    Did you attend your follow up appointment (s):   If not, why not:  Discharged to [facility]   Follow up with physician at [facility]       Resources/supports as identified by patient/family:    Fiance, sister and mother and friends are supportive       Top Challenges facing patient (as identified by patient/family and CM): Finances/Medication cost?    Confirmed insurance   No concerns-- has [employer] working  prior to surgery on 3/2/22-- family will transport       Support system or lack thereof? Mother, Sister and fiance and friends      Living arrangements? Prior to IPR living with in duplex home-- with fiance and sister         Self-care/ADLs/Cognition? Alert and oriented           Current Advanced Directive/Advance Care Plan: In chart-- Sister, Jany Landa MPOA and mother Alexandre Garza secondary MPOA    ACP  In chart           Plan for utilizing home health:   Not at this time as patient plans to return to [facility]             Transition of Care Plan:    Based on readmission, the patient's previous Plan of Care   has been evaluated and/or modified. The current Transition of Care Plan is:      Return to [facility] to complete rehab- will require authorization from insurance    CM met with patient in his room to introduce self and explain role. Patient was alert and oriented and confirmed his demographics, PCP and insurance. Secures medications at [facility] and would like to return when discharged. Prior to admission 3/2/22 for brain tumor and having surgery-- craniotomy and discharging to [facility] , he was living in duplex home (upper)  with his fiance, Stephanie Stinson, and his sister living in the lower level home. He was self care and driving and working as [employer] prior to the 3/2/22 admission. He hopes he will be able to return to work . He has right arms and hand weakness--using RW for mobility    Patient said he is getting better and has more movement in his right hand but still needs rehab and would like to return to complete rehab. Referral sent to [facility] .   He will require an authorization     CM will follow and assist with transition of care plan-- IPR. He will need BLS    Care Management Interventions  PCP Verified by CM: Yes  Mode of Transport at Discharge: BLS  Transition of Care Consult (CM Consult):  Other  Discharge Durable Medical Equipment: No  Physical Therapy Consult: Yes  Occupational Therapy Consult: Yes  Support Systems: Spouse/Significant Other,Parent(s)  Confirm Follow Up Transport: Other (see comment) (family/ friends)  The Plan for Transition of Care is Related to the Following Treatment Goals : IPR  The Patient and/or Patient Representative was Provided with a Choice of Provider and Agrees with the Discharge Plan?: Yes  Freedom of Choice List was Provided with Basic Dialogue that Supports the Patient's Individualized Plan of Care/Goals, Treatment Preferences and Shares the Quality Data Associated with the Providers?: Yes  Discharge Location  Patient Expects to be Discharged to[de-identified] Rehab hospital/unit acute

## 2022-03-22 PROBLEM — R65.10 SIRS (SYSTEMIC INFLAMMATORY RESPONSE SYNDROME) (HCC): Status: RESOLVED | Noted: 2022-03-18 | Resolved: 2022-03-22

## 2022-03-22 LAB
ANION GAP SERPL CALC-SCNC: 6 MMOL/L (ref 5–15)
BASOPHILS # BLD: 0.1 K/UL (ref 0–0.1)
BASOPHILS NFR BLD: 1 % (ref 0–1)
BUN SERPL-MCNC: 22 MG/DL (ref 6–20)
BUN/CREAT SERPL: 21 (ref 12–20)
CALCIUM SERPL-MCNC: 8.8 MG/DL (ref 8.5–10.1)
CHLORIDE SERPL-SCNC: 94 MMOL/L (ref 97–108)
CO2 SERPL-SCNC: 34 MMOL/L (ref 21–32)
CREAT SERPL-MCNC: 1.04 MG/DL (ref 0.7–1.3)
DIFFERENTIAL METHOD BLD: ABNORMAL
EOSINOPHIL # BLD: 0.1 K/UL (ref 0–0.4)
EOSINOPHIL NFR BLD: 2 % (ref 0–7)
ERYTHROCYTE [DISTWIDTH] IN BLOOD BY AUTOMATED COUNT: 11.9 % (ref 11.5–14.5)
GLUCOSE SERPL-MCNC: 100 MG/DL (ref 65–100)
HCT VFR BLD AUTO: 42.1 % (ref 36.6–50.3)
HGB BLD-MCNC: 14 G/DL (ref 12.1–17)
IMM GRANULOCYTES # BLD AUTO: 0.1 K/UL (ref 0–0.04)
IMM GRANULOCYTES NFR BLD AUTO: 2 % (ref 0–0.5)
LYMPHOCYTES # BLD: 2.4 K/UL (ref 0.8–3.5)
LYMPHOCYTES NFR BLD: 37 % (ref 12–49)
MCH RBC QN AUTO: 30.6 PG (ref 26–34)
MCHC RBC AUTO-ENTMCNC: 33.3 G/DL (ref 30–36.5)
MCV RBC AUTO: 91.9 FL (ref 80–99)
MONOCYTES # BLD: 1 K/UL (ref 0–1)
MONOCYTES NFR BLD: 15 % (ref 5–13)
NEUTS SEG # BLD: 2.9 K/UL (ref 1.8–8)
NEUTS SEG NFR BLD: 43 % (ref 32–75)
NRBC # BLD: 0 K/UL (ref 0–0.01)
NRBC BLD-RTO: 0 PER 100 WBC
PLATELET # BLD AUTO: 221 K/UL (ref 150–400)
PMV BLD AUTO: 10.7 FL (ref 8.9–12.9)
POTASSIUM SERPL-SCNC: 3.2 MMOL/L (ref 3.5–5.1)
RBC # BLD AUTO: 4.58 M/UL (ref 4.1–5.7)
RBC MORPH BLD: ABNORMAL
SARS-COV-2, COV2: NORMAL
SODIUM SERPL-SCNC: 134 MMOL/L (ref 136–145)
WBC # BLD AUTO: 6.6 K/UL (ref 4.1–11.1)

## 2022-03-22 PROCEDURE — U0005 INFEC AGEN DETEC AMPLI PROBE: HCPCS

## 2022-03-22 PROCEDURE — 97535 SELF CARE MNGMENT TRAINING: CPT

## 2022-03-22 PROCEDURE — 74011250637 HC RX REV CODE- 250/637: Performed by: HOSPITALIST

## 2022-03-22 PROCEDURE — 85025 COMPLETE CBC W/AUTO DIFF WBC: CPT

## 2022-03-22 PROCEDURE — 36415 COLL VENOUS BLD VENIPUNCTURE: CPT

## 2022-03-22 PROCEDURE — 80048 BASIC METABOLIC PNL TOTAL CA: CPT

## 2022-03-22 PROCEDURE — 74011250636 HC RX REV CODE- 250/636: Performed by: STUDENT IN AN ORGANIZED HEALTH CARE EDUCATION/TRAINING PROGRAM

## 2022-03-22 PROCEDURE — 65660000000 HC RM CCU STEPDOWN

## 2022-03-22 PROCEDURE — 97530 THERAPEUTIC ACTIVITIES: CPT

## 2022-03-22 PROCEDURE — 74011000250 HC RX REV CODE- 250: Performed by: STUDENT IN AN ORGANIZED HEALTH CARE EDUCATION/TRAINING PROGRAM

## 2022-03-22 PROCEDURE — 97116 GAIT TRAINING THERAPY: CPT

## 2022-03-22 RX ORDER — LEVOFLOXACIN 750 MG/1
750 TABLET ORAL DAILY
Qty: 5 TABLET | Refills: 0 | Status: SHIPPED | OUTPATIENT
Start: 2022-03-22 | End: 2022-03-27

## 2022-03-22 RX ADMIN — APIXABAN 10 MG: 5 TABLET, FILM COATED ORAL at 17:59

## 2022-03-22 RX ADMIN — Medication 5 ML: at 06:00

## 2022-03-22 RX ADMIN — CALCIUM CARBONATE (ANTACID) CHEW TAB 500 MG 200 MG: 500 CHEW TAB at 09:22

## 2022-03-22 RX ADMIN — LISINOPRIL 5 MG: 5 TABLET ORAL at 09:20

## 2022-03-22 RX ADMIN — APIXABAN 10 MG: 5 TABLET, FILM COATED ORAL at 09:19

## 2022-03-22 RX ADMIN — LEVETIRACETAM 1000 MG: 500 TABLET, FILM COATED ORAL at 22:01

## 2022-03-22 RX ADMIN — Medication 10 ML: at 22:00

## 2022-03-22 RX ADMIN — LEVETIRACETAM 1000 MG: 500 TABLET, FILM COATED ORAL at 09:20

## 2022-03-22 RX ADMIN — TORSEMIDE 100 MG: 100 TABLET ORAL at 09:20

## 2022-03-22 RX ADMIN — CARVEDILOL 12.5 MG: 12.5 TABLET, FILM COATED ORAL at 09:20

## 2022-03-22 RX ADMIN — CALCIUM CARBONATE (ANTACID) CHEW TAB 500 MG 200 MG: 500 CHEW TAB at 17:59

## 2022-03-22 RX ADMIN — PHENYTOIN 100 MG: 50 TABLET, CHEWABLE ORAL at 16:55

## 2022-03-22 RX ADMIN — PHENYTOIN 100 MG: 50 TABLET, CHEWABLE ORAL at 09:19

## 2022-03-22 RX ADMIN — PHENYTOIN 100 MG: 50 TABLET, CHEWABLE ORAL at 22:01

## 2022-03-22 RX ADMIN — CALCIUM CARBONATE (ANTACID) CHEW TAB 500 MG 200 MG: 500 CHEW TAB at 13:56

## 2022-03-22 RX ADMIN — LEVOFLOXACIN 750 MG: 5 INJECTION, SOLUTION INTRAVENOUS at 22:02

## 2022-03-22 NOTE — PROGRESS NOTES
Bedside shift change report given to Sergio Diallo (oncoming nurse) by Flory Armstrong RN (offgoing nurse). Report included the following information SBAR, Kardex, ED Summary, Intake/Output, MAR, Recent Results and Cardiac Rhythm NSR.

## 2022-03-22 NOTE — PROGRESS NOTES
Neurosurgery courtesy note    Mr. Aixa Rosas is POD20 s/p right parietal craniotomy with resection of tumor by Dr. Angela Dhaliwal. He discharged to [facility] on 03/11/22. He began spiking fevers over the weekend with chills. He had a head CT at the [facility] that was concerning for possible abscess. He transferred to [facility]. MRI completed and there was no evidence of abscess, just hemorrhage in the resection site. Pt found to have DVT and started on Xarelto. Scalp incision is well-approximated and well-healed. No drainage or fluctuance.  Ok to d/c back to rehab hospital. Rad onc and oncology follow-up after d/c from rehab hospital.    Khushi Caceres NP

## 2022-03-22 NOTE — PROGRESS NOTES
Problem: Self Care Deficits Care Plan (Adult)  Goal: *Acute Goals and Plan of Care (Insert Text)  Description: FUNCTIONAL STATUS PRIOR TO ADMISSION: Pt reports he was Independent prior to sx on 3/2/2022, reporting he lives with his sister and fiance normally staying on 2nd floor with tub-shower combo; however, plans to stay on first floor when rehab completed. Reports [facility] was assisting with DME needs. HOME SUPPORT: The patient lived with sister/fiance who are able to provide PRN ADL/IADL assist.    Occupational Therapy Goals  Initiated 3/20/2022  1. Patient will perform RW grooming with contact guard assist within 7 day(s). 2.  Patient will perform seated bathing with supervision/set-up within 7 day(s). 3.  Patient will perform RW lower body dressing with contact guard assist within 7 day(s). 4.  Patient will perform RW toilet transfers with contact guard assist within 7 day(s). 5.  Patient will perform all aspects of toileting with contact guard assist within 7 day(s). Outcome: Progressing Towards Goal   OCCUPATIONAL THERAPY TREATMENT  Patient: Magnus Holman (57 y.o. male)  Date: 3/22/2022  Diagnosis: SIRS (systemic inflammatory response syndrome) ([facility]) [R65.10] <principal problem not specified>      Precautions:    Chart, occupational therapy assessment, plan of care, and goals were reviewed. ASSESSMENT  Patient continues with skilled OT services and is progressing towards goals. Patient received resting in bed, readily agreeable to therapy session and reporting regular engagement in neuro re-ed activities and exercises throughout the day. Patient ambulates to bathroom with min assist for standing grooming, benefiting from anterior trunk support via sink to facilitate BUE task engagement and effective use of compensatory strategies. Patient actively utilizes LUE to attempt managing sink and to stabilize grooming items for R hand fine motor manipulation.  Patient manages tight turn in bathroom with min assist, sustaining L hand grasp of RW during ambulation. Note L wrist in flexion on RW handle, however patient currently unable to correct 2/2 limited extension. Patient continues to demonstrate excellent motivation, however remains limited by impaired functional mobility and balance, decreased LUE function, and L-side weakness. Continue to recommend IPR at discharge to maximize functional gains. Current Level of Function Impacting Discharge (ADLs): min A    Other factors to consider for discharge: independent at baseline;          PLAN :  Patient continues to benefit from skilled intervention to address the above impairments. Continue treatment per established plan of care to address goals. Recommendation for discharge: (in order for the patient to meet his/her long term goals)  Therapy 3 hours per day 5-7 days per week    This discharge recommendation:  Has been made in collaboration with the attending provider and/or case management    IF patient discharges home will need the following DME: TBD       SUBJECTIVE:   Patient stated I am motivated! I need to get back to it.     OBJECTIVE DATA SUMMARY:   Cognitive/Behavioral Status:  Neurologic State: Alert; Appropriate for age  Orientation Level: Oriented X4  Cognition: Follows commands  Perception: Appears intact  Perseveration: No perseveration noted  Safety/Judgement: Awareness of environment; Fall prevention;Home safety; Insight into deficits;Good awareness of safety precautions    Functional Mobility and Transfers for ADLs:  Bed Mobility:   Supine to Sit: Contact guard assistance;Bed Modified    Transfers:  Sit to Stand: Minimum assistance  Functional Transfers  Bathroom Mobility: Minimum assistance  Bed to Chair: Moderate assistance    Balance:  Sitting: Intact; Without support  Standing: Impaired; With support  Standing - Static: Fair;Constant support  Standing - Dynamic : Constant support; Fair    ADL Intervention:  Feeding  Feeding Assistance: Set-up  Container Management: Standing-by assistance    Grooming  Grooming Assistance: Minimum assistance  Position Performed: Standing (w/ anterior trunk support)  Washing Face: Stand-by assistance; Compensatory technique training  Brushing Teeth: Minimum assistance; Compensatory technique training  Cues: Verbal cues provided;Visual cues provided    Cognitive Retraining  Problem Solving: Awareness of environment;General alternative solution  Safety/Judgement: Awareness of environment; Fall prevention;Home safety; Insight into deficits;Good awareness of safety precautions    Neuro Re-Education & Therapeutic Exercises:  AROM of LUE    Self-ROM - forward flexion, diagonal flexion    Functional transfers, sit<> prep for increased safety and independence in OOB ADLs    Pain:  No reports. Activity Tolerance:   Good    After treatment patient left in no apparent distress:   Sitting in chair and Call bell within reach    COMMUNICATION/COLLABORATION:   The patients plan of care was discussed with: Physical therapist and Registered nurse. Patient was educated regarding His deficit(s). He demonstrated Good understanding as evidenced by verbalization and engagement in discussion. Patient and/or family was verbally educated on the BE FAST acronym for signs/symptoms of CVA and TIA. BE FAST was written on patient's communication board  for visual education and reinforcement. All questions answered with patient indicating verbal understanding.      Owen Borjas OT  Time Calculation: 32 mins

## 2022-03-22 NOTE — PROGRESS NOTES
Transition of Care Plan  RUR 14%    Will need PCR  COVID 19  within 7 days of discharge--physician informed    Disposition   Return to [facility] to complete rehab--Accepted  Authorization  started today  Contact   Mauri Mobley [phone]    Transportation   [facility] ([address] [place] [address]) phone [phone]  BLS    Contact  MPOA  Sister  Radha Berumen [phone]  [employer] [place]  Mother  Ronnie Tobias [phone]    CM talked with mauri Mobley today and was informed that patient was accepted to return to [facility]. She started authorization this am  Patient will need PCR prior to discharge. CM informed patient  Sister will be contacted when authorization is secured and patient being discharged.

## 2022-03-22 NOTE — PROGRESS NOTES
Spiritual Care Partner Volunteer visited patient in Rm 409 on 3/18/22. Visited by: Mayra Tim.  Irma Escobedo, [address] [place] [address] Road paging Service [phone] ([phone]

## 2022-03-22 NOTE — PROGRESS NOTES
Bedside shift change report given to VINNY Scherer (oncoming nurse) by Darinel Javed RN (offgoing nurse). Report included the following information SBAR, Kardex, ED Summary, Intake/Output, MAR, Accordion, Recent Results, Med Rec Status, Cardiac Rhythm SR and Alarm Parameters .

## 2022-03-22 NOTE — PROGRESS NOTES
[facility] Adult  Hospitalist Group                                                                                          Hospitalist Progress Note  Surinder Pulliam MD  Answering service: [phone] or [phone] from in house phone        Date of Service:  3/22/2022  NAME:  Jorge Lara  :  1975  MRN:  [id number]      Admission Summary:   Jorge Lara is a 51 y.o. male with history of glioblastoma status post partial resection on , hypertension, HFrEF, epilepsy, severe morbid obesity who presents to hospital with complaints of fevers and chills. Patient [facility] where he notes 3 days of intermittent fevers and chills. He reported this to short-term staff and was sent to [address] [place] [address] [facility] where he was evaluated and had a CT scan which he states showed some unknown acute abnormality. He was referred back to the ED for further evaluation and treatment.     Interval history / Subjective:   Patient seen and examined   Patient awaiting bed at [facility] can be transferred today  Need to complete loading dose of Eliquis followed by 5 mg twice daily  Seen by neurosurgery as well reviewed MRI--no collection    Ready for discharge discussed with CM back to [facility] hospital     Assessment & Plan:     Fever possibly UTI  -Urine WBC 10-20 leukocyte esterase trace nitrate negative  -Unlikely from recent glioblastoma resection and intracranial infection  -chest x-ray unremarkable  -Covid test negative  -Empiric cefepime and Levaquin --will DC cefepime continue Levaquin for UTI  -Await final read of brain: No definitive concerns for abscess collection  -Neurosurgery consult - APPRECIATED RECOMMENDATION     Right parietal brain mass /glioblastoma  - s/p crani   -cont keppra and dilantin.   -Oncology consult     Hx of CHF  -Compensated.  -On Coreg and lisinopril                HTN  - cont coreg, lisinopril, torsemide         Morbid obesity as defined by BMI greater than 40  - Body mass index is 51.54 kg/m². - Diet and exercise counseling as appropriate     Hyponatremia  -Due to SIADH. -Sodium 131 today and stable  -1.5 L daily fluid restriction    DVT scan - soleal vein thrombosis acute   --d/w NSGY for use of a/c , pt had recent crani d/w Dr. Freddie Mckay to a/c                   Code status: Full  DVT prophylaxis: on a/c    Care Plan discussed with: Patient/Family and Nurse  Anticipated Disposition: SNF/LTC  Anticipated Discharge: Refer discharge to [facility] today     Hospital Problems  Date Reviewed: 2/22/2022    None            Review of Systems:   A comprehensive review of systems was negative. Vital Signs:    Last 24hrs VS reviewed since prior progress note. Most recent are:  Visit Vitals  /88 (BP 1 Location: Left upper arm, BP Patient Position: At rest)   Pulse 81   Temp 97.6 °F (36.4 °C)   Resp 15   Wt 126.3 kg (278 lb 7.1 oz)   SpO2 95%   BMI 47.79 kg/m²         Intake/Output Summary (Last 24 hours) at 3/22/2022 1119  Last data filed at 3/22/2022 1116  Gross per 24 hour   Intake 925 ml   Output 2250 ml   Net -1325 ml        Physical Examination:     I had a face to face encounter with this patient and independently examined them on 3/22/2022 as outlined below:          General : alert x 3, awake, no acute distress, resting in bed, pleasant   HEENT: PEERL, EOMI, moist mucus membrane, TM clear  Neck: supple, no JVD, no meningeal signs  Chest: Clear to auscultation bilaterally   CVS: S1 S2 heard, Capillary refill less than 2 seconds  Abd: soft/ Non tender, non distended, BS physiological,   Ext: no clubbing, no cyanosis, no edema, brisk 2+ DP pulses  Neuro/Psych: pleasant mood and affect, CN 2-12 grossly intact  Skin: warm     Data Review:    I personally reviewed  Image and labs      Labs:     No results for input(s): WBC, HGB, HCT, PLT, HGBEXT, HCTEXT, PLTEXT, HGBEXT, HCTEXT, PLTEXT in the last 72 hours.   No results for input(s): NA, K, CL, CO2, BUN, CREA, GLU, CA, MG, PHOS, URICA in the last 72 hours. No results for input(s): ALT, AP, TBIL, TBILI, TP, ALB, GLOB, GGT, AML, LPSE in the last 72 hours. No lab exists for component: SGOT, GPT, AMYP, HLPSE  No results for input(s): INR, PTP, APTT, INREXT, INREXT in the last 72 hours. No results for input(s): FE, TIBC, PSAT, FERR in the last 72 hours. No results found for: FOL, RBCF   No results for input(s): PH, PCO2, PO2 in the last 72 hours. No results for input(s): CPK, CKNDX, TROIQ in the last 72 hours.     No lab exists for component: CPKMB  Lab Results   Component Value Date/Time    Cholesterol, total 180 02/23/2022 05:18 AM    HDL Cholesterol 36 02/23/2022 05:18 AM    LDL, calculated 126.8 (H) 02/23/2022 05:18 AM    Triglyceride 86 02/23/2022 05:18 AM    CHOL/HDL Ratio 5.0 02/23/2022 05:18 AM     Lab Results   Component Value Date/Time    Glucose (POC) 133 (H) 03/11/2022 11:13 AM    Glucose (POC) 135 (H) 03/11/2022 07:15 AM    Glucose (POC) 148 (H) 03/10/2022 09:09 PM    Glucose (POC) 119 (H) 03/10/2022 04:59 PM    Glucose (POC) 115 03/10/2022 11:36 AM     Lab Results   Component Value Date/Time    Color DARK YELLOW 03/18/2022 04:06 PM    Appearance CLEAR 03/18/2022 04:06 PM    Specific gravity 1.010 03/18/2022 04:06 PM    pH (UA) 5.5 03/18/2022 04:06 PM    Protein 30 (A) 03/18/2022 04:06 PM    Glucose Negative 03/18/2022 04:06 PM    Ketone Negative 03/18/2022 04:06 PM    Urobilinogen 2.0 (H) 03/18/2022 04:06 PM    Nitrites Negative 03/18/2022 04:06 PM    Leukocyte Esterase TRACE (A) 03/18/2022 04:06 PM    Epithelial cells FEW 03/18/2022 04:06 PM    Bacteria Negative 03/18/2022 04:06 PM    WBC 10-20 03/18/2022 04:06 PM    RBC 0-5 03/18/2022 04:06 PM         Medications Reviewed:     Current Facility-Administered Medications   Medication Dose Route Frequency    calcium carbonate (TUMS) chewable tablet 200 mg [elemental]  200 mg Oral QID WITH MEALS    carvediloL (COREG) tablet 12.5 mg 12.5 mg Oral BID    levETIRAcetam (KEPPRA) tablet 1,000 mg  1,000 mg Oral Q12H    lisinopriL (PRINIVIL, ZESTRIL) tablet 5 mg  5 mg Oral DAILY    phenytoin (DILANTIN) chewable tablet 100 mg  100 mg Oral TID    torsemide (DEMADEX) tablet 100 mg  100 mg Oral DAILY    apixaban (ELIQUIS) tablet 10 mg  10 mg Oral BID    sodium chloride (NS) flush 5-10 mL  5-10 mL IntraVENous PRN    levoFLOXacin (LEVAQUIN) 750 mg in D5W IVPB  750 mg IntraVENous Q24H    sodium chloride (NS) flush 5-40 mL  5-40 mL IntraVENous Q8H    sodium chloride (NS) flush 5-40 mL  5-40 mL IntraVENous PRN    acetaminophen (TYLENOL) tablet 650 mg  650 mg Oral Q6H PRN    Or    acetaminophen (TYLENOL) suppository 650 mg  650 mg Rectal Q6H PRN    polyethylene glycol (MIRALAX) packet 17 g  17 g Oral DAILY PRN    ondansetron (ZOFRAN ODT) tablet 4 mg  4 mg Oral Q8H PRN    Or    ondansetron (ZOFRAN) injection 4 mg  4 mg IntraVENous Q6H PRN     ______________________________________________________________________  EXPECTED LENGTH OF STAY: - - -  ACTUAL LENGTH OF STAY:          4      Please note that this dictation was completed with [employer], the computer voice recognition software. Quite often unanticipated grammatical, syntax, homophones, and other interpretive errors are inadvertently transcribed by the computer software. Please disregard these errors. Please excuse any errors that have escaped final proofreading.                 Charlene Gage MD

## 2022-03-22 NOTE — PROGRESS NOTES
Problem: Mobility Impaired (Adult and Pediatric)  Goal: *Acute Goals and Plan of Care (Insert Text)  Description: FUNCTIONAL STATUS PRIOR TO ADMISSION:  Prior to 3/2/2022, pt was independent ambulating, lived with his sister and fiance. He has been in inpatient rehab where he has been ambulating with a RW w/ therapy. HOME SUPPORT: The patient lived with sister/fiance who are able to assist.      Physical Therapy Goals  Initiated 3/20/2022  1. Patient will move from supine to sit and sit to supine  in bed with supervision/ set up within 7 day(s). 2.  Patient will transfer from bed to chair and chair to bed with supervision/set-up using the least restrictive device within 7 day(s). 3.  Patient will perform sit to stand with supervision/set-up w/ safe technique within 7 day(s). 4.  Patient will ambulate with contact guard assist for 100 feet with the least restrictive device within 7 day(s). Outcome: Progressing Towards Goal   PHYSICAL THERAPY TREATMENT  Patient: Alpesh Or (79 y.o. male)  Date: 3/22/2022  Diagnosis: SIRS (systemic inflammatory response syndrome) ([facility]) [R65.10] <principal problem not specified>       Precautions:    Chart, physical therapy assessment, plan of care and goals were reviewed. ASSESSMENT  Patient continues with skilled PT services and is progressing towards goals. He demonstrates improved ability to maintain L  on RW. Patient demonstrates decreased wrist flexion. He ambulates in and out of the restroom with Min A for safety/balance. No overt LOB is noted with mobility, L LE does not buckle. Patient stands at sink to perform ADLs bracing himself against the sink. He ambulates back to bedside chair and completes sit to stands with x3 with feet staggered R in front of left. He performs an additional 5x with R LE propped on step. Patient Verbal cues are provided for keeping a soft bend in the L LE and to control lowering.      Current Level of Function Impacting Discharge (mobility/balance): Min- Mod A for gait and transfers with Rw    Other factors to consider for discharge: medical stability, decreased balance, decreased strength, decreased independence with functional mobiltiy          PLAN :  Patient continues to benefit from skilled intervention to address the above impairments. Continue treatment per established plan of care. to address goals. Recommendation for discharge: (in order for the patient to meet his/her long term goals)  Therapy 3 hours per day 5-7 days per week    This discharge recommendation:  Has been made in collaboration with the attending provider and/or case management    IF patient discharges home will need the following DME: to be determined (TBD)       SUBJECTIVE:   Patient stated Tanesha Garcia! I'm ready to go.     OBJECTIVE DATA SUMMARY:   Critical Behavior:  Neurologic State: Alert  Orientation Level: Oriented X4  Cognition: Follows commands  Safety/Judgement: Awareness of environment  Functional Mobility Training:  Bed Mobility:                    Transfers:  Sit to Stand: Minimum assistance  Stand to Sit: Minimum assistance        Bed to Chair: Moderate assistance                    Balance:  Sitting: Intact; Without support  Standing: Impaired; With support  Standing - Static: Fair;Constant support  Standing - Dynamic : Constant support; Fair  Ambulation/Gait Training:  Distance (ft): 40 Feet (ft)  Assistive Device: Gait belt;Walker, rolling  Ambulation - Level of Assistance: Minimal assistance        Gait Abnormalities: Hemiplegic; Step to gait        Base of Support: Widened     Speed/Ana: Slow  Step Length: Right shortened;Left shortened  Swing Pattern: Left asymmetrical                 Stairs:               Therapeutic Exercises:     Pain Rating:      Activity Tolerance:   Good    After treatment patient left in no apparent distress:   Sitting in chair and Call bell within reach    COMMUNICATION/COLLABORATION:   The patients plan of care was discussed with: Occupational therapist and Registered nurse.      Mushtaq Red, PT   Time Calculation: 32 mins

## 2022-03-23 VITALS
RESPIRATION RATE: 20 BRPM | OXYGEN SATURATION: 96 % | BODY MASS INDEX: 47.79 KG/M2 | WEIGHT: 278.44 LBS | TEMPERATURE: 97.8 F | HEART RATE: 89 BPM | SYSTOLIC BLOOD PRESSURE: 103 MMHG | DIASTOLIC BLOOD PRESSURE: 69 MMHG

## 2022-03-23 LAB
BACTERIA SPEC CULT: NORMAL
BACTERIA SPEC CULT: NORMAL
SARS-COV-2, XPLCVT: NOT DETECTED
SERVICE CMNT-IMP: NORMAL
SERVICE CMNT-IMP: NORMAL
SOURCE, COVRS: NORMAL

## 2022-03-23 PROCEDURE — 74011000250 HC RX REV CODE- 250: Performed by: STUDENT IN AN ORGANIZED HEALTH CARE EDUCATION/TRAINING PROGRAM

## 2022-03-23 PROCEDURE — 97116 GAIT TRAINING THERAPY: CPT

## 2022-03-23 PROCEDURE — 97535 SELF CARE MNGMENT TRAINING: CPT

## 2022-03-23 PROCEDURE — 74011250637 HC RX REV CODE- 250/637: Performed by: HOSPITALIST

## 2022-03-23 PROCEDURE — 97112 NEUROMUSCULAR REEDUCATION: CPT

## 2022-03-23 RX ORDER — LEVETIRACETAM 1000 MG/1
1500 TABLET ORAL 2 TIMES DAILY
Qty: 60 TABLET | Refills: 0 | Status: SHIPPED | OUTPATIENT
Start: 2022-03-23

## 2022-03-23 RX ORDER — PHENYTOIN 50 MG/1
100 TABLET, CHEWABLE ORAL 2 TIMES DAILY
Status: DISCONTINUED | OUTPATIENT
Start: 2022-03-23 | End: 2022-03-23 | Stop reason: HOSPADM

## 2022-03-23 RX ORDER — PHENYTOIN 50 MG/1
TABLET, CHEWABLE ORAL
Qty: 14 TABLET | Refills: 0 | Status: SHIPPED | OUTPATIENT
Start: 2022-03-23

## 2022-03-23 RX ORDER — ENOXAPARIN SODIUM 100 MG/ML
1 INJECTION SUBCUTANEOUS EVERY 12 HOURS
Status: DISCONTINUED | OUTPATIENT
Start: 2022-03-23 | End: 2022-03-23

## 2022-03-23 RX ORDER — LEVETIRACETAM 500 MG/1
1500 TABLET ORAL EVERY 12 HOURS
Status: DISCONTINUED | OUTPATIENT
Start: 2022-03-23 | End: 2022-03-23 | Stop reason: HOSPADM

## 2022-03-23 RX ADMIN — LEVETIRACETAM 1000 MG: 500 TABLET, FILM COATED ORAL at 08:52

## 2022-03-23 RX ADMIN — Medication 10 ML: at 08:54

## 2022-03-23 RX ADMIN — Medication 10 ML: at 06:00

## 2022-03-23 RX ADMIN — PHENYTOIN 100 MG: 50 TABLET, CHEWABLE ORAL at 08:51

## 2022-03-23 RX ADMIN — Medication 5 ML: at 14:00

## 2022-03-23 RX ADMIN — APIXABAN 10 MG: 5 TABLET, FILM COATED ORAL at 17:14

## 2022-03-23 RX ADMIN — CALCIUM CARBONATE (ANTACID) CHEW TAB 500 MG 200 MG: 500 CHEW TAB at 08:52

## 2022-03-23 RX ADMIN — CALCIUM CARBONATE (ANTACID) CHEW TAB 500 MG 200 MG: 500 CHEW TAB at 12:42

## 2022-03-23 RX ADMIN — CARVEDILOL 12.5 MG: 12.5 TABLET, FILM COATED ORAL at 17:15

## 2022-03-23 RX ADMIN — CALCIUM CARBONATE (ANTACID) CHEW TAB 500 MG 200 MG: 500 CHEW TAB at 17:14

## 2022-03-23 RX ADMIN — CARVEDILOL 12.5 MG: 12.5 TABLET, FILM COATED ORAL at 08:52

## 2022-03-23 RX ADMIN — APIXABAN 10 MG: 5 TABLET, FILM COATED ORAL at 08:52

## 2022-03-23 RX ADMIN — LISINOPRIL 5 MG: 5 TABLET ORAL at 08:52

## 2022-03-23 RX ADMIN — PHENYTOIN 100 MG: 50 TABLET, CHEWABLE ORAL at 17:15

## 2022-03-23 RX ADMIN — TORSEMIDE 100 MG: 100 TABLET ORAL at 08:52

## 2022-03-23 NOTE — PROGRESS NOTES
Physician Progress Note      Azeb Kahn  CSN #:                  [id number]  :                       1975  ADMIT DATE:       3/18/2022 2:31 PM  [address] [place] DATE:  RESPONDING  PROVIDER #:        Merlin Barrett MD Janit Pines MD          QUERY TEXT:    Patient presented to hospital with complaints of fevers and chills. Hospitalist documented possible UTI. Pt had urine culture with no significant growth and UA with WBC 10-20, negative nitrates, trace leukocyte esterase, negative bacteria. Pt has received IV Abx. In order to support the diagnosis of UTI, please include additional clinical indicators in your documentation. Or, please document if the diagnosis of UTI has been ruled out after further study. The medical record reflects the following:    Risk Factors: Recent surgery & hospital admission, admission from rehab center    Clinical Indicators:  -- temp = 101.4,  at admission  -- UA = clear, negative nitrates, trace leukocyte esterase, 10-20 WBC, negative bacteria  -- UCx = no significant growth  -- [facility] 3/22 = Fever possibly UTI -Urine WBC 10-20 leukocyte esterase trace nitrate negative -Unlikely from recent glioblastoma resection and intracranial infection    Treatment: IV Abx, UA, urine culture, vitals monitoring, serial lab monitoring    Thank-you,  Tamica Gutierres RN, [facility]  Clinical   [phone]  [email or web address]  Options provided:  -- UTI present as evidenced by, Please document evidence.   -- UTI was ruled out  -- Other - I will add my own diagnosis  -- Disagree - Not applicable / Not valid  -- Disagree - Clinically unable to determine / Unknown  -- Refer to Clinical Documentation Reviewer    PROVIDER RESPONSE TEXT:    UTI is present as evidenced by see ua    Query created by: Lilliana Kamara on 3/22/2022 3:29 PM      Electronically signed by:  Merlin Barrett MD Janit Pines MD 3/23/2022 7:26 AM

## 2022-03-23 NOTE — DISCHARGE SUMMARY
Discharge Summary       PATIENT ID: Bertha Enrique  MRN: [id number]   YOB: 1975    DATE OF ADMISSION: 3/18/2022  2:31 PM    DATE OF DISCHARGE: 3/23/22   PRIMARY CARE PROVIDER: Sixto Hare MD     ATTENDING PHYSICIAN: Miguel Walls  DISCHARGING PROVIDER: Abhijeet Montenegro MD    To contact this individual call [phone] and ask the  to page. If unavailable ask to be transferred the Adult Hospitalist Department. CONSULTATIONS: IP CONSULT TO NEUROSURGERY    PROCEDURES/SURGERIES: * No surgery found *    DISCHARGE DIAGNOSES: UTI / DVT    Trevon Mancia is a 51 y. o. male with history of glioblastoma status post partial resection on March 2, hypertension, HFrEF, epilepsy, severe morbid obesity who presents to hospital with complaints of fevers and chills.  Patient presents from [facility] rehab where he notes 3 days of intermittent fevers and chills.  He reported this to short-term staff and was sent to [address] [place] [address] [facility] where he was evaluated and had a CT scan which he states showed some unknown acute abnormality.  He was referred back to the ED for further evaluation and treatment. [address] [facility],[address] COURSE:   Patient was transferred to the hospital for possible infection after craniotomy but MRI showed no collection in the brain so patient was treated with IV antibiotics and later found to have UTI and a DVT as well treated with Eliquis with loading dose followed by regular doses. Patient was on Keppra and phenytoin dose increased for Keppra and phenytoin gradually tapered to stop because of interaction between Eliquis and phenytoin discussed with neurology over the phone and neurosurgery as well patient sent back to [facility] for completing rehab follow-up as an outpatient for radiation recommended    [address] / PLAN:      D/c to [facility]    BMI: Body mass index is 47.79 kg/m². . This patient: Meets criteria for severe obesity given BMI >/= to 40 due to excess calories/nutritional. Weight loss and lifestyle modifications should be encouraged as an outpatient. PENDING TEST RESULTS:   At the time of discharge the following test results are still pending:      ADDITIONAL CARE RECOMMENDATIONS:        NOTIFY YOUR PHYSICIAN FOR ANY OF THE FOLLOWING:   Fever over 101 degrees for 24 hours. Chest pain, shortness of breath, fever, chills, nausea, vomiting, diarrhea, change in mentation, falling, weakness, bleeding. Severe pain or pain not relieved by medications, as well as any other signs or symptoms that you may have questions about. FOLLOW UP APPOINTMENTS:    Follow-up Information     Follow up With Specialties Details Why Contact Info    Lauren Valdes MD Internal Medicine In 1 week  [address] [facility] [address]  [phone]               DIET: Regular Diet    ACTIVITY: Activity as tolerated    EQUIPMENT needed:     DISCHARGE MEDICATIONS:  Current Discharge Medication List      START taking these medications    Details   !! apixaban (ELIQUIS) 5 mg tablet Take 2 Tablets by mouth two (2) times a day for 8 doses. Qty: 16 Tablet, Refills: 0  Start date: 3/22/2022, End date: 3/26/2022      !! apixaban (Eliquis) 5 mg tablet Take 1 Tablet by mouth two (2) times a day for 30 days. After loading dose complete 3/26  Qty: 60 Tablet, Refills: 0  Start date: 3/22/2022, End date: 4/21/2022      levoFLOXacin (Levaquin) 750 mg tablet Take 1 Tablet by mouth daily for 5 days. Qty: 5 Tablet, Refills: 0  Start date: 3/22/2022, End date: 3/27/2022       !! - Potential duplicate medications found. Please discuss with provider. CONTINUE these medications which have CHANGED    Details   levETIRAcetam 1,000 mg tablet Take 1.5 Tablets by mouth two (2) times a day.   Qty: 60 Tablet, Refills: 0  Start date: 3/23/2022      phenytoin (DILANTIN) 50 mg chewable tablet 100 mg BID for 2 days 100 mg daily for 2 days 100 mg daily for 1 days  Qty: 14 Tablet, Refills: 0  Start date: 3/23/2022         CONTINUE these medications which have NOT CHANGED    Details   acetaminophen (TYLENOL) 325 mg tablet Take 2 Tablets by mouth every four (4) hours as needed for Pain. Qty: 10 Tablet, Refills: 0      calcium carbonate (TUMS) 200 mg calcium (500 mg) chew Take 1 Tablet by mouth as needed. carvediloL (COREG) 12.5 mg tablet Take 1 Tablet by mouth two (2) times a day for 30 days. Qty: 60 Tablet, Refills: 0      lisinopriL (PRINIVIL, ZESTRIL) 5 mg tablet Take 1 Tablet by mouth daily for 30 days. Qty: 30 Tablet, Refills: 0      torsemide (DEMADEX) 100 mg tablet Take 100 mg by mouth daily. DISPOSITION:    Home With:   OT  PT  HH  RN      x Long term SNF/Inpatient Rehab    Independent/assisted living    Hospice    Other:       PATIENT CONDITION AT DISCHARGE:     Functional status    Poor    x Deconditioned     Independent      Cognition   x  Lucid     Forgetful     Dementia      Catheters/lines (plus indication)    Carrillo     PICC     PEG    x None      Code status   x  Full code     DNR      PHYSICAL EXAMINATION AT DISCHARGE:  General:          Alert, cooperative, no distress, appears stated age. HEENT:           Atraumatic, anicteric sclerae, pink conjunctivae                          No oral ulcers, mucosa moist, throat clear, dentition fair  Neck:               Supple, symmetrical  Lungs:             Clear to auscultation bilaterally. No Wheezing or Rhonchi. No rales. Chest wall:      No tenderness  No Accessory muscle use. Heart:              Regular  rhythm,  No  murmur   No edema  Abdomen:        Soft, non-tender. Not distended. Bowel sounds normal  Extremities:     No cyanosis. No clubbing,                            Skin turgor normal, Capillary refill normal  Skin:                Not pale. Not Jaundiced  No rashes   Psych:             Not anxious or agitated.   Neurologic:      Alert, moves all extremities, answers questions appropriately and responds to commands       CHRONIC MEDICAL DIAGNOSES:  Problem List as of 3/23/2022 Date Reviewed: 2/22/2022          Codes Class Noted - Resolved    NICM (nonischemic cardiomyopathy) ([facility] ICD-10-CM: I42.8  ICD-9-CM: 425.4  2/25/2022 - Present    Overview Signed 2/25/2022  6:53 PM by Ariela Nicole MD     cath open cors, high LVEDP, EF on echo was 35% day before             Hypertension ICD-10-CM: I10  ICD-9-CM: 401.9  2/22/2022 - Present        Hyperlipidemia ICD-10-CM: E78.5  ICD-9-CM: 272.4  2/22/2022 - Present        Morbid obesity ([facility] ICD-10-CM: E66.01  ICD-9-CM: 278.01  2/22/2022 - Present        Prediabetes ICD-10-CM: R73.03  ICD-9-CM: 790.29  2/22/2022 - Present        Heart failure with mid-range ejection fraction (HCC) ICD-10-CM: I50.9  ICD-9-CM: 428.9  2/22/2022 - Present        Brain mass ICD-10-CM: G93.89  ICD-9-CM: 348.89  2/21/2022 - Present        Systolic CHF, chronic ([facility] ICD-10-CM: I50.22  ICD-9-CM: 428.22, 428.0  2020 - Present    Overview Signed 2/25/2022  6:53 PM by Ariela Nicole MD     EF 31 then, now 35% 2022             RESOLVED: SIRS (systemic inflammatory response syndrome) ([facility] ICD-10-CM: R65.10  ICD-9-CM: 995.90  3/18/2022 - 3/22/2022              Greater than 30  minutes were spent with the patient on counseling and coordination of care    Signed:   Ba Larose MD  3/23/2022  2:33 PM

## 2022-03-23 NOTE — PROGRESS NOTES
Physician Progress Note      Dionicio Rizvi  CSN #:                  [id number]  :                       1975  ADMIT DATE:       3/18/2022 2:31 PM  [address] [place] DATE:  RESPONDING  PROVIDER #:        Claudia Mcginnis MD          QUERY TEXT:    Pt admitted on 3/18/22 with a fever of 101.4. Pt noted to have UTI per Hospitalist notes and previous query response. Pt also noted to have temp of 101.4, HR of  and procalcitonin of 0.57 on the day of admission. If possible, please document if you are evaluating and /or treating any of the following: The medical record reflects the following:    Risk Factors: UTI    Clinical Indicators:  -- temp = 101.4, HR = , procalcitonin = 0.57 on the day of admission  -- UA 3/18 = clear, negative nitrates, trace leukocyte esterase, 10-20 WBC, negative bacteria  -- UCx 3/18 = no significant growth  -- Hospitalist noted (3/22): Fever possibly UTI -Urine WBC 10-20 leukocyte esterase trace nitrate negative  -- Hospitalist noted (3/23): UTI is present as evidenced by see ua  -- Levaquin and cefepime orders documented: Antibiotic Indications Sepsis of unknown etiology    Treatment: IV Abx, IV bolus x1 liter, UA, urine culture, serial lab monitoring, procalcitonin testing, vitals monitoring    Thank-you,  Lachelle Damian RN, [employer]  Clinical   [phone]  Options provided:  -- Sepsis due to UTI, present on admission  -- Sepsis due to UTI, present on admission now resolved  -- UTI without sepsis  -- Other - I will add my own diagnosis  -- Disagree - Not applicable / Not valid  -- Disagree - Clinically unable to determine / Unknown  -- Refer to Clinical Documentation Reviewer    PROVIDER RESPONSE TEXT:    This patient was treated for sepsis due to a UTI this admission, which was present on admission and is now currently resolved.     Query created by: Kinga Cortez on 3/23/2022 11:00 AM      Electronically signed by:  Claudia Mcginnis MD 3/23/2022 11:18 AM

## 2022-03-23 NOTE — PROGRESS NOTES
Bedside shift change report given to Key RN (oncoming nurse) by Agatha Zhang RN (offgoing nurse). Report included the following information SBAR, Kardex, ED Summary, Intake/Output, MAR, Accordion, Recent Results, Med Rec Status, Cardiac Rhythm SR and Alarm Parameters .

## 2022-03-23 NOTE — PROGRESS NOTES
Problem: Mobility Impaired (Adult and Pediatric)  Goal: *Acute Goals and Plan of Care (Insert Text)  Description: FUNCTIONAL STATUS PRIOR TO ADMISSION:  Prior to 3/2/2022, pt was independent ambulating, lived with his sister and fiance. He has been in inpatient rehab where he has been ambulating with a RW w/ therapy. HOME SUPPORT: The patient lived with sister/fiance who are able to assist.      Physical Therapy Goals  Initiated 3/20/2022  1. Patient will move from supine to sit and sit to supine  in bed with supervision/ set up within 7 day(s). 2.  Patient will transfer from bed to chair and chair to bed with supervision/set-up using the least restrictive device within 7 day(s). 3.  Patient will perform sit to stand with supervision/set-up w/ safe technique within 7 day(s). 4.  Patient will ambulate with contact guard assist for 100 feet with the least restrictive device within 7 day(s). Outcome: Progressing Towards Goal   PHYSICAL THERAPY TREATMENT  Patient: Munir Kirkland (89 y.o. male)  Date: 3/23/2022  Diagnosis: SIRS (systemic inflammatory response syndrome) ([facility]) [R65.10] <principal problem not specified>       Precautions:    Chart, physical therapy assessment, plan of care and goals were reviewed. ASSESSMENT  Patient continues with skilled PT services and is progressing towards goals. He remains highly motivated for progress and is making daily gains towards goals. He remains limited by left sided weakness, impaired coordination, balance, and endurance following admission for SIRS. He was admitted from IP rehab where he was undergoing therapy services following resection of a glioblastoma 3/2/22. Mobility completed with a RW requiring cues for hand placement and device management during activity. Challenged with left UE  on the RW d/t impaired pronation/supination and gradual wrist inversion over distance.  His utilization of his LUE continues to improve but assist required to maintain  and alignment on RW. Gait training completed over 61' with inconsistent step length L > R and a hemiparetic pattern. Returned to bedside chair with all needs met. The patient was independent and working prior to surgery 3/2/22. He was making good gains at IP rehab and recommend return there to finish his episode of care. Current Level of Function Impacting Discharge (mobility/balance): minimal assist for gait             PLAN :  Patient continues to benefit from skilled intervention to address the above impairments. Continue treatment per established plan of care. to address goals. Recommendation for discharge: (in order for the patient to meet his/her long term goals)  Therapy 3 hours per day 5-7 days per week    This discharge recommendation:  Has been made in collaboration with the attending provider and/or case management    IF patient discharges home will need the following DME: to be determined (TBD)       SUBJECTIVE:   Patient stated I want to get back to it.     OBJECTIVE DATA SUMMARY:   Critical Behavior:  Neurologic State: Alert  Orientation Level: Oriented X4  Cognition: Appropriate decision making,Appropriate for age attention/concentration,Appropriate safety awareness  Safety/Judgement: Awareness of environment,Fall prevention,Home safety,Insight into deficits,Good awareness of safety precautions  Functional Mobility Training:  Bed Mobility:                    Transfers:  Sit to Stand: Minimum assistance  Stand to Sit: Minimum assistance                             Balance:  Sitting: Intact; Without support  Standing: Impaired; With support  Standing - Static: Fair;Constant support  Standing - Dynamic : Fair;Constant support  Ambulation/Gait Training:  Distance (ft): 60 Feet (ft)  Assistive Device: Gait belt;Walker, rolling  Ambulation - Level of Assistance: Minimal assistance        Gait Abnormalities: Hemiplegic; Step to gait        Base of Support: Widened     Speed/Ana: Slow     Swing Pattern: Left asymmetrical             Activity Tolerance:   Good    After treatment patient left in no apparent distress:   Sitting in chair and Call bell within reach    COMMUNICATION/COLLABORATION:   The patients plan of care was discussed with: Registered nurse.      Janice Weeks PT, DPT   Time Calculation: 17 mins

## 2022-03-23 NOTE — PROGRESS NOTES
Transition Plan of Care  RUR 15%-Med  Disposition-[facility] has received authorization for admit and they will have a bed available tomorrow at 1400. [facility] transport arranged for 1400 transport time. PCR results completed. Attending made aware as well as daughter Santos Herndon [phone]. Updated-discharging to [place] today. Bed available per mauri Connor [phone] confirmed. Attending aware and discharged orders placed. [facility] will transport at 1800. Nursing to call report to number provided on envelope  and also place AVS and MARs. Nursing to complete EMTALA form.

## 2022-03-23 NOTE — PROGRESS NOTES
Problem: Self Care Deficits Care Plan (Adult)  Goal: *Acute Goals and Plan of Care (Insert Text)  Description: FUNCTIONAL STATUS PRIOR TO ADMISSION: Pt reports he was Independent prior to sx on 3/2/2022, reporting he lives with his sister and fiance normally staying on 2nd floor with tub-shower combo; however, plans to stay on first floor when rehab completed. Reports [facility] was assisting with DME needs. HOME SUPPORT: The patient lived with sister/fiance who are able to provide PRN ADL/IADL assist.    Occupational Therapy Goals  Initiated 3/20/2022  1. Patient will perform RW grooming with contact guard assist within 7 day(s). 2.  Patient will perform seated bathing with supervision/set-up within 7 day(s). 3.  Patient will perform RW lower body dressing with contact guard assist within 7 day(s). 4.  Patient will perform RW toilet transfers with contact guard assist within 7 day(s). 5.  Patient will perform all aspects of toileting with contact guard assist within 7 day(s)  Outcome: Progressing Towards Goal  OCCUPATIONAL THERAPY TREATMENT  Patient: Magnus Holman (48 y.o. male)  Date: 3/23/2022  Diagnosis: SIRS (systemic inflammatory response syndrome) ([facility]) [R65.10] <principal problem not specified>       Precautions:    Chart, occupational therapy assessment, plan of care, and goals were reviewed. ASSESSMENT  Patient continues with skilled OT services and is progressing towards goals. Pt received seated in chair, motivated to participate. He performed self-ROM of LUE joints during session, and reports engaging in neuro-madison and ROM ex throughout the day. Using RW pt stood and ambulated to bathroom with CGA-min A, able to maintain L grasp on walker handle, noted compensatory L wrist flexion. After transferring to toilet with min A pt stood at sink for several minutes with anterior trunk support to perform grooming ADLs. Pt incorporated LUE as gross stabilizer during grooming task, requiring overall min A. He returned to sitting in chair at end of session with needs met and VSS. Pt is highly motivated to work with therapy and continues to benefit from skilled intervention to address the above impairments. Continue to recommend return to inpatient rehab at discharge. Current Level of Function Impacting Discharge (ADLs): Min A transfers, setup-min A UB ADLs    Other factors to consider for discharge: fall risk, independent prior to surgery 3/2/2022         PLAN :  Patient continues to benefit from skilled intervention to address the above impairments. Continue treatment per established plan of care to address goals. Recommendation for discharge: (in order for the patient to meet his/her long term goals)  Therapy 3 hours per day 5-7 days per week    This discharge recommendation:  Has been made in collaboration with the attending provider and/or case management    IF patient discharges home will need the following DME: TBD       SUBJECTIVE:   Patient stated I like working with therapy.     OBJECTIVE DATA SUMMARY:   Cognitive/Behavioral Status:  Neurologic State: Alert  Orientation Level: Oriented X4  Cognition: Appropriate decision making; Appropriate for age attention/concentration; Appropriate safety awareness          Functional Mobility and Transfers for ADLs:  Bed Mobility:   Received OOB in chair    Transfers:  Sit to Stand: Minimum assistance  Functional Transfers  Toilet Transfer : Minimum assistance; Adaptive equipment       Balance:  Sitting: Intact; Without support  Standing: Impaired; With support  Standing - Static: Fair;Constant support  Standing - Dynamic : Fair;Constant support    ADL Intervention:    Grooming  Position Performed: Standing (with anterior trunk support)  Brushing Teeth: Minimum assistance; Compensatory technique training;Training to use affected extremity as a gross motor assistance  Cues: Verbal cues provided       Therapeutic Exercises:   - AAROM L shoulder flexion assisting with RUE  - AROM of L shoulder flexion and abduction to approx 80 degrees and L elbow, wrist and finger flexion/extension   Pain:  Pt with no c/o pain during session    Activity Tolerance:   Good    After treatment patient left in no apparent distress:   Sitting in chair and Call bell within reach    COMMUNICATION/COLLABORATION:   The patients plan of care was discussed with: Physical therapist and Registered nurse.      Swathi Salgado OT  Time Calculation: 38 mins

## 2022-03-23 NOTE — DISCHARGE INSTRUCTIONS
Discharge Instructions       PATIENT ID: Sandrine Cartwright  MRN: [id number]   YOB: 1975    DATE OF ADMISSION: 3/18/2022  2:31 PM    DATE OF DISCHARGE: 3/22/2022    PRIMARY CARE PROVIDER: Eva Hitchcock MD     ATTENDING PHYSICIAN: Jonna Sánchez MD  DISCHARGING PROVIDER: Virgen Lewis MD    To contact this individual call [phone] and ask the  to page. If unavailable ask to be transferred the Adult Hospitalist Department. DISCHARGE DIAGNOSES DVT and UTI     CONSULTATIONS: IP CONSULT TO NEUROSURGERY    PROCEDURES/SURGERIES: * No surgery found *    PENDING TEST RESULTS:   At the time of discharge the following test results are still pending:     FOLLOW UP APPOINTMENTS:   Follow-up Information     Follow up With Specialties Details Why Contact Info    Eva Hitchcock MD Internal Medicine In 1 week  [address] [facility] [address] [id number] [phone]             ADDITIONAL CARE RECOMMENDATIONS: we are tapering of phenytoin and increased your dose of keppra     DIET: Regular Diet and Cardiac Diet      ACTIVITY: Activity as tolerated    WOUND CARE:     EQUIPMENT needed:       Radiology      DISCHARGE MEDICATIONS:   See Medication Reconciliation Form    · It is important that you take the medication exactly as they are prescribed. · Keep your medication in the bottles provided by the pharmacist and keep a list of the medication names, dosages, and times to be taken in your wallet. · Do not take other medications without consulting your doctor. NOTIFY YOUR PHYSICIAN FOR ANY OF THE FOLLOWING:   Fever over 101 degrees for 24 hours. Chest pain, shortness of breath, fever, chills, nausea, vomiting, diarrhea, change in mentation, falling, weakness, bleeding. Severe pain or pain not relieved by medications. Or, any other signs or symptoms that you may have questions about.       DISPOSITION:    Home With:   OT  PT  HH  RN      x SNF/Inpatient Rehab/LTAC Independent/assisted living    Hospice    Other:     CDMP Checked:   Yes x     PROBLEM LIST Updated:  Yes x       Signed:   Chris Parker MD  3/22/2022  11:19 AM

## 2022-03-24 PROBLEM — R65.10 SIRS (SYSTEMIC INFLAMMATORY RESPONSE SYNDROME) (HCC): Status: RESOLVED | Noted: 2022-03-18 | Resolved: 2022-03-22

## 2022-03-24 PROBLEM — R65.10 SIRS (SYSTEMIC INFLAMMATORY RESPONSE SYNDROME) (HCC): Status: ACTIVE | Noted: 2022-03-18

## 2022-03-28 ENCOUNTER — HOSPITAL ENCOUNTER (OUTPATIENT)
Dept: RADIATION THERAPY | Age: 47
Discharge: HOME | End: 2022-03-28

## 2022-03-28 ENCOUNTER — NURSE NAVIGATOR (OUTPATIENT)
Dept: CASE MANAGEMENT | Age: 47
End: 2022-03-28

## 2022-03-28 NOTE — NURSE NAVIGATOR
[employer]  Gyn/Onc and Brain Navigator Encounter    Name:    Jules Rees  Age:    49 y.o. Diagnosis: GBM      Encounter type:  []Patient Initiated  [x]Navigator Follow-up []Pre-op  []Post-op  []Check-in Prior to First Treatment []Treatment Modality Change  []1st point of Contact []Referral []Other:       Narrative:    Spoke with Scottie Drake (sister) and we discussed Torey Tovar being discharged from [facility] and any needs that he may need when returning home. I instructed Scottie Drake to speak with [facility] to see what equipment he may need and [facility] will actually set all of this up for them when he returns home. Told Ada to call me with any concerns or questions. Renee ALONSON, RN.   Gyn Oncology/ Primary Brain Tumor [address] [place] [address] 22.  Office: [phone]  Cell: [phone]  F: [phone]  [email or web address]  Good Help to Those in [facility]

## 2022-03-30 ENCOUNTER — HOSPITAL ENCOUNTER (OUTPATIENT)
Dept: RADIATION THERAPY | Age: 47
Discharge: HOME | End: 2022-03-30
Payer: MEDICAID

## 2022-03-30 PROCEDURE — 77470 SPECIAL RADIATION TREATMENT: CPT

## 2022-04-11 ENCOUNTER — HOSPITAL ENCOUNTER (OUTPATIENT)
Dept: RADIATION THERAPY | Age: 47
Discharge: HOME | End: 2022-04-11
Payer: MEDICAID

## 2022-04-11 PROCEDURE — 77300 RADIATION THERAPY DOSE PLAN: CPT

## 2022-04-11 PROCEDURE — 77386 HC IMRT TRMT DLVR COMPL: CPT

## 2022-04-11 PROCEDURE — 77338 DESIGN MLC DEVICE FOR IMRT: CPT

## 2022-04-11 PROCEDURE — 77301 RADIOTHERAPY DOSE PLAN IMRT: CPT

## 2022-04-12 ENCOUNTER — HOSPITAL ENCOUNTER (OUTPATIENT)
Dept: RADIATION THERAPY | Age: 47
Discharge: HOME | End: 2022-04-12
Payer: MEDICAID

## 2022-04-12 PROCEDURE — 77386 HC IMRT TRMT DLVR COMPL: CPT

## 2022-04-13 ENCOUNTER — HOSPITAL ENCOUNTER (OUTPATIENT)
Dept: RADIATION THERAPY | Age: 47
Discharge: HOME | End: 2022-04-13
Payer: MEDICAID

## 2022-04-13 PROCEDURE — 77386 HC IMRT TRMT DLVR COMPL: CPT

## 2022-04-14 ENCOUNTER — HOSPITAL ENCOUNTER (OUTPATIENT)
Dept: RADIATION THERAPY | Age: 47
Discharge: HOME | End: 2022-04-14
Payer: MEDICAID

## 2022-04-14 PROCEDURE — 77386 HC IMRT TRMT DLVR COMPL: CPT

## 2022-04-15 ENCOUNTER — HOSPITAL ENCOUNTER (OUTPATIENT)
Dept: RADIATION THERAPY | Age: 47
Discharge: HOME | End: 2022-04-15
Payer: MEDICAID

## 2022-04-15 PROCEDURE — 77386 HC IMRT TRMT DLVR COMPL: CPT

## 2022-04-15 PROCEDURE — 77336 RADIATION PHYSICS CONSULT: CPT

## 2022-04-18 ENCOUNTER — HOSPITAL ENCOUNTER (OUTPATIENT)
Dept: RADIATION THERAPY | Age: 47
Discharge: HOME | End: 2022-04-18
Payer: MEDICAID

## 2022-04-18 PROCEDURE — 77386 HC IMRT TRMT DLVR COMPL: CPT

## 2022-04-19 ENCOUNTER — HOSPITAL ENCOUNTER (OUTPATIENT)
Dept: RADIATION THERAPY | Age: 47
Discharge: HOME | End: 2022-04-19
Payer: MEDICAID

## 2022-04-19 PROCEDURE — 77386 HC IMRT TRMT DLVR COMPL: CPT

## 2022-04-20 ENCOUNTER — HOSPITAL ENCOUNTER (OUTPATIENT)
Dept: RADIATION THERAPY | Age: 47
Discharge: HOME | End: 2022-04-20
Payer: MEDICAID

## 2022-04-20 PROCEDURE — 77386 HC IMRT TRMT DLVR COMPL: CPT

## 2022-04-21 ENCOUNTER — HOSPITAL ENCOUNTER (OUTPATIENT)
Dept: RADIATION THERAPY | Age: 47
Discharge: HOME | End: 2022-04-21
Payer: MEDICAID

## 2022-04-21 PROCEDURE — 77386 HC IMRT TRMT DLVR COMPL: CPT

## 2022-04-22 ENCOUNTER — HOSPITAL ENCOUNTER (OUTPATIENT)
Dept: RADIATION THERAPY | Age: 47
Discharge: HOME | End: 2022-04-22
Payer: MEDICAID

## 2022-04-22 PROCEDURE — 77336 RADIATION PHYSICS CONSULT: CPT

## 2022-04-22 PROCEDURE — 77386 HC IMRT TRMT DLVR COMPL: CPT

## 2022-04-25 ENCOUNTER — HOSPITAL ENCOUNTER (OUTPATIENT)
Dept: RADIATION THERAPY | Age: 47
Discharge: HOME | End: 2022-04-25
Payer: MEDICAID

## 2022-04-25 PROCEDURE — 77386 HC IMRT TRMT DLVR COMPL: CPT

## 2022-04-26 ENCOUNTER — HOSPITAL ENCOUNTER (OUTPATIENT)
Dept: RADIATION THERAPY | Age: 47
Discharge: HOME | End: 2022-04-26
Payer: MEDICAID

## 2022-04-26 PROCEDURE — 77386 HC IMRT TRMT DLVR COMPL: CPT

## 2022-04-27 ENCOUNTER — HOSPITAL ENCOUNTER (OUTPATIENT)
Dept: RADIATION THERAPY | Age: 47
Discharge: HOME | End: 2022-04-27
Payer: MEDICAID

## 2022-04-27 PROCEDURE — 77386 HC IMRT TRMT DLVR COMPL: CPT

## 2022-04-28 ENCOUNTER — HOSPITAL ENCOUNTER (OUTPATIENT)
Dept: RADIATION THERAPY | Age: 47
Discharge: HOME | End: 2022-04-28
Payer: MEDICAID

## 2022-04-28 PROCEDURE — 77386 HC IMRT TRMT DLVR COMPL: CPT

## 2022-04-29 ENCOUNTER — HOSPITAL ENCOUNTER (OUTPATIENT)
Dept: RADIATION THERAPY | Age: 47
Discharge: HOME | End: 2022-04-29
Payer: MEDICAID

## 2022-04-29 PROCEDURE — 77386 HC IMRT TRMT DLVR COMPL: CPT

## 2022-04-29 PROCEDURE — 77336 RADIATION PHYSICS CONSULT: CPT

## 2022-05-02 ENCOUNTER — HOSPITAL ENCOUNTER (OUTPATIENT)
Dept: RADIATION THERAPY | Age: 47
Discharge: HOME | End: 2022-05-02
Payer: MEDICAID

## 2022-05-02 PROCEDURE — 77386 HC IMRT TRMT DLVR COMPL: CPT

## 2022-05-03 ENCOUNTER — HOSPITAL ENCOUNTER (OUTPATIENT)
Dept: RADIATION THERAPY | Age: 47
Discharge: HOME | End: 2022-05-03
Payer: MEDICAID

## 2022-05-03 PROCEDURE — 77386 HC IMRT TRMT DLVR COMPL: CPT

## 2022-05-04 ENCOUNTER — HOSPITAL ENCOUNTER (OUTPATIENT)
Dept: RADIATION THERAPY | Age: 47
Discharge: HOME | End: 2022-05-04
Payer: MEDICAID

## 2022-05-04 PROCEDURE — 77386 HC IMRT TRMT DLVR COMPL: CPT

## 2022-05-05 ENCOUNTER — HOSPITAL ENCOUNTER (OUTPATIENT)
Dept: RADIATION THERAPY | Age: 47
Discharge: HOME | End: 2022-05-05
Payer: MEDICAID

## 2022-05-05 PROCEDURE — 77386 HC IMRT TRMT DLVR COMPL: CPT

## 2022-05-06 ENCOUNTER — HOSPITAL ENCOUNTER (OUTPATIENT)
Dept: RADIATION THERAPY | Age: 47
Discharge: HOME | End: 2022-05-06
Payer: MEDICAID

## 2022-05-06 PROCEDURE — 77386 HC IMRT TRMT DLVR COMPL: CPT

## 2022-05-06 PROCEDURE — 77336 RADIATION PHYSICS CONSULT: CPT

## 2022-05-09 ENCOUNTER — HOSPITAL ENCOUNTER (OUTPATIENT)
Dept: RADIATION THERAPY | Age: 47
Discharge: HOME | End: 2022-05-09
Payer: MEDICAID

## 2022-05-09 PROCEDURE — 77386 HC IMRT TRMT DLVR COMPL: CPT

## 2022-05-10 ENCOUNTER — HOSPITAL ENCOUNTER (OUTPATIENT)
Dept: RADIATION THERAPY | Age: 47
Discharge: HOME | End: 2022-05-10
Payer: MEDICAID

## 2022-05-10 PROCEDURE — 77386 HC IMRT TRMT DLVR COMPL: CPT

## 2022-05-11 ENCOUNTER — HOSPITAL ENCOUNTER (OUTPATIENT)
Dept: RADIATION THERAPY | Age: 47
Discharge: HOME | End: 2022-05-11
Payer: MEDICAID

## 2022-05-11 PROCEDURE — 77386 HC IMRT TRMT DLVR COMPL: CPT

## 2022-05-12 ENCOUNTER — HOSPITAL ENCOUNTER (OUTPATIENT)
Dept: RADIATION THERAPY | Age: 47
Discharge: HOME | End: 2022-05-12
Payer: MEDICAID

## 2022-05-12 PROCEDURE — 77386 HC IMRT TRMT DLVR COMPL: CPT

## 2022-05-13 ENCOUNTER — HOSPITAL ENCOUNTER (OUTPATIENT)
Dept: RADIATION THERAPY | Age: 47
Discharge: HOME | End: 2022-05-13
Payer: MEDICAID

## 2022-05-13 PROCEDURE — 77336 RADIATION PHYSICS CONSULT: CPT

## 2022-05-13 PROCEDURE — 77386 HC IMRT TRMT DLVR COMPL: CPT

## 2022-05-16 ENCOUNTER — HOSPITAL ENCOUNTER (OUTPATIENT)
Dept: RADIATION THERAPY | Age: 47
Discharge: HOME | End: 2022-05-16
Payer: MEDICAID

## 2022-05-16 PROCEDURE — 77386 HC IMRT TRMT DLVR COMPL: CPT

## 2022-05-17 ENCOUNTER — HOSPITAL ENCOUNTER (OUTPATIENT)
Dept: RADIATION THERAPY | Age: 47
Discharge: HOME | End: 2022-05-17
Payer: MEDICAID

## 2022-05-17 PROCEDURE — 77386 HC IMRT TRMT DLVR COMPL: CPT

## 2022-05-18 ENCOUNTER — HOSPITAL ENCOUNTER (OUTPATIENT)
Dept: RADIATION THERAPY | Age: 47
Discharge: HOME | End: 2022-05-18
Payer: MEDICAID

## 2022-05-18 PROCEDURE — 77386 HC IMRT TRMT DLVR COMPL: CPT

## 2022-05-19 ENCOUNTER — HOSPITAL ENCOUNTER (OUTPATIENT)
Dept: RADIATION THERAPY | Age: 47
Discharge: HOME | End: 2022-05-19
Payer: MEDICAID

## 2022-05-19 PROCEDURE — 77386 HC IMRT TRMT DLVR COMPL: CPT

## 2022-05-20 ENCOUNTER — HOSPITAL ENCOUNTER (OUTPATIENT)
Dept: RADIATION THERAPY | Age: 47
Discharge: HOME | End: 2022-05-20
Payer: MEDICAID

## 2022-05-20 PROCEDURE — 77336 RADIATION PHYSICS CONSULT: CPT

## 2022-05-20 PROCEDURE — 77386 HC IMRT TRMT DLVR COMPL: CPT

## 2022-06-03 ENCOUNTER — TRANSCRIBE ORDER (OUTPATIENT)
Dept: SCHEDULING | Age: 47
End: 2022-06-03

## 2022-06-03 DIAGNOSIS — C71.9 BRAIN NEOPLASM MALIGNANT (HCC): Primary | ICD-10-CM

## 2022-06-08 ENCOUNTER — TRANSCRIBE ORDER (OUTPATIENT)
Dept: SCHEDULING | Age: 47
End: 2022-06-08

## 2022-06-08 DIAGNOSIS — C71.9 GLIOMA (HCC): Primary | ICD-10-CM

## 2022-08-23 ENCOUNTER — HOSPITAL ENCOUNTER (OUTPATIENT)
Dept: MRI IMAGING | Age: 47
Discharge: HOME | End: 2022-08-23
Attending: NEUROLOGICAL SURGERY
Payer: MEDICAID

## 2022-08-23 DIAGNOSIS — C71.9 GLIOMA (HCC): ICD-10-CM

## 2022-08-23 PROCEDURE — 74011250636 HC RX REV CODE- 250/636

## 2022-08-23 PROCEDURE — 70553 MRI BRAIN STEM W/O & W/DYE: CPT

## 2022-08-23 PROCEDURE — A9576 INJ PROHANCE MULTIPACK: HCPCS

## 2022-08-23 RX ADMIN — GADOTERIDOL 20 ML: 279.3 INJECTION, SOLUTION INTRAVENOUS at 18:48

## 2022-08-30 ENCOUNTER — NURSE NAVIGATOR (OUTPATIENT)
Dept: CASE MANAGEMENT | Age: 47
End: 2022-08-30

## 2022-08-30 NOTE — NURSE NAVIGATOR
[facility]   Brain Navigator Encounter    Name:    Yuly Bustamante  Age:    49 y.o. Diagnosis: GBM      Encounter type:  []Patient Initiated  [x]Navigator Follow-up []Pre-op  []Post-op  []Check-in Prior to First Treatment []Treatment Modality Change  []1st point of Contact []Referral []Other:       Narrative:    Received call from Jocelyn Mathur in rad-onc with 2 dates and times that would work, Shae finally completed MRI that has been pending since June 2022 but they were having trouble getting anyone on the phone to schedule follow up appointment. I spoke with Trena and she agreed to 9/13/22 at 9:15. Notified komal and team for assistance in hopes to get an appointment scheduled same day or virtual, waiting to hear back. Let Trena know I would notify her as soon as I knew. Sonia ALONSON, RN.   Primary Brain Tumor [place] [address] [place]en  22.  Office: [phone]  Cell: [phone]  F: [phone]  [email or web address]  Good Help to Those in [facility]

## 2022-09-13 ENCOUNTER — HOSPITAL ENCOUNTER (OUTPATIENT)
Dept: RADIATION THERAPY | Age: 47
Discharge: HOME | End: 2022-09-13

## 2022-09-16 ENCOUNTER — TRANSCRIBE ORDER (OUTPATIENT)
Dept: SCHEDULING | Age: 47
End: 2022-09-16

## 2022-09-16 DIAGNOSIS — C71.9 BRAIN NEOPLASM MALIGNANT (HCC): Primary | ICD-10-CM

## 2022-11-16 ENCOUNTER — HOSPITAL ENCOUNTER (OUTPATIENT)
Dept: RADIATION THERAPY | Age: 47
Discharge: HOME | End: 2022-11-16

## 2022-12-08 ENCOUNTER — HOSPITAL ENCOUNTER (OUTPATIENT)
Dept: MRI IMAGING | Age: 47
End: 2022-12-08
Attending: RADIOLOGY
Payer: MEDICAID

## 2022-12-08 VITALS — WEIGHT: 285 LBS | BODY MASS INDEX: 48.92 KG/M2

## 2022-12-08 DIAGNOSIS — C71.9 BRAIN NEOPLASM MALIGNANT (HCC): ICD-10-CM

## 2022-12-08 PROCEDURE — A9576 INJ PROHANCE MULTIPACK: HCPCS | Performed by: RADIOLOGY

## 2022-12-08 PROCEDURE — 70553 MRI BRAIN STEM W/O & W/DYE: CPT

## 2022-12-08 PROCEDURE — 74011250636 HC RX REV CODE- 250/636: Performed by: RADIOLOGY

## 2022-12-08 RX ADMIN — GADOTERIDOL 20 ML: 279.3 INJECTION, SOLUTION INTRAVENOUS at 11:28

## 2023-02-15 ENCOUNTER — HOSPITAL ENCOUNTER (OUTPATIENT)
Dept: RADIATION THERAPY | Age: 48
Discharge: HOME | End: 2023-02-15

## 2023-02-16 ENCOUNTER — TRANSCRIBE ORDER (OUTPATIENT)
Dept: SCHEDULING | Age: 48
End: 2023-02-16

## 2023-02-16 DIAGNOSIS — C71.9 MALIGNANT NEOPLASM OF BRAIN (HCC): Primary | ICD-10-CM

## 2023-03-08 ENCOUNTER — HOSPITAL ENCOUNTER (OUTPATIENT)
Dept: MRI IMAGING | Age: 48
Discharge: HOME | End: 2023-03-08
Attending: RADIOLOGY
Payer: MEDICAID

## 2023-03-08 DIAGNOSIS — C71.9 MALIGNANT NEOPLASM OF BRAIN (HCC): ICD-10-CM

## 2023-03-08 PROCEDURE — A9576 INJ PROHANCE MULTIPACK: HCPCS

## 2023-03-08 PROCEDURE — 74011250636 HC RX REV CODE- 250/636

## 2023-03-08 PROCEDURE — 74011000250 HC RX REV CODE- 250: Performed by: RADIOLOGY

## 2023-03-08 PROCEDURE — 70553 MRI BRAIN STEM W/O & W/DYE: CPT

## 2023-03-08 PROCEDURE — 74011000258 HC RX REV CODE- 258: Performed by: RADIOLOGY

## 2023-03-08 RX ORDER — SODIUM CHLORIDE 0.9 % (FLUSH) 0.9 %
10 SYRINGE (ML) INJECTION
Status: COMPLETED | OUTPATIENT
Start: 2023-03-08 | End: 2023-03-08

## 2023-03-08 RX ADMIN — GADOTERIDOL 20 ML: 279.3 INJECTION, SOLUTION INTRAVENOUS at 15:13

## 2023-03-08 RX ADMIN — SODIUM CHLORIDE 100 ML: 900 INJECTION, SOLUTION INTRAVENOUS at 15:14

## 2023-03-08 RX ADMIN — SODIUM CHLORIDE, PRESERVATIVE FREE 10 ML: 5 INJECTION INTRAVENOUS at 15:14

## 2023-04-22 ENCOUNTER — TRANSCRIBE ORDERS (OUTPATIENT)
Facility: HOSPITAL | Age: 48
End: 2023-04-22

## 2023-04-22 DIAGNOSIS — C71.9 MALIGNANT NEOPLASM OF BRAIN, UNSPECIFIED LOCATION (HCC): Primary | ICD-10-CM

## 2023-06-23 ENCOUNTER — HOSPITAL ENCOUNTER (INPATIENT)
Facility: HOSPITAL | Age: 48
LOS: 2 days | Discharge: HOME | DRG: 058 | End: 2023-06-25
Attending: ANESTHESIOLOGY | Admitting: ANESTHESIOLOGY
Payer: MEDICAID

## 2023-06-23 ENCOUNTER — APPOINTMENT (OUTPATIENT)
Facility: HOSPITAL | Age: 48
DRG: 058 | End: 2023-06-23
Attending: ANESTHESIOLOGY
Payer: MEDICAID

## 2023-06-23 PROBLEM — R56.9 SEIZURES (HCC): Status: ACTIVE | Noted: 2023-06-23

## 2023-06-23 LAB
ALBUMIN SERPL-MCNC: 3.6 G/DL (ref 3.5–5)
ALBUMIN/GLOB SERPL: 0.9 (ref 1.1–2.2)
ALP SERPL-CCNC: 55 U/L (ref 45–117)
ALT SERPL-CCNC: 23 U/L (ref 12–78)
ANION GAP SERPL CALC-SCNC: 5 MMOL/L (ref 5–15)
AST SERPL-CCNC: 15 U/L (ref 15–37)
BASOPHILS # BLD: 0 K/UL (ref 0–0.1)
BASOPHILS NFR BLD: 0 % (ref 0–1)
BILIRUB SERPL-MCNC: 1.1 MG/DL (ref 0.2–1)
BUN SERPL-MCNC: 14 MG/DL (ref 6–20)
BUN/CREAT SERPL: 13 (ref 12–20)
CALCIUM SERPL-MCNC: 9.1 MG/DL (ref 8.5–10.1)
CHLORIDE SERPL-SCNC: 104 MMOL/L (ref 97–108)
CO2 SERPL-SCNC: 25 MMOL/L (ref 21–32)
CREAT SERPL-MCNC: 1.09 MG/DL (ref 0.7–1.3)
DIFFERENTIAL METHOD BLD: ABNORMAL
EOSINOPHIL # BLD: 0.1 K/UL (ref 0–0.4)
EOSINOPHIL NFR BLD: 1 % (ref 0–7)
ERYTHROCYTE [DISTWIDTH] IN BLOOD BY AUTOMATED COUNT: 11.7 % (ref 11.5–14.5)
GLOBULIN SER CALC-MCNC: 4 G/DL (ref 2–4)
GLUCOSE SERPL-MCNC: 125 MG/DL (ref 65–100)
HCT VFR BLD AUTO: 47.4 % (ref 36.6–50.3)
HGB BLD-MCNC: 16 G/DL (ref 12.1–17)
IMM GRANULOCYTES # BLD AUTO: 0.1 K/UL (ref 0–0.04)
IMM GRANULOCYTES NFR BLD AUTO: 1 % (ref 0–0.5)
LYMPHOCYTES # BLD: 2.7 K/UL (ref 0.8–3.5)
LYMPHOCYTES NFR BLD: 25 % (ref 12–49)
MCH RBC QN AUTO: 30.6 PG (ref 26–34)
MCHC RBC AUTO-ENTMCNC: 33.8 G/DL (ref 30–36.5)
MCV RBC AUTO: 90.6 FL (ref 80–99)
MONOCYTES # BLD: 0.8 K/UL (ref 0–1)
MONOCYTES NFR BLD: 7 % (ref 5–13)
NEUTS SEG # BLD: 7.1 K/UL (ref 1.8–8)
NEUTS SEG NFR BLD: 66 % (ref 32–75)
NRBC # BLD: 0 K/UL (ref 0–0.01)
NRBC BLD-RTO: 0 PER 100 WBC
PLATELET # BLD AUTO: 286 K/UL (ref 150–400)
PMV BLD AUTO: 10.7 FL (ref 8.9–12.9)
POTASSIUM SERPL-SCNC: 3.6 MMOL/L (ref 3.5–5.1)
PROT SERPL-MCNC: 7.6 G/DL (ref 6.4–8.2)
RBC # BLD AUTO: 5.23 M/UL (ref 4.1–5.7)
SODIUM SERPL-SCNC: 134 MMOL/L (ref 136–145)
WBC # BLD AUTO: 10.8 K/UL (ref 4.1–11.1)

## 2023-06-23 PROCEDURE — 80053 COMPREHEN METABOLIC PANEL: CPT

## 2023-06-23 PROCEDURE — 6370000000 HC RX 637 (ALT 250 FOR IP): Performed by: HOSPITALIST

## 2023-06-23 PROCEDURE — 2580000003 HC RX 258: Performed by: ANESTHESIOLOGY

## 2023-06-23 PROCEDURE — 36415 COLL VENOUS BLD VENIPUNCTURE: CPT

## 2023-06-23 PROCEDURE — 6370000000 HC RX 637 (ALT 250 FOR IP): Performed by: ANESTHESIOLOGY

## 2023-06-23 PROCEDURE — 99221 1ST HOSP IP/OBS SF/LOW 40: CPT | Performed by: NURSE PRACTITIONER

## 2023-06-23 PROCEDURE — 2060000000 HC ICU INTERMEDIATE R&B

## 2023-06-23 PROCEDURE — 85025 COMPLETE CBC W/AUTO DIFF WBC: CPT

## 2023-06-23 RX ORDER — SODIUM CHLORIDE 0.9 % (FLUSH) 0.9 %
5-40 SYRINGE (ML) INJECTION EVERY 12 HOURS SCHEDULED
Status: DISCONTINUED | OUTPATIENT
Start: 2023-06-23 | End: 2023-06-25 | Stop reason: HOSPADM

## 2023-06-23 RX ORDER — SPIRONOLACTONE 25 MG/1
TABLET ORAL
COMMUNITY
Start: 2022-08-18

## 2023-06-23 RX ORDER — SODIUM CHLORIDE 0.9 % (FLUSH) 0.9 %
5-40 SYRINGE (ML) INJECTION PRN
Status: DISCONTINUED | OUTPATIENT
Start: 2023-06-23 | End: 2023-06-25 | Stop reason: HOSPADM

## 2023-06-23 RX ORDER — PANTOPRAZOLE SODIUM 40 MG/1
40 TABLET, DELAYED RELEASE ORAL
Status: DISCONTINUED | OUTPATIENT
Start: 2023-06-24 | End: 2023-06-25 | Stop reason: HOSPADM

## 2023-06-23 RX ORDER — CARVEDILOL 12.5 MG/1
25 TABLET ORAL 2 TIMES DAILY WITH MEALS
Status: DISCONTINUED | OUTPATIENT
Start: 2023-06-23 | End: 2023-06-25 | Stop reason: HOSPADM

## 2023-06-23 RX ORDER — SPIRONOLACTONE 25 MG/1
50 TABLET ORAL DAILY
Status: DISCONTINUED | OUTPATIENT
Start: 2023-06-23 | End: 2023-06-25 | Stop reason: HOSPADM

## 2023-06-23 RX ORDER — ACETAMINOPHEN 325 MG/1
650 TABLET ORAL EVERY 6 HOURS PRN
Status: DISCONTINUED | OUTPATIENT
Start: 2023-06-23 | End: 2023-06-25 | Stop reason: HOSPADM

## 2023-06-23 RX ORDER — POLYETHYLENE GLYCOL 3350 17 G/17G
17 POWDER, FOR SOLUTION ORAL DAILY PRN
Status: DISCONTINUED | OUTPATIENT
Start: 2023-06-23 | End: 2023-06-25 | Stop reason: HOSPADM

## 2023-06-23 RX ORDER — LEVETIRACETAM 500 MG/1
500 TABLET ORAL 2 TIMES DAILY
Status: DISCONTINUED | OUTPATIENT
Start: 2023-06-23 | End: 2023-06-25 | Stop reason: HOSPADM

## 2023-06-23 RX ORDER — CARVEDILOL 25 MG/1
TABLET ORAL
COMMUNITY
Start: 2023-04-13

## 2023-06-23 RX ORDER — SODIUM CHLORIDE 9 MG/ML
INJECTION, SOLUTION INTRAVENOUS PRN
Status: DISCONTINUED | OUTPATIENT
Start: 2023-06-23 | End: 2023-06-25 | Stop reason: HOSPADM

## 2023-06-23 RX ORDER — LISINOPRIL 20 MG/1
40 TABLET ORAL DAILY
Status: DISCONTINUED | OUTPATIENT
Start: 2023-06-23 | End: 2023-06-25 | Stop reason: HOSPADM

## 2023-06-23 RX ORDER — PANTOPRAZOLE SODIUM 40 MG/1
TABLET, DELAYED RELEASE ORAL
COMMUNITY
Start: 2023-04-13

## 2023-06-23 RX ORDER — LISINOPRIL 40 MG/1
TABLET ORAL
COMMUNITY
Start: 2023-04-13

## 2023-06-23 RX ORDER — ONDANSETRON 4 MG/1
4 TABLET, ORALLY DISINTEGRATING ORAL EVERY 8 HOURS PRN
Status: DISCONTINUED | OUTPATIENT
Start: 2023-06-23 | End: 2023-06-25 | Stop reason: HOSPADM

## 2023-06-23 RX ORDER — ONDANSETRON 2 MG/ML
4 INJECTION INTRAMUSCULAR; INTRAVENOUS EVERY 6 HOURS PRN
Status: DISCONTINUED | OUTPATIENT
Start: 2023-06-23 | End: 2023-06-25 | Stop reason: HOSPADM

## 2023-06-23 RX ORDER — ACETAMINOPHEN 650 MG/1
650 SUPPOSITORY RECTAL EVERY 6 HOURS PRN
Status: DISCONTINUED | OUTPATIENT
Start: 2023-06-23 | End: 2023-06-25 | Stop reason: HOSPADM

## 2023-06-23 RX ADMIN — SODIUM CHLORIDE, PRESERVATIVE FREE 10 ML: 5 INJECTION INTRAVENOUS at 21:36

## 2023-06-23 RX ADMIN — ACETAMINOPHEN 650 MG: 325 TABLET ORAL at 21:34

## 2023-06-23 RX ADMIN — SODIUM CHLORIDE, PRESERVATIVE FREE 10 ML: 5 INJECTION INTRAVENOUS at 08:54

## 2023-06-23 RX ADMIN — CARVEDILOL 25 MG: 12.5 TABLET, FILM COATED ORAL at 10:36

## 2023-06-23 RX ADMIN — SPIRONOLACTONE 50 MG: 25 TABLET ORAL at 10:36

## 2023-06-23 RX ADMIN — LISINOPRIL 40 MG: 20 TABLET ORAL at 10:36

## 2023-06-23 RX ADMIN — CARVEDILOL 25 MG: 12.5 TABLET, FILM COATED ORAL at 19:22

## 2023-06-23 RX ADMIN — LEVETIRACETAM 500 MG: 500 TABLET, FILM COATED ORAL at 08:51

## 2023-06-23 RX ADMIN — LEVETIRACETAM 500 MG: 500 TABLET, FILM COATED ORAL at 21:34

## 2023-06-23 ASSESSMENT — PAIN DESCRIPTION - LOCATION: LOCATION: HEAD

## 2023-06-23 ASSESSMENT — PAIN DESCRIPTION - ORIENTATION: ORIENTATION: RIGHT

## 2023-06-23 ASSESSMENT — PAIN DESCRIPTION - ONSET: ONSET: AWAKENED FROM SLEEP

## 2023-06-23 ASSESSMENT — PAIN SCALES - GENERAL: PAINLEVEL_OUTOF10: 10

## 2023-06-23 ASSESSMENT — PAIN DESCRIPTION - DESCRIPTORS: DESCRIPTORS: ACHING

## 2023-06-23 ASSESSMENT — PAIN DESCRIPTION - PAIN TYPE: TYPE: ACUTE PAIN

## 2023-06-24 ENCOUNTER — APPOINTMENT (OUTPATIENT)
Facility: HOSPITAL | Age: 48
DRG: 058 | End: 2023-06-24
Attending: ANESTHESIOLOGY
Payer: MEDICAID

## 2023-06-24 PROCEDURE — 70450 CT HEAD/BRAIN W/O DYE: CPT

## 2023-06-24 PROCEDURE — 2580000003 HC RX 258: Performed by: ANESTHESIOLOGY

## 2023-06-24 PROCEDURE — 6370000000 HC RX 637 (ALT 250 FOR IP): Performed by: HOSPITALIST

## 2023-06-24 PROCEDURE — 2709999900 MRI BRAIN W WO CONTRAST

## 2023-06-24 PROCEDURE — 2060000000 HC ICU INTERMEDIATE R&B

## 2023-06-24 PROCEDURE — 6370000000 HC RX 637 (ALT 250 FOR IP): Performed by: ANESTHESIOLOGY

## 2023-06-24 RX ORDER — 0.9 % SODIUM CHLORIDE 0.9 %
100 INTRAVENOUS SOLUTION INTRAVENOUS ONCE
Status: COMPLETED | OUTPATIENT
Start: 2023-06-24 | End: 2023-06-24

## 2023-06-24 RX ORDER — SODIUM CHLORIDE 0.9 % (FLUSH) 0.9 %
10 SYRINGE (ML) INJECTION ONCE
Status: COMPLETED | OUTPATIENT
Start: 2023-06-24 | End: 2023-06-24

## 2023-06-24 RX ADMIN — SPIRONOLACTONE 50 MG: 25 TABLET ORAL at 10:24

## 2023-06-24 RX ADMIN — SODIUM CHLORIDE 100 ML: 900 INJECTION, SOLUTION INTRAVENOUS at 09:28

## 2023-06-24 RX ADMIN — LISINOPRIL 40 MG: 20 TABLET ORAL at 10:24

## 2023-06-24 RX ADMIN — SODIUM CHLORIDE, PRESERVATIVE FREE 10 ML: 5 INJECTION INTRAVENOUS at 21:53

## 2023-06-24 RX ADMIN — CARVEDILOL 25 MG: 12.5 TABLET, FILM COATED ORAL at 10:24

## 2023-06-24 RX ADMIN — PANTOPRAZOLE SODIUM 40 MG: 40 TABLET, DELAYED RELEASE ORAL at 07:07

## 2023-06-24 RX ADMIN — ONDANSETRON 4 MG: 4 TABLET, ORALLY DISINTEGRATING ORAL at 23:27

## 2023-06-24 RX ADMIN — LEVETIRACETAM 500 MG: 500 TABLET, FILM COATED ORAL at 10:32

## 2023-06-24 RX ADMIN — SODIUM CHLORIDE, PRESERVATIVE FREE 10 ML: 5 INJECTION INTRAVENOUS at 09:27

## 2023-06-24 RX ADMIN — LEVETIRACETAM 500 MG: 500 TABLET, FILM COATED ORAL at 21:22

## 2023-06-24 RX ADMIN — SODIUM CHLORIDE, PRESERVATIVE FREE 10 ML: 5 INJECTION INTRAVENOUS at 10:28

## 2023-06-24 ASSESSMENT — PAIN SCALES - GENERAL: PAINLEVEL_OUTOF10: 0

## 2023-06-25 VITALS
SYSTOLIC BLOOD PRESSURE: 109 MMHG | BODY MASS INDEX: 50.91 KG/M2 | DIASTOLIC BLOOD PRESSURE: 68 MMHG | WEIGHT: 294.53 LBS | HEART RATE: 74 BPM | TEMPERATURE: 98 F | RESPIRATION RATE: 16 BRPM | OXYGEN SATURATION: 96 %

## 2023-06-25 PROBLEM — R56.9 SEIZURES (HCC): Status: RESOLVED | Noted: 2023-06-23 | Resolved: 2023-06-25

## 2023-06-25 LAB
ANION GAP SERPL CALC-SCNC: 6 MMOL/L (ref 5–15)
BASOPHILS # BLD: 0 K/UL (ref 0–0.1)
BASOPHILS NFR BLD: 0 % (ref 0–1)
BUN SERPL-MCNC: 28 MG/DL (ref 6–20)
BUN/CREAT SERPL: 20 (ref 12–20)
CALCIUM SERPL-MCNC: 9.2 MG/DL (ref 8.5–10.1)
CHLORIDE SERPL-SCNC: 107 MMOL/L (ref 97–108)
CO2 SERPL-SCNC: 24 MMOL/L (ref 21–32)
CREAT SERPL-MCNC: 1.37 MG/DL (ref 0.7–1.3)
DIFFERENTIAL METHOD BLD: ABNORMAL
EOSINOPHIL # BLD: 0.2 K/UL (ref 0–0.4)
EOSINOPHIL NFR BLD: 2 % (ref 0–7)
ERYTHROCYTE [DISTWIDTH] IN BLOOD BY AUTOMATED COUNT: 11.9 % (ref 11.5–14.5)
GLUCOSE SERPL-MCNC: 110 MG/DL (ref 65–100)
HCT VFR BLD AUTO: 49.6 % (ref 36.6–50.3)
HGB BLD-MCNC: 16.3 G/DL (ref 12.1–17)
IMM GRANULOCYTES # BLD AUTO: 0.1 K/UL (ref 0–0.04)
IMM GRANULOCYTES NFR BLD AUTO: 1 % (ref 0–0.5)
LYMPHOCYTES # BLD: 2.7 K/UL (ref 0.8–3.5)
LYMPHOCYTES NFR BLD: 19 % (ref 12–49)
MCH RBC QN AUTO: 30.5 PG (ref 26–34)
MCHC RBC AUTO-ENTMCNC: 32.9 G/DL (ref 30–36.5)
MCV RBC AUTO: 92.7 FL (ref 80–99)
MONOCYTES # BLD: 1 K/UL (ref 0–1)
MONOCYTES NFR BLD: 7 % (ref 5–13)
NEUTS SEG # BLD: 10.1 K/UL (ref 1.8–8)
NEUTS SEG NFR BLD: 71 % (ref 32–75)
NRBC # BLD: 0 K/UL (ref 0–0.01)
NRBC BLD-RTO: 0 PER 100 WBC
PLATELET # BLD AUTO: 325 K/UL (ref 150–400)
PMV BLD AUTO: 10.6 FL (ref 8.9–12.9)
POTASSIUM SERPL-SCNC: 4.5 MMOL/L (ref 3.5–5.1)
RBC # BLD AUTO: 5.35 M/UL (ref 4.1–5.7)
SODIUM SERPL-SCNC: 137 MMOL/L (ref 136–145)
WBC # BLD AUTO: 14.1 K/UL (ref 4.1–11.1)

## 2023-06-25 PROCEDURE — 85025 COMPLETE CBC W/AUTO DIFF WBC: CPT

## 2023-06-25 PROCEDURE — 36415 COLL VENOUS BLD VENIPUNCTURE: CPT

## 2023-06-25 PROCEDURE — 80048 BASIC METABOLIC PNL TOTAL CA: CPT

## 2023-06-25 PROCEDURE — 2580000003 HC RX 258: Performed by: ANESTHESIOLOGY

## 2023-06-25 PROCEDURE — 6370000000 HC RX 637 (ALT 250 FOR IP): Performed by: ANESTHESIOLOGY

## 2023-06-25 PROCEDURE — 6370000000 HC RX 637 (ALT 250 FOR IP): Performed by: HOSPITALIST

## 2023-06-25 RX ORDER — LEVETIRACETAM 500 MG/1
500 TABLET ORAL 2 TIMES DAILY
Qty: 60 TABLET | Refills: 0 | Status: SHIPPED | OUTPATIENT
Start: 2023-06-25

## 2023-06-25 RX ADMIN — PANTOPRAZOLE SODIUM 40 MG: 40 TABLET, DELAYED RELEASE ORAL at 06:37

## 2023-06-25 RX ADMIN — CARVEDILOL 25 MG: 12.5 TABLET, FILM COATED ORAL at 17:00

## 2023-06-25 RX ADMIN — LISINOPRIL 40 MG: 20 TABLET ORAL at 09:24

## 2023-06-25 RX ADMIN — LEVETIRACETAM 500 MG: 500 TABLET, FILM COATED ORAL at 09:25

## 2023-06-25 RX ADMIN — SODIUM CHLORIDE, PRESERVATIVE FREE 10 ML: 5 INJECTION INTRAVENOUS at 09:25

## 2023-06-25 RX ADMIN — CARVEDILOL 25 MG: 12.5 TABLET, FILM COATED ORAL at 09:24

## 2023-06-25 RX ADMIN — SPIRONOLACTONE 50 MG: 25 TABLET ORAL at 09:25

## 2023-06-25 ASSESSMENT — PAIN SCALES - GENERAL
PAINLEVEL_OUTOF10: 0

## 2023-07-03 NOTE — PROGRESS NOTES
valid  -- Disagree - Clinically unable to determine / Unknown  -- Refer to Clinical Documentation Reviewer    PROVIDER RESPONSE TEXT:    This patient has ataxia due to left hemiparesis.     Query created by: Cristo Fleming on 6/29/2023 9:07 AM      Electronically signed by:  Pradeep Silva MD 7/3/2023 2:05 PM

## 2023-08-10 ENCOUNTER — TELEPHONE (OUTPATIENT)
Facility: HOSPITAL | Age: 48
End: 2023-08-10

## 2023-08-10 NOTE — TELEPHONE ENCOUNTER
Jaxson Noriega  Brain Navigator Encounter    Name:    Neal Sheets  Age:    51 y.o. Diagnosis: GBM      Encounter type:  []Patient Initiated  [x]Navigator Follow-up []Pre-op  []Post-op  []Check-in Prior to First Treatment []Treatment Modality Change  []1st point of Contact []Referral []Other:       Narrative: Received referral from radiation oncology. Clarissa Roblero (sister) is trying to get the patient in with [facility] due to possible reoccurrence as well as the patient having frequent headaches recently. They tried to call and schedule an appointment with [facility] but were told they would need a referral. I told Clarissa Roblero I would work on getting him in and notify members from his treatment team before of these updates. She has my number to reach out with any new needs or issues. Klaus FORTUNEN, RN.   [facility] [address]  Office: [phone]  Cell: [phone]  F: [phone]  [email or web address]  Good Help to Those in [facility] SPECIALTY [facility]

## 2023-08-14 ENCOUNTER — TELEPHONE (OUTPATIENT)
Facility: HOSPITAL | Age: 48
End: 2023-08-14

## 2023-08-14 NOTE — TELEPHONE ENCOUNTER
Jaxson Noriega  Brain Navigator Encounter    Name:    Angelica Mclean  Age:    51 y.o. Encounter type:  []Patient Initiated  [x]Navigator Follow-up []Pre-op  []Post-op  []Check-in Prior to First Treatment []Treatment Modality Change  []1st point of Contact []Referral []Other:       Narrative:    Reached out to NSY and LVM regarding patient needing a follow up appointment. MRI suggested reoccurrence back in June and the patient is starting to have symptoms and would like to follow up with surgery. Updated sister that I have messages out to the office and will update her as soon as I hear from them. 8/15/23: Addendum- Spoke with NS and was able to get [facility] scheduled for 10/4 at [facility] office. Confirmed this with his sister Dorothea Parekh. Spoke with [facility] and was able to obtain an appointment for 9/7/23 at 0930 for a lab draw and then follow up with Thomas Rangel. Scheduling did let me know he was scheduled back in June and never showed up. Heydi Fleming with appointment information and she confirmed this as well. Spoke with Latasha Leslie from rad-onc regarding a follow up with Davene Felty and she will update myself or the patient with those date and times available. Raymond Perez BSN, RN.   [facility] [address], [address] [place] [address]  Office: [phone]  Cell: [phone]  F: [phone]  [email or web address]  Good Help to Those in [facility] SPECIALTY [facility]

## 2023-08-22 ENCOUNTER — TELEPHONE (OUTPATIENT)
Facility: HOSPITAL | Age: 48
End: 2023-08-22

## 2023-08-22 NOTE — TELEPHONE ENCOUNTER
Jaxson Noriega  Brain Navigator Encounter    Name:    Prisca Nielsen  Age:    51 y.o. Diagnosis: gbm      Narrative: Patient scheduled for brain mri 9/2/23 at [address] [place] [address] with an arrival of 1230 to the ER for outpatient MRI. I have notified the sister of this information and she also should receive a text with the appointment information too. Updated Rad-onc for scheduling purposes. Rosalba FORTUNEN, RN.   [facility] [address] [place], [address] [place] [address]  Office: [phone]  Cell: [phone]  F: [phone]  [email or web address]  Good Help to Those in [facility]

## 2023-09-13 ENCOUNTER — HOSPITAL ENCOUNTER (OUTPATIENT)
Facility: HOSPITAL | Age: 48
Discharge: HOME | End: 2023-09-16
Attending: RADIOLOGY
Payer: MEDICAID

## 2023-09-13 DIAGNOSIS — C71.9 MALIGNANT NEOPLASM OF BRAIN, UNSPECIFIED LOCATION (HCC): ICD-10-CM

## 2023-09-13 PROCEDURE — 6360000004 HC RX CONTRAST MEDICATION

## 2023-09-13 PROCEDURE — 70553 MRI BRAIN STEM W/O & W/DYE: CPT

## 2023-09-13 PROCEDURE — A9579 GAD-BASE MR CONTRAST NOS,1ML: HCPCS

## 2023-09-13 RX ADMIN — GADOTERIDOL 20 ML: 279.3 INJECTION, SOLUTION INTRAVENOUS at 20:06

## 2023-09-22 ENCOUNTER — TELEPHONE (OUTPATIENT)
Facility: HOSPITAL | Age: 48
End: 2023-09-22

## 2023-09-22 NOTE — TELEPHONE ENCOUNTER
[facility]  Brain Navigator Encounter    Name:    Ana Galeana  Age:    51 y.o. Diagnosis: gbm      Narrative: Spoke with Uvaldo Soriano in [place], Ravi had his MRI done but he has not be in to see Dr. Manny Leon yet. Uvaldo Soriano gave me some dates and times to offer to him and his sister for a virtual. Lio Alvarez agreed to October 2nd at 1230 pm for a virtual follow up. I messaged Laurel with the date and time as well. I also emailed and called Moni Wright with time and date they chose. Tamara FORTUNEN, RN.   [facility] [address], [address] [place] [address]  Office: [phone]  Cell: [phone]  F: [phone]  [email or web address]  Good Help to Those in [facility]

## 2023-10-02 ENCOUNTER — HOSPITAL ENCOUNTER (OUTPATIENT)
Facility: HOSPITAL | Age: 48
Discharge: HOME | End: 2023-10-05

## 2023-10-02 NOTE — PROGRESS NOTES
edema bilaterally. Pulmonary: No increased work of breathing. Symmetric chest rise  Skin: Warm, dry, no rashes noted. Neurologic: Alert and oriented. Psychiatric: Cooperative to commands and responds to questions appropriately. Assessment & Plan   Mr. Isatu Williamson is a 51 y.o. 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 We discussed the latest MRI findings, and I explained to him that I have spoken with Dr. Huan Hernández, who would like to see him to discuss options at this time. He is agreeable to this. Adriano Huitron will help him coordinate this visit and also help him with some paperwork. He will keep me posted what he and Dr. Huan Hernández decide once they meet. -     Shweta Bar MD  [facility]  [address]  P: [address] [address]  P: 20 [facility]  [address] [facility], [address]   P: [phone]    Time and Counseling: I spent a total of 60 minutes in care of this patient during today's encounter on 10/2/23.     Carbon Copy:  Lv Feliz

## 2023-10-06 ENCOUNTER — TELEPHONE (OUTPATIENT)
Facility: HOSPITAL | Age: 48
End: 2023-10-06

## 2023-10-06 NOTE — TELEPHONE ENCOUNTER
Jaxson Noirega  Brain Navigator Encounter    Name:    Mart Ashton  Age:    51 y.o. Encounter type:  []Patient Initiated  [x]Navigator Follow-up []Pre-op  []Post-op  []Check-in Prior to First Treatment []Treatment Modality Change  []1st point of Contact []Referral []Other:       Narrative:   I had trouble getting in touch with someone in Dr. Michele Mcgowan office to schedule an appointment for [facility], so I reached out to Sj for assistance. With her help I was able to get [facility] scheduled for 10/17/23 at 1345. [facility] says his headaches are becoming continuous and hard to manage. We discussed going to the ED if he feels he cannot make it until the appointment with [facility] 10/17/23. I also had the  add him to a cancellation list incase anything opens up sooner. I let his sister know to reach out for any needs or concerns. Kane FORTUNEN, RN.   [facility] [address], [facility]  Office: [phone]  Cell: [phone]  F: [phone]  [email or web address]  Good Help to Those in [facility] SPECIALTY [facility]

## 2023-10-09 ENCOUNTER — APPOINTMENT (OUTPATIENT)
Facility: HOSPITAL | Age: 48
DRG: 021 | End: 2023-10-09
Attending: INTERNAL MEDICINE
Payer: MEDICAID

## 2023-10-09 ENCOUNTER — HOSPITAL ENCOUNTER (INPATIENT)
Facility: HOSPITAL | Age: 48
LOS: 10 days | Discharge: NURSING, REHAB OR LONG-TERM CARE | DRG: 021 | End: 2023-10-19
Attending: INTERNAL MEDICINE | Admitting: INTERNAL MEDICINE
Payer: MEDICAID

## 2023-10-09 DIAGNOSIS — C71.9 GLIOBLASTOMA (HCC): ICD-10-CM

## 2023-10-09 DIAGNOSIS — R06.6 INTRACTABLE HICCUPS: Primary | ICD-10-CM

## 2023-10-09 LAB
ANION GAP SERPL CALC-SCNC: 5 MMOL/L (ref 5–15)
ARTERIAL PATENCY WRIST A: POSITIVE
BASE DEFICIT BLD-SCNC: 0.1 MMOL/L
BDY SITE: ABNORMAL
BUN SERPL-MCNC: 9 MG/DL (ref 6–20)
BUN/CREAT SERPL: 8 (ref 12–20)
CA-I BLD-SCNC: 1.16 MMOL/L (ref 1.12–1.32)
CALCIUM SERPL-MCNC: 8.9 MG/DL (ref 8.5–10.1)
CHLORIDE SERPL-SCNC: 105 MMOL/L (ref 97–108)
CO2 SERPL-SCNC: 26 MMOL/L (ref 21–32)
CREAT SERPL-MCNC: 1.12 MG/DL (ref 0.7–1.3)
ERYTHROCYTE [DISTWIDTH] IN BLOOD BY AUTOMATED COUNT: 11.9 % (ref 11.5–14.5)
GAS FLOW.O2 O2 DELIVERY SYS: ABNORMAL
GAS FLOW.O2 SETTING OXYMISER: 16 BPM
GLUCOSE BLD STRIP.AUTO-MCNC: 135 MG/DL (ref 65–117)
GLUCOSE SERPL-MCNC: 172 MG/DL (ref 65–100)
HCO3 BLD-SCNC: 24.7 MMOL/L (ref 22–26)
HCT VFR BLD AUTO: 46.5 % (ref 36.6–50.3)
HGB BLD-MCNC: 16 G/DL (ref 12.1–17)
MAGNESIUM SERPL-MCNC: 2.2 MG/DL (ref 1.6–2.4)
MCH RBC QN AUTO: 30.4 PG (ref 26–34)
MCHC RBC AUTO-ENTMCNC: 34.4 G/DL (ref 30–36.5)
MCV RBC AUTO: 88.4 FL (ref 80–99)
NRBC # BLD: 0 K/UL (ref 0–0.01)
NRBC BLD-RTO: 0 PER 100 WBC
O2/TOTAL GAS SETTING VFR VENT: 40 %
PAW @ MEAN EXP FLOW ON VENT: 13 CMH2O
PCO2 BLD: 40.2 MMHG (ref 35–45)
PEEP RESPIRATORY: 8 CMH2O
PH BLD: 7.4 (ref 7.35–7.45)
PHOSPHATE SERPL-MCNC: 1 MG/DL (ref 2.6–4.7)
PLATELET # BLD AUTO: 314 K/UL (ref 150–400)
PMV BLD AUTO: 10.6 FL (ref 8.9–12.9)
PO2 BLD: 70 MMHG (ref 80–100)
POTASSIUM SERPL-SCNC: 3.8 MMOL/L (ref 3.5–5.1)
RBC # BLD AUTO: 5.26 M/UL (ref 4.1–5.7)
SAO2 % BLD: 93.6 % (ref 92–97)
SERVICE CMNT-IMP: ABNORMAL
SODIUM SERPL-SCNC: 136 MMOL/L (ref 136–145)
SPECIMEN TYPE: ABNORMAL
TROPONIN I SERPL HS-MCNC: 9 NG/L (ref 0–76)
VENTILATION MODE VENT: ABNORMAL
VT SETTING VENT: 400 ML
WBC # BLD AUTO: 14.6 K/UL (ref 4.1–11.1)

## 2023-10-09 PROCEDURE — 85027 COMPLETE CBC AUTOMATED: CPT

## 2023-10-09 PROCEDURE — 80048 BASIC METABOLIC PNL TOTAL CA: CPT

## 2023-10-09 PROCEDURE — 36600 WITHDRAWAL OF ARTERIAL BLOOD: CPT

## 2023-10-09 PROCEDURE — 36415 COLL VENOUS BLD VENIPUNCTURE: CPT

## 2023-10-09 PROCEDURE — 2700000000 HC OXYGEN THERAPY PER DAY

## 2023-10-09 PROCEDURE — 2500000003 HC RX 250 WO HCPCS: Performed by: NURSE PRACTITIONER

## 2023-10-09 PROCEDURE — 82962 GLUCOSE BLOOD TEST: CPT

## 2023-10-09 PROCEDURE — 82803 BLOOD GASES ANY COMBINATION: CPT

## 2023-10-09 PROCEDURE — C9113 INJ PANTOPRAZOLE SODIUM, VIA: HCPCS | Performed by: NURSE PRACTITIONER

## 2023-10-09 PROCEDURE — 2580000003 HC RX 258: Performed by: NURSE PRACTITIONER

## 2023-10-09 PROCEDURE — 6360000002 HC RX W HCPCS: Performed by: NURSE PRACTITIONER

## 2023-10-09 PROCEDURE — 74018 RADEX ABDOMEN 1 VIEW: CPT

## 2023-10-09 PROCEDURE — 71045 X-RAY EXAM CHEST 1 VIEW: CPT

## 2023-10-09 PROCEDURE — 83735 ASSAY OF MAGNESIUM: CPT

## 2023-10-09 PROCEDURE — 84100 ASSAY OF PHOSPHORUS: CPT

## 2023-10-09 PROCEDURE — 51702 INSERT TEMP BLADDER CATH: CPT

## 2023-10-09 PROCEDURE — 2000000000 HC ICU R&B

## 2023-10-09 PROCEDURE — 6370000000 HC RX 637 (ALT 250 FOR IP): Performed by: NURSE PRACTITIONER

## 2023-10-09 PROCEDURE — 84484 ASSAY OF TROPONIN QUANT: CPT

## 2023-10-09 PROCEDURE — A4216 STERILE WATER/SALINE, 10 ML: HCPCS | Performed by: NURSE PRACTITIONER

## 2023-10-09 RX ORDER — SODIUM CHLORIDE 0.9 % (FLUSH) 0.9 %
5-40 SYRINGE (ML) INJECTION EVERY 12 HOURS SCHEDULED
Status: DISCONTINUED | OUTPATIENT
Start: 2023-10-09 | End: 2023-10-19 | Stop reason: HOSPADM

## 2023-10-09 RX ORDER — CHLORHEXIDINE GLUCONATE ORAL RINSE 1.2 MG/ML
15 SOLUTION DENTAL 2 TIMES DAILY
Status: DISCONTINUED | OUTPATIENT
Start: 2023-10-09 | End: 2023-10-09

## 2023-10-09 RX ORDER — INSULIN LISPRO 100 [IU]/ML
0-4 INJECTION, SOLUTION INTRAVENOUS; SUBCUTANEOUS EVERY 6 HOURS
Status: DISCONTINUED | OUTPATIENT
Start: 2023-10-09 | End: 2023-10-10

## 2023-10-09 RX ORDER — MAGNESIUM SULFATE IN WATER 40 MG/ML
2000 INJECTION, SOLUTION INTRAVENOUS PRN
Status: DISCONTINUED | OUTPATIENT
Start: 2023-10-09 | End: 2023-10-17

## 2023-10-09 RX ORDER — POTASSIUM CHLORIDE 7.45 MG/ML
10 INJECTION INTRAVENOUS PRN
Status: DISCONTINUED | OUTPATIENT
Start: 2023-10-09 | End: 2023-10-17

## 2023-10-09 RX ORDER — ACETAMINOPHEN 650 MG/1
650 SUPPOSITORY RECTAL EVERY 6 HOURS PRN
Status: DISCONTINUED | OUTPATIENT
Start: 2023-10-09 | End: 2023-10-19 | Stop reason: HOSPADM

## 2023-10-09 RX ORDER — DEXTROSE MONOHYDRATE 100 MG/ML
INJECTION, SOLUTION INTRAVENOUS CONTINUOUS PRN
Status: DISCONTINUED | OUTPATIENT
Start: 2023-10-09 | End: 2023-10-19 | Stop reason: HOSPADM

## 2023-10-09 RX ORDER — PROPOFOL 10 MG/ML
5-50 INJECTION, EMULSION INTRAVENOUS CONTINUOUS
Status: DISCONTINUED | OUTPATIENT
Start: 2023-10-09 | End: 2023-10-09

## 2023-10-09 RX ORDER — SODIUM CHLORIDE 0.9 % (FLUSH) 0.9 %
5-40 SYRINGE (ML) INJECTION PRN
Status: DISCONTINUED | OUTPATIENT
Start: 2023-10-09 | End: 2023-10-19 | Stop reason: HOSPADM

## 2023-10-09 RX ORDER — ACETAMINOPHEN 325 MG/1
650 TABLET ORAL EVERY 6 HOURS PRN
Status: DISCONTINUED | OUTPATIENT
Start: 2023-10-09 | End: 2023-10-19 | Stop reason: HOSPADM

## 2023-10-09 RX ORDER — DEXAMETHASONE SODIUM PHOSPHATE 4 MG/ML
4 INJECTION, SOLUTION INTRA-ARTICULAR; INTRALESIONAL; INTRAMUSCULAR; INTRAVENOUS; SOFT TISSUE EVERY 6 HOURS
Status: DISCONTINUED | OUTPATIENT
Start: 2023-10-09 | End: 2023-10-19

## 2023-10-09 RX ORDER — SODIUM CHLORIDE 9 MG/ML
INJECTION, SOLUTION INTRAVENOUS PRN
Status: DISCONTINUED | OUTPATIENT
Start: 2023-10-09 | End: 2023-10-19 | Stop reason: HOSPADM

## 2023-10-09 RX ORDER — ONDANSETRON 2 MG/ML
4 INJECTION INTRAMUSCULAR; INTRAVENOUS EVERY 6 HOURS PRN
Status: DISCONTINUED | OUTPATIENT
Start: 2023-10-09 | End: 2023-10-17

## 2023-10-09 RX ORDER — HYDRALAZINE HYDROCHLORIDE 20 MG/ML
5 INJECTION INTRAMUSCULAR; INTRAVENOUS EVERY 4 HOURS PRN
Status: DISCONTINUED | OUTPATIENT
Start: 2023-10-09 | End: 2023-10-19 | Stop reason: HOSPADM

## 2023-10-09 RX ORDER — FENTANYL CITRATE 50 UG/ML
50 INJECTION, SOLUTION INTRAMUSCULAR; INTRAVENOUS
Status: DISCONTINUED | OUTPATIENT
Start: 2023-10-09 | End: 2023-10-09

## 2023-10-09 RX ORDER — ONDANSETRON 4 MG/1
4 TABLET, ORALLY DISINTEGRATING ORAL EVERY 8 HOURS PRN
Status: DISCONTINUED | OUTPATIENT
Start: 2023-10-09 | End: 2023-10-17

## 2023-10-09 RX ORDER — LEVETIRACETAM 500 MG/5ML
500 INJECTION, SOLUTION, CONCENTRATE INTRAVENOUS EVERY 12 HOURS
Status: DISCONTINUED | OUTPATIENT
Start: 2023-10-09 | End: 2023-10-10

## 2023-10-09 RX ORDER — POTASSIUM CHLORIDE 29.8 MG/ML
20 INJECTION INTRAVENOUS PRN
Status: DISCONTINUED | OUTPATIENT
Start: 2023-10-09 | End: 2023-10-17

## 2023-10-09 RX ORDER — POLYETHYLENE GLYCOL 3350 17 G/17G
17 POWDER, FOR SOLUTION ORAL DAILY PRN
Status: DISCONTINUED | OUTPATIENT
Start: 2023-10-09 | End: 2023-10-19 | Stop reason: HOSPADM

## 2023-10-09 RX ADMIN — CHLORHEXIDINE GLUCONATE 15 ML: 1.2 RINSE ORAL at 20:31

## 2023-10-09 RX ADMIN — ONDANSETRON 4 MG: 2 INJECTION INTRAMUSCULAR; INTRAVENOUS at 23:41

## 2023-10-09 RX ADMIN — SODIUM PHOSPHATE, MONOBASIC, MONOHYDRATE AND SODIUM PHOSPHATE, DIBASIC, ANHYDROUS 30 MMOL: 142; 276 INJECTION, SOLUTION INTRAVENOUS at 22:58

## 2023-10-09 RX ADMIN — PANTOPRAZOLE SODIUM 40 MG: 40 INJECTION, POWDER, FOR SOLUTION INTRAVENOUS at 19:04

## 2023-10-09 RX ADMIN — NICARDIPINE HYDROCHLORIDE 5 MG/HR: 25 INJECTION, SOLUTION INTRAVENOUS at 19:00

## 2023-10-09 RX ADMIN — LEVETIRACETAM 500 MG: 500 INJECTION, SOLUTION INTRAVENOUS at 19:04

## 2023-10-09 RX ADMIN — FENTANYL CITRATE 50 MCG: 50 INJECTION INTRAMUSCULAR; INTRAVENOUS at 19:32

## 2023-10-09 RX ADMIN — DEXAMETHASONE SODIUM PHOSPHATE 4 MG: 4 INJECTION, SOLUTION INTRAMUSCULAR; INTRAVENOUS at 20:31

## 2023-10-09 RX ADMIN — HYDRALAZINE HYDROCHLORIDE 5 MG: 20 INJECTION, SOLUTION INTRAMUSCULAR; INTRAVENOUS at 18:37

## 2023-10-09 RX ADMIN — SODIUM CHLORIDE, PRESERVATIVE FREE 10 ML: 5 INJECTION INTRAVENOUS at 20:21

## 2023-10-09 RX ADMIN — PROPOFOL 40 MCG/KG/MIN: 10 INJECTION, EMULSION INTRAVENOUS at 21:11

## 2023-10-09 RX ADMIN — PROPOFOL 20 MCG/KG/MIN: 10 INJECTION, EMULSION INTRAVENOUS at 18:34

## 2023-10-09 ASSESSMENT — PULMONARY FUNCTION TESTS
PIF_VALUE: 11
PIF_VALUE: 15
PIF_VALUE: 16
PIF_VALUE: 22
PIF_VALUE: 21
PIF_VALUE: 21

## 2023-10-10 ENCOUNTER — APPOINTMENT (OUTPATIENT)
Facility: HOSPITAL | Age: 48
DRG: 021 | End: 2023-10-10
Attending: INTERNAL MEDICINE
Payer: MEDICAID

## 2023-10-10 LAB
ANION GAP SERPL CALC-SCNC: 9 MMOL/L (ref 5–15)
BUN SERPL-MCNC: 12 MG/DL (ref 6–20)
BUN/CREAT SERPL: 10 (ref 12–20)
CALCIUM SERPL-MCNC: 8.2 MG/DL (ref 8.5–10.1)
CHLORIDE SERPL-SCNC: 108 MMOL/L (ref 97–108)
CHOLEST SERPL-MCNC: 171 MG/DL
CO2 SERPL-SCNC: 22 MMOL/L (ref 21–32)
CREAT SERPL-MCNC: 1.19 MG/DL (ref 0.7–1.3)
ERYTHROCYTE [DISTWIDTH] IN BLOOD BY AUTOMATED COUNT: 11.9 % (ref 11.5–14.5)
EST. AVERAGE GLUCOSE BLD GHB EST-MCNC: 111 MG/DL
GLUCOSE BLD STRIP.AUTO-MCNC: 105 MG/DL (ref 65–117)
GLUCOSE BLD STRIP.AUTO-MCNC: 117 MG/DL (ref 65–117)
GLUCOSE BLD STRIP.AUTO-MCNC: 140 MG/DL (ref 65–117)
GLUCOSE BLD STRIP.AUTO-MCNC: 145 MG/DL (ref 65–117)
GLUCOSE SERPL-MCNC: 149 MG/DL (ref 65–100)
HBA1C MFR BLD: 5.5 % (ref 4–5.6)
HCT VFR BLD AUTO: 44.1 % (ref 36.6–50.3)
HDLC SERPL-MCNC: 42 MG/DL
HDLC SERPL: 4.1 (ref 0–5)
HGB BLD-MCNC: 14.7 G/DL (ref 12.1–17)
INR PPP: 1.1 (ref 0.9–1.1)
LDLC SERPL CALC-MCNC: 119.6 MG/DL (ref 0–100)
MAGNESIUM SERPL-MCNC: 1.7 MG/DL (ref 1.6–2.4)
MCH RBC QN AUTO: 30 PG (ref 26–34)
MCHC RBC AUTO-ENTMCNC: 33.3 G/DL (ref 30–36.5)
MCV RBC AUTO: 90 FL (ref 80–99)
NRBC # BLD: 0 K/UL (ref 0–0.01)
NRBC BLD-RTO: 0 PER 100 WBC
PHOSPHATE SERPL-MCNC: 2.8 MG/DL (ref 2.6–4.7)
PLATELET # BLD AUTO: 325 K/UL (ref 150–400)
PMV BLD AUTO: 11 FL (ref 8.9–12.9)
POTASSIUM SERPL-SCNC: 3.2 MMOL/L (ref 3.5–5.1)
PROTHROMBIN TIME: 11.6 SEC (ref 9–11.1)
RBC # BLD AUTO: 4.9 M/UL (ref 4.1–5.7)
SERVICE CMNT-IMP: ABNORMAL
SERVICE CMNT-IMP: ABNORMAL
SERVICE CMNT-IMP: NORMAL
SERVICE CMNT-IMP: NORMAL
SODIUM SERPL-SCNC: 139 MMOL/L (ref 136–145)
TRIGL SERPL-MCNC: 47 MG/DL
TSH SERPL DL<=0.05 MIU/L-ACNC: 0.4 UIU/ML (ref 0.36–3.74)
VLDLC SERPL CALC-MCNC: 9.4 MG/DL
WBC # BLD AUTO: 14.9 K/UL (ref 4.1–11.1)

## 2023-10-10 PROCEDURE — 2060000000 HC ICU INTERMEDIATE R&B

## 2023-10-10 PROCEDURE — A9579 GAD-BASE MR CONTRAST NOS,1ML: HCPCS | Performed by: NURSE PRACTITIONER

## 2023-10-10 PROCEDURE — 6360000002 HC RX W HCPCS: Performed by: NURSE PRACTITIONER

## 2023-10-10 PROCEDURE — 6370000000 HC RX 637 (ALT 250 FOR IP): Performed by: NURSE PRACTITIONER

## 2023-10-10 PROCEDURE — A4216 STERILE WATER/SALINE, 10 ML: HCPCS | Performed by: NURSE PRACTITIONER

## 2023-10-10 PROCEDURE — 2580000003 HC RX 258: Performed by: NURSE PRACTITIONER

## 2023-10-10 PROCEDURE — 70553 MRI BRAIN STEM W/O & W/DYE: CPT

## 2023-10-10 PROCEDURE — 2700000000 HC OXYGEN THERAPY PER DAY

## 2023-10-10 PROCEDURE — 36415 COLL VENOUS BLD VENIPUNCTURE: CPT

## 2023-10-10 PROCEDURE — 82962 GLUCOSE BLOOD TEST: CPT

## 2023-10-10 PROCEDURE — 84443 ASSAY THYROID STIM HORMONE: CPT

## 2023-10-10 PROCEDURE — 85610 PROTHROMBIN TIME: CPT

## 2023-10-10 PROCEDURE — 83036 HEMOGLOBIN GLYCOSYLATED A1C: CPT

## 2023-10-10 PROCEDURE — 83735 ASSAY OF MAGNESIUM: CPT

## 2023-10-10 PROCEDURE — 6360000004 HC RX CONTRAST MEDICATION: Performed by: NURSE PRACTITIONER

## 2023-10-10 PROCEDURE — 84100 ASSAY OF PHOSPHORUS: CPT

## 2023-10-10 PROCEDURE — 80048 BASIC METABOLIC PNL TOTAL CA: CPT

## 2023-10-10 PROCEDURE — C9113 INJ PANTOPRAZOLE SODIUM, VIA: HCPCS | Performed by: NURSE PRACTITIONER

## 2023-10-10 PROCEDURE — 85027 COMPLETE CBC AUTOMATED: CPT

## 2023-10-10 PROCEDURE — 2500000003 HC RX 250 WO HCPCS: Performed by: NURSE PRACTITIONER

## 2023-10-10 PROCEDURE — 80061 LIPID PANEL: CPT

## 2023-10-10 RX ORDER — INSULIN LISPRO 100 [IU]/ML
0-4 INJECTION, SOLUTION INTRAVENOUS; SUBCUTANEOUS NIGHTLY
Status: DISCONTINUED | OUTPATIENT
Start: 2023-10-10 | End: 2023-10-19 | Stop reason: HOSPADM

## 2023-10-10 RX ORDER — MAGNESIUM SULFATE IN WATER 40 MG/ML
2000 INJECTION, SOLUTION INTRAVENOUS ONCE
Status: COMPLETED | OUTPATIENT
Start: 2023-10-10 | End: 2023-10-10

## 2023-10-10 RX ORDER — POTASSIUM CHLORIDE 750 MG/1
40 TABLET, FILM COATED, EXTENDED RELEASE ORAL ONCE
Status: COMPLETED | OUTPATIENT
Start: 2023-10-10 | End: 2023-10-10

## 2023-10-10 RX ORDER — SODIUM CHLORIDE 0.9 % (FLUSH) 0.9 %
5-40 SYRINGE (ML) INJECTION 2 TIMES DAILY
Status: DISCONTINUED | OUTPATIENT
Start: 2023-10-10 | End: 2023-10-17 | Stop reason: HOSPADM

## 2023-10-10 RX ORDER — BUTALBITAL, ACETAMINOPHEN AND CAFFEINE 50; 325; 40 MG/1; MG/1; MG/1
1 TABLET ORAL EVERY 4 HOURS PRN
Status: DISCONTINUED | OUTPATIENT
Start: 2023-10-10 | End: 2023-10-19 | Stop reason: HOSPADM

## 2023-10-10 RX ORDER — INSULIN LISPRO 100 [IU]/ML
0-4 INJECTION, SOLUTION INTRAVENOUS; SUBCUTANEOUS
Status: DISCONTINUED | OUTPATIENT
Start: 2023-10-10 | End: 2023-10-19 | Stop reason: HOSPADM

## 2023-10-10 RX ORDER — PANTOPRAZOLE SODIUM 40 MG/1
40 TABLET, DELAYED RELEASE ORAL
Status: DISCONTINUED | OUTPATIENT
Start: 2023-10-11 | End: 2023-10-19 | Stop reason: HOSPADM

## 2023-10-10 RX ORDER — LISINOPRIL 20 MG/1
40 TABLET ORAL DAILY
Status: DISCONTINUED | OUTPATIENT
Start: 2023-10-10 | End: 2023-10-19 | Stop reason: HOSPADM

## 2023-10-10 RX ORDER — LEVETIRACETAM 500 MG/1
500 TABLET ORAL 2 TIMES DAILY
Status: DISCONTINUED | OUTPATIENT
Start: 2023-10-10 | End: 2023-10-16 | Stop reason: SDUPTHER

## 2023-10-10 RX ORDER — TORSEMIDE 20 MG/1
60 TABLET ORAL DAILY
Status: DISCONTINUED | OUTPATIENT
Start: 2023-10-10 | End: 2023-10-19 | Stop reason: HOSPADM

## 2023-10-10 RX ORDER — INSULIN LISPRO 100 [IU]/ML
0-4 INJECTION, SOLUTION INTRAVENOUS; SUBCUTANEOUS
Status: DISCONTINUED | OUTPATIENT
Start: 2023-10-10 | End: 2023-10-10

## 2023-10-10 RX ORDER — CARVEDILOL 12.5 MG/1
25 TABLET ORAL 2 TIMES DAILY WITH MEALS
Status: DISCONTINUED | OUTPATIENT
Start: 2023-10-10 | End: 2023-10-19 | Stop reason: HOSPADM

## 2023-10-10 RX ORDER — PROCHLORPERAZINE EDISYLATE 5 MG/ML
10 INJECTION INTRAMUSCULAR; INTRAVENOUS EVERY 6 HOURS PRN
Status: DISCONTINUED | OUTPATIENT
Start: 2023-10-10 | End: 2023-10-17

## 2023-10-10 RX ORDER — MAGNESIUM SULFATE IN WATER 40 MG/ML
2000 INJECTION, SOLUTION INTRAVENOUS ONCE
Status: DISCONTINUED | OUTPATIENT
Start: 2023-10-10 | End: 2023-10-10

## 2023-10-10 RX ADMIN — SODIUM CHLORIDE, PRESERVATIVE FREE 10 ML: 5 INJECTION INTRAVENOUS at 08:21

## 2023-10-10 RX ADMIN — NICARDIPINE HYDROCHLORIDE 5 MG/HR: 25 INJECTION, SOLUTION INTRAVENOUS at 01:37

## 2023-10-10 RX ADMIN — ONDANSETRON 4 MG: 2 INJECTION INTRAMUSCULAR; INTRAVENOUS at 03:08

## 2023-10-10 RX ADMIN — DEXAMETHASONE SODIUM PHOSPHATE 4 MG: 4 INJECTION, SOLUTION INTRAMUSCULAR; INTRAVENOUS at 08:20

## 2023-10-10 RX ADMIN — PANTOPRAZOLE SODIUM 40 MG: 40 INJECTION, POWDER, FOR SOLUTION INTRAVENOUS at 08:20

## 2023-10-10 RX ADMIN — CARVEDILOL 25 MG: 12.5 TABLET, FILM COATED ORAL at 10:08

## 2023-10-10 RX ADMIN — DEXAMETHASONE SODIUM PHOSPHATE 4 MG: 4 INJECTION, SOLUTION INTRAMUSCULAR; INTRAVENOUS at 01:22

## 2023-10-10 RX ADMIN — BUTALBITAL, ACETAMINOPHEN, AND CAFFEINE 1 TABLET: 50; 325; 40 TABLET ORAL at 01:21

## 2023-10-10 RX ADMIN — SODIUM CHLORIDE, PRESERVATIVE FREE 10 ML: 5 INJECTION INTRAVENOUS at 04:37

## 2023-10-10 RX ADMIN — LEVETIRACETAM 500 MG: 500 INJECTION, SOLUTION INTRAVENOUS at 06:47

## 2023-10-10 RX ADMIN — SODIUM CHLORIDE, PRESERVATIVE FREE 10 ML: 5 INJECTION INTRAVENOUS at 08:22

## 2023-10-10 RX ADMIN — DEXAMETHASONE SODIUM PHOSPHATE 4 MG: 4 INJECTION, SOLUTION INTRAMUSCULAR; INTRAVENOUS at 15:17

## 2023-10-10 RX ADMIN — LEVETIRACETAM 500 MG: 500 TABLET, FILM COATED ORAL at 18:34

## 2023-10-10 RX ADMIN — CARVEDILOL 25 MG: 12.5 TABLET, FILM COATED ORAL at 17:07

## 2023-10-10 RX ADMIN — POTASSIUM CHLORIDE 40 MEQ: 750 TABLET, FILM COATED, EXTENDED RELEASE ORAL at 12:34

## 2023-10-10 RX ADMIN — GADOTERIDOL 20 ML: 279.3 INJECTION, SOLUTION INTRAVENOUS at 04:36

## 2023-10-10 RX ADMIN — LISINOPRIL 40 MG: 20 TABLET ORAL at 10:08

## 2023-10-10 RX ADMIN — SODIUM CHLORIDE, PRESERVATIVE FREE 10 ML: 5 INJECTION INTRAVENOUS at 21:22

## 2023-10-10 RX ADMIN — POTASSIUM CHLORIDE 40 MEQ: 750 TABLET, FILM COATED, EXTENDED RELEASE ORAL at 08:20

## 2023-10-10 RX ADMIN — TORSEMIDE 60 MG: 20 TABLET ORAL at 12:34

## 2023-10-10 RX ADMIN — MAGNESIUM SULFATE HEPTAHYDRATE 2000 MG: 40 INJECTION, SOLUTION INTRAVENOUS at 05:45

## 2023-10-10 RX ADMIN — PROCHLORPERAZINE EDISYLATE 10 MG: 5 INJECTION INTRAMUSCULAR; INTRAVENOUS at 03:14

## 2023-10-10 RX ADMIN — DEXAMETHASONE SODIUM PHOSPHATE 4 MG: 4 INJECTION, SOLUTION INTRAMUSCULAR; INTRAVENOUS at 21:22

## 2023-10-10 ASSESSMENT — PAIN SCALES - GENERAL
PAINLEVEL_OUTOF10: 8
PAINLEVEL_OUTOF10: 0
PAINLEVEL_OUTOF10: 3
PAINLEVEL_OUTOF10: 0

## 2023-10-10 ASSESSMENT — PAIN DESCRIPTION - DESCRIPTORS: DESCRIPTORS: ACHING

## 2023-10-10 ASSESSMENT — PAIN DESCRIPTION - ORIENTATION: ORIENTATION: POSTERIOR

## 2023-10-10 ASSESSMENT — PAIN DESCRIPTION - LOCATION: LOCATION: HEAD

## 2023-10-10 ASSESSMENT — ENCOUNTER SYMPTOMS
SHORTNESS OF BREATH: 0
ABDOMINAL PAIN: 0
CHEST TIGHTNESS: 0

## 2023-10-10 NOTE — CARE COORDINATION
Care Management Initial Assessment       RUR: Calculating   Readmission? No     10/10/23 2484   Service Assessment   Patient Orientation Alert and Oriented;Person;Place;Situation;Self   Cognition Alert   History Provided By Patient   Primary Caregiver Self   Support Systems Family Members  (Sister Duy Santana)   [address] is: Named in [address]   PCP Verified by CM Yes  (Dr Korene Aase)   Last Visit to PCP Within last 3 months   Prior Functional Level Independent in ADLs/IADLs   Current Functional Level Independent in ADLs/IADLs   Can patient return to prior living arrangement Unknown at present   Ability to make needs known: Good   Family able to assist with home care needs: Yes   Would you like for me to discuss the discharge plan with any other family members/significant others, and if so, who? No   Financial Resources [facility] None   Social/Functional History   Lives With Family  (Sister Duy Santana)   Type of [address] [place] [address] Dr One level   Home Access Stairs to enter with rails   Entrance Stairs - Number of Steps 2   Entrance Stairs - Rails Left   Bathroom Toilet Standard   Bathroom Equipment Grab bars in [address] Help From Family   ADL Assistance Independent   Homemaking Assistance Independent   Ambulation Assistance Independent   Transfer Assistance Independent   Active  Yes   Mode of Transportation Car   Occupation Unemployed   Discharge Planning   Living Arrangements Family Members   Current Services Prior To Admission None   One/Two Story Residence One story   History of falls? 0     Care manager met with patient to introduce slef and explain role. Patient lives independently with his sister Duy Santana. Patient drives himself to appointments. Patient confirmed his PCP to be Dr Korene Aase and sees him twice a month and uses the [facility] on [address] in [facility].    Patient uses a cane, no

## 2023-10-10 NOTE — H&P
CRITICAL CARE NOTE      Name: Chris Cazares   : 1975   MRN: [id number]   Date: 10/9/2023      REASON FOR ICU ADMISSION: Acute respiratory failure requiring intubation and for airway protection    PRINCIPAL ICU DIAGNOSIS   Recurrent glioblastoma now with increased vasogenic edema and subfalcine herniation  Seizures  Acute respiratory failure requiring intubation and mechanical ventilation for airway protection  Hypertension  PMHx nonischemic cardiomyopathy, mixed systolic/diastolic heart failure (LVEF 35%), MITCH (CPAP), seizures, HTN, HLD, recurrent glioblastoma s/p resection (2023, Dr. Mamadou Aldana)    BRIEF PATIENT SUMMARY     54-year-old male with a PMHx nonischemic cardiomyopathy, mixed systolic/diastolic heart failure (LVEF 35%), MITCH (CPAP), seizures, HTN, HLD, recurrent glioblastoma s/p resection (2023, Dr. Mamadou Aldana) presented to OSH with headache, left facial droop, and slurred speech. The patient is intubated and therefore not able to provide history. History obtained per chart review/ED physician. ED physician reports that he has been followed outpatient and recent MRI demonstrated enlarging residual tumor now measuring 4.2 x 2.9 x 4.0 cm with increasing surrounding vasogenic edema from prior imaging. On presentation to OSH ED patient noted to have some subfalcine herniation as well. NSGY at [facility] was contacted and patient was treated with Keppra, Decadron, and Zofran. He was to be admitted to hospitalist service initially, but unfortunately condition deteriorated requiring emergent intubation for airway protection. He was subsequently then transferred to [facility] ICU for further observation and management.       COMPREHENSIVE ASSESSMENT & PLAN:SYSTEM BASED     24 HOUR EVENTS: As above    NEUROLOGICAL:  Recurrent glioblastoma s/p resection presents with increased intracranial mass with vasogenic edema and subfalcine herniation   -Close neurologic monitoring  -NSGY following  -CT head in the AM  -HOB >
PPI    Hx of MITCH   - home CPAP     DIET: Diet NPO   ISOLATION PRECAUTIONS: No active isolations  CODE STATUS: Full Code   DVT PROPHYLAXIS: SCDs  FUNCTIONAL STATUS PRIOR TO HOSPITALIZATION: Fully active and ambulatory; able to carry on all self-care without restriction. Ambulatory status/function: By self   EARLY MOBILITY ASSESSMENT: Recommend an assessment from physical therapy and/or occupational therapy  ANTICIPATED DISCHARGE: 24-48 hours.   ANTICIPATED DISPOSITION: Home with Home Healthcare  EMERGENCY CONTACT/SURROGATE DECISION MAKER: Patients wife Grant Vivas, [phone]     CRITICAL CARE WAS PERFORMED FOR THIS ENCOUNTER: NO.      Signed By: MASOUD Vaughn - PRISCILLA     October 10, 2023

## 2023-10-11 LAB
ANION GAP SERPL CALC-SCNC: 7 MMOL/L (ref 5–15)
BUN SERPL-MCNC: 26 MG/DL (ref 6–20)
BUN/CREAT SERPL: 16 (ref 12–20)
CALCIUM SERPL-MCNC: 9.2 MG/DL (ref 8.5–10.1)
CHLORIDE SERPL-SCNC: 106 MMOL/L (ref 97–108)
CO2 SERPL-SCNC: 26 MMOL/L (ref 21–32)
CREAT SERPL-MCNC: 1.66 MG/DL (ref 0.7–1.3)
ERYTHROCYTE [DISTWIDTH] IN BLOOD BY AUTOMATED COUNT: 12.2 % (ref 11.5–14.5)
GLUCOSE BLD STRIP.AUTO-MCNC: 106 MG/DL (ref 65–117)
GLUCOSE BLD STRIP.AUTO-MCNC: 110 MG/DL (ref 65–117)
GLUCOSE BLD STRIP.AUTO-MCNC: 138 MG/DL (ref 65–117)
GLUCOSE BLD STRIP.AUTO-MCNC: 208 MG/DL (ref 65–117)
GLUCOSE SERPL-MCNC: 111 MG/DL (ref 65–100)
HCT VFR BLD AUTO: 47.6 % (ref 36.6–50.3)
HGB BLD-MCNC: 15.7 G/DL (ref 12.1–17)
MAGNESIUM SERPL-MCNC: 2.7 MG/DL (ref 1.6–2.4)
MCH RBC QN AUTO: 30.3 PG (ref 26–34)
MCHC RBC AUTO-ENTMCNC: 33 G/DL (ref 30–36.5)
MCV RBC AUTO: 91.9 FL (ref 80–99)
NRBC # BLD: 0 K/UL (ref 0–0.01)
NRBC BLD-RTO: 0 PER 100 WBC
PHOSPHATE SERPL-MCNC: 3.7 MG/DL (ref 2.6–4.7)
PLATELET # BLD AUTO: 332 K/UL (ref 150–400)
PMV BLD AUTO: 11.3 FL (ref 8.9–12.9)
POTASSIUM SERPL-SCNC: 4.4 MMOL/L (ref 3.5–5.1)
RBC # BLD AUTO: 5.18 M/UL (ref 4.1–5.7)
SERVICE CMNT-IMP: ABNORMAL
SERVICE CMNT-IMP: ABNORMAL
SERVICE CMNT-IMP: NORMAL
SERVICE CMNT-IMP: NORMAL
SODIUM SERPL-SCNC: 139 MMOL/L (ref 136–145)
WBC # BLD AUTO: 16.3 K/UL (ref 4.1–11.1)

## 2023-10-11 PROCEDURE — 2060000000 HC ICU INTERMEDIATE R&B

## 2023-10-11 PROCEDURE — 84100 ASSAY OF PHOSPHORUS: CPT

## 2023-10-11 PROCEDURE — 2580000003 HC RX 258: Performed by: NURSE PRACTITIONER

## 2023-10-11 PROCEDURE — 6360000002 HC RX W HCPCS: Performed by: NURSE PRACTITIONER

## 2023-10-11 PROCEDURE — 99222 1ST HOSP IP/OBS MODERATE 55: CPT | Performed by: NURSE PRACTITIONER

## 2023-10-11 PROCEDURE — APPNB30 APP NON BILLABLE TIME 0-30 MINS: Performed by: NURSE PRACTITIONER

## 2023-10-11 PROCEDURE — 83735 ASSAY OF MAGNESIUM: CPT

## 2023-10-11 PROCEDURE — 6370000000 HC RX 637 (ALT 250 FOR IP): Performed by: NURSE PRACTITIONER

## 2023-10-11 PROCEDURE — 82962 GLUCOSE BLOOD TEST: CPT

## 2023-10-11 PROCEDURE — 80048 BASIC METABOLIC PNL TOTAL CA: CPT

## 2023-10-11 PROCEDURE — 36415 COLL VENOUS BLD VENIPUNCTURE: CPT

## 2023-10-11 PROCEDURE — 85027 COMPLETE CBC AUTOMATED: CPT

## 2023-10-11 RX ADMIN — DEXAMETHASONE SODIUM PHOSPHATE 4 MG: 4 INJECTION, SOLUTION INTRAMUSCULAR; INTRAVENOUS at 02:03

## 2023-10-11 RX ADMIN — DEXAMETHASONE SODIUM PHOSPHATE 4 MG: 4 INJECTION, SOLUTION INTRAMUSCULAR; INTRAVENOUS at 08:30

## 2023-10-11 RX ADMIN — DEXAMETHASONE SODIUM PHOSPHATE 4 MG: 4 INJECTION, SOLUTION INTRAMUSCULAR; INTRAVENOUS at 21:08

## 2023-10-11 RX ADMIN — CARVEDILOL 25 MG: 12.5 TABLET, FILM COATED ORAL at 17:43

## 2023-10-11 RX ADMIN — SODIUM CHLORIDE, PRESERVATIVE FREE 10 ML: 5 INJECTION INTRAVENOUS at 21:08

## 2023-10-11 RX ADMIN — SODIUM CHLORIDE, PRESERVATIVE FREE 10 ML: 5 INJECTION INTRAVENOUS at 08:45

## 2023-10-11 RX ADMIN — LISINOPRIL 40 MG: 20 TABLET ORAL at 08:24

## 2023-10-11 RX ADMIN — Medication 1 UNITS: at 17:44

## 2023-10-11 RX ADMIN — SODIUM CHLORIDE, PRESERVATIVE FREE 10 ML: 5 INJECTION INTRAVENOUS at 08:46

## 2023-10-11 RX ADMIN — LEVETIRACETAM 500 MG: 500 TABLET, FILM COATED ORAL at 08:28

## 2023-10-11 RX ADMIN — CARVEDILOL 25 MG: 12.5 TABLET, FILM COATED ORAL at 08:29

## 2023-10-11 RX ADMIN — TORSEMIDE 60 MG: 20 TABLET ORAL at 08:28

## 2023-10-11 RX ADMIN — LEVETIRACETAM 500 MG: 500 TABLET, FILM COATED ORAL at 21:08

## 2023-10-11 RX ADMIN — PANTOPRAZOLE SODIUM 40 MG: 40 TABLET, DELAYED RELEASE ORAL at 06:15

## 2023-10-11 RX ADMIN — DEXAMETHASONE SODIUM PHOSPHATE 4 MG: 4 INJECTION, SOLUTION INTRAMUSCULAR; INTRAVENOUS at 14:53

## 2023-10-11 ASSESSMENT — PAIN SCALES - GENERAL
PAINLEVEL_OUTOF10: 0

## 2023-10-12 LAB
ANION GAP SERPL CALC-SCNC: 9 MMOL/L (ref 5–15)
BUN SERPL-MCNC: 38 MG/DL (ref 6–20)
BUN/CREAT SERPL: 23 (ref 12–20)
CALCIUM SERPL-MCNC: 8.7 MG/DL (ref 8.5–10.1)
CHLORIDE SERPL-SCNC: 102 MMOL/L (ref 97–108)
CO2 SERPL-SCNC: 27 MMOL/L (ref 21–32)
CREAT SERPL-MCNC: 1.66 MG/DL (ref 0.7–1.3)
ERYTHROCYTE [DISTWIDTH] IN BLOOD BY AUTOMATED COUNT: 12 % (ref 11.5–14.5)
GLUCOSE BLD STRIP.AUTO-MCNC: 111 MG/DL (ref 65–117)
GLUCOSE BLD STRIP.AUTO-MCNC: 113 MG/DL (ref 65–117)
GLUCOSE BLD STRIP.AUTO-MCNC: 130 MG/DL (ref 65–117)
GLUCOSE BLD STRIP.AUTO-MCNC: 135 MG/DL (ref 65–117)
GLUCOSE SERPL-MCNC: 127 MG/DL (ref 65–100)
HCT VFR BLD AUTO: 48.1 % (ref 36.6–50.3)
HGB BLD-MCNC: 16 G/DL (ref 12.1–17)
MAGNESIUM SERPL-MCNC: 2.8 MG/DL (ref 1.6–2.4)
MCH RBC QN AUTO: 30.1 PG (ref 26–34)
MCHC RBC AUTO-ENTMCNC: 33.3 G/DL (ref 30–36.5)
MCV RBC AUTO: 90.4 FL (ref 80–99)
NRBC # BLD: 0 K/UL (ref 0–0.01)
NRBC BLD-RTO: 0 PER 100 WBC
PHOSPHATE SERPL-MCNC: 4.3 MG/DL (ref 2.6–4.7)
PLATELET # BLD AUTO: 357 K/UL (ref 150–400)
PMV BLD AUTO: 11 FL (ref 8.9–12.9)
POTASSIUM SERPL-SCNC: 3.9 MMOL/L (ref 3.5–5.1)
RBC # BLD AUTO: 5.32 M/UL (ref 4.1–5.7)
SERVICE CMNT-IMP: ABNORMAL
SERVICE CMNT-IMP: ABNORMAL
SERVICE CMNT-IMP: NORMAL
SERVICE CMNT-IMP: NORMAL
SODIUM SERPL-SCNC: 138 MMOL/L (ref 136–145)
WBC # BLD AUTO: 17.2 K/UL (ref 4.1–11.1)

## 2023-10-12 PROCEDURE — 6370000000 HC RX 637 (ALT 250 FOR IP): Performed by: NURSE PRACTITIONER

## 2023-10-12 PROCEDURE — 85027 COMPLETE CBC AUTOMATED: CPT

## 2023-10-12 PROCEDURE — 2060000000 HC ICU INTERMEDIATE R&B

## 2023-10-12 PROCEDURE — 80048 BASIC METABOLIC PNL TOTAL CA: CPT

## 2023-10-12 PROCEDURE — 82962 GLUCOSE BLOOD TEST: CPT

## 2023-10-12 PROCEDURE — 2580000003 HC RX 258: Performed by: NURSE PRACTITIONER

## 2023-10-12 PROCEDURE — 36415 COLL VENOUS BLD VENIPUNCTURE: CPT

## 2023-10-12 PROCEDURE — 6360000002 HC RX W HCPCS: Performed by: NURSE PRACTITIONER

## 2023-10-12 PROCEDURE — APPNB30 APP NON BILLABLE TIME 0-30 MINS: Performed by: NURSE PRACTITIONER

## 2023-10-12 PROCEDURE — 84100 ASSAY OF PHOSPHORUS: CPT

## 2023-10-12 PROCEDURE — 83735 ASSAY OF MAGNESIUM: CPT

## 2023-10-12 RX ADMIN — LEVETIRACETAM 500 MG: 500 TABLET, FILM COATED ORAL at 20:52

## 2023-10-12 RX ADMIN — DEXAMETHASONE SODIUM PHOSPHATE 4 MG: 4 INJECTION, SOLUTION INTRAMUSCULAR; INTRAVENOUS at 01:39

## 2023-10-12 RX ADMIN — SODIUM CHLORIDE, PRESERVATIVE FREE 10 ML: 5 INJECTION INTRAVENOUS at 20:52

## 2023-10-12 RX ADMIN — BUTALBITAL, ACETAMINOPHEN, AND CAFFEINE 1 TABLET: 50; 325; 40 TABLET ORAL at 01:42

## 2023-10-12 RX ADMIN — LEVETIRACETAM 500 MG: 500 TABLET, FILM COATED ORAL at 09:24

## 2023-10-12 RX ADMIN — TORSEMIDE 60 MG: 20 TABLET ORAL at 09:24

## 2023-10-12 RX ADMIN — DEXAMETHASONE SODIUM PHOSPHATE 4 MG: 4 INJECTION, SOLUTION INTRAMUSCULAR; INTRAVENOUS at 09:24

## 2023-10-12 RX ADMIN — DEXAMETHASONE SODIUM PHOSPHATE 4 MG: 4 INJECTION, SOLUTION INTRAMUSCULAR; INTRAVENOUS at 13:35

## 2023-10-12 RX ADMIN — LISINOPRIL 40 MG: 20 TABLET ORAL at 09:24

## 2023-10-12 RX ADMIN — PANTOPRAZOLE SODIUM 40 MG: 40 TABLET, DELAYED RELEASE ORAL at 05:40

## 2023-10-12 RX ADMIN — DEXAMETHASONE SODIUM PHOSPHATE 4 MG: 4 INJECTION, SOLUTION INTRAMUSCULAR; INTRAVENOUS at 20:52

## 2023-10-12 RX ADMIN — CARVEDILOL 25 MG: 12.5 TABLET, FILM COATED ORAL at 09:24

## 2023-10-12 ASSESSMENT — PAIN DESCRIPTION - LOCATION: LOCATION: HEAD

## 2023-10-12 ASSESSMENT — PAIN SCALES - GENERAL
PAINLEVEL_OUTOF10: 0
PAINLEVEL_OUTOF10: 0
PAINLEVEL_OUTOF10: 3
PAINLEVEL_OUTOF10: 0

## 2023-10-12 ASSESSMENT — PAIN DESCRIPTION - DESCRIPTORS: DESCRIPTORS: ACHING

## 2023-10-13 PROBLEM — R53.1 UNILATERAL WEAKNESS: Status: ACTIVE | Noted: 2023-10-13

## 2023-10-13 PROBLEM — Z71.89 GOALS OF CARE, COUNSELING/DISCUSSION: Status: ACTIVE | Noted: 2023-10-13

## 2023-10-13 PROBLEM — Z51.5 PALLIATIVE CARE ENCOUNTER: Status: ACTIVE | Noted: 2023-10-13

## 2023-10-13 PROBLEM — Z71.89 ADVANCED CARE PLANNING/COUNSELING DISCUSSION: Status: ACTIVE | Noted: 2023-10-13

## 2023-10-13 PROBLEM — C71.9 GLIOBLASTOMA (HCC): Status: ACTIVE | Noted: 2023-10-13

## 2023-10-13 LAB
ANION GAP SERPL CALC-SCNC: 9 MMOL/L (ref 5–15)
BUN SERPL-MCNC: 39 MG/DL (ref 6–20)
BUN/CREAT SERPL: 25 (ref 12–20)
CALCIUM SERPL-MCNC: 9.2 MG/DL (ref 8.5–10.1)
CHLORIDE SERPL-SCNC: 101 MMOL/L (ref 97–108)
CO2 SERPL-SCNC: 27 MMOL/L (ref 21–32)
CREAT SERPL-MCNC: 1.54 MG/DL (ref 0.7–1.3)
ERYTHROCYTE [DISTWIDTH] IN BLOOD BY AUTOMATED COUNT: 11.9 % (ref 11.5–14.5)
GLUCOSE BLD STRIP.AUTO-MCNC: 104 MG/DL (ref 65–117)
GLUCOSE BLD STRIP.AUTO-MCNC: 126 MG/DL (ref 65–117)
GLUCOSE BLD STRIP.AUTO-MCNC: 130 MG/DL (ref 65–117)
GLUCOSE BLD STRIP.AUTO-MCNC: 153 MG/DL (ref 65–117)
GLUCOSE SERPL-MCNC: 117 MG/DL (ref 65–100)
HCT VFR BLD AUTO: 51.3 % (ref 36.6–50.3)
HGB BLD-MCNC: 16.8 G/DL (ref 12.1–17)
MAGNESIUM SERPL-MCNC: 2.9 MG/DL (ref 1.6–2.4)
MCH RBC QN AUTO: 29.9 PG (ref 26–34)
MCHC RBC AUTO-ENTMCNC: 32.7 G/DL (ref 30–36.5)
MCV RBC AUTO: 91.3 FL (ref 80–99)
NRBC # BLD: 0 K/UL (ref 0–0.01)
NRBC BLD-RTO: 0 PER 100 WBC
PHOSPHATE SERPL-MCNC: 5 MG/DL (ref 2.6–4.7)
PLATELET # BLD AUTO: 367 K/UL (ref 150–400)
PMV BLD AUTO: 10.9 FL (ref 8.9–12.9)
POTASSIUM SERPL-SCNC: 3.7 MMOL/L (ref 3.5–5.1)
RBC # BLD AUTO: 5.62 M/UL (ref 4.1–5.7)
SERVICE CMNT-IMP: ABNORMAL
SERVICE CMNT-IMP: NORMAL
SODIUM SERPL-SCNC: 137 MMOL/L (ref 136–145)
WBC # BLD AUTO: 15.6 K/UL (ref 4.1–11.1)

## 2023-10-13 PROCEDURE — 6370000000 HC RX 637 (ALT 250 FOR IP): Performed by: NURSE PRACTITIONER

## 2023-10-13 PROCEDURE — 2060000000 HC ICU INTERMEDIATE R&B

## 2023-10-13 PROCEDURE — 6360000002 HC RX W HCPCS: Performed by: NURSE PRACTITIONER

## 2023-10-13 PROCEDURE — 84100 ASSAY OF PHOSPHORUS: CPT

## 2023-10-13 PROCEDURE — 80048 BASIC METABOLIC PNL TOTAL CA: CPT

## 2023-10-13 PROCEDURE — 36415 COLL VENOUS BLD VENIPUNCTURE: CPT

## 2023-10-13 PROCEDURE — 2580000003 HC RX 258: Performed by: NURSE PRACTITIONER

## 2023-10-13 PROCEDURE — APPNB30 APP NON BILLABLE TIME 0-30 MINS: Performed by: NURSE PRACTITIONER

## 2023-10-13 PROCEDURE — 83735 ASSAY OF MAGNESIUM: CPT

## 2023-10-13 PROCEDURE — 85027 COMPLETE CBC AUTOMATED: CPT

## 2023-10-13 PROCEDURE — 82962 GLUCOSE BLOOD TEST: CPT

## 2023-10-13 RX ADMIN — LISINOPRIL 40 MG: 20 TABLET ORAL at 09:27

## 2023-10-13 RX ADMIN — DEXAMETHASONE SODIUM PHOSPHATE 4 MG: 4 INJECTION, SOLUTION INTRAMUSCULAR; INTRAVENOUS at 09:27

## 2023-10-13 RX ADMIN — PANTOPRAZOLE SODIUM 40 MG: 40 TABLET, DELAYED RELEASE ORAL at 05:14

## 2023-10-13 RX ADMIN — DEXAMETHASONE SODIUM PHOSPHATE 4 MG: 4 INJECTION, SOLUTION INTRAMUSCULAR; INTRAVENOUS at 14:56

## 2023-10-13 RX ADMIN — DEXAMETHASONE SODIUM PHOSPHATE 4 MG: 4 INJECTION, SOLUTION INTRAMUSCULAR; INTRAVENOUS at 22:10

## 2023-10-13 RX ADMIN — LEVETIRACETAM 500 MG: 500 TABLET, FILM COATED ORAL at 09:27

## 2023-10-13 RX ADMIN — DEXAMETHASONE SODIUM PHOSPHATE 4 MG: 4 INJECTION, SOLUTION INTRAMUSCULAR; INTRAVENOUS at 02:21

## 2023-10-13 RX ADMIN — CARVEDILOL 25 MG: 12.5 TABLET, FILM COATED ORAL at 17:00

## 2023-10-13 RX ADMIN — TORSEMIDE 60 MG: 20 TABLET ORAL at 09:27

## 2023-10-13 RX ADMIN — SODIUM CHLORIDE, PRESERVATIVE FREE 10 ML: 5 INJECTION INTRAVENOUS at 22:11

## 2023-10-13 RX ADMIN — LEVETIRACETAM 500 MG: 500 TABLET, FILM COATED ORAL at 22:10

## 2023-10-14 LAB
ANION GAP SERPL CALC-SCNC: 9 MMOL/L (ref 5–15)
BUN SERPL-MCNC: 51 MG/DL (ref 6–20)
BUN/CREAT SERPL: 31 (ref 12–20)
CALCIUM SERPL-MCNC: 8.7 MG/DL (ref 8.5–10.1)
CHLORIDE SERPL-SCNC: 101 MMOL/L (ref 97–108)
CO2 SERPL-SCNC: 25 MMOL/L (ref 21–32)
CREAT SERPL-MCNC: 1.64 MG/DL (ref 0.7–1.3)
GLUCOSE BLD STRIP.AUTO-MCNC: 105 MG/DL (ref 65–117)
GLUCOSE BLD STRIP.AUTO-MCNC: 117 MG/DL (ref 65–117)
GLUCOSE BLD STRIP.AUTO-MCNC: 118 MG/DL (ref 65–117)
GLUCOSE BLD STRIP.AUTO-MCNC: 180 MG/DL (ref 65–117)
GLUCOSE SERPL-MCNC: 141 MG/DL (ref 65–100)
MAGNESIUM SERPL-MCNC: 3 MG/DL (ref 1.6–2.4)
PHOSPHATE SERPL-MCNC: 5.2 MG/DL (ref 2.6–4.7)
POTASSIUM SERPL-SCNC: 4 MMOL/L (ref 3.5–5.1)
SERVICE CMNT-IMP: ABNORMAL
SERVICE CMNT-IMP: ABNORMAL
SERVICE CMNT-IMP: NORMAL
SERVICE CMNT-IMP: NORMAL
SODIUM SERPL-SCNC: 135 MMOL/L (ref 136–145)

## 2023-10-14 PROCEDURE — 36415 COLL VENOUS BLD VENIPUNCTURE: CPT

## 2023-10-14 PROCEDURE — 84100 ASSAY OF PHOSPHORUS: CPT

## 2023-10-14 PROCEDURE — 2580000003 HC RX 258: Performed by: NURSE PRACTITIONER

## 2023-10-14 PROCEDURE — 6360000002 HC RX W HCPCS: Performed by: NURSE PRACTITIONER

## 2023-10-14 PROCEDURE — 6370000000 HC RX 637 (ALT 250 FOR IP): Performed by: NURSE PRACTITIONER

## 2023-10-14 PROCEDURE — 82962 GLUCOSE BLOOD TEST: CPT

## 2023-10-14 PROCEDURE — 83735 ASSAY OF MAGNESIUM: CPT

## 2023-10-14 PROCEDURE — 94760 N-INVAS EAR/PLS OXIMETRY 1: CPT

## 2023-10-14 PROCEDURE — 2060000000 HC ICU INTERMEDIATE R&B

## 2023-10-14 PROCEDURE — 80048 BASIC METABOLIC PNL TOTAL CA: CPT

## 2023-10-14 RX ADMIN — DEXAMETHASONE SODIUM PHOSPHATE 4 MG: 4 INJECTION, SOLUTION INTRAMUSCULAR; INTRAVENOUS at 02:13

## 2023-10-14 RX ADMIN — DEXAMETHASONE SODIUM PHOSPHATE 4 MG: 4 INJECTION, SOLUTION INTRAMUSCULAR; INTRAVENOUS at 11:01

## 2023-10-14 RX ADMIN — CARVEDILOL 25 MG: 12.5 TABLET, FILM COATED ORAL at 18:08

## 2023-10-14 RX ADMIN — DEXAMETHASONE SODIUM PHOSPHATE 4 MG: 4 INJECTION, SOLUTION INTRAMUSCULAR; INTRAVENOUS at 14:37

## 2023-10-14 RX ADMIN — CARVEDILOL 25 MG: 12.5 TABLET, FILM COATED ORAL at 11:00

## 2023-10-14 RX ADMIN — PANTOPRAZOLE SODIUM 40 MG: 40 TABLET, DELAYED RELEASE ORAL at 06:25

## 2023-10-14 RX ADMIN — BUTALBITAL, ACETAMINOPHEN, AND CAFFEINE 1 TABLET: 50; 325; 40 TABLET ORAL at 22:29

## 2023-10-14 RX ADMIN — LISINOPRIL 40 MG: 20 TABLET ORAL at 11:01

## 2023-10-14 RX ADMIN — LEVETIRACETAM 500 MG: 500 TABLET, FILM COATED ORAL at 11:00

## 2023-10-14 RX ADMIN — DEXAMETHASONE SODIUM PHOSPHATE 4 MG: 4 INJECTION, SOLUTION INTRAMUSCULAR; INTRAVENOUS at 22:31

## 2023-10-14 RX ADMIN — SODIUM CHLORIDE, PRESERVATIVE FREE 10 ML: 5 INJECTION INTRAVENOUS at 11:03

## 2023-10-14 RX ADMIN — SODIUM CHLORIDE, PRESERVATIVE FREE 10 ML: 5 INJECTION INTRAVENOUS at 22:34

## 2023-10-14 RX ADMIN — TORSEMIDE 60 MG: 20 TABLET ORAL at 11:00

## 2023-10-14 RX ADMIN — LEVETIRACETAM 500 MG: 500 TABLET, FILM COATED ORAL at 22:31

## 2023-10-14 ASSESSMENT — PAIN DESCRIPTION - DESCRIPTORS: DESCRIPTORS: ACHING

## 2023-10-14 ASSESSMENT — PAIN SCALES - GENERAL: PAINLEVEL_OUTOF10: 5

## 2023-10-14 ASSESSMENT — PAIN DESCRIPTION - LOCATION: LOCATION: HEAD

## 2023-10-15 LAB
ANION GAP SERPL CALC-SCNC: 9 MMOL/L (ref 5–15)
BUN SERPL-MCNC: 53 MG/DL (ref 6–20)
BUN/CREAT SERPL: 30 (ref 12–20)
CALCIUM SERPL-MCNC: 9.1 MG/DL (ref 8.5–10.1)
CHLORIDE SERPL-SCNC: 100 MMOL/L (ref 97–108)
CO2 SERPL-SCNC: 26 MMOL/L (ref 21–32)
CREAT SERPL-MCNC: 1.77 MG/DL (ref 0.7–1.3)
GLUCOSE BLD STRIP.AUTO-MCNC: 123 MG/DL (ref 65–117)
GLUCOSE BLD STRIP.AUTO-MCNC: 140 MG/DL (ref 65–117)
GLUCOSE BLD STRIP.AUTO-MCNC: 169 MG/DL (ref 65–117)
GLUCOSE BLD STRIP.AUTO-MCNC: 232 MG/DL (ref 65–117)
GLUCOSE SERPL-MCNC: 139 MG/DL (ref 65–100)
MAGNESIUM SERPL-MCNC: 2.8 MG/DL (ref 1.6–2.4)
PHOSPHATE SERPL-MCNC: 5.1 MG/DL (ref 2.6–4.7)
POTASSIUM SERPL-SCNC: 4 MMOL/L (ref 3.5–5.1)
SERVICE CMNT-IMP: ABNORMAL
SODIUM SERPL-SCNC: 135 MMOL/L (ref 136–145)

## 2023-10-15 PROCEDURE — 80048 BASIC METABOLIC PNL TOTAL CA: CPT

## 2023-10-15 PROCEDURE — 82962 GLUCOSE BLOOD TEST: CPT

## 2023-10-15 PROCEDURE — 6370000000 HC RX 637 (ALT 250 FOR IP): Performed by: NURSE PRACTITIONER

## 2023-10-15 PROCEDURE — 2580000003 HC RX 258: Performed by: NURSE PRACTITIONER

## 2023-10-15 PROCEDURE — 84100 ASSAY OF PHOSPHORUS: CPT

## 2023-10-15 PROCEDURE — 83735 ASSAY OF MAGNESIUM: CPT

## 2023-10-15 PROCEDURE — 36415 COLL VENOUS BLD VENIPUNCTURE: CPT

## 2023-10-15 PROCEDURE — 2060000000 HC ICU INTERMEDIATE R&B

## 2023-10-15 PROCEDURE — 6360000002 HC RX W HCPCS: Performed by: NURSE PRACTITIONER

## 2023-10-15 RX ORDER — LOPERAMIDE HYDROCHLORIDE 2 MG/1
2 CAPSULE ORAL ONCE
Status: COMPLETED | OUTPATIENT
Start: 2023-10-15 | End: 2023-10-15

## 2023-10-15 RX ADMIN — DEXAMETHASONE SODIUM PHOSPHATE 4 MG: 4 INJECTION, SOLUTION INTRAMUSCULAR; INTRAVENOUS at 03:05

## 2023-10-15 RX ADMIN — LOPERAMIDE HYDROCHLORIDE 2 MG: 2 CAPSULE ORAL at 01:05

## 2023-10-15 RX ADMIN — SODIUM CHLORIDE, PRESERVATIVE FREE 10 ML: 5 INJECTION INTRAVENOUS at 09:03

## 2023-10-15 RX ADMIN — Medication 1 UNITS: at 12:10

## 2023-10-15 RX ADMIN — SODIUM CHLORIDE, PRESERVATIVE FREE 10 ML: 5 INJECTION INTRAVENOUS at 21:12

## 2023-10-15 RX ADMIN — LEVETIRACETAM 500 MG: 500 TABLET, FILM COATED ORAL at 08:48

## 2023-10-15 RX ADMIN — DEXAMETHASONE SODIUM PHOSPHATE 4 MG: 4 INJECTION, SOLUTION INTRAMUSCULAR; INTRAVENOUS at 08:46

## 2023-10-15 RX ADMIN — BUTALBITAL, ACETAMINOPHEN, AND CAFFEINE 1 TABLET: 50; 325; 40 TABLET ORAL at 08:58

## 2023-10-15 RX ADMIN — LEVETIRACETAM 500 MG: 500 TABLET, FILM COATED ORAL at 21:11

## 2023-10-15 RX ADMIN — PANTOPRAZOLE SODIUM 40 MG: 40 TABLET, DELAYED RELEASE ORAL at 06:40

## 2023-10-15 RX ADMIN — DEXAMETHASONE SODIUM PHOSPHATE 4 MG: 4 INJECTION, SOLUTION INTRAMUSCULAR; INTRAVENOUS at 21:11

## 2023-10-15 RX ADMIN — DEXAMETHASONE SODIUM PHOSPHATE 4 MG: 4 INJECTION, SOLUTION INTRAMUSCULAR; INTRAVENOUS at 14:21

## 2023-10-16 ENCOUNTER — ANESTHESIA (OUTPATIENT)
Facility: HOSPITAL | Age: 48
DRG: 021 | End: 2023-10-16
Payer: MEDICAID

## 2023-10-16 ENCOUNTER — ANESTHESIA EVENT (OUTPATIENT)
Facility: HOSPITAL | Age: 48
DRG: 021 | End: 2023-10-16
Payer: MEDICAID

## 2023-10-16 ENCOUNTER — APPOINTMENT (OUTPATIENT)
Facility: HOSPITAL | Age: 48
DRG: 021 | End: 2023-10-16
Attending: INTERNAL MEDICINE
Payer: MEDICAID

## 2023-10-16 LAB
ABO + RH BLD: NORMAL
BLOOD GROUP ANTIBODIES SERPL: NORMAL
COMMENT:: NORMAL
GLUCOSE BLD STRIP.AUTO-MCNC: 117 MG/DL (ref 65–117)
GLUCOSE BLD STRIP.AUTO-MCNC: 119 MG/DL (ref 65–117)
GLUCOSE BLD STRIP.AUTO-MCNC: 122 MG/DL (ref 65–117)
GLUCOSE BLD STRIP.AUTO-MCNC: 127 MG/DL (ref 65–117)
SERVICE CMNT-IMP: ABNORMAL
SERVICE CMNT-IMP: NORMAL
SPECIMEN EXP DATE BLD: NORMAL
SPECIMEN HOLD: NORMAL

## 2023-10-16 PROCEDURE — P9045 ALBUMIN (HUMAN), 5%, 250 ML: HCPCS

## 2023-10-16 PROCEDURE — 6370000000 HC RX 637 (ALT 250 FOR IP)

## 2023-10-16 PROCEDURE — 6360000002 HC RX W HCPCS

## 2023-10-16 PROCEDURE — 2580000003 HC RX 258: Performed by: ANESTHESIOLOGY

## 2023-10-16 PROCEDURE — 88331 PATH CONSLTJ SURG 1 BLK 1SPC: CPT

## 2023-10-16 PROCEDURE — 2580000003 HC RX 258: Performed by: NURSE PRACTITIONER

## 2023-10-16 PROCEDURE — 6370000000 HC RX 637 (ALT 250 FOR IP): Performed by: NURSE PRACTITIONER

## 2023-10-16 PROCEDURE — 6360000002 HC RX W HCPCS: Performed by: NURSE PRACTITIONER

## 2023-10-16 PROCEDURE — C1713 ANCHOR/SCREW BN/BN,TIS/BN: HCPCS | Performed by: NEUROLOGICAL SURGERY

## 2023-10-16 PROCEDURE — 2500000003 HC RX 250 WO HCPCS

## 2023-10-16 PROCEDURE — 86900 BLOOD TYPING SEROLOGIC ABO: CPT

## 2023-10-16 PROCEDURE — 36415 COLL VENOUS BLD VENIPUNCTURE: CPT

## 2023-10-16 PROCEDURE — 3700000000 HC ANESTHESIA ATTENDED CARE: Performed by: NEUROLOGICAL SURGERY

## 2023-10-16 PROCEDURE — 82962 GLUCOSE BLOOD TEST: CPT

## 2023-10-16 PROCEDURE — 6370000000 HC RX 637 (ALT 250 FOR IP): Performed by: NEUROLOGICAL SURGERY

## 2023-10-16 PROCEDURE — 3600000005 HC SURGERY LEVEL 5 BASE: Performed by: NEUROLOGICAL SURGERY

## 2023-10-16 PROCEDURE — 5A1935Z RESPIRATORY VENTILATION, LESS THAN 24 CONSECUTIVE HOURS: ICD-10-PCS | Performed by: NEUROLOGICAL SURGERY

## 2023-10-16 PROCEDURE — 86850 RBC ANTIBODY SCREEN: CPT

## 2023-10-16 PROCEDURE — 2580000003 HC RX 258

## 2023-10-16 PROCEDURE — APPNB30 APP NON BILLABLE TIME 0-30 MINS: Performed by: NURSE PRACTITIONER

## 2023-10-16 PROCEDURE — 6360000002 HC RX W HCPCS: Performed by: ANESTHESIOLOGY

## 2023-10-16 PROCEDURE — 70450 CT HEAD/BRAIN W/O DYE: CPT

## 2023-10-16 PROCEDURE — 7100000001 HC PACU RECOVERY - ADDTL 15 MIN: Performed by: NEUROLOGICAL SURGERY

## 2023-10-16 PROCEDURE — 2580000003 HC RX 258: Performed by: NEUROLOGICAL SURGERY

## 2023-10-16 PROCEDURE — 2500000003 HC RX 250 WO HCPCS: Performed by: NURSE PRACTITIONER

## 2023-10-16 PROCEDURE — 3600000015 HC SURGERY LEVEL 5 ADDTL 15MIN: Performed by: NEUROLOGICAL SURGERY

## 2023-10-16 PROCEDURE — 2500000003 HC RX 250 WO HCPCS: Performed by: NEUROLOGICAL SURGERY

## 2023-10-16 PROCEDURE — 00B00ZZ EXCISION OF BRAIN, OPEN APPROACH: ICD-10-PCS | Performed by: NEUROLOGICAL SURGERY

## 2023-10-16 PROCEDURE — 88307 TISSUE EXAM BY PATHOLOGIST: CPT

## 2023-10-16 PROCEDURE — 7100000000 HC PACU RECOVERY - FIRST 15 MIN: Performed by: NEUROLOGICAL SURGERY

## 2023-10-16 PROCEDURE — 86901 BLOOD TYPING SEROLOGIC RH(D): CPT

## 2023-10-16 PROCEDURE — 8E09XBZ COMPUTER ASSISTED PROCEDURE OF HEAD AND NECK REGION: ICD-10-PCS | Performed by: NEUROLOGICAL SURGERY

## 2023-10-16 PROCEDURE — 2709999900 HC NON-CHARGEABLE SUPPLY: Performed by: NEUROLOGICAL SURGERY

## 2023-10-16 PROCEDURE — 2060000000 HC ICU INTERMEDIATE R&B

## 2023-10-16 PROCEDURE — 3700000001 HC ADD 15 MINUTES (ANESTHESIA): Performed by: NEUROLOGICAL SURGERY

## 2023-10-16 DEVICE — COVER BUR H L DIA18.5MM THK0.5MM 5 H NEURO TI W/O TAB: Type: IMPLANTABLE DEVICE | Site: BRAIN | Status: FUNCTIONAL

## 2023-10-16 DEVICE — PLATE BONE LNG L16MM THK0.6MM 2 H TI STR FOR 1.5MM SCR: Type: IMPLANTABLE DEVICE | Site: BRAIN | Status: FUNCTIONAL

## 2023-10-16 DEVICE — DURAGEN® PLUS DURAL REGENERATION MATRIX, 3 IN X 3 IN (7.5 CM X 7.5 CM)
Type: IMPLANTABLE DEVICE | Site: BRAIN | Status: FUNCTIONAL
Brand: DURAGEN® PLUS

## 2023-10-16 DEVICE — SCREW BNE L4MM DIA1.5MM CORT MAXILLOMANDIBULAR GRN TI SELF: Type: IMPLANTABLE DEVICE | Site: BRAIN | Status: FUNCTIONAL

## 2023-10-16 RX ORDER — BACITRACIN ZINC 500 [USP'U]/G
OINTMENT TOPICAL PRN
Status: DISCONTINUED | OUTPATIENT
Start: 2023-10-16 | End: 2023-10-17 | Stop reason: HOSPADM

## 2023-10-16 RX ORDER — MEPERIDINE HYDROCHLORIDE 25 MG/ML
12.5 INJECTION INTRAMUSCULAR; INTRAVENOUS; SUBCUTANEOUS EVERY 5 MIN PRN
Status: DISCONTINUED | OUTPATIENT
Start: 2023-10-16 | End: 2023-10-17 | Stop reason: HOSPADM

## 2023-10-16 RX ORDER — CEFAZOLIN SODIUM 1 G/3ML
INJECTION, POWDER, FOR SOLUTION INTRAMUSCULAR; INTRAVENOUS PRN
Status: DISCONTINUED | OUTPATIENT
Start: 2023-10-16 | End: 2023-10-16 | Stop reason: SDUPTHER

## 2023-10-16 RX ORDER — SODIUM CHLORIDE 9 MG/ML
INJECTION, SOLUTION INTRAVENOUS PRN
Status: DISCONTINUED | OUTPATIENT
Start: 2023-10-16 | End: 2023-10-17 | Stop reason: HOSPADM

## 2023-10-16 RX ORDER — FENTANYL CITRATE 50 UG/ML
100 INJECTION, SOLUTION INTRAMUSCULAR; INTRAVENOUS
Status: DISCONTINUED | OUTPATIENT
Start: 2023-10-16 | End: 2023-10-17 | Stop reason: HOSPADM

## 2023-10-16 RX ORDER — SODIUM CHLORIDE, SODIUM LACTATE, POTASSIUM CHLORIDE, CALCIUM CHLORIDE 600; 310; 30; 20 MG/100ML; MG/100ML; MG/100ML; MG/100ML
INJECTION, SOLUTION INTRAVENOUS CONTINUOUS
Status: DISCONTINUED | OUTPATIENT
Start: 2023-10-16 | End: 2023-10-17 | Stop reason: HOSPADM

## 2023-10-16 RX ORDER — ONDANSETRON 2 MG/ML
INJECTION INTRAMUSCULAR; INTRAVENOUS PRN
Status: DISCONTINUED | OUTPATIENT
Start: 2023-10-16 | End: 2023-10-16 | Stop reason: SDUPTHER

## 2023-10-16 RX ORDER — LIDOCAINE HYDROCHLORIDE 20 MG/ML
INJECTION, SOLUTION EPIDURAL; INFILTRATION; INTRACAUDAL; PERINEURAL PRN
Status: DISCONTINUED | OUTPATIENT
Start: 2023-10-16 | End: 2023-10-16 | Stop reason: SDUPTHER

## 2023-10-16 RX ORDER — HYDROMORPHONE HYDROCHLORIDE 1 MG/ML
0.25 INJECTION, SOLUTION INTRAMUSCULAR; INTRAVENOUS; SUBCUTANEOUS EVERY 5 MIN PRN
Status: COMPLETED | OUTPATIENT
Start: 2023-10-16 | End: 2023-10-17

## 2023-10-16 RX ORDER — MIDAZOLAM HYDROCHLORIDE 2 MG/2ML
2 INJECTION, SOLUTION INTRAMUSCULAR; INTRAVENOUS
Status: DISCONTINUED | OUTPATIENT
Start: 2023-10-16 | End: 2023-10-17 | Stop reason: HOSPADM

## 2023-10-16 RX ORDER — DEXAMETHASONE SODIUM PHOSPHATE 4 MG/ML
INJECTION, SOLUTION INTRA-ARTICULAR; INTRALESIONAL; INTRAMUSCULAR; INTRAVENOUS; SOFT TISSUE PRN
Status: DISCONTINUED | OUTPATIENT
Start: 2023-10-16 | End: 2023-10-16 | Stop reason: SDUPTHER

## 2023-10-16 RX ORDER — PROPOFOL 10 MG/ML
INJECTION, EMULSION INTRAVENOUS PRN
Status: DISCONTINUED | OUTPATIENT
Start: 2023-10-16 | End: 2023-10-16 | Stop reason: SDUPTHER

## 2023-10-16 RX ORDER — ONDANSETRON 2 MG/ML
4 INJECTION INTRAMUSCULAR; INTRAVENOUS ONCE
Status: DISCONTINUED | OUTPATIENT
Start: 2023-10-16 | End: 2023-10-17 | Stop reason: HOSPADM

## 2023-10-16 RX ORDER — HYDRALAZINE HYDROCHLORIDE 20 MG/ML
10 INJECTION INTRAMUSCULAR; INTRAVENOUS
Status: DISCONTINUED | OUTPATIENT
Start: 2023-10-16 | End: 2023-10-17 | Stop reason: HOSPADM

## 2023-10-16 RX ORDER — LEVETIRACETAM 100 MG/ML
SOLUTION ORAL PRN
Status: DISCONTINUED | OUTPATIENT
Start: 2023-10-16 | End: 2023-10-16 | Stop reason: SDUPTHER

## 2023-10-16 RX ORDER — SUCCINYLCHOLINE/SOD CL,ISO/PF 200MG/10ML
SYRINGE (ML) INTRAVENOUS PRN
Status: DISCONTINUED | OUTPATIENT
Start: 2023-10-16 | End: 2023-10-16 | Stop reason: SDUPTHER

## 2023-10-16 RX ORDER — SODIUM CHLORIDE 9 MG/ML
INJECTION, SOLUTION INTRAVENOUS CONTINUOUS
Status: DISCONTINUED | OUTPATIENT
Start: 2023-10-16 | End: 2023-10-17 | Stop reason: HOSPADM

## 2023-10-16 RX ORDER — PHENYLEPHRINE HCL IN 0.9% NACL 0.4MG/10ML
SYRINGE (ML) INTRAVENOUS PRN
Status: DISCONTINUED | OUTPATIENT
Start: 2023-10-16 | End: 2023-10-16 | Stop reason: SDUPTHER

## 2023-10-16 RX ORDER — MANNITOL 20 G/100ML
INJECTION, SOLUTION INTRAVENOUS PRN
Status: DISCONTINUED | OUTPATIENT
Start: 2023-10-16 | End: 2023-10-16 | Stop reason: SDUPTHER

## 2023-10-16 RX ORDER — LEVETIRACETAM 500 MG/5ML
1000 INJECTION, SOLUTION, CONCENTRATE INTRAVENOUS EVERY 12 HOURS
Status: DISCONTINUED | OUTPATIENT
Start: 2023-10-17 | End: 2023-10-17

## 2023-10-16 RX ORDER — ROCURONIUM BROMIDE 10 MG/ML
INJECTION, SOLUTION INTRAVENOUS PRN
Status: DISCONTINUED | OUTPATIENT
Start: 2023-10-16 | End: 2023-10-16 | Stop reason: SDUPTHER

## 2023-10-16 RX ORDER — SODIUM CHLORIDE 0.9 % (FLUSH) 0.9 %
5-40 SYRINGE (ML) INJECTION EVERY 12 HOURS SCHEDULED
Status: DISCONTINUED | OUTPATIENT
Start: 2023-10-16 | End: 2023-10-17 | Stop reason: HOSPADM

## 2023-10-16 RX ORDER — SODIUM CHLORIDE 0.9 % (FLUSH) 0.9 %
5-40 SYRINGE (ML) INJECTION PRN
Status: DISCONTINUED | OUTPATIENT
Start: 2023-10-16 | End: 2023-10-17 | Stop reason: HOSPADM

## 2023-10-16 RX ORDER — FENTANYL CITRATE 50 UG/ML
INJECTION, SOLUTION INTRAMUSCULAR; INTRAVENOUS PRN
Status: DISCONTINUED | OUTPATIENT
Start: 2023-10-16 | End: 2023-10-16 | Stop reason: SDUPTHER

## 2023-10-16 RX ORDER — BUPIVACAINE HYDROCHLORIDE AND EPINEPHRINE 5; 5 MG/ML; UG/ML
INJECTION, SOLUTION EPIDURAL; INTRACAUDAL; PERINEURAL PRN
Status: DISCONTINUED | OUTPATIENT
Start: 2023-10-16 | End: 2023-10-17 | Stop reason: HOSPADM

## 2023-10-16 RX ORDER — SODIUM CHLORIDE 9 MG/ML
INJECTION, SOLUTION INTRAVENOUS CONTINUOUS PRN
Status: DISCONTINUED | OUTPATIENT
Start: 2023-10-16 | End: 2023-10-16 | Stop reason: SDUPTHER

## 2023-10-16 RX ORDER — LABETALOL HYDROCHLORIDE 5 MG/ML
INJECTION, SOLUTION INTRAVENOUS PRN
Status: DISCONTINUED | OUTPATIENT
Start: 2023-10-16 | End: 2023-10-16 | Stop reason: SDUPTHER

## 2023-10-16 RX ORDER — SODIUM CHLORIDE 9 MG/ML
INJECTION, SOLUTION INTRAVENOUS CONTINUOUS
Status: DISCONTINUED | OUTPATIENT
Start: 2023-10-16 | End: 2023-10-17

## 2023-10-16 RX ORDER — DIPHENHYDRAMINE HYDROCHLORIDE 50 MG/ML
12.5 INJECTION INTRAMUSCULAR; INTRAVENOUS
Status: DISCONTINUED | OUTPATIENT
Start: 2023-10-16 | End: 2023-10-17 | Stop reason: HOSPADM

## 2023-10-16 RX ORDER — PROCHLORPERAZINE EDISYLATE 5 MG/ML
5 INJECTION INTRAMUSCULAR; INTRAVENOUS
Status: DISCONTINUED | OUTPATIENT
Start: 2023-10-16 | End: 2023-10-17 | Stop reason: HOSPADM

## 2023-10-16 RX ORDER — FENTANYL CITRATE 50 UG/ML
50 INJECTION, SOLUTION INTRAMUSCULAR; INTRAVENOUS EVERY 5 MIN PRN
Status: COMPLETED | OUTPATIENT
Start: 2023-10-16 | End: 2023-10-16

## 2023-10-16 RX ORDER — ALBUMIN, HUMAN INJ 5% 5 %
SOLUTION INTRAVENOUS PRN
Status: DISCONTINUED | OUTPATIENT
Start: 2023-10-16 | End: 2023-10-16 | Stop reason: SDUPTHER

## 2023-10-16 RX ORDER — ONDANSETRON 2 MG/ML
4 INJECTION INTRAMUSCULAR; INTRAVENOUS
Status: DISCONTINUED | OUTPATIENT
Start: 2023-10-16 | End: 2023-10-17 | Stop reason: HOSPADM

## 2023-10-16 RX ORDER — SODIUM CHLORIDE, SODIUM LACTATE, POTASSIUM CHLORIDE, CALCIUM CHLORIDE 600; 310; 30; 20 MG/100ML; MG/100ML; MG/100ML; MG/100ML
INJECTION, SOLUTION INTRAVENOUS CONTINUOUS
Status: DISCONTINUED | OUTPATIENT
Start: 2023-10-16 | End: 2023-10-17

## 2023-10-16 RX ADMIN — HYDROMORPHONE HYDROCHLORIDE 0.25 MG: 1 INJECTION, SOLUTION INTRAMUSCULAR; INTRAVENOUS; SUBCUTANEOUS at 22:55

## 2023-10-16 RX ADMIN — NICARDIPINE HYDROCHLORIDE 10 MG/HR: 25 INJECTION, SOLUTION INTRAVENOUS at 20:52

## 2023-10-16 RX ADMIN — ROCURONIUM BROMIDE 40 MG: 10 INJECTION, SOLUTION INTRAVENOUS at 13:52

## 2023-10-16 RX ADMIN — Medication 40 MCG: at 14:43

## 2023-10-16 RX ADMIN — SODIUM CHLORIDE: 9 INJECTION, SOLUTION INTRAVENOUS at 13:42

## 2023-10-16 RX ADMIN — ROCURONIUM BROMIDE 10 MG: 10 INJECTION, SOLUTION INTRAVENOUS at 19:30

## 2023-10-16 RX ADMIN — ROCURONIUM BROMIDE 20 MG: 10 INJECTION, SOLUTION INTRAVENOUS at 17:35

## 2023-10-16 RX ADMIN — LABETALOL HYDROCHLORIDE 10 MG: 5 INJECTION INTRAVENOUS at 21:28

## 2023-10-16 RX ADMIN — SODIUM CHLORIDE, PRESERVATIVE FREE 10 ML: 5 INJECTION INTRAVENOUS at 23:29

## 2023-10-16 RX ADMIN — DEXAMETHASONE SODIUM PHOSPHATE 4 MG: 4 INJECTION, SOLUTION INTRAMUSCULAR; INTRAVENOUS at 23:25

## 2023-10-16 RX ADMIN — PANTOPRAZOLE SODIUM 40 MG: 40 TABLET, DELAYED RELEASE ORAL at 07:26

## 2023-10-16 RX ADMIN — CEFAZOLIN 3 G: 1 INJECTION, POWDER, FOR SOLUTION INTRAMUSCULAR; INTRAVENOUS at 18:10

## 2023-10-16 RX ADMIN — ROCURONIUM BROMIDE 10 MG: 10 INJECTION, SOLUTION INTRAVENOUS at 19:00

## 2023-10-16 RX ADMIN — HYDROMORPHONE HYDROCHLORIDE 0.25 MG: 1 INJECTION, SOLUTION INTRAMUSCULAR; INTRAVENOUS; SUBCUTANEOUS at 20:31

## 2023-10-16 RX ADMIN — LEVETIRACETAM 500 MG: 500 TABLET, FILM COATED ORAL at 08:18

## 2023-10-16 RX ADMIN — ROCURONIUM BROMIDE 20 MG: 10 INJECTION, SOLUTION INTRAVENOUS at 15:28

## 2023-10-16 RX ADMIN — CEFAZOLIN 3 G: 1 INJECTION, POWDER, FOR SOLUTION INTRAMUSCULAR; INTRAVENOUS at 14:10

## 2023-10-16 RX ADMIN — FENTANYL CITRATE 50 MCG: 50 INJECTION INTRAMUSCULAR; INTRAVENOUS at 22:13

## 2023-10-16 RX ADMIN — SODIUM CHLORIDE, PRESERVATIVE FREE 10 ML: 5 INJECTION INTRAVENOUS at 08:16

## 2023-10-16 RX ADMIN — SODIUM CHLORIDE: 9 INJECTION, SOLUTION INTRAVENOUS at 18:59

## 2023-10-16 RX ADMIN — ROCURONIUM BROMIDE 50 MG: 10 INJECTION, SOLUTION INTRAVENOUS at 14:35

## 2023-10-16 RX ADMIN — SODIUM CHLORIDE, POTASSIUM CHLORIDE, SODIUM LACTATE AND CALCIUM CHLORIDE: 600; 310; 30; 20 INJECTION, SOLUTION INTRAVENOUS at 12:11

## 2023-10-16 RX ADMIN — PHENYLEPHRINE HYDROCHLORIDE 40 MCG/MIN: 10 INJECTION INTRAVENOUS at 13:49

## 2023-10-16 RX ADMIN — HYDROMORPHONE HYDROCHLORIDE 0.25 MG: 1 INJECTION, SOLUTION INTRAMUSCULAR; INTRAVENOUS; SUBCUTANEOUS at 21:09

## 2023-10-16 RX ADMIN — DEXAMETHASONE SODIUM PHOSPHATE 4 MG: 4 INJECTION, SOLUTION INTRAMUSCULAR; INTRAVENOUS at 01:55

## 2023-10-16 RX ADMIN — DEXAMETHASONE SODIUM PHOSPHATE 10 MG: 4 INJECTION, SOLUTION INTRAMUSCULAR; INTRAVENOUS at 13:55

## 2023-10-16 RX ADMIN — ROCURONIUM BROMIDE 10 MG: 10 INJECTION, SOLUTION INTRAVENOUS at 18:30

## 2023-10-16 RX ADMIN — Medication 120 MCG: at 13:49

## 2023-10-16 RX ADMIN — ROCURONIUM BROMIDE 10 MG: 10 INJECTION, SOLUTION INTRAVENOUS at 18:08

## 2023-10-16 RX ADMIN — MANNITOL 100 G: 20 INJECTION, SOLUTION INTRAVENOUS at 14:00

## 2023-10-16 RX ADMIN — Medication 80 MCG: at 20:37

## 2023-10-16 RX ADMIN — ALBUMIN (HUMAN) 12.5 G: 12.5 INJECTION, SOLUTION INTRAVENOUS at 16:53

## 2023-10-16 RX ADMIN — SUGAMMADEX 250 MG: 100 INJECTION, SOLUTION INTRAVENOUS at 20:48

## 2023-10-16 RX ADMIN — ONDANSETRON 4 MG: 2 INJECTION INTRAMUSCULAR; INTRAVENOUS at 20:16

## 2023-10-16 RX ADMIN — LEVETIRACETAM 500 MG: 100 SOLUTION ORAL at 13:55

## 2023-10-16 RX ADMIN — Medication 40 MCG: at 15:03

## 2023-10-16 RX ADMIN — SODIUM CHLORIDE, PRESERVATIVE FREE 10 ML: 5 INJECTION INTRAVENOUS at 08:17

## 2023-10-16 RX ADMIN — SODIUM CHLORIDE, POTASSIUM CHLORIDE, SODIUM LACTATE AND CALCIUM CHLORIDE: 600; 310; 30; 20 INJECTION, SOLUTION INTRAVENOUS at 13:37

## 2023-10-16 RX ADMIN — ROCURONIUM BROMIDE 10 MG: 10 INJECTION, SOLUTION INTRAVENOUS at 16:25

## 2023-10-16 RX ADMIN — LISINOPRIL 40 MG: 20 TABLET ORAL at 08:19

## 2023-10-16 RX ADMIN — PROPOFOL 50 MG: 10 INJECTION, EMULSION INTRAVENOUS at 13:52

## 2023-10-16 RX ADMIN — Medication 140 MG: at 13:49

## 2023-10-16 RX ADMIN — DEXAMETHASONE SODIUM PHOSPHATE 4 MG: 4 INJECTION, SOLUTION INTRAMUSCULAR; INTRAVENOUS at 08:16

## 2023-10-16 RX ADMIN — SODIUM CHLORIDE, POTASSIUM CHLORIDE, SODIUM LACTATE AND CALCIUM CHLORIDE: 600; 310; 30; 20 INJECTION, SOLUTION INTRAVENOUS at 20:18

## 2023-10-16 RX ADMIN — PROPOFOL 150 MG: 10 INJECTION, EMULSION INTRAVENOUS at 13:49

## 2023-10-16 RX ADMIN — LIDOCAINE HYDROCHLORIDE 100 MG: 20 INJECTION, SOLUTION EPIDURAL; INFILTRATION; INTRACAUDAL; PERINEURAL at 13:49

## 2023-10-16 RX ADMIN — PROPOFOL 80 MG: 10 INJECTION, EMULSION INTRAVENOUS at 14:14

## 2023-10-16 RX ADMIN — FENTANYL CITRATE 50 MCG: 50 INJECTION INTRAMUSCULAR; INTRAVENOUS at 21:44

## 2023-10-16 RX ADMIN — ROCURONIUM BROMIDE 10 MG: 10 INJECTION, SOLUTION INTRAVENOUS at 13:49

## 2023-10-16 RX ADMIN — HYDROMORPHONE HYDROCHLORIDE 0.5 MG: 1 INJECTION, SOLUTION INTRAMUSCULAR; INTRAVENOUS; SUBCUTANEOUS at 21:22

## 2023-10-16 RX ADMIN — ROCURONIUM BROMIDE 20 MG: 10 INJECTION, SOLUTION INTRAVENOUS at 15:56

## 2023-10-16 RX ADMIN — FENTANYL CITRATE 250 MCG: 0.05 INJECTION, SOLUTION INTRAMUSCULAR; INTRAVENOUS at 13:49

## 2023-10-16 RX ADMIN — LIDOCAINE HYDROCHLORIDE 60 MG: 20 INJECTION, SOLUTION EPIDURAL; INFILTRATION; INTRACAUDAL; PERINEURAL at 20:16

## 2023-10-16 RX ADMIN — SODIUM CHLORIDE: 9 INJECTION, SOLUTION INTRAVENOUS at 21:59

## 2023-10-16 RX ADMIN — TORSEMIDE 60 MG: 20 TABLET ORAL at 08:18

## 2023-10-16 RX ADMIN — ROCURONIUM BROMIDE 10 MG: 10 INJECTION, SOLUTION INTRAVENOUS at 19:56

## 2023-10-16 RX ADMIN — ROCURONIUM BROMIDE 20 MG: 10 INJECTION, SOLUTION INTRAVENOUS at 16:52

## 2023-10-16 ASSESSMENT — PAIN SCALES - GENERAL
PAINLEVEL_OUTOF10: 6
PAINLEVEL_OUTOF10: 10
PAINLEVEL_OUTOF10: 7

## 2023-10-16 ASSESSMENT — PAIN DESCRIPTION - DESCRIPTORS
DESCRIPTORS: OTHER (COMMENT)
DESCRIPTORS: ACHING
DESCRIPTORS: ACHING

## 2023-10-16 ASSESSMENT — PAIN DESCRIPTION - LOCATION
LOCATION: ARM
LOCATION: HEAD
LOCATION: ARM

## 2023-10-16 ASSESSMENT — PAIN DESCRIPTION - ORIENTATION
ORIENTATION: RIGHT
ORIENTATION: RIGHT

## 2023-10-16 NOTE — ANESTHESIA PROCEDURE NOTES
Arterial Line:    An arterial line was placed using surface landmarks, in the pre-op for the following indication(s): continuous blood pressure monitoring and blood sampling needed. A 20 gauge (size) (length), Arrow (type) catheter was placed, Seldinger technique used, into the right radial artery, secured by Tegaderm. Anesthesia type: Local  Local infiltration: Injection    Events:  patient tolerated procedure well with no complications. 10/16/2023 2:07 PM  Anesthesiologist: Zev Bradley MD  Performed: Anesthesiologist   Preanesthetic Checklist  Completed: patient identified, IV checked, site marked, risks and benefits discussed, surgical/procedural consents, equipment checked, pre-op evaluation, timeout performed, anesthesia consent given, oxygen available, monitors applied/VS acknowledged and fire risk safety assessment completed and verbalized

## 2023-10-16 NOTE — FLOWSHEET NOTE
10/16/23 1832   Family Communication    Relationship to Patient Sister  (Marcelle Lord)    Phone Number [phone]   Family/Significant Other Update Called   Delivery Origin Nurse   Message Disposition Family present - message delivered   Update Given Yes   Family Communication   Family Update Message Surgeon working; Will update in 1-2 hours

## 2023-10-16 NOTE — PERIOP NOTE
SURGICEL added to sterile field to be used by MD during case PRN    Ref: [id number]  Lot: [id number]  Exp: 08-    SURGIFOAM added to sterile field to be used by MD during case PRN    Ref: [id number]  Lot: [id number]  Exp: 7-    FLOSEAL added to sterile field to be used by MD during case PRN    Ref: [id number]  Lot: [id number]  Exp: 03-

## 2023-10-17 ENCOUNTER — APPOINTMENT (OUTPATIENT)
Facility: HOSPITAL | Age: 48
DRG: 021 | End: 2023-10-17
Attending: INTERNAL MEDICINE
Payer: MEDICAID

## 2023-10-17 LAB
ANION GAP SERPL CALC-SCNC: 5 MMOL/L (ref 5–15)
BASOPHILS # BLD: 0 K/UL (ref 0–0.1)
BASOPHILS NFR BLD: 0 % (ref 0–1)
BUN SERPL-MCNC: 26 MG/DL (ref 6–20)
BUN/CREAT SERPL: 22 (ref 12–20)
CALCIUM SERPL-MCNC: 7.7 MG/DL (ref 8.5–10.1)
CHLORIDE SERPL-SCNC: 107 MMOL/L (ref 97–108)
CO2 SERPL-SCNC: 31 MMOL/L (ref 21–32)
CREAT SERPL-MCNC: 1.17 MG/DL (ref 0.7–1.3)
DIFFERENTIAL METHOD BLD: ABNORMAL
EOSINOPHIL # BLD: 0 K/UL (ref 0–0.4)
EOSINOPHIL NFR BLD: 0 % (ref 0–7)
ERYTHROCYTE [DISTWIDTH] IN BLOOD BY AUTOMATED COUNT: 11.9 % (ref 11.5–14.5)
GLUCOSE BLD STRIP.AUTO-MCNC: 112 MG/DL (ref 65–117)
GLUCOSE BLD STRIP.AUTO-MCNC: 124 MG/DL (ref 65–117)
GLUCOSE BLD STRIP.AUTO-MCNC: 129 MG/DL (ref 65–117)
GLUCOSE BLD STRIP.AUTO-MCNC: 159 MG/DL (ref 65–117)
GLUCOSE SERPL-MCNC: 147 MG/DL (ref 65–100)
HCT VFR BLD AUTO: 44.5 % (ref 36.6–50.3)
HGB BLD-MCNC: 14.5 G/DL (ref 12.1–17)
IMM GRANULOCYTES # BLD AUTO: 0.2 K/UL (ref 0–0.04)
IMM GRANULOCYTES NFR BLD AUTO: 1 % (ref 0–0.5)
LYMPHOCYTES # BLD: 2.2 K/UL (ref 0.8–3.5)
LYMPHOCYTES NFR BLD: 10 % (ref 12–49)
MAGNESIUM SERPL-MCNC: 2.5 MG/DL (ref 1.6–2.4)
MCH RBC QN AUTO: 30.5 PG (ref 26–34)
MCHC RBC AUTO-ENTMCNC: 32.6 G/DL (ref 30–36.5)
MCV RBC AUTO: 93.5 FL (ref 80–99)
MONOCYTES # BLD: 2.2 K/UL (ref 0–1)
MONOCYTES NFR BLD: 10 % (ref 5–13)
NEUTS SEG # BLD: 17.3 K/UL (ref 1.8–8)
NEUTS SEG NFR BLD: 79 % (ref 32–75)
NRBC # BLD: 0 K/UL (ref 0–0.01)
NRBC BLD-RTO: 0 PER 100 WBC
PHOSPHATE SERPL-MCNC: 3.5 MG/DL (ref 2.6–4.7)
PLATELET # BLD AUTO: 293 K/UL (ref 150–400)
PMV BLD AUTO: 10.9 FL (ref 8.9–12.9)
POTASSIUM SERPL-SCNC: 3.7 MMOL/L (ref 3.5–5.1)
RBC # BLD AUTO: 4.76 M/UL (ref 4.1–5.7)
RBC MORPH BLD: ABNORMAL
SERVICE CMNT-IMP: ABNORMAL
SERVICE CMNT-IMP: NORMAL
SODIUM SERPL-SCNC: 143 MMOL/L (ref 136–145)
WBC # BLD AUTO: 21.9 K/UL (ref 4.1–11.1)

## 2023-10-17 PROCEDURE — 6370000000 HC RX 637 (ALT 250 FOR IP)

## 2023-10-17 PROCEDURE — 82962 GLUCOSE BLOOD TEST: CPT

## 2023-10-17 PROCEDURE — 71045 X-RAY EXAM CHEST 1 VIEW: CPT

## 2023-10-17 PROCEDURE — 2580000003 HC RX 258: Performed by: ANESTHESIOLOGY

## 2023-10-17 PROCEDURE — 6370000000 HC RX 637 (ALT 250 FOR IP): Performed by: NEUROLOGICAL SURGERY

## 2023-10-17 PROCEDURE — 6360000002 HC RX W HCPCS: Performed by: NEUROLOGICAL SURGERY

## 2023-10-17 PROCEDURE — 97530 THERAPEUTIC ACTIVITIES: CPT

## 2023-10-17 PROCEDURE — 70450 CT HEAD/BRAIN W/O DYE: CPT

## 2023-10-17 PROCEDURE — 2580000003 HC RX 258: Performed by: NEUROLOGICAL SURGERY

## 2023-10-17 PROCEDURE — 84100 ASSAY OF PHOSPHORUS: CPT

## 2023-10-17 PROCEDURE — 2060000000 HC ICU INTERMEDIATE R&B

## 2023-10-17 PROCEDURE — 2500000003 HC RX 250 WO HCPCS: Performed by: NEUROLOGICAL SURGERY

## 2023-10-17 PROCEDURE — 36415 COLL VENOUS BLD VENIPUNCTURE: CPT

## 2023-10-17 PROCEDURE — 6370000000 HC RX 637 (ALT 250 FOR IP): Performed by: NURSE PRACTITIONER

## 2023-10-17 PROCEDURE — 6360000002 HC RX W HCPCS: Performed by: NURSE PRACTITIONER

## 2023-10-17 PROCEDURE — 80048 BASIC METABOLIC PNL TOTAL CA: CPT

## 2023-10-17 PROCEDURE — 2700000000 HC OXYGEN THERAPY PER DAY

## 2023-10-17 PROCEDURE — 85025 COMPLETE CBC W/AUTO DIFF WBC: CPT

## 2023-10-17 PROCEDURE — 83735 ASSAY OF MAGNESIUM: CPT

## 2023-10-17 PROCEDURE — 99024 POSTOP FOLLOW-UP VISIT: CPT | Performed by: NURSE PRACTITIONER

## 2023-10-17 PROCEDURE — 97161 PT EVAL LOW COMPLEX 20 MIN: CPT

## 2023-10-17 PROCEDURE — 6360000002 HC RX W HCPCS: Performed by: ANESTHESIOLOGY

## 2023-10-17 PROCEDURE — 94760 N-INVAS EAR/PLS OXIMETRY 1: CPT

## 2023-10-17 RX ORDER — POTASSIUM CHLORIDE 750 MG/1
20 TABLET, FILM COATED, EXTENDED RELEASE ORAL ONCE
Status: COMPLETED | OUTPATIENT
Start: 2023-10-17 | End: 2023-10-17

## 2023-10-17 RX ORDER — DOCUSATE SODIUM 100 MG/1
100 CAPSULE, LIQUID FILLED ORAL 2 TIMES DAILY
Status: DISCONTINUED | OUTPATIENT
Start: 2023-10-17 | End: 2023-10-19 | Stop reason: HOSPADM

## 2023-10-17 RX ORDER — MORPHINE SULFATE 4 MG/ML
4 INJECTION, SOLUTION INTRAMUSCULAR; INTRAVENOUS
Status: DISCONTINUED | OUTPATIENT
Start: 2023-10-17 | End: 2023-10-19 | Stop reason: HOSPADM

## 2023-10-17 RX ORDER — SODIUM CHLORIDE 9 MG/ML
INJECTION, SOLUTION INTRAVENOUS PRN
Status: DISCONTINUED | OUTPATIENT
Start: 2023-10-17 | End: 2023-10-19 | Stop reason: HOSPADM

## 2023-10-17 RX ORDER — DEXAMETHASONE SODIUM PHOSPHATE 4 MG/ML
4 INJECTION, SOLUTION INTRA-ARTICULAR; INTRALESIONAL; INTRAMUSCULAR; INTRAVENOUS; SOFT TISSUE EVERY 6 HOURS
Status: DISCONTINUED | OUTPATIENT
Start: 2023-10-17 | End: 2023-10-17 | Stop reason: SDUPTHER

## 2023-10-17 RX ORDER — LEVETIRACETAM 500 MG/1
1000 TABLET ORAL 2 TIMES DAILY
Status: DISCONTINUED | OUTPATIENT
Start: 2023-10-17 | End: 2023-10-19 | Stop reason: HOSPADM

## 2023-10-17 RX ORDER — ONDANSETRON 4 MG/1
4 TABLET, ORALLY DISINTEGRATING ORAL EVERY 8 HOURS PRN
Status: DISCONTINUED | OUTPATIENT
Start: 2023-10-17 | End: 2023-10-19 | Stop reason: HOSPADM

## 2023-10-17 RX ORDER — OXYCODONE HYDROCHLORIDE 5 MG/1
10 TABLET ORAL EVERY 4 HOURS PRN
Status: DISCONTINUED | OUTPATIENT
Start: 2023-10-17 | End: 2023-10-19 | Stop reason: HOSPADM

## 2023-10-17 RX ORDER — ACETAMINOPHEN 325 MG/1
650 TABLET ORAL EVERY 4 HOURS PRN
Status: DISCONTINUED | OUTPATIENT
Start: 2023-10-17 | End: 2023-10-19 | Stop reason: HOSPADM

## 2023-10-17 RX ORDER — SODIUM CHLORIDE 0.9 % (FLUSH) 0.9 %
5-40 SYRINGE (ML) INJECTION PRN
Status: DISCONTINUED | OUTPATIENT
Start: 2023-10-17 | End: 2023-10-17

## 2023-10-17 RX ORDER — MORPHINE SULFATE 2 MG/ML
2 INJECTION, SOLUTION INTRAMUSCULAR; INTRAVENOUS
Status: DISCONTINUED | OUTPATIENT
Start: 2023-10-17 | End: 2023-10-19 | Stop reason: HOSPADM

## 2023-10-17 RX ORDER — OXYCODONE HYDROCHLORIDE 5 MG/1
5 TABLET ORAL EVERY 4 HOURS PRN
Status: DISCONTINUED | OUTPATIENT
Start: 2023-10-17 | End: 2023-10-19 | Stop reason: HOSPADM

## 2023-10-17 RX ORDER — ONDANSETRON 2 MG/ML
4 INJECTION INTRAMUSCULAR; INTRAVENOUS EVERY 6 HOURS PRN
Status: DISCONTINUED | OUTPATIENT
Start: 2023-10-17 | End: 2023-10-19 | Stop reason: HOSPADM

## 2023-10-17 RX ORDER — SODIUM CHLORIDE 0.9 % (FLUSH) 0.9 %
5-40 SYRINGE (ML) INJECTION EVERY 12 HOURS SCHEDULED
Status: DISCONTINUED | OUTPATIENT
Start: 2023-10-17 | End: 2023-10-19 | Stop reason: HOSPADM

## 2023-10-17 RX ADMIN — OXYCODONE HYDROCHLORIDE 10 MG: 5 TABLET ORAL at 23:08

## 2023-10-17 RX ADMIN — SODIUM CHLORIDE, PRESERVATIVE FREE 10 ML: 5 INJECTION INTRAVENOUS at 21:45

## 2023-10-17 RX ADMIN — OXYCODONE HYDROCHLORIDE 10 MG: 5 TABLET ORAL at 11:22

## 2023-10-17 RX ADMIN — POTASSIUM CHLORIDE 20 MEQ: 750 TABLET, FILM COATED, EXTENDED RELEASE ORAL at 11:23

## 2023-10-17 RX ADMIN — DEXAMETHASONE SODIUM PHOSPHATE 4 MG: 4 INJECTION, SOLUTION INTRAMUSCULAR; INTRAVENOUS at 11:23

## 2023-10-17 RX ADMIN — SODIUM CHLORIDE, PRESERVATIVE FREE 10 ML: 5 INJECTION INTRAVENOUS at 08:34

## 2023-10-17 RX ADMIN — DEXAMETHASONE SODIUM PHOSPHATE 4 MG: 4 INJECTION, SOLUTION INTRAMUSCULAR; INTRAVENOUS at 17:44

## 2023-10-17 RX ADMIN — LEVETIRACETAM 1000 MG: 500 TABLET, FILM COATED ORAL at 21:00

## 2023-10-17 RX ADMIN — DEXAMETHASONE SODIUM PHOSPHATE 4 MG: 4 INJECTION, SOLUTION INTRAMUSCULAR; INTRAVENOUS at 05:36

## 2023-10-17 RX ADMIN — CARVEDILOL 25 MG: 12.5 TABLET, FILM COATED ORAL at 17:43

## 2023-10-17 RX ADMIN — OXYCODONE HYDROCHLORIDE 10 MG: 5 TABLET ORAL at 15:43

## 2023-10-17 RX ADMIN — HYDROMORPHONE HYDROCHLORIDE 0.25 MG: 1 INJECTION, SOLUTION INTRAMUSCULAR; INTRAVENOUS; SUBCUTANEOUS at 00:55

## 2023-10-17 RX ADMIN — PANTOPRAZOLE SODIUM 40 MG: 40 TABLET, DELAYED RELEASE ORAL at 06:26

## 2023-10-17 RX ADMIN — LEVETIRACETAM 1000 MG: 100 INJECTION, SOLUTION INTRAVENOUS at 00:08

## 2023-10-17 RX ADMIN — SODIUM CHLORIDE 125 ML/HR: 9 INJECTION, SOLUTION INTRAVENOUS at 07:22

## 2023-10-17 RX ADMIN — HYDRALAZINE HYDROCHLORIDE 5 MG: 20 INJECTION, SOLUTION INTRAMUSCULAR; INTRAVENOUS at 05:17

## 2023-10-17 RX ADMIN — OXYCODONE HYDROCHLORIDE 10 MG: 5 TABLET ORAL at 06:25

## 2023-10-17 RX ADMIN — DOCUSATE SODIUM 100 MG: 100 CAPSULE, LIQUID FILLED ORAL at 21:44

## 2023-10-17 RX ADMIN — TORSEMIDE 60 MG: 20 TABLET ORAL at 11:22

## 2023-10-17 RX ADMIN — CARVEDILOL 25 MG: 12.5 TABLET, FILM COATED ORAL at 08:07

## 2023-10-17 RX ADMIN — LEVETIRACETAM 1000 MG: 100 INJECTION, SOLUTION INTRAVENOUS at 11:21

## 2023-10-17 RX ADMIN — LISINOPRIL 40 MG: 20 TABLET ORAL at 08:07

## 2023-10-17 RX ADMIN — DOCUSATE SODIUM 100 MG: 100 CAPSULE, LIQUID FILLED ORAL at 15:38

## 2023-10-17 RX ADMIN — OXYCODONE HYDROCHLORIDE 5 MG: 5 TABLET ORAL at 01:46

## 2023-10-17 ASSESSMENT — PAIN DESCRIPTION - PAIN TYPE
TYPE: SURGICAL PAIN

## 2023-10-17 ASSESSMENT — PAIN DESCRIPTION - LOCATION
LOCATION: HEAD

## 2023-10-17 ASSESSMENT — PAIN DESCRIPTION - DESCRIPTORS
DESCRIPTORS: ACHING

## 2023-10-17 ASSESSMENT — PAIN SCALES - GENERAL
PAINLEVEL_OUTOF10: 7
PAINLEVEL_OUTOF10: 0
PAINLEVEL_OUTOF10: 0
PAINLEVEL_OUTOF10: 3
PAINLEVEL_OUTOF10: 3
PAINLEVEL_OUTOF10: 10
PAINLEVEL_OUTOF10: 5
PAINLEVEL_OUTOF10: 5
PAINLEVEL_OUTOF10: 7
PAINLEVEL_OUTOF10: 2

## 2023-10-17 NOTE — OP NOTE
[facility]  OPERATIVE REPORT    Name:  Sd Jackson  MR#:  [id number]  :  1975  ACCOUNT #:  [de-identified]  DATE OF SERVICE:  10/16/2023    PREOPERATIVE DIAGNOSIS:  Glioma pus. POSTOPERATIVE DIAGNOSIS:  Ependymoma, final pathology pending. PROCEDURE PERFORMED:  BrainLAB-guided right-sided parietooccipital craniotomy, re-exploration and removal of recurrent parietal tumor. SURGEON:  Sangeeta Garcia MD    ASSISTANT:  [facility].    ANESTHESIA:  General.    COMPLICATIONS:  None. SPECIMENS REMOVED:  Tumor. IMPLANTS:  None. ESTIMATED BLOOD LOSS:  None. PROCEDURE:  The patient had a presumed primary glioma, but the specimen at the time of the first surgery was sent to [facility] Pathology, and they believe that the tumor was an intraparenchymal ependymoma. Nonetheless, the tumor recurred at the site of the original resection and after discussing risks, benefits, and alternatives of surgery with him, he agreed to proceed. He was taken first for a CT scan for use with the neuronavigation [address], then taken to the operating room where he was induced under general endotracheal anesthesia, placed on the operating table in the supine position, the head secured into three-point Austin headrest.  A time-out was performed to identify the correct patient and procedure. Appropriate antibiotics administered and sequential stockings applied for DVT prophylaxis. The fiducial markers were registered. The old incision was visually identified. After a time-out and a sterile scrub, prep and drape, the old incision was opened. Self-retaining retractors were inserted into the edges of the incision. It was a small lazy S incision centered directly over the tumor. The bone flap was removed first by removing the original plates and screws, and then using the Midas Willis drill to turn the flap as some bone regrowth had occurred. The dura was opened.   The surface of the cortex was

## 2023-10-18 LAB
ANION GAP SERPL CALC-SCNC: 6 MMOL/L (ref 5–15)
BASOPHILS # BLD: 0 K/UL (ref 0–0.1)
BASOPHILS NFR BLD: 0 % (ref 0–1)
BUN SERPL-MCNC: 25 MG/DL (ref 6–20)
BUN/CREAT SERPL: 23 (ref 12–20)
CALCIUM SERPL-MCNC: 8.6 MG/DL (ref 8.5–10.1)
CHLORIDE SERPL-SCNC: 101 MMOL/L (ref 97–108)
CO2 SERPL-SCNC: 30 MMOL/L (ref 21–32)
CREAT SERPL-MCNC: 1.11 MG/DL (ref 0.7–1.3)
DIFFERENTIAL METHOD BLD: ABNORMAL
EOSINOPHIL # BLD: 0 K/UL (ref 0–0.4)
EOSINOPHIL NFR BLD: 0 % (ref 0–7)
ERYTHROCYTE [DISTWIDTH] IN BLOOD BY AUTOMATED COUNT: 11.9 % (ref 11.5–14.5)
GLUCOSE BLD STRIP.AUTO-MCNC: 111 MG/DL (ref 65–117)
GLUCOSE BLD STRIP.AUTO-MCNC: 131 MG/DL (ref 65–117)
GLUCOSE BLD STRIP.AUTO-MCNC: 147 MG/DL (ref 65–117)
GLUCOSE BLD STRIP.AUTO-MCNC: 165 MG/DL (ref 65–117)
GLUCOSE SERPL-MCNC: 137 MG/DL (ref 65–100)
HCT VFR BLD AUTO: 46.9 % (ref 36.6–50.3)
HGB BLD-MCNC: 15.3 G/DL (ref 12.1–17)
IMM GRANULOCYTES # BLD AUTO: 0.2 K/UL (ref 0–0.04)
IMM GRANULOCYTES NFR BLD AUTO: 1 % (ref 0–0.5)
LYMPHOCYTES # BLD: 3.1 K/UL (ref 0.8–3.5)
LYMPHOCYTES NFR BLD: 16 % (ref 12–49)
MCH RBC QN AUTO: 30.5 PG (ref 26–34)
MCHC RBC AUTO-ENTMCNC: 32.6 G/DL (ref 30–36.5)
MCV RBC AUTO: 93.4 FL (ref 80–99)
MONOCYTES # BLD: 2.1 K/UL (ref 0–1)
MONOCYTES NFR BLD: 11 % (ref 5–13)
NEUTS SEG # BLD: 13.9 K/UL (ref 1.8–8)
NEUTS SEG NFR BLD: 72 % (ref 32–75)
NRBC # BLD: 0 K/UL (ref 0–0.01)
NRBC BLD-RTO: 0 PER 100 WBC
PLATELET # BLD AUTO: 298 K/UL (ref 150–400)
PMV BLD AUTO: 10.9 FL (ref 8.9–12.9)
POTASSIUM SERPL-SCNC: 3.9 MMOL/L (ref 3.5–5.1)
RBC # BLD AUTO: 5.02 M/UL (ref 4.1–5.7)
RBC MORPH BLD: ABNORMAL
SERVICE CMNT-IMP: ABNORMAL
SERVICE CMNT-IMP: NORMAL
SODIUM SERPL-SCNC: 137 MMOL/L (ref 136–145)
WBC # BLD AUTO: 19.3 K/UL (ref 4.1–11.1)

## 2023-10-18 PROCEDURE — 97530 THERAPEUTIC ACTIVITIES: CPT

## 2023-10-18 PROCEDURE — 82962 GLUCOSE BLOOD TEST: CPT

## 2023-10-18 PROCEDURE — 6370000000 HC RX 637 (ALT 250 FOR IP): Performed by: NEUROLOGICAL SURGERY

## 2023-10-18 PROCEDURE — 6370000000 HC RX 637 (ALT 250 FOR IP): Performed by: NURSE PRACTITIONER

## 2023-10-18 PROCEDURE — 2580000003 HC RX 258: Performed by: NEUROLOGICAL SURGERY

## 2023-10-18 PROCEDURE — 36415 COLL VENOUS BLD VENIPUNCTURE: CPT

## 2023-10-18 PROCEDURE — 6370000000 HC RX 637 (ALT 250 FOR IP)

## 2023-10-18 PROCEDURE — 97165 OT EVAL LOW COMPLEX 30 MIN: CPT

## 2023-10-18 PROCEDURE — 2060000000 HC ICU INTERMEDIATE R&B

## 2023-10-18 PROCEDURE — 85025 COMPLETE CBC W/AUTO DIFF WBC: CPT

## 2023-10-18 PROCEDURE — 80048 BASIC METABOLIC PNL TOTAL CA: CPT

## 2023-10-18 PROCEDURE — 6360000002 HC RX W HCPCS: Performed by: NURSE PRACTITIONER

## 2023-10-18 PROCEDURE — 99024 POSTOP FOLLOW-UP VISIT: CPT | Performed by: NURSE PRACTITIONER

## 2023-10-18 RX ADMIN — OXYCODONE HYDROCHLORIDE 10 MG: 5 TABLET ORAL at 06:18

## 2023-10-18 RX ADMIN — DEXAMETHASONE SODIUM PHOSPHATE 4 MG: 4 INJECTION, SOLUTION INTRAMUSCULAR; INTRAVENOUS at 10:57

## 2023-10-18 RX ADMIN — TORSEMIDE 60 MG: 20 TABLET ORAL at 09:05

## 2023-10-18 RX ADMIN — DEXAMETHASONE SODIUM PHOSPHATE 4 MG: 4 INJECTION, SOLUTION INTRAMUSCULAR; INTRAVENOUS at 00:21

## 2023-10-18 RX ADMIN — DEXAMETHASONE SODIUM PHOSPHATE 4 MG: 4 INJECTION, SOLUTION INTRAMUSCULAR; INTRAVENOUS at 18:05

## 2023-10-18 RX ADMIN — CARVEDILOL 25 MG: 12.5 TABLET, FILM COATED ORAL at 18:05

## 2023-10-18 RX ADMIN — BUTALBITAL, ACETAMINOPHEN, AND CAFFEINE 1 TABLET: 50; 325; 40 TABLET ORAL at 12:23

## 2023-10-18 RX ADMIN — SODIUM CHLORIDE, PRESERVATIVE FREE 10 ML: 5 INJECTION INTRAVENOUS at 21:37

## 2023-10-18 RX ADMIN — LEVETIRACETAM 1000 MG: 500 TABLET, FILM COATED ORAL at 09:06

## 2023-10-18 RX ADMIN — LEVETIRACETAM 1000 MG: 500 TABLET, FILM COATED ORAL at 21:37

## 2023-10-18 RX ADMIN — DEXAMETHASONE SODIUM PHOSPHATE 4 MG: 4 INJECTION, SOLUTION INTRAMUSCULAR; INTRAVENOUS at 06:18

## 2023-10-18 RX ADMIN — CARVEDILOL 25 MG: 12.5 TABLET, FILM COATED ORAL at 06:19

## 2023-10-18 RX ADMIN — DEXAMETHASONE SODIUM PHOSPHATE 4 MG: 4 INJECTION, SOLUTION INTRAMUSCULAR; INTRAVENOUS at 22:52

## 2023-10-18 RX ADMIN — DOCUSATE SODIUM 100 MG: 100 CAPSULE, LIQUID FILLED ORAL at 21:37

## 2023-10-18 RX ADMIN — OXYCODONE HYDROCHLORIDE 5 MG: 5 TABLET ORAL at 15:27

## 2023-10-18 RX ADMIN — SODIUM CHLORIDE, PRESERVATIVE FREE 10 ML: 5 INJECTION INTRAVENOUS at 09:09

## 2023-10-18 RX ADMIN — DOCUSATE SODIUM 100 MG: 100 CAPSULE, LIQUID FILLED ORAL at 09:06

## 2023-10-18 RX ADMIN — PANTOPRAZOLE SODIUM 40 MG: 40 TABLET, DELAYED RELEASE ORAL at 06:18

## 2023-10-18 ASSESSMENT — PAIN DESCRIPTION - DESCRIPTORS: DESCRIPTORS: ACHING

## 2023-10-18 ASSESSMENT — PAIN SCALES - GENERAL
PAINLEVEL_OUTOF10: 5
PAINLEVEL_OUTOF10: 0
PAINLEVEL_OUTOF10: 5
PAINLEVEL_OUTOF10: 8
PAINLEVEL_OUTOF10: 2

## 2023-10-18 ASSESSMENT — PAIN DESCRIPTION - ORIENTATION: ORIENTATION: RIGHT

## 2023-10-18 ASSESSMENT — PAIN DESCRIPTION - LOCATION: LOCATION: HEAD

## 2023-10-18 NOTE — CONSULTS
Chart reviewed and pt to be seen mid-late morning on 10/19. Thank you for this consultation  Please feel free to contact me directly with any questions or concerns. Marisel Cota, [address] [facility],Fourth Floor
Consult Note            Date:10/18/2023        Patient Name:Osmany Gipson     YOB: 1975     Age:48 y.o. Consults Physical Medicine and Rehabilitation    Chief Complaint    Right brain mass      History Obtained From   patient, electronic medical record    History of Present Illness   Mr. Bobby Gipson is a 53-year-old male with a past medical history of nonischemic cardiomyopathy, mixed systolic and diastolic congestive failure with ejection fraction of 35%, MITCH on CPAP, seizures, hypertension, and recurrent tumor, who had a resection in February 2023 who presented to an outside hospital on 10/9/2023 with left facial droop, slurred speech and headache. Recent outpatient MRI showed enlarging residual tumor with increasing surrounding vasogenic edema. He was also found to have some subfalcine herniation. Neurosurgery was contacted and he was treated with levetiracetam, dexamethasone and Zofran. He did deteriorate and required intubation. He was transferred to [facility] ICU. He was transferred out of the ICU on 10/10/2023 following extubation. He was seen by palliative care on 10/1/2023 and expressed desire to proceed with tumor resection and goal to prolong his life as long as possible. On 10/16/2023 he was taken to the OR by Abraham Sahni for right-sided parieto-occipital craniotomy with reexploration and removal of recurrent parietal tumor. He has lost most of his movement in his left arm and leg postoperatively. He does have a elevated white blood cell count, chest x-ray negative, saturating 97% on room air. Physical medicine rehabilitation has been consulted for disposition recommendations and appropriateness for acute inpatient rehabilitation. Patient lives with his sister who was able provide care but did not require assistance prior to this admission. He did receive inpatient rehab following his initial tumor resection.     Physical therapy note on 10/18/2023: Progressing towards
Name: Jeremías Greer   : 1975   MRN: [id number]   Date: 10/17/2023      REASON FOR ICU ADMISSION: Post op management     PRINCIPLE ICU DIAGNOSIS   Recurrent Glioblastoma s/p right parietal/occipital redo craniotomy   HTN  Sleep Apnea - no c-pap   Hx Seizures  NICM - Last Echo was 35%    PATIENT SUMMARY   Jeremías Greer is a 51 y.o. male PMHx nonischemic cardiomyopathy, mixed systolic/diastolic heart failure (LVEF 35%), MITCH (CPAP), seizures, HTN, HLD, recurrent glioblastoma s/p resection (2023, Dr. Clorinda Harada) presented to OSH with headache, left facial droop, and slurred speech. The patient is intubated and therefore not able to provide history. History obtained per chart review/ED physician. ED physician reports that he has been followed outpatient and recent MRI demonstrated enlarging residual tumor now measuring 4.2 x 2.9 x 4.0 cm with increasing surrounding vasogenic edema from prior imaging. On presentation to OSH ED patient noted to have some subfalcine herniation as well. NSGY at [facility] was contacted and patient was treated with Keppra, Decadron, and Zofran. He was to be admitted to hospitalist service initially, but unfortunately condition deteriorated requiring emergent intubation for airway protection. He was subsequently then transferred to [facility] ICU for further observation and management. Patient was able to be weaned from ventilator and extubated to NC. Transferred out ICU on 10/10/23. Palliative care was consulted and clarified goals of care. Patient remains full code. After further discussion with surgery patient was taken to OR for resection of tumor on 10/16/23. Extubated and taken to PACU post op, waiting on available ICU bed.       COMPREHENSIVE ASSESSMENT & PLAN:SYSTEM BASED     24 HOUR EVENTS: as above    NEUROLOGICAL:   Analgesia: Tylenol   Sedation:   Neuro check Frequency: Hourly    Keppra for seizure prophylaxis  Decadron  Neurosurgery following  Hold all antiplatelets and
encounter and personally completing the provider-level activities documented in the note. This includes time spent prior to the visit and after the visit in direct care of the patient. This time does not include time spent in any separately reportable services.     Electronically signed by   MASOUD Ken - NP  Palliative Care Team  on 10/13/2023 at 7:23 AM

## 2023-10-18 NOTE — CARE COORDINATION
10/18/23 1222   Condition of Participation: Discharge Planning   The Plan for Transition of Care is related to the following treatment goals: IPR   The Patient and/or Patient Representative was provided with a Choice of Provider? Patient   The Patient and/Or Patient Representative agree with the Discharge Plan? Yes   Freedom of Choice list was provided with basic dialogue that supports the patient's individualized plan of care/goals, treatment preferences, and shares the quality data associated with the providers?   Yes

## 2023-10-18 NOTE — CARE COORDINATION
Transition of Care Plan:  [facility] accepted and started auth 10/18. RUR: 11%  Prior Level of Functioning: Independent  Disposition: IPR-Referral sent to [facility]. NP and CPN approved referral.   Cortes Andujar has been consulted. If SNF or IPR: Date FOC offered: 10/18  Date [address] received: 10/18  Accepting facility:   Date authorization started with reference number: 10/18  Date authorization received and expires: Follow up appointments: PCP/Specialist  DME needed: None  Transportation at discharge: BLS  IM/IMM Medicare/ letter given: No  Is patient a [place] and connected with [facility]? If yes, was [employer] transfer form completed and VA notified? Caregiver Contact: Latrice Azar [phone]  Discharge Caregiver contacted prior to discharge? Yes  Care Conference needed?  No  Barriers to discharge:  Medical stability    Polly Officer, RN/CRM  [phone]

## 2023-10-19 VITALS
TEMPERATURE: 98.5 F | SYSTOLIC BLOOD PRESSURE: 137 MMHG | RESPIRATION RATE: 16 BRPM | OXYGEN SATURATION: 97 % | HEART RATE: 71 BPM | DIASTOLIC BLOOD PRESSURE: 69 MMHG | BODY MASS INDEX: 48.76 KG/M2 | WEIGHT: 282.1 LBS

## 2023-10-19 LAB
ANION GAP SERPL CALC-SCNC: 6 MMOL/L (ref 5–15)
BASOPHILS # BLD: 0.1 K/UL (ref 0–0.1)
BASOPHILS NFR BLD: 0 % (ref 0–1)
BUN SERPL-MCNC: 26 MG/DL (ref 6–20)
BUN/CREAT SERPL: 29 (ref 12–20)
CALCIUM SERPL-MCNC: 8.5 MG/DL (ref 8.5–10.1)
CHLORIDE SERPL-SCNC: 99 MMOL/L (ref 97–108)
CO2 SERPL-SCNC: 30 MMOL/L (ref 21–32)
CREAT SERPL-MCNC: 0.9 MG/DL (ref 0.7–1.3)
DIFFERENTIAL METHOD BLD: ABNORMAL
EOSINOPHIL # BLD: 0 K/UL (ref 0–0.4)
EOSINOPHIL NFR BLD: 0 % (ref 0–7)
ERYTHROCYTE [DISTWIDTH] IN BLOOD BY AUTOMATED COUNT: 11.8 % (ref 11.5–14.5)
GLUCOSE BLD STRIP.AUTO-MCNC: 112 MG/DL (ref 65–117)
GLUCOSE BLD STRIP.AUTO-MCNC: 121 MG/DL (ref 65–117)
GLUCOSE BLD STRIP.AUTO-MCNC: 127 MG/DL (ref 65–117)
GLUCOSE SERPL-MCNC: 123 MG/DL (ref 65–100)
HCT VFR BLD AUTO: 45.7 % (ref 36.6–50.3)
HGB BLD-MCNC: 15 G/DL (ref 12.1–17)
IMM GRANULOCYTES # BLD AUTO: 0.4 K/UL (ref 0–0.04)
IMM GRANULOCYTES NFR BLD AUTO: 2 % (ref 0–0.5)
LYMPHOCYTES # BLD: 2.8 K/UL (ref 0.8–3.5)
LYMPHOCYTES NFR BLD: 13 % (ref 12–49)
MCH RBC QN AUTO: 30.2 PG (ref 26–34)
MCHC RBC AUTO-ENTMCNC: 32.8 G/DL (ref 30–36.5)
MCV RBC AUTO: 92 FL (ref 80–99)
MONOCYTES # BLD: 1.8 K/UL (ref 0–1)
MONOCYTES NFR BLD: 8 % (ref 5–13)
NEUTS SEG # BLD: 16.4 K/UL (ref 1.8–8)
NEUTS SEG NFR BLD: 77 % (ref 32–75)
NRBC # BLD: 0 K/UL (ref 0–0.01)
NRBC BLD-RTO: 0 PER 100 WBC
PLATELET # BLD AUTO: 323 K/UL (ref 150–400)
PMV BLD AUTO: 10.8 FL (ref 8.9–12.9)
POTASSIUM SERPL-SCNC: 4 MMOL/L (ref 3.5–5.1)
RBC # BLD AUTO: 4.97 M/UL (ref 4.1–5.7)
SERVICE CMNT-IMP: ABNORMAL
SERVICE CMNT-IMP: ABNORMAL
SERVICE CMNT-IMP: NORMAL
SODIUM SERPL-SCNC: 135 MMOL/L (ref 136–145)
WBC # BLD AUTO: 21.4 K/UL (ref 4.1–11.1)

## 2023-10-19 PROCEDURE — 6370000000 HC RX 637 (ALT 250 FOR IP): Performed by: NURSE PRACTITIONER

## 2023-10-19 PROCEDURE — 6370000000 HC RX 637 (ALT 250 FOR IP)

## 2023-10-19 PROCEDURE — 6370000000 HC RX 637 (ALT 250 FOR IP): Performed by: NEUROLOGICAL SURGERY

## 2023-10-19 PROCEDURE — 2580000003 HC RX 258: Performed by: NEUROLOGICAL SURGERY

## 2023-10-19 PROCEDURE — 97116 GAIT TRAINING THERAPY: CPT

## 2023-10-19 PROCEDURE — 82962 GLUCOSE BLOOD TEST: CPT

## 2023-10-19 PROCEDURE — 80048 BASIC METABOLIC PNL TOTAL CA: CPT

## 2023-10-19 PROCEDURE — 36415 COLL VENOUS BLD VENIPUNCTURE: CPT

## 2023-10-19 PROCEDURE — 6360000002 HC RX W HCPCS: Performed by: NURSE PRACTITIONER

## 2023-10-19 PROCEDURE — 99024 POSTOP FOLLOW-UP VISIT: CPT | Performed by: NURSE PRACTITIONER

## 2023-10-19 PROCEDURE — 85025 COMPLETE CBC W/AUTO DIFF WBC: CPT

## 2023-10-19 PROCEDURE — 97530 THERAPEUTIC ACTIVITIES: CPT

## 2023-10-19 RX ORDER — POLYETHYLENE GLYCOL 3350 17 G/17G
17 POWDER, FOR SOLUTION ORAL DAILY PRN
Qty: 30 PACKET | Refills: 0 | Status: SHIPPED | OUTPATIENT
Start: 2023-10-19 | End: 2023-11-18

## 2023-10-19 RX ORDER — DEXAMETHASONE 2 MG/1
2 TABLET ORAL EVERY 12 HOURS SCHEDULED
Qty: 4 TABLET | Refills: 0 | Status: SHIPPED | OUTPATIENT
Start: 2023-10-23 | End: 2023-10-25

## 2023-10-19 RX ORDER — CHLORPROMAZINE HYDROCHLORIDE 25 MG/1
25 TABLET, FILM COATED ORAL EVERY 6 HOURS
Qty: 8 TABLET | Refills: 0 | Status: SHIPPED | OUTPATIENT
Start: 2023-10-19 | End: 2023-10-21

## 2023-10-19 RX ORDER — DEXAMETHASONE 4 MG/1
4 TABLET ORAL EVERY 8 HOURS SCHEDULED
Status: DISCONTINUED | OUTPATIENT
Start: 2023-10-19 | End: 2023-10-19 | Stop reason: HOSPADM

## 2023-10-19 RX ORDER — PSEUDOEPHEDRINE HCL 30 MG
100 TABLET ORAL 2 TIMES DAILY
Qty: 60 CAPSULE | Refills: 0 | Status: SHIPPED | OUTPATIENT
Start: 2023-10-19

## 2023-10-19 RX ORDER — CHLORPROMAZINE HYDROCHLORIDE 10 MG/1
25 TABLET, FILM COATED ORAL EVERY 6 HOURS
Status: DISCONTINUED | OUTPATIENT
Start: 2023-10-19 | End: 2023-10-19 | Stop reason: HOSPADM

## 2023-10-19 RX ORDER — DEXAMETHASONE 4 MG/1
4 TABLET ORAL EVERY 12 HOURS SCHEDULED
Status: DISCONTINUED | OUTPATIENT
Start: 2023-10-21 | End: 2023-10-19 | Stop reason: HOSPADM

## 2023-10-19 RX ORDER — OXYCODONE HYDROCHLORIDE 5 MG/1
5 TABLET ORAL EVERY 4 HOURS PRN
Qty: 12 TABLET | Refills: 0 | Status: SHIPPED | OUTPATIENT
Start: 2023-10-19 | End: 2023-10-22

## 2023-10-19 RX ORDER — DEXAMETHASONE 4 MG/1
4 TABLET ORAL EVERY 8 HOURS SCHEDULED
Qty: 5 TABLET | Refills: 0 | Status: SHIPPED | OUTPATIENT
Start: 2023-10-19 | End: 2023-10-21

## 2023-10-19 RX ORDER — BUTALBITAL, ACETAMINOPHEN AND CAFFEINE 50; 325; 40 MG/1; MG/1; MG/1
1 TABLET ORAL EVERY 4 HOURS PRN
Qty: 30 TABLET | Refills: 0 | Status: SHIPPED | OUTPATIENT
Start: 2023-10-19

## 2023-10-19 RX ORDER — DEXAMETHASONE 4 MG/1
4 TABLET ORAL EVERY 12 HOURS SCHEDULED
Qty: 4 TABLET | Refills: 0 | Status: SHIPPED | OUTPATIENT
Start: 2023-10-21 | End: 2023-10-23

## 2023-10-19 RX ORDER — DEXAMETHASONE 1 MG
2 TABLET ORAL DAILY
Status: DISCONTINUED | OUTPATIENT
Start: 2023-10-25 | End: 2023-10-19 | Stop reason: HOSPADM

## 2023-10-19 RX ORDER — DEXAMETHASONE 1 MG
2 TABLET ORAL EVERY 12 HOURS SCHEDULED
Status: DISCONTINUED | OUTPATIENT
Start: 2023-10-23 | End: 2023-10-19 | Stop reason: HOSPADM

## 2023-10-19 RX ORDER — DEXAMETHASONE 2 MG/1
2 TABLET ORAL DAILY
Qty: 2 TABLET | Refills: 0 | Status: SHIPPED | OUTPATIENT
Start: 2023-10-25 | End: 2023-10-27

## 2023-10-19 RX ORDER — LEVETIRACETAM 1000 MG/1
1000 TABLET ORAL 2 TIMES DAILY
Qty: 60 TABLET | Refills: 0 | Status: SHIPPED | OUTPATIENT
Start: 2023-10-19

## 2023-10-19 RX ADMIN — CHLORPROMAZINE HYDROCHLORIDE 25 MG: 10 TABLET, FILM COATED ORAL at 17:05

## 2023-10-19 RX ADMIN — CHLORPROMAZINE HYDROCHLORIDE 25 MG: 10 TABLET, FILM COATED ORAL at 10:23

## 2023-10-19 RX ADMIN — LEVETIRACETAM 1000 MG: 500 TABLET, FILM COATED ORAL at 09:35

## 2023-10-19 RX ADMIN — TORSEMIDE 60 MG: 20 TABLET ORAL at 09:34

## 2023-10-19 RX ADMIN — DEXAMETHASONE SODIUM PHOSPHATE 4 MG: 4 INJECTION, SOLUTION INTRAMUSCULAR; INTRAVENOUS at 07:08

## 2023-10-19 RX ADMIN — CARVEDILOL 25 MG: 12.5 TABLET, FILM COATED ORAL at 17:05

## 2023-10-19 RX ADMIN — DOCUSATE SODIUM 100 MG: 100 CAPSULE, LIQUID FILLED ORAL at 09:34

## 2023-10-19 RX ADMIN — PANTOPRAZOLE SODIUM 40 MG: 40 TABLET, DELAYED RELEASE ORAL at 07:08

## 2023-10-19 RX ADMIN — LISINOPRIL 40 MG: 20 TABLET ORAL at 09:34

## 2023-10-19 RX ADMIN — SODIUM CHLORIDE, PRESERVATIVE FREE 10 ML: 5 INJECTION INTRAVENOUS at 10:24

## 2023-10-19 RX ADMIN — BUTALBITAL, ACETAMINOPHEN, AND CAFFEINE 1 TABLET: 50; 325; 40 TABLET ORAL at 14:33

## 2023-10-19 RX ADMIN — DEXAMETHASONE 4 MG: 4 TABLET ORAL at 13:55

## 2023-10-19 RX ADMIN — CARVEDILOL 25 MG: 12.5 TABLET, FILM COATED ORAL at 09:35

## 2023-10-19 ASSESSMENT — ENCOUNTER SYMPTOMS
GASTROINTESTINAL NEGATIVE: 1
RESPIRATORY NEGATIVE: 1

## 2023-10-19 ASSESSMENT — PAIN DESCRIPTION - DESCRIPTORS: DESCRIPTORS: ACHING

## 2023-10-19 ASSESSMENT — PAIN SCALES - GENERAL
PAINLEVEL_OUTOF10: 0
PAINLEVEL_OUTOF10: 3
PAINLEVEL_OUTOF10: 0

## 2023-10-19 ASSESSMENT — PAIN DESCRIPTION - LOCATION: LOCATION: HEAD

## 2023-10-19 NOTE — CARE COORDINATION
Transition of Care Plan:    RUR: 11%  Prior Level of Functioning: IADLs  Disposition: Acute Rehab  If SNF or IPR: Date FOC offered: 10/18/2023  Date FOC received: 10/18/2023  Accepting facility: [address] [place] [address]  Date authorization started with reference number: 10/18; Ref#: Unknown  Date authorization received and expires: 10/19/2023; expiration date unknown  Follow up appointments: See Discharge Instructions  DME needed: To be determined by IPR  Transportation at discharge: [facility]; Medicaid Ref#: [id number]  IM/IMM Medicare/ letter given: N/A  Caregiver Contact: Sister, Duy Santana (ph#: [phone])  Discharge Caregiver contacted prior to discharge? No, pt reports he will notify family  Care Conference needed? No  Barriers to discharge: None Noted    Transport Time: [address]  Room Number: 2160  Report Number: [phone]    Chart reviewed. IDR completed. CM received notification for [facility] liaison, Jorie Collet, that insurance authorization approved and bed available today. CM inquired if pt able to admit before noon; liaison declined reporting earliest available time for discharge 1400. MD and RN notified. CM met with pt to notify of acceptance and discuss transport to be arranged via Medicaid; pt verbalized understanding and in agreement. Transport requested from [facility] (ph#: [phone]); anticipated pickup time 1436. Discharge packet placed on chart for discharge. Pt reports no questions or concerns for discharge at this time. John Schneider MBA, DEVIKAN, RN- Care Management    5186: CM contacted unit to confirm if any notice from transportation; CM notified unit has not heard from transportation company. CM leadership notified. Transport setup for Hospital to [address] [place] [address] 1800. Pt going to room 2160. CM notified [facility] liaison, Jorie Collet.  John Schneider MBA, BSN, RN- Care Management

## 2023-10-19 NOTE — DISCHARGE SUMMARY
Discharge Summary       PATIENT ID: Dex Aldrich  MRN: [id number]   YOB: 1975    DATE OF ADMISSION: 10/9/2023  6:12 PM    DATE OF DISCHARGE: 10/19/2023  PRIMARY CARE PROVIDER: Mela Hunt MD     ATTENDING PHYSICIAN: Dr. Eric Lamas   DISCHARGING PROVIDER: Candance Idol, APRN - NP    To contact this individual call [phone] and ask the  to page. If unavailable ask to be transferred the Adult Hospitalist Department. CONSULTATIONS: IP CONSULT TO NEUROSURGERY  IP CONSULT TO PALLIATIVE CARE  IP CONSULT TO PHYSICAL THERAPY  IP CONSULT TO OCCUPATIONAL THERAPY  IP CONSULT TO PHYSICAL MEDICINE REHAB    PROCEDURES/SURGERIES: Procedure(s):  BRAIN LAB GUIDED RIGHT OCCIPITAL RE-DO CRANIOTOMY FOR REMOVAL OF GLIOMA     ADMITTING DIAGNOSES & HOSPITAL COURSE:     Dex Aldrich is a 51year-old male with past medical history of nonischemic cardiomyopathy, mixed systolic/diastolic heart failure (LVEF 35%), MITCH (CPAP), seizures, HTN, HLD, and recurrent glioblastoma s/p resection (2/2/23) who presented to outside hospital on 10/9 with left facial droop, slurred speech, and headache. Recent outpatient MRI showed enlarging residual tumor with increasing surrounding vasogenic edema. He was also found to have some subfalcine herniation at outside hospital. Neurosurgery was contacted and he was treated with levetiracetam, dexamethasone, and Zofran. His condition deteriorated and he required emergent intubation, so he was transferred to [facility] ICU. He was extubated and transferred out of ICU on 10/10. He was seen by Palliative Care on 10/11 with patient expressing plan to proceed with tumor resection and goals of prolonging his life as long as possible. On 10/16, he was taken to OR with Dr. Eric Lamas for right-sided parietooccipital craniotomy with re-exploration and removal of recurrent parietal tumor. He was boarded in the PACU overnight due to no ICU beds available.  Hospitalist service was consulted for ICU

## 2023-10-19 NOTE — PLAN OF CARE
Problem: Chronic Conditions and Co-morbidities  Goal: Patient's chronic conditions and co-morbidity symptoms are monitored and maintained or improved  10/11/2023 1127 by Alberto Nascimento RN  Outcome: Progressing  10/11/2023 0825 by Brandi Lundberg RN  Outcome: Progressing  Flowsheets (Taken 10/11/2023 0800 by Alberto Nascimento RN)  Care Plan - Patient's Chronic Conditions and Co-Morbidity Symptoms are Monitored and Maintained or Improved: Monitor and assess patient's chronic conditions and comorbid symptoms for stability, deterioration, or improvement     Problem: Discharge Planning  Goal: Discharge to home or other facility with appropriate resources  10/11/2023 1127 by Alberto Nascimento RN  Outcome: Progressing  10/11/2023 0825 by Brandi Lundberg RN  Outcome: Progressing  Flowsheets (Taken 10/11/2023 0800 by Alberto Nascimento RN)  Discharge to home or other facility with appropriate resources: Identify barriers to discharge with patient and caregiver     Problem: Safety - Medical Restraint  Goal: Remains free of injury from restraints (Restraint for Interference with Medical Device)  Description: INTERVENTIONS:  1. Determine that other, less restrictive measures have been tried or would not be effective before applying the restraint  2. Evaluate the patient's condition at the time of restraint application  3. Inform patient/family regarding the reason for restraint  4. Q2H: Monitor safety, psychosocial status, comfort, nutrition and hydration  Outcome: Progressing     Problem: Skin/Tissue Integrity  Goal: Absence of new skin breakdown  Description: 1. Monitor for areas of redness and/or skin breakdown  2. Assess vascular access sites hourly  3. Every 4-6 hours minimum:  Change oxygen saturation probe site  4. Every 4-6 hours:  If on nasal continuous positive airway pressure, respiratory therapy assess nares and determine need for appliance change or resting period.   10/11/2023 1127 by Alberto Nascimento RN  Outcome:
Problem: Chronic Conditions and Co-morbidities  Goal: Patient's chronic conditions and co-morbidity symptoms are monitored and maintained or improved  10/11/2023 1838 by Kristan Sanchez RN  Outcome: Progressing  10/11/2023 1127 by Kristan Sanchez RN  Outcome: Progressing  10/11/2023 0825 by Amanda Watts RN  Outcome: Progressing  Flowsheets (Taken 10/11/2023 0800 by Kristan Sanchez RN)  Care Plan - Patient's Chronic Conditions and Co-Morbidity Symptoms are Monitored and Maintained or Improved: Monitor and assess patient's chronic conditions and comorbid symptoms for stability, deterioration, or improvement     Problem: Discharge Planning  Goal: Discharge to home or other facility with appropriate resources  10/11/2023 1838 by Kristan Sanchez RN  Outcome: Progressing  10/11/2023 1127 by Kristan Sanchez RN  Outcome: Progressing  10/11/2023 0825 by Amanda Watts RN  Outcome: Progressing  Flowsheets (Taken 10/11/2023 0800 by Kristan Sanchez RN)  Discharge to home or other facility with appropriate resources: Identify barriers to discharge with patient and caregiver     Problem: Safety - Medical Restraint  Goal: Remains free of injury from restraints (Restraint for Interference with Medical Device)  Description: INTERVENTIONS:  1. Determine that other, less restrictive measures have been tried or would not be effective before applying the restraint  2. Evaluate the patient's condition at the time of restraint application  3. Inform patient/family regarding the reason for restraint  4. Q2H: Monitor safety, psychosocial status, comfort, nutrition and hydration  10/11/2023 1838 by Kristan Sanchez RN  Outcome: Progressing  10/11/2023 1127 by Kristan Sanchez RN  Outcome: Progressing     Problem: Skin/Tissue Integrity  Goal: Absence of new skin breakdown  Description: 1. Monitor for areas of redness and/or skin breakdown  2. Assess vascular access sites hourly  3.   Every 4-6 hours minimum:  Change oxygen saturation probe
Problem: Chronic Conditions and Co-morbidities  Goal: Patient's chronic conditions and co-morbidity symptoms are monitored and maintained or improved  10/14/2023 0033 by Maki Smith RN  Outcome: Progressing  Flowsheets (Taken 10/13/2023 2000)  Care Plan - Patient's Chronic Conditions and Co-Morbidity Symptoms are Monitored and Maintained or Improved: Monitor and assess patient's chronic conditions and comorbid symptoms for stability, deterioration, or improvement  10/13/2023 1042 by Augusta Herndon RN  Outcome: Progressing  Flowsheets (Taken 10/13/2023 0800)  Care Plan - Patient's Chronic Conditions and Co-Morbidity Symptoms are Monitored and Maintained or Improved: Monitor and assess patient's chronic conditions and comorbid symptoms for stability, deterioration, or improvement     Problem: Discharge Planning  Goal: Discharge to home or other facility with appropriate resources  Recent Flowsheet Documentation  Taken 10/13/2023 2000 by Maki Smith RN  Discharge to home or other facility with appropriate resources: Identify barriers to discharge with patient and caregiver  10/13/2023 1042 by Augusta Herndon RN  Outcome: Progressing  Flowsheets (Taken 10/13/2023 0800)  Discharge to home or other facility with appropriate resources: Identify barriers to discharge with patient and caregiver     Problem: Safety - Medical Restraint  Goal: Remains free of injury from restraints (Restraint for Interference with Medical Device)  Description: INTERVENTIONS:  1. Determine that other, less restrictive measures have been tried or would not be effective before applying the restraint  2. Evaluate the patient's condition at the time of restraint application  3. Inform patient/family regarding the reason for restraint  4.  Q2H: Monitor safety, psychosocial status, comfort, nutrition and hydration  10/13/2023 1042 by Augusta Herndon RN  Outcome: Progressing     Problem: Skin/Tissue Integrity  Goal:
Problem: Chronic Conditions and Co-morbidities  Goal: Patient's chronic conditions and co-morbidity symptoms are monitored and maintained or improved  10/15/2023 1109 by Johnathan Olivares RN  Outcome: Progressing  Flowsheets (Taken 10/15/2023 0800)  Care Plan - Patient's Chronic Conditions and Co-Morbidity Symptoms are Monitored and Maintained or Improved: Monitor and assess patient's chronic conditions and comorbid symptoms for stability, deterioration, or improvement  10/15/2023 0421 by Ysabel Navarrete RN  Outcome: Progressing  Flowsheets (Taken 10/14/2023 2000)  Care Plan - Patient's Chronic Conditions and Co-Morbidity Symptoms are Monitored and Maintained or Improved: Monitor and assess patient's chronic conditions and comorbid symptoms for stability, deterioration, or improvement     Problem: Discharge Planning  Goal: Discharge to home or other facility with appropriate resources  Outcome: Progressing  Flowsheets (Taken 10/15/2023 0800)  Discharge to home or other facility with appropriate resources: Identify barriers to discharge with patient and caregiver     Problem: Safety - Medical Restraint  Goal: Remains free of injury from restraints (Restraint for Interference with Medical Device)  Description: INTERVENTIONS:  1. Determine that other, less restrictive measures have been tried or would not be effective before applying the restraint  2. Evaluate the patient's condition at the time of restraint application  3. Inform patient/family regarding the reason for restraint  4. Q2H: Monitor safety, psychosocial status, comfort, nutrition and hydration  Outcome: Progressing     Problem: Skin/Tissue Integrity  Goal: Absence of new skin breakdown  Description: 1. Monitor for areas of redness and/or skin breakdown  2. Assess vascular access sites hourly  3. Every 4-6 hours minimum:  Change oxygen saturation probe site  4.   Every 4-6 hours:  If on nasal continuous positive airway pressure, respiratory
Problem: Chronic Conditions and Co-morbidities  Goal: Patient's chronic conditions and co-morbidity symptoms are monitored and maintained or improved  10/15/2023 1633 by Helen Fountain RN  Outcome: Progressing  10/15/2023 1109 by Marely Downs RN  Outcome: Progressing  Flowsheets (Taken 10/15/2023 0800)  Care Plan - Patient's Chronic Conditions and Co-Morbidity Symptoms are Monitored and Maintained or Improved: Monitor and assess patient's chronic conditions and comorbid symptoms for stability, deterioration, or improvement  10/15/2023 0421 by Wilberto Waller RN  Outcome: Progressing  Flowsheets (Taken 10/14/2023 2000)  Care Plan - Patient's Chronic Conditions and Co-Morbidity Symptoms are Monitored and Maintained or Improved: Monitor and assess patient's chronic conditions and comorbid symptoms for stability, deterioration, or improvement     Problem: Discharge Planning  Goal: Discharge to home or other facility with appropriate resources  10/15/2023 1633 by Helen Fountain RN  Outcome: Progressing  10/15/2023 1109 by Marely Downs RN  Outcome: Progressing  Flowsheets (Taken 10/15/2023 0800)  Discharge to home or other facility with appropriate resources: Identify barriers to discharge with patient and caregiver     Problem: Safety - Medical Restraint  Goal: Remains free of injury from restraints (Restraint for Interference with Medical Device)  Description: INTERVENTIONS:  1. Determine that other, less restrictive measures have been tried or would not be effective before applying the restraint  2. Evaluate the patient's condition at the time of restraint application  3. Inform patient/family regarding the reason for restraint  4. Q2H: Monitor safety, psychosocial status, comfort, nutrition and hydration  10/15/2023 1633 by Helen Fountain RN  Outcome: Progressing  10/15/2023 1109 by Marely Downs RN  Outcome: Progressing     Problem: Skin/Tissue Integrity  Goal: Absence of new skin breakdown  Description: 1.
Problem: Chronic Conditions and Co-morbidities  Goal: Patient's chronic conditions and co-morbidity symptoms are monitored and maintained or improved  10/15/2023 2326 by Adrián Freeman RN  Outcome: Progressing       Problem: Skin/Tissue Integrity  Goal: Absence of new skin breakdown  Description: 1. Monitor for areas of redness and/or skin breakdown  2. Assess vascular access sites hourly  3. Every 4-6 hours minimum:  Change oxygen saturation probe site  4. Every 4-6 hours:  If on nasal continuous positive airway pressure, respiratory therapy assess nares and determine need for appliance change or resting period.   10/15/2023 2326 by Adrián Freeman RN  Outcome: Progressing     Problem: Safety - Adult  Goal: Free from fall injury  10/15/2023 2326 by Adrián Freeman RN  Outcome: Progressing    Problem: Pain  Goal: Verbalizes/displays adequate comfort level or baseline comfort level  10/15/2023 2326 by Adrián Freeman RN  Outcome: Progressing
Problem: Chronic Conditions and Co-morbidities  Goal: Patient's chronic conditions and co-morbidity symptoms are monitored and maintained or improved  10/16/2023 1251 by Elda Hoff RN  Outcome: Progressing  Flowsheets (Taken 10/16/2023 0800)  Care Plan - Patient's Chronic Conditions and Co-Morbidity Symptoms are Monitored and Maintained or Improved: Monitor and assess patient's chronic conditions and comorbid symptoms for stability, deterioration, or improvement  10/15/2023 2326 by Regina Machuca RN  Outcome: Progressing  Flowsheets (Taken 10/15/2023 2000)  Care Plan - Patient's Chronic Conditions and Co-Morbidity Symptoms are Monitored and Maintained or Improved: Monitor and assess patient's chronic conditions and comorbid symptoms for stability, deterioration, or improvement     Problem: Discharge Planning  Goal: Discharge to home or other facility with appropriate resources  Outcome: Progressing  Flowsheets (Taken 10/16/2023 0800)  Discharge to home or other facility with appropriate resources: Identify barriers to discharge with patient and caregiver     Problem: Safety - Medical Restraint  Goal: Remains free of injury from restraints (Restraint for Interference with Medical Device)  Description: INTERVENTIONS:  1. Determine that other, less restrictive measures have been tried or would not be effective before applying the restraint  2. Evaluate the patient's condition at the time of restraint application  3. Inform patient/family regarding the reason for restraint  4. Q2H: Monitor safety, psychosocial status, comfort, nutrition and hydration  10/16/2023 1251 by Elda Hoff RN  Outcome: Progressing  10/15/2023 2326 by Regina Machuca RN  Outcome: Progressing     Problem: Skin/Tissue Integrity  Goal: Absence of new skin breakdown  Description: 1. Monitor for areas of redness and/or skin breakdown  2. Assess vascular access sites hourly  3.   Every 4-6 hours minimum:  Change oxygen saturation
Problem: Chronic Conditions and Co-morbidities  Goal: Patient's chronic conditions and co-morbidity symptoms are monitored and maintained or improved  10/17/2023 1816 by Jaqueline Merrill RN  Outcome: Progressing  10/17/2023 1236 by Maureen Melendez RN  Outcome: Progressing     Problem: Discharge Planning  Goal: Discharge to home or other facility with appropriate resources  10/17/2023 1816 by Jaqueline Merrill RN  Outcome: Progressing  10/17/2023 1236 by Maureen Melendez RN  Outcome: Progressing     Problem: Safety - Medical Restraint  Goal: Remains free of injury from restraints (Restraint for Interference with Medical Device)  Description: INTERVENTIONS:  1. Determine that other, less restrictive measures have been tried or would not be effective before applying the restraint  2. Evaluate the patient's condition at the time of restraint application  3. Inform patient/family regarding the reason for restraint  4. Q2H: Monitor safety, psychosocial status, comfort, nutrition and hydration  10/17/2023 1816 by Jaqueline Merrill RN  Outcome: Progressing  10/17/2023 1236 by Maureen Melendez RN  Outcome: Progressing     Problem: Skin/Tissue Integrity  Goal: Absence of new skin breakdown  Description: 1. Monitor for areas of redness and/or skin breakdown  2. Assess vascular access sites hourly  3. Every 4-6 hours minimum:  Change oxygen saturation probe site  4. Every 4-6 hours:  If on nasal continuous positive airway pressure, respiratory therapy assess nares and determine need for appliance change or resting period.   10/17/2023 1816 by Jaqueline Merrill RN  Outcome: Progressing  10/17/2023 1236 by Maureen Melendez RN  Outcome: Progressing     Problem: Safety - Adult  Goal: Free from fall injury  10/17/2023 1816 by Jaqueline Merrill RN  Outcome: Progressing  Flowsheets (Taken 10/17/2023 1600)  Free From Fall Injury: Instruct family/caregiver on patient safety  10/17/2023 1236 by Kate
Problem: Chronic Conditions and Co-morbidities  Goal: Patient's chronic conditions and co-morbidity symptoms are monitored and maintained or improved  Outcome: Progressing     Problem: Discharge Planning  Goal: Discharge to home or other facility with appropriate resources  Outcome: Progressing     Problem: Safety - Medical Restraint  Goal: Remains free of injury from restraints (Restraint for Interference with Medical Device)  Description: INTERVENTIONS:  1. Determine that other, less restrictive measures have been tried or would not be effective before applying the restraint  2. Evaluate the patient's condition at the time of restraint application  3. Inform patient/family regarding the reason for restraint  4. Q2H: Monitor safety, psychosocial status, comfort, nutrition and hydration  Outcome: Progressing     Problem: Skin/Tissue Integrity  Goal: Absence of new skin breakdown  Description: 1. Monitor for areas of redness and/or skin breakdown  2. Assess vascular access sites hourly  3. Every 4-6 hours minimum:  Change oxygen saturation probe site  4. Every 4-6 hours:  If on nasal continuous positive airway pressure, respiratory therapy assess nares and determine need for appliance change or resting period.   Outcome: Progressing     Problem: Safety - Adult  Goal: Free from fall injury  Outcome: Progressing     Problem: Pain  Goal: Verbalizes/displays adequate comfort level or baseline comfort level  Outcome: Progressing
Problem: Chronic Conditions and Co-morbidities  Goal: Patient's chronic conditions and co-morbidity symptoms are monitored and maintained or improved  Outcome: Progressing     Problem: Discharge Planning  Goal: Discharge to home or other facility with appropriate resources  Outcome: Progressing     Problem: Safety - Medical Restraint  Goal: Remains free of injury from restraints (Restraint for Interference with Medical Device)  Description: INTERVENTIONS:  1. Determine that other, less restrictive measures have been tried or would not be effective before applying the restraint  2. Evaluate the patient's condition at the time of restraint application  3. Inform patient/family regarding the reason for restraint  4. Q2H: Monitor safety, psychosocial status, comfort, nutrition and hydration  Outcome: Progressing     Problem: Skin/Tissue Integrity  Goal: Absence of new skin breakdown  Description: 1. Monitor for areas of redness and/or skin breakdown  2. Assess vascular access sites hourly  3. Every 4-6 hours minimum:  Change oxygen saturation probe site  4. Every 4-6 hours:  If on nasal continuous positive airway pressure, respiratory therapy assess nares and determine need for appliance change or resting period.   Outcome: Progressing     Problem: Safety - Adult  Goal: Free from fall injury  Outcome: Progressing  Flowsheets (Taken 10/10/2023 1731)  Free From Fall Injury: Instruct family/caregiver on patient safety     Problem: Pain  Goal: Verbalizes/displays adequate comfort level or baseline comfort level  Outcome: Progressing
Problem: Chronic Conditions and Co-morbidities  Goal: Patient's chronic conditions and co-morbidity symptoms are monitored and maintained or improved  Outcome: Progressing     Problem: Discharge Planning  Goal: Discharge to home or other facility with appropriate resources  Outcome: Progressing     Problem: Skin/Tissue Integrity  Goal: Absence of new skin breakdown  Description: 1. Monitor for areas of redness and/or skin breakdown  2. Assess vascular access sites hourly  3. Every 4-6 hours minimum:  Change oxygen saturation probe site  4. Every 4-6 hours:  If on nasal continuous positive airway pressure, respiratory therapy assess nares and determine need for appliance change or resting period.   Outcome: Progressing     Problem: Safety - Adult  Goal: Free from fall injury  Outcome: Progressing     Problem: Pain  Goal: Verbalizes/displays adequate comfort level or baseline comfort level  Outcome: Progressing
Problem: Chronic Conditions and Co-morbidities  Goal: Patient's chronic conditions and co-morbidity symptoms are monitored and maintained or improved  Outcome: Progressing  Flowsheets  Taken 10/18/2023 0800 by Jose Clayton - Patient's Chronic Conditions and Co-Morbidity Symptoms are Monitored and Maintained or Improved: Monitor and assess patient's chronic conditions and comorbid symptoms for stability, deterioration, or improvement  Taken 10/18/2023 0000 by Jose Perales - Patient's Chronic Conditions and Co-Morbidity Symptoms are Monitored and Maintained or Improved:   Monitor and assess patient's chronic conditions and comorbid symptoms for stability, deterioration, or improvement   Collaborate with multidisciplinary team to address chronic and comorbid conditions and prevent exacerbation or deterioration   Update acute care plan with appropriate goals if chronic or comorbid symptoms are exacerbated and prevent overall improvement and discharge     Problem: Discharge Planning  Goal: Discharge to home or other facility with appropriate resources  Outcome: Progressing  Flowsheets  Taken 10/18/2023 0800 by Karan Sher RN  Discharge to home or other facility with appropriate resources: Identify barriers to discharge with patient and caregiver  Taken 10/18/2023 0000 by Cirilo Soriano RN  Discharge to home or other facility with appropriate resources:   Identify barriers to discharge with patient and caregiver   Arrange for needed discharge resources and transportation as appropriate   Identify discharge learning needs (meds, wound care, etc)     Problem: Safety - Medical Restraint  Goal: Remains free of injury from restraints (Restraint for Interference with Medical Device)  Description: INTERVENTIONS:  1. Determine that other, less restrictive measures have been tried or would not be effective before applying the restraint  2.  Evaluate the patient's condition at the time of
Problem: Chronic Conditions and Co-morbidities  Goal: Patient's chronic conditions and co-morbidity symptoms are monitored and maintained or improved  Outcome: Progressing  Flowsheets (Taken 10/13/2023 0800)  Care Plan - Patient's Chronic Conditions and Co-Morbidity Symptoms are Monitored and Maintained or Improved: Monitor and assess patient's chronic conditions and comorbid symptoms for stability, deterioration, or improvement     Problem: Discharge Planning  Goal: Discharge to home or other facility with appropriate resources  Outcome: Progressing  Flowsheets (Taken 10/13/2023 0800)  Discharge to home or other facility with appropriate resources: Identify barriers to discharge with patient and caregiver     Problem: Safety - Medical Restraint  Goal: Remains free of injury from restraints (Restraint for Interference with Medical Device)  Description: INTERVENTIONS:  1. Determine that other, less restrictive measures have been tried or would not be effective before applying the restraint  2. Evaluate the patient's condition at the time of restraint application  3. Inform patient/family regarding the reason for restraint  4. Q2H: Monitor safety, psychosocial status, comfort, nutrition and hydration  Outcome: Progressing     Problem: Skin/Tissue Integrity  Goal: Absence of new skin breakdown  Description: 1. Monitor for areas of redness and/or skin breakdown  2. Assess vascular access sites hourly  3. Every 4-6 hours minimum:  Change oxygen saturation probe site  4. Every 4-6 hours:  If on nasal continuous positive airway pressure, respiratory therapy assess nares and determine need for appliance change or resting period.   Outcome: Progressing     Problem: Safety - Adult  Goal: Free from fall injury  Outcome: Progressing     Problem: Pain  Goal: Verbalizes/displays adequate comfort level or baseline comfort level  Outcome: Progressing
Problem: Chronic Conditions and Co-morbidities  Goal: Patient's chronic conditions and co-morbidity symptoms are monitored and maintained or improved  Outcome: Progressing  Flowsheets (Taken 10/14/2023 2000)  Care Plan - Patient's Chronic Conditions and Co-Morbidity Symptoms are Monitored and Maintained or Improved: Monitor and assess patient's chronic conditions and comorbid symptoms for stability, deterioration, or improvement     Problem: Skin/Tissue Integrity  Goal: Absence of new skin breakdown  Description: 1. Monitor for areas of redness and/or skin breakdown  2. Assess vascular access sites hourly  3. Every 4-6 hours minimum:  Change oxygen saturation probe site  4. Every 4-6 hours:  If on nasal continuous positive airway pressure, respiratory therapy assess nares and determine need for appliance change or resting period.   Outcome: Progressing     Problem: Safety - Adult  Goal: Free from fall injury  Outcome: Progressing     Problem: Discharge Planning  Goal: Discharge to home or other facility with appropriate resources  Recent Flowsheet Documentation  Taken 10/14/2023 2000 by Milena Grijalva RN  Discharge to home or other facility with appropriate resources: Identify barriers to discharge with patient and caregiver
Problem: Occupational Therapy - Adult  Goal: By Discharge: Performs self-care activities at highest level of function for planned discharge setting. See evaluation for individualized goals. Description: FUNCTIONAL STATUS PRIOR TO ADMISSION:  Patient was ambulatory without use of DME. 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 HOME SUPPORT: Patient lived his sister and did not require assistance with ADL/IADL tasks. Occupational Therapy Goals:  Initiated 10/18/2023  1. Patient will perform grooming routine using LUE as gross motor stabilizer with Minimal Assist within 7 day(s). 2.  Patient will perform seated anterior neck to thigh bathing with Minimal Assist within 7 day(s). 3.  Patient will perform upper body dressing with Minimal Assist within 7 day(s). 4.  Patient will perform toilet/BSC transfers with Moderate Assist  within 7 day(s). 5.  Patient will perform all aspects of toileting with Moderate Assist within 7 day(s). 6.  Patient will participate in upper extremity therapeutic exercise/activities with Supervision for 5 minutes within 7 day(s). 7.  Patient will improve their Fugl Montague score by 5 points in prep for completion of ADL tasks within 7 days. Outcome: Progressing   OCCUPATIONAL THERAPY EVALUATION    Patient: Dany Baker (77 y.o. male)  Date: 10/18/2023  Primary Diagnosis: Brain mass [G93.89]  Procedure(s) (LRB):  BRAIN LAB GUIDED RIGHT OCCIPITAL RE-DO CRANIOTOMY FOR REMOVAL OF GLIOMA (Right) 2 Days Post-Op     Precautions: Fall Risk (SBP < 150)                  ASSESSMENT :  The patient's performance of ADL/IADL tasks is limited at this time by impaired standing balance, activity tolerance, LUE hemiparesis (increased strength proximally at shoulder), sensation, coordination, and cognition (attention, insight, safety awareness).      Pt received seated in
Problem: Physical Therapy - Adult  Goal: By Discharge: Performs mobility at highest level of function for planned discharge setting. See evaluation for individualized goals. Description: FUNCTIONAL STATUS PRIOR TO ADMISSION: Patient was independent and active without use of DME. Patient has hx recurrent GBM with last resection 2/2/2023. Following last resection, patient with L weakness and had stay at [facility]. Patient reports no further L deficits at baseline, no falls, driving, and being fully independent. Patient lives with sister who is able to provide care at discharge as needed. On disability. HOME SUPPORT PRIOR TO ADMISSION: The patient lived with sister but did not require assistance. Physical Therapy Goals  Initiated 10/17/2023  1. Patient will move from supine to sit and sit to supine in bed with minimal assistance within 7 day(s). 2.  Patient will perform sit to stand with minimal assistance within 7 day(s). 3.  Patient will transfer from bed to chair and chair to bed with minimal assistance using the least restrictive device within 7 day(s). 4.  Patient will ambulate with moderate assistance for 10 feet with the least restrictive device within 7 day(s). 5.  Patient will ascend/descend 2 stairs with bilateral handrail(s) with moderate assistance within 7 day(s). 6.  Patient will improve Acosta Balance score by 7 points within 7 days. Outcome: Progressing   PHYSICAL THERAPY TREATMENT    Patient: Aria Mac (66 y.o. male)  Date: 10/18/2023  Diagnosis: Brain mass [G93.89] Brain mass  Procedure(s) (LRB):  BRAIN LAB GUIDED RIGHT OCCIPITAL RE-DO CRANIOTOMY FOR REMOVAL OF GLIOMA (Right) 2 Days Post-Op  Precautions: Fall Risk (SBP < 150)                    ASSESSMENT:  Patient continues to benefit from skilled PT services and is progressing towards goals. Osmany tolerated today's session well and was able to progress to taking a few steps with Mod A x 2.  With first trial of ambulation he did have
Progressing
baseline comfort level  10/11/2023 0825 by Duy Gordillo, RN  Outcome: Progressing  10/10/2023 1919 by Talat White, RN  Outcome: Progressing
Given To: Patient  Education Provided: Plan of Care  Education Method: Verbal  Barriers to Learning: None  Education Outcome: Verbalized understanding;Continued education needed      Ladi Murders, PTA  Minutes: 24
Floor or on a Stool: Able to sit safely and securely for 2 minutes  4. Standing to Sitting: Needs assist to sit  5. Transfers: Needs one person to assist  6. Standing Unsupported with Eyes Closed: Needs help to keep from falling  7. Standing Unsupported with Feet Together: Needs help to attain position and unable to hold for 15 seconds  8. Reach Forward with Outstretched Arm While Standing: Loses balance while trying/requires external support  9.  Object from Floor from a Standing Position: Unable to try/needs assist to keep from losing balance or falling  10. Turning to Look Behind Over Left and Right Shoulders While Standing: Needs assist to keep from losing balance or falling  11. Turn 360 Degrees: Needs assistance while turning  12. Place Alternate Foot on Step or Stool While Standing Unsupported: Needs assistance to keep from falling/unable to try  13. Standing Unsupported One Foot in Front: Loses balance while stepping or standing  14. Standing on One Leg: Unable to try needs assist to prevent fall  Acosta Balance Score: 5         56=Maximum possible score;   0-20=High fall risk  21-40=Moderate fall risk   41-56=Low fall risk                                                                                                                                                                                                                               Pain Rating:  3/10 HA pain   Pain Intervention(s):   nursing notified and rest    Activity Tolerance:   Good, requires rest breaks, and SpO2 stable on room air    After treatment:   Patient left in no apparent distress sitting up in chair, Call bell within reach, and Bed/ chair alarm activated    COMMUNICATION/EDUCATION:   The patient's plan of care was discussed with: registered nurse    Patient Education  Education Given To: Patient  Education Provided: Role of Therapy; Energy Conservation; Fall Prevention Strategies; Plan of Care;Precautions;Transfer

## 2023-10-19 NOTE — DISCHARGE INSTRUCTIONS
Discharge Summary       PATIENT ID: Chris Cazares  MRN: [id number]   YOB: 1975    DATE OF ADMISSION: 10/9/2023  6:12 PM    DATE OF DISCHARGE: 10/19/2023  PRIMARY CARE PROVIDER: Vick Holder MD     ATTENDING PHYSICIAN: Dr. Ruth Napoles   DISCHARGING PROVIDER: MASOUD Padilla NP    To contact this individual call [phone] and ask the  to page. If unavailable ask to be transferred the Adult Hospitalist Department. CONSULTATIONS: IP CONSULT TO NEUROSURGERY  IP CONSULT TO PALLIATIVE CARE  IP CONSULT TO PHYSICAL THERAPY  IP CONSULT TO OCCUPATIONAL THERAPY  IP CONSULT TO PHYSICAL MEDICINE REHAB    PROCEDURES/SURGERIES: Procedure(s):  BRAIN LAB GUIDED RIGHT OCCIPITAL RE-DO CRANIOTOMY FOR REMOVAL OF GLIOMA     ADMITTING DIAGNOSES & HOSPITAL COURSE:     Chris Cazares is a 51year-old male with past medical history of nonischemic cardiomyopathy, mixed systolic/diastolic heart failure (LVEF 35%), MITCH (CPAP), seizures, HTN, HLD, and recurrent glioblastoma s/p resection (2/2/23) who presented to outside hospital on 10/9 with left facial droop, slurred speech, and headache. Recent outpatient MRI showed enlarging residual tumor with increasing surrounding vasogenic edema. He was also found to have some subfalcine herniation at outside hospital. Neurosurgery was contacted and he was treated with levetiracetam, dexamethasone, and Zofran. His condition deteriorated and he required emergent intubation, so he was transferred to [facility] ICU. He was extubated and transferred out of ICU on 10/10. He was seen by Palliative Care on 10/11 with patient expressing plan to proceed with tumor resection and goals of prolonging his life as long as possible. On 10/16, he was taken to OR with Dr. Ruth Napoles for right-sided parietooccipital craniotomy with re-exploration and removal of recurrent parietal tumor. He was boarded in the PACU overnight due to no ICU beds available.  Hospitalist service was consulted for ICU

## 2024-08-09 ENCOUNTER — TELEPHONE (OUTPATIENT)
Facility: HOSPITAL | Age: 49
End: 2024-08-09

## 2024-08-09 NOTE — TELEPHONE ENCOUNTER
Received wound care referral from [facility], called and spoke with patient and he reports lower legs are doing well no drainage no redness or pain. No appoinment is needed at this time but will call to schedule if have any further problems

## 2024-10-21 ENCOUNTER — HOSPITAL ENCOUNTER (OUTPATIENT)
Facility: HOSPITAL | Age: 49
Discharge: HOME | End: 2024-10-21
Attending: PODIATRIST
Payer: MEDICARE

## 2024-10-21 VITALS
TEMPERATURE: 98.4 F | BODY MASS INDEX: 48.3 KG/M2 | SYSTOLIC BLOOD PRESSURE: 145 MMHG | DIASTOLIC BLOOD PRESSURE: 80 MMHG | WEIGHT: 282.9 LBS | HEART RATE: 56 BPM | RESPIRATION RATE: 17 BRPM | HEIGHT: 64 IN

## 2024-10-21 DIAGNOSIS — L97.922 NON-PRESSURE CHRONIC ULCER OF LEFT LOWER LEG, WITH FAT LAYER EXPOSED (HCC): Primary | ICD-10-CM

## 2024-10-21 PROCEDURE — 99213 OFFICE O/P EST LOW 20 MIN: CPT

## 2024-10-21 PROCEDURE — 11045 DBRDMT SUBQ TISS EACH ADDL: CPT

## 2024-10-21 PROCEDURE — 11042 DBRDMT SUBQ TIS 1ST 20SQCM/<: CPT

## 2024-10-21 RX ORDER — MUPIROCIN 20 MG/G
OINTMENT TOPICAL ONCE
OUTPATIENT
Start: 2024-10-21 | End: 2024-10-21

## 2024-10-21 RX ORDER — CLOBETASOL PROPIONATE 0.5 MG/G
OINTMENT TOPICAL ONCE
OUTPATIENT
Start: 2024-10-21 | End: 2024-10-21

## 2024-10-21 RX ORDER — LIDOCAINE HYDROCHLORIDE 20 MG/ML
JELLY TOPICAL ONCE
OUTPATIENT
Start: 2024-10-21 | End: 2024-10-21

## 2024-10-21 RX ORDER — LIDOCAINE 40 MG/G
CREAM TOPICAL ONCE
OUTPATIENT
Start: 2024-10-21 | End: 2024-10-21

## 2024-10-21 RX ORDER — LIDOCAINE HYDROCHLORIDE 40 MG/ML
SOLUTION TOPICAL ONCE
OUTPATIENT
Start: 2024-10-21 | End: 2024-10-21

## 2024-10-21 RX ORDER — NEOMYCIN/BACITRACIN/POLYMYXINB 3.5-400-5K
OINTMENT (GRAM) TOPICAL ONCE
OUTPATIENT
Start: 2024-10-21 | End: 2024-10-21

## 2024-10-21 RX ORDER — TRIAMCINOLONE ACETONIDE 1 MG/G
OINTMENT TOPICAL ONCE
OUTPATIENT
Start: 2024-10-21 | End: 2024-10-21

## 2024-10-21 RX ORDER — SPIRONOLACTONE 25 MG/1
25 TABLET ORAL DAILY
COMMUNITY

## 2024-10-21 RX ORDER — SILVER SULFADIAZINE 10 MG/G
CREAM TOPICAL ONCE
OUTPATIENT
Start: 2024-10-21 | End: 2024-10-21

## 2024-10-21 RX ORDER — BACITRACIN ZINC AND POLYMYXIN B SULFATE 500; 1000 [USP'U]/G; [USP'U]/G
OINTMENT TOPICAL ONCE
OUTPATIENT
Start: 2024-10-21 | End: 2024-10-21

## 2024-10-21 RX ORDER — GENTAMICIN SULFATE 1 MG/G
OINTMENT TOPICAL ONCE
OUTPATIENT
Start: 2024-10-21 | End: 2024-10-21

## 2024-10-21 RX ORDER — SODIUM CHLOR/HYPOCHLOROUS ACID 0.033 %
SOLUTION, IRRIGATION IRRIGATION ONCE
OUTPATIENT
Start: 2024-10-21 | End: 2024-10-21

## 2024-10-21 RX ORDER — GINSENG 100 MG
CAPSULE ORAL ONCE
OUTPATIENT
Start: 2024-10-21 | End: 2024-10-21

## 2024-10-21 RX ORDER — LIDOCAINE 50 MG/G
OINTMENT TOPICAL ONCE
OUTPATIENT
Start: 2024-10-21 | End: 2024-10-21

## 2024-10-21 RX ORDER — BETAMETHASONE DIPROPIONATE 0.5 MG/G
CREAM TOPICAL ONCE
OUTPATIENT
Start: 2024-10-21 | End: 2024-10-21

## 2024-10-21 NOTE — FLOWSHEET NOTE
10/21/24 1038   Anesthetic   Anesthetic 4% Lidocaine Liquid Topical   Right Leg Edema Point of Measurement   Leg circumference 46.6 cm   Ankle circumference 28.1 cm   Left Leg Edema Point of Measurement   Leg circumference 50.4 cm   Ankle circumference 32 cm   RLE Neurovascular Assessment   Capillary Refill Less than/Equal to 3 seconds   Color Appropriate for Ethnicity   Temperature Cool   R Pedal Pulse +2   LLE Neurovascular Assessment   Capillary Refill Less than/Equal to 3 seconds   Color Appropriate for Ethnicity;Yellow-Brown/Hemosiderin Staining   Temperature Cool   L Pedal Pulse Doppler   Wound 10/21/24 Leg Left;Lower;Anterior #1   Date First Assessed/Time First Assessed: 10/21/24 1035   Present on Original Admission: Yes  Wound Approximate Age at First Assessment (Weeks): 8 weeks  Primary Wound Type: Venous Ulcer  Location: Leg  Wound Location Orientation: Left;Lower;Anterior  ...   Wound Image    Wound Cleansed Cleansed with saline;Soap and water   Wound Length (cm) 4 cm   Wound Width (cm) 5.7 cm   Wound Depth (cm) 0.1 cm   Wound Surface Area (cm^2) 22.8 cm^2   Wound Volume (cm^3) 2.28 cm^3   Wound Assessment [place]/red;Slough   Drainage Amount Moderate (25-50%)   Drainage Description Serosanguinous   Odor None   Jennifer-wound Assessment Blanchable erythema;Hyperpigmented;Edematous   Margins Flat/open edges   Wound Thickness Description not for Pressure Injury Full thickness   Wound 10/21/24 Leg Posterior;Left;Lower #2   Date First Assessed/Time First Assessed: 10/21/24 1035   Present on Original Admission: Yes  Wound Approximate Age at First Assessment (Weeks): 8 weeks  Primary Wound Type: Venous Ulcer  Location: Leg  Wound Location Orientation: Posterior;Left;Lower ...   Wound Image    Dressing Status Old drainage noted   Wound Cleansed Cleansed with saline;Soap and water   Wound Length (cm) 8 cm   Wound Width (cm) 8 cm   Wound Depth (cm) 0 cm   Wound Surface Area (cm^2) 64 cm^2   Wound Volume (cm^3) 0 cm^3

## 2024-10-21 NOTE — WOUND CARE
mouth 2 times daily (Patient not taking: Reported on 10/21/2024) 60 tablet 0    chlorproMAZINE (THORAZINE) 25 MG tablet Take 1 tablet by mouth every 6 hours for 2 days 8 tablet 0    torsemide 60 MG TABS Take 60 mg by mouth daily (Patient not taking: Reported on 10/21/2024) 30 tablet 0    docusate sodium (COLACE, DULCOLAX) 100 MG CAPS Take 100 mg by mouth 2 times daily (Patient not taking: Reported on 10/21/2024) 60 capsule 0    acetaminophen (TYLENOL) 325 MG tablet Take 650 mg by mouth every 4 hours as needed (Patient not taking: Reported on 10/21/2024)      calcium carbonate (OS-TRACY) 1250 (500 Ca) MG chewable tablet Take 1 tablet by mouth as needed (Patient not taking: Reported on 10/21/2024)       No current facility-administered medications on file prior to encounter.         Written patient dismissal instructions given to patient and signed by patient or POA.         Electronically signed by Andreia Morgan DPM on 10/21/2024 at 12:11 PM

## 2024-10-21 NOTE — FLOWSHEET NOTE
10/21/24 1104   Right Leg Edema Point of Measurement   Compression Therapy Tubular elastic support bandage   Left Leg Edema Point of Measurement   Compression Therapy Tubular elastic support bandage   Wound 10/21/24 Leg Left;Lower;Anterior #1   Date First Assessed/Time First Assessed: 10/21/24 1035   Present on Original Admission: Yes  Wound Approximate Age at First Assessment (Weeks): 8 weeks  Primary Wound Type: Venous Ulcer  Location: Leg  Wound Location Orientation: Left;Lower;Anterior  ...   Dressing/Treatment Honey gel/honey paste;Gauze dressing/dressing sponge;ABD;Roll gauze   Wound 10/21/24 Leg Posterior;Left;Lower #2   Date First Assessed/Time First Assessed: 10/21/24 1035   Present on Original Admission: Yes  Wound Approximate Age at First Assessment (Weeks): 8 weeks  Primary Wound Type: Venous Ulcer  Location: Leg  Wound Location Orientation: Posterior;Left;Lower ...   Dressing/Treatment Non adherent;ABD;Gauze dressing/dressing sponge;Roll gauze;Tape/Soft cloth adhesive tape     Discharge Condition: Stable    Pain: 0    Ambulatory Status: Straight Cane    Discharge Destination: home    Transportation:car    Accompanied by: SELF    Discharge instructions reviewed with SELF and copy or written instructions have been provided. All questions/concerns have been addressed at this time.

## 2024-10-21 NOTE — PATIENT INSTRUCTIONS
Discharge Instructions for  [facility]  [address] [place] [address]  Telephone: [phone]     FAX [phone]    Wound Care Center Information: Should you experience any significant changes in your wound(s) or have questions about your wound care, please contact the [facility] at MONDAY - FRIDAY 8:00 am - 4:30.  If you need help with your wound outside these hours and cannot wait until we are again available, contact your PCP or go to the hospital emergency room.     NAME:  Osmany Campbell  YOB: 1975  DATE:  10/21/2024    : Susy     [x] Wound and dressing supply provider: [facility]    Wound Cleansing:   Do not scrub or use excessive force.  Cleanse wound prior to applying a clean dressing with:  [] Vashe - moisten gauze with Vashe, let sit for 2-3 minutes then pat dry    [] Keep Wound Dry in Shower - may purchase a cast cover at local pharmacy     [x] Cleanse wound with Mild Soap & Water    [x] May Shower: coordinate with dressing changes, remove dressing 1st, wash with mild soap and water, pat dry, and redress wound right after with a new dressing  [] Do not shower  [] cleanse with baby shampoo lather leave 2-3 minutes then rinse with water    Topical Treatments:  Do not apply lotions, creams, or ointments to wound bed unless directed.   [x] Apply moisturizing lotion such as A&D ointment to skin surrounding the wound prior to dressing change.  [] Other:     Dressings:                Wound Location Right foot Toes          Apply Primary Dressing:      [x] Some maceration noted - please weave betadine soaked gauze between toes      Change dressing:   [] Daily      [x] Every Other Day   [] Three times per week  [] Once a week   [] Do Not Change Dressing     [] Other:     Dressings:                   Wound Location Left Leg Anterior      Apply Primary Dressing:      [x] Medi-honey gel    Cover and Secure with:  [x]

## 2024-10-21 NOTE — PLAN OF CARE
Wound Care Supplies      Supply Company:     [employer]     Ordering Center:     [facility]  [address]  [place] [address] [phone]  WOUND CARE Dept: [phone]   FAX NUMBER [phone]    [facility] Information:      [facility] [id number]   [phone]   : 1975  AGE: 49 y.o.     GENDER: male   EPISODE DATE: 10/21/2024    Insurance:      PRIMARY INSURANCE:  Plan: [employer] DUAL COMPLETE  Coverage: [facility] MEDICARE  Effective Date: 10/1/2024  Group Number: [unfilled]  Subscriber Number: [id number] - (Medicare Managed)    Payer/Plan Subscr  Sex Relation Sub. Ins. ID Effective Group Num   1. [facility] MEDICARE * ZOEY PEDERSEN 1975 Male Self [id number] 10/1/24 [id number]                                   [address]       Patient Wound Information:      Problem List Items Addressed This Visit    None      WOUNDS REQUIRING DRESSING SUPPLIES:     Wound 10/21/24 Leg Left;Lower;Anterior #1 (Active)   Wound Image   10/21/24 1038   Wound Etiology Venous 10/21/24 1055   Wound Cleansed Cleansed with saline;Soap and water 10/21/24 1038   Dressing/Treatment Honey gel/honey paste;Gauze dressing/dressing sponge;ABD;Roll gauze 10/21/24 1104   Wound Length (cm) 4 cm 10/21/24 1038   Wound Width (cm) 5.7 cm 10/21/24 1038   Wound Depth (cm) 0.1 cm 10/21/24 1038   Wound Surface Area (cm^2) 22.8 cm^2 10/21/24 1038   Wound Volume (cm^3) 2.28 cm^3 10/21/24 1038   Post-Procedure Length (cm) 4 cm 10/21/24 1055   Post-Procedure Width (cm) 5.7 cm 10/21/24 1055   Post-Procedure Depth (cm) 0.2 cm 10/21/24 1055   Post-Procedure Surface Area (cm^2) 22.8 cm^2 10/21/24 1055   Post-Procedure Volume (cm^3) 4.56 cm^3 10/21/24 1055   Wound Assessment Pink/red;Slough 10/21/24 1038   Drainage Amount Moderate (25-50%) 10/21/24 1038   Drainage Description Serosanguinous 10/21/24 1038   Odor None 10/21/24 1038   Jennifer-wound Assessment Blanchable erythema;Hyperpigmented;Edematous

## 2024-10-28 ENCOUNTER — HOSPITAL ENCOUNTER (OUTPATIENT)
Facility: HOSPITAL | Age: 49
Discharge: HOME | End: 2024-10-28
Attending: PODIATRIST
Payer: MEDICARE

## 2024-10-28 VITALS
HEART RATE: 57 BPM | TEMPERATURE: 97.9 F | DIASTOLIC BLOOD PRESSURE: 73 MMHG | SYSTOLIC BLOOD PRESSURE: 141 MMHG | RESPIRATION RATE: 16 BRPM

## 2024-10-28 DIAGNOSIS — L97.922 NON-PRESSURE CHRONIC ULCER OF LEFT LOWER LEG, WITH FAT LAYER EXPOSED (HCC): Primary | ICD-10-CM

## 2024-10-28 PROCEDURE — 11042 DBRDMT SUBQ TIS 1ST 20SQCM/<: CPT

## 2024-10-28 RX ORDER — SODIUM CHLOR/HYPOCHLOROUS ACID 0.033 %
SOLUTION, IRRIGATION IRRIGATION ONCE
OUTPATIENT
Start: 2024-10-28 | End: 2024-10-28

## 2024-10-28 RX ORDER — LIDOCAINE HYDROCHLORIDE 20 MG/ML
JELLY TOPICAL ONCE
OUTPATIENT
Start: 2024-10-28 | End: 2024-10-28

## 2024-10-28 RX ORDER — GINSENG 100 MG
CAPSULE ORAL ONCE
OUTPATIENT
Start: 2024-10-28 | End: 2024-10-28

## 2024-10-28 RX ORDER — NEOMYCIN/BACITRACIN/POLYMYXINB 3.5-400-5K
OINTMENT (GRAM) TOPICAL ONCE
OUTPATIENT
Start: 2024-10-28 | End: 2024-10-28

## 2024-10-28 RX ORDER — MUPIROCIN 20 MG/G
OINTMENT TOPICAL ONCE
OUTPATIENT
Start: 2024-10-28 | End: 2024-10-28

## 2024-10-28 RX ORDER — LIDOCAINE 40 MG/G
CREAM TOPICAL ONCE
OUTPATIENT
Start: 2024-10-28 | End: 2024-10-28

## 2024-10-28 RX ORDER — BETAMETHASONE DIPROPIONATE 0.5 MG/G
CREAM TOPICAL ONCE
OUTPATIENT
Start: 2024-10-28 | End: 2024-10-28

## 2024-10-28 RX ORDER — LIDOCAINE 50 MG/G
OINTMENT TOPICAL ONCE
OUTPATIENT
Start: 2024-10-28 | End: 2024-10-28

## 2024-10-28 RX ORDER — LIDOCAINE HYDROCHLORIDE 40 MG/ML
SOLUTION TOPICAL ONCE
OUTPATIENT
Start: 2024-10-28 | End: 2024-10-28

## 2024-10-28 RX ORDER — TRIAMCINOLONE ACETONIDE 1 MG/G
OINTMENT TOPICAL ONCE
OUTPATIENT
Start: 2024-10-28 | End: 2024-10-28

## 2024-10-28 RX ORDER — BACITRACIN ZINC AND POLYMYXIN B SULFATE 500; 1000 [USP'U]/G; [USP'U]/G
OINTMENT TOPICAL ONCE
OUTPATIENT
Start: 2024-10-28 | End: 2024-10-28

## 2024-10-28 RX ORDER — GENTAMICIN SULFATE 1 MG/G
OINTMENT TOPICAL ONCE
OUTPATIENT
Start: 2024-10-28 | End: 2024-10-28

## 2024-10-28 RX ORDER — CLOBETASOL PROPIONATE 0.5 MG/G
OINTMENT TOPICAL ONCE
OUTPATIENT
Start: 2024-10-28 | End: 2024-10-28

## 2024-10-28 RX ORDER — SILVER SULFADIAZINE 10 MG/G
CREAM TOPICAL ONCE
OUTPATIENT
Start: 2024-10-28 | End: 2024-10-28

## 2024-10-28 NOTE — PATIENT INSTRUCTIONS
Discharge Instructions for  [facility]  [address] [place] [address]  Telephone: [phone]     FAX [phone]    Wound Care Center Information: Should you experience any significant changes in your wound(s) or have questions about your wound care, please contact the [facility] at MONDAY - FRIDAY 8:00 am - 4:30.  If you need help with your wound outside these hours and cannot wait until we are again available, contact your PCP or go to the hospital emergency room.     NAME:  Osmany Campbell  YOB: 1975  DATE:  10/28/2024    : Susy     [x] Wound and dressing supply provider: [facility]    Wound Cleansing:   Do not scrub or use excessive force.  Cleanse wound prior to applying a clean dressing with:  [] Vashe - moisten gauze with Vashe, let sit for 2-3 minutes then pat dry    [] Keep Wound Dry in Shower - may purchase a cast cover at local pharmacy     [x] Cleanse wound with Mild Soap & Water    [x] May Shower: coordinate with dressing changes, remove dressing 1st, wash with mild soap and water, pat dry, and redress wound right after with a new dressing  [] Do not shower  [] cleanse with baby shampoo lather leave 2-3 minutes then rinse with water    Topical Treatments:  Do not apply lotions, creams, or ointments to wound bed unless directed.   [x] Apply moisturizing lotion such as A&D ointment to skin surrounding the wound prior to dressing change.  [] Other:     Dressings:                Wound Location left foot Toes          Apply Primary Dressing:      [x] Some maceration noted - please weave betadine soaked gauze between toes      Change dressing:   [] Daily      [x] Every Other Day   [] Three times per week  [] Once a week   [] Do Not Change Dressing     [] Other:     Dressings:                   Wound Location Left Leg Anterior      Apply Primary Dressing:      [x] Medi-honey gel    Cover and Secure with:  [x]

## 2024-10-28 NOTE — WOUND CARE
Procedure Note  Indications: Based on my examination of this patient's wound(s)/ulcer(s) today, debridement is required to promote healing and evaluate the wound base.    Debridement: Excisional Debridement    Using: curette the wound(s)/ulcer(s) was/were debrided down through and including the removal of subcutaneous tissue.  Performed by: Andreia Morgan DPM  Consent obtained: Yes  Time out taken: No:   Pain Control: Anesthetic  Anesthetic: 4% Lidocaine Liquid Topical       Devitalized Tissue Debrided: slough    Pre Debridement Measurements:  Are located in the Wound/Ulcer Documentation Flow Sheet    Diabetic/Pressure/Non Pressure Ulcers only:  Ulcer: Non-Pressure ulcer, fat layer exposed     Wound/Ulcer #: 1  Post Debridement Measurements:  Wound/Ulcer Descriptions are Pre Debridement except measurements:  Wound 10/21/24 Leg Left;Lower;Anterior #1 (Active)   Wound Image   10/28/24 1014   Wound Etiology Venous 10/21/24 1055   Wound Cleansed Soap and water 10/28/24 1014   Dressing/Treatment Honey gel/honey paste;Gauze dressing/dressing sponge;Roll gauze;ABD 10/28/24 1052   Wound Length (cm) 3.1 cm 10/28/24 1014   Wound Width (cm) 5.4 cm 10/28/24 1014   Wound Depth (cm) 0 cm 10/28/24 1014   Wound Surface Area (cm^2) 16.74 cm^2 10/28/24 1014   Change in Wound Size % (l*w) 26.58 10/28/24 1014   Wound Volume (cm^3) 0 cm^3 10/28/24 1014   Wound Healing % 100 10/28/24 1014   Post-Procedure Length (cm) 3.1 cm 10/28/24 1046   Post-Procedure Width (cm) 5.4 cm 10/28/24 1046   Post-Procedure Depth (cm) 0.1 cm 10/28/24 1046   Post-Procedure Surface Area (cm^2) 16.74 cm^2 10/28/24 1046   Post-Procedure Volume (cm^3) 1.674 cm^3 10/28/24 1046   Wound Assessment Other (Comment);Pink/red;Epithelialization 10/28/24 1014   Drainage Amount Scant (moist but unmeasurable) 10/28/24 1014   Drainage Description Serosanguinous 10/28/24 1014   Odor None 10/28/24 1014   Jennifer-wound Assessment Blanchable erythema;Dry/flaky;Hyperpigmented 10/28/24

## 2024-10-28 NOTE — FLOWSHEET NOTE
10/28/24 1052   Right Leg Edema Point of Measurement   Compression Therapy Tubular elastic support bandage   Left Leg Edema Point of Measurement   Compression Therapy Tubular elastic support bandage   Wound 10/21/24 Leg Left;Lower;Anterior #1   Date First Assessed/Time First Assessed: 10/21/24 1035   Present on Original Admission: Yes  Wound Approximate Age at First Assessment (Weeks): 8 weeks  Primary Wound Type: Venous Ulcer  Location: Leg  Wound Location Orientation: Left;Lower;Anterior  ...   Dressing/Treatment Honey gel/honey paste;Gauze dressing/dressing sponge;Roll gauze;ABD  (Betadine weaved between toes)

## 2024-10-28 NOTE — FLOWSHEET NOTE
10/28/24 1052   Wound 10/21/24 Leg Left;Lower;Anterior #1   Date First Assessed/Time First Assessed: 10/21/24 1035   Present on Original Admission: Yes  Wound Approximate Age at First Assessment (Weeks): 8 weeks  Primary Wound Type: Venous Ulcer  Location: Leg  Wound Location Orientation: Left;Lower;Anterior  ...   Dressing/Treatment Honey gel/honey paste;Gauze dressing/dressing sponge;Roll gauze;ABD  (Betadine weaved between toes)     Discharge Condition: Stable    Pain: 0    Ambulatory Status: Straight Cane    Discharge Destination: home    Transportation:car    Accompanied by: FAMILY    Discharge instructions reviewed with SELF and copy or written instructions have been provided. All questions/concerns have been addressed at this time.

## 2024-10-28 NOTE — FLOWSHEET NOTE
10/28/24 1014   Anesthetic   Anesthetic 4% Lidocaine Liquid Topical   Left Leg Edema Point of Measurement   Leg circumference 46.5 cm   Ankle circumference 31 cm   LLE Neurovascular Assessment   Capillary Refill Greater than 3 seconds   Color Appropriate for Ethnicity   Temperature Cool   L Pedal Pulse +2   Wound 10/21/24 Leg Left;Lower;Anterior #1   Date First Assessed/Time First Assessed: 10/21/24 1035   Present on Original Admission: Yes  Wound Approximate Age at First Assessment (Weeks): 8 weeks  Primary Wound Type: Venous Ulcer  Location: Leg  Wound Location Orientation: Left;Lower;Anterior  ...   Wound Image    Wound Cleansed Soap and water   Wound Length (cm) 3.1 cm   Wound Width (cm) 5.4 cm   Wound Depth (cm) 0 cm   Wound Surface Area (cm^2) 16.74 cm^2   Change in Wound Size % (l*w) 26.58   Wound Volume (cm^3) 0 cm^3   Wound Healing % 100   Wound Assessment Other (Comment);Pink/red;Epithelialization  (dried exudate)   Drainage Amount Scant (moist but unmeasurable)   Drainage Description Serosanguinous   Odor None   Jennifer-wound Assessment Blanchable erythema;Dry/flaky;Hyperpigmented   Margins Flat/open edges   Wound 10/21/24 Leg Posterior;Left;Lower #2   Date First Assessed/Time First Assessed: 10/21/24 1035   Present on Original Admission: Yes  Wound Approximate Age at First Assessment (Weeks): 8 weeks  Primary Wound Type: Venous Ulcer  Location: Leg  Wound Location Orientation: Posterior;Left;Lower ...   Wound Image    Wound Cleansed Soap and water   Wound Length (cm) 0 cm   Wound Width (cm) 0 cm   Wound Depth (cm) 0 cm   Wound Surface Area (cm^2) 0 cm^2   Change in Wound Size % (l*w) 100   Wound Volume (cm^3) 0 cm^3   Wound Assessment Epithelialization   Drainage Amount None (dry)   Odor None   Jennifer-wound Assessment Intact   Margins Attached edges   Pain Assessment   Pain Assessment None - Denies Pain     BP (!) 141/73   Pulse 57   Temp 97.9 °F (36.6 °C) (Temporal)   Resp 16

## 2025-01-28 NOTE — PROGRESS NOTES
"On statin, niacin and fish oil. Did not tolerate zetia    Lab Results   Component Value Date    CHOL 209 (H) 07/24/2024    CHOL 221 (H) 07/24/2023    CHOL 235 (H) 07/20/2022     Lab Results   Component Value Date    HDL 42 07/24/2024    HDL 49 07/24/2023    HDL 44 07/20/2022     No results found for: "LDLCALC"  No results found for: "DLDL"  Lab Results   Component Value Date    TRIG 162 (H) 07/24/2024    TRIG 94 07/24/2023    TRIG 132 07/20/2022       f1 No results found for: "CHOLHDL"    " [facility]                                                                                          Hospitalist Progress Note  Indu Chisholm MD  Answering service: [phone] OR 36 from in house phone        Date of Service:  3/23/2022  NAME:  Jd Cisneros  :  1975  MRN:  [id number]      Admission Summary:   Jd Cisneros is a 51 y.o. male with history of glioblastoma status post partial resection on , hypertension, HFrEF, epilepsy, severe morbid obesity who presents to hospital with complaints of fevers and chills. [facility] where he notes 3 days of intermittent fevers and chills. He reported this to short-term staff and was sent to [address] [place] [address] [facility] where he was evaluated and had a CT scan which he states showed some unknown acute abnormality. He was referred back to the ED for further evaluation and treatment. Interval history / Subjective:   Patient seen and examined   [facility] Cov[facility] requested    Ready for discharge discussed with CM back to [facility]     Assessment & Plan:     Fever possibly UTI  -Urine WBC 10-20 leukocyte esterase trace nitrate negative  -Unlikely from recent glioblastoma resection and intracranial infection  -chest x-ray unremarkable  -Covid test negative on admission  -Levaquin for UTI  -MRI no definitive concern for abscess or collection neurosurgery was on board no further recommendation     Right parietal brain mass /glioblastoma  - s/p crani   -cont keppra and dilantin.  --See below about taper of Dilantin continue Keppra with higher doses  -Oncology consult patient scheduled for outpatient radiation therapy     Hx of CHF  -Compensated.  -On Coreg and lisinopril                HTN  - cont coreg, lisinopril, torsemide creatinine normal         Morbid obesity as defined by BMI greater than 40  - Body mass index is 51.54 kg/m². - Diet and exercise counseling as appropriate     Hyponatremia  -Due to SIADH. -Sodium 134 today and stable  -1.5 L daily fluid restriction    DVT scan - soleal vein thrombosis acute   --Discussed with neurology and neurosurgery patient is on Dilantin has an interaction with Eliquis  --After reviewing and careful discussion we did recommended that the phenytoin should be tapered 100 mg daily for 3 days and then taken off increase Keppra to 1500 twice daily patient's renal function is normal  --Continue Eliquis as prescribed                  Code status: Full  DVT prophylaxis: on a/c    Care Plan discussed with: Patient/Family and Nurse  Anticipated Disposition: SNF/LTC  Anticipated Discharge: discharge to [facility]  Date Reviewed: 2/22/2022    None            Review of Systems:   A comprehensive review of systems was negative. Vital Signs:    Last 24hrs VS reviewed since prior progress note.  Most recent are:  Visit Vitals  BP (!) 133/91 (BP 1 Location: Left lower arm, BP Patient Position: At rest)   Pulse 77   Temp 97.7 °F (36.5 °C)   Resp 22   Wt 126.3 kg (278 lb 7.1 oz)   SpO2 97%   BMI 47.79 kg/m²         Intake/Output Summary (Last 24 hours) at 3/23/2022 1059  Last data filed at 3/23/2022 1634  Gross per 24 hour   Intake 1400 ml   Output 1300 ml   Net 100 ml        Physical Examination:     I had a face to face encounter with this patient and independently examined them on 3/23/2022 as outlined below:          General : alert x 3, awake, no acute distress, resting in bed, pleasant   HEENT: PEERL, EOMI, moist mucus membrane, TM clear  Neck: supple, no JVD, no meningeal signs  Chest: Clear to auscultation bilaterally   CVS: S1 S2 heard, Capillary refill less than 2 seconds  Abd: soft/ Non tender, non distended, BS physiological,   Ext: no clubbing, no cyanosis, no edema, brisk 2+ DP pulses  Neuro/Psych: pleasant mood and affect, CN 2-12 grossly intact  Skin: warm     Data Review:    I personally reviewed  Image and labs      Labs:     Recent Labs     03/22/22  1202   WBC 6.6   HGB 14.0   HCT 42.1        Recent Labs     03/22/22  1202   *   K 3.2*   CL 94*   CO2 34*   BUN 22*   CREA 1.04      CA 8.8     No results for input(s): ALT, AP, TBIL, TBILI, TP, ALB, GLOB, GGT, AML, LPSE in the last 72 hours. No lab exists for component: SGOT, GPT, AMYP, HLPSE  No results for input(s): INR, PTP, APTT, INREXT, INREXT in the last 72 hours. No results for input(s): FE, TIBC, PSAT, FERR in the last 72 hours. No results found for: FOL, RBCF   No results for input(s): PH, PCO2, PO2 in the last 72 hours. No results for input(s): CPK, CKNDX, TROIQ in the last 72 hours.     No lab exists for component: CPKMB  Lab Results   Component Value Date/Time    Cholesterol, total 180 02/23/2022 05:18 AM    HDL Cholesterol 36 02/23/2022 05:18 AM    LDL, calculated 126.8 (H) 02/23/2022 05:18 AM    Triglyceride 86 02/23/2022 05:18 AM    CHOL/HDL Ratio 5.0 02/23/2022 05:18 AM     Lab Results   Component Value Date/Time    Glucose (POC) 133 (H) 03/11/2022 11:13 AM    Glucose (POC) 135 (H) 03/11/2022 07:15 AM    Glucose (POC) 148 (H) 03/10/2022 09:09 PM    Glucose (POC) 119 (H) 03/10/2022 04:59 PM    Glucose (POC) 115 03/10/2022 11:36 AM     Lab Results   Component Value Date/Time    Color DARK YELLOW 03/18/2022 04:06 PM    Appearance CLEAR 03/18/2022 04:06 PM    Specific gravity 1.010 03/18/2022 04:06 PM    pH (UA) 5.5 03/18/2022 04:06 PM    Protein 30 (A) 03/18/2022 04:06 PM    Glucose Negative 03/18/2022 04:06 PM    Ketone Negative 03/18/2022 04:06 PM    Urobilinogen 2.0 (H) 03/18/2022 04:06 PM    Nitrites Negative 03/18/2022 04:06 PM    Leukocyte Esterase TRACE (A) 03/18/2022 04:06 PM    Epithelial cells FEW 03/18/2022 04:06 PM    Bacteria Negative 03/18/2022 04:06 PM    WBC 10-20 03/18/2022 04:06 PM    RBC 0-5 03/18/2022 04:06 PM Medications Reviewed:     Current Facility-Administered Medications   Medication Dose Route Frequency    levETIRAcetam (KEPPRA) tablet 1,500 mg  1,500 mg Oral Q12H    phenytoin (DILANTIN) chewable tablet 100 mg  100 mg Oral BID    apixaban (ELIQUIS) tablet 10 mg  10 mg Oral BID    [START ON 3/27/2022] apixaban (ELIQUIS) tablet 5 mg  5 mg Oral BID    calcium carbonate (TUMS) chewable tablet 200 mg [elemental]  200 mg Oral QID WITH MEALS    carvediloL (COREG) tablet 12.5 mg  12.5 mg Oral BID    lisinopriL (PRINIVIL, ZESTRIL) tablet 5 mg  5 mg Oral DAILY    torsemide (DEMADEX) tablet 100 mg  100 mg Oral DAILY    sodium chloride (NS) flush 5-10 mL  5-10 mL IntraVENous PRN    levoFLOXacin (LEVAQUIN) 750 mg in D5W IVPB  750 mg IntraVENous Q24H    sodium chloride (NS) flush 5-40 mL  5-40 mL IntraVENous Q8H    sodium chloride (NS) flush 5-40 mL  5-40 mL IntraVENous PRN    acetaminophen (TYLENOL) tablet 650 mg  650 mg Oral Q6H PRN    Or    acetaminophen (TYLENOL) suppository 650 mg  650 mg Rectal Q6H PRN    polyethylene glycol (MIRALAX) packet 17 g  17 g Oral DAILY PRN    ondansetron (ZOFRAN ODT) tablet 4 mg  4 mg Oral Q8H PRN    Or    ondansetron (ZOFRAN) injection 4 mg  4 mg IntraVENous Q6H PRN     ______________________________________________________________________  EXPECTED LENGTH OF STAY: 3d 2h  ACTUAL LENGTH OF STAY:          5      Please note that this dictation was completed with [employer], the computer voice recognition software. Quite often unanticipated grammatical, syntax, homophones, and other interpretive errors are inadvertently transcribed by the computer software. Please disregard these errors. Please excuse any errors that have escaped final proofreading.                 Chiara Tadeo MD

## 2025-02-18 ENCOUNTER — TRANSCRIBE ORDERS (OUTPATIENT)
Facility: HOSPITAL | Age: 50
End: 2025-02-18

## 2025-02-18 DIAGNOSIS — C71.9 GLIOBLASTOMA (HCC): Primary | ICD-10-CM

## 2025-04-06 ENCOUNTER — APPOINTMENT (OUTPATIENT)
Facility: HOSPITAL | Age: 50
DRG: 025 | End: 2025-04-06
Payer: MEDICARE

## 2025-04-06 ENCOUNTER — HOSPITAL ENCOUNTER (INPATIENT)
Facility: HOSPITAL | Age: 50
LOS: 12 days | Discharge: NURSING, REHAB OR LONG-TERM CARE | DRG: 025 | End: 2025-04-18
Attending: EMERGENCY MEDICINE | Admitting: INTERNAL MEDICINE
Payer: MEDICARE

## 2025-04-06 DIAGNOSIS — I16.1 HYPERTENSIVE EMERGENCY: ICD-10-CM

## 2025-04-06 DIAGNOSIS — M79.89 LEG SWELLING: ICD-10-CM

## 2025-04-06 DIAGNOSIS — I50.40 COMBINED SYSTOLIC AND DIASTOLIC CONGESTIVE HEART FAILURE, UNSPECIFIED HF CHRONICITY (HCC): ICD-10-CM

## 2025-04-06 DIAGNOSIS — G93.89 BRAIN MASS: Primary | ICD-10-CM

## 2025-04-06 DIAGNOSIS — G93.6 VASOGENIC EDEMA (HCC): ICD-10-CM

## 2025-04-06 LAB
ANION GAP SERPL CALC-SCNC: 4 MMOL/L (ref 2–12)
BASOPHILS # BLD: 0.06 K/UL (ref 0–0.1)
BASOPHILS NFR BLD: 0.6 % (ref 0–1)
BUN SERPL-MCNC: 14 MG/DL (ref 6–20)
BUN/CREAT SERPL: 11 (ref 12–20)
CALCIUM SERPL-MCNC: 9.3 MG/DL (ref 8.5–10.1)
CHLORIDE SERPL-SCNC: 107 MMOL/L (ref 97–108)
CO2 SERPL-SCNC: 27 MMOL/L (ref 21–32)
COMMENT:: NORMAL
CREAT SERPL-MCNC: 1.31 MG/DL (ref 0.7–1.3)
DIFFERENTIAL METHOD BLD: NORMAL
EOSINOPHIL # BLD: 0.17 K/UL (ref 0–0.4)
EOSINOPHIL NFR BLD: 1.7 % (ref 0–7)
ERYTHROCYTE [DISTWIDTH] IN BLOOD BY AUTOMATED COUNT: 11.9 % (ref 11.5–14.5)
GLUCOSE SERPL-MCNC: 112 MG/DL (ref 65–100)
HCT VFR BLD AUTO: 47.2 % (ref 36.6–50.3)
HGB BLD-MCNC: 16.1 G/DL (ref 12.1–17)
IMM GRANULOCYTES # BLD AUTO: 0.04 K/UL (ref 0–0.04)
IMM GRANULOCYTES NFR BLD AUTO: 0.4 % (ref 0–0.5)
LYMPHOCYTES # BLD: 3.14 K/UL (ref 0.8–3.5)
LYMPHOCYTES NFR BLD: 30.9 % (ref 12–49)
MCH RBC QN AUTO: 30.4 PG (ref 26–34)
MCHC RBC AUTO-ENTMCNC: 34.1 G/DL (ref 30–36.5)
MCV RBC AUTO: 89.1 FL (ref 80–99)
MONOCYTES # BLD: 0.74 K/UL (ref 0–1)
MONOCYTES NFR BLD: 7.3 % (ref 5–13)
NEUTS SEG # BLD: 6.01 K/UL (ref 1.8–8)
NEUTS SEG NFR BLD: 59.1 % (ref 32–75)
NRBC # BLD: 0 K/UL (ref 0–0.01)
NRBC BLD-RTO: 0 PER 100 WBC
PLATELET # BLD AUTO: 294 K/UL (ref 150–400)
PMV BLD AUTO: 10.9 FL (ref 8.9–12.9)
POTASSIUM SERPL-SCNC: 3.5 MMOL/L (ref 3.5–5.1)
RBC # BLD AUTO: 5.3 M/UL (ref 4.1–5.7)
SODIUM SERPL-SCNC: 138 MMOL/L (ref 136–145)
SPECIMEN HOLD: NORMAL
WBC # BLD AUTO: 10.2 K/UL (ref 4.1–11.1)

## 2025-04-06 PROCEDURE — 85025 COMPLETE CBC W/AUTO DIFF WBC: CPT

## 2025-04-06 PROCEDURE — 2580000003 HC RX 258: Performed by: EMERGENCY MEDICINE

## 2025-04-06 PROCEDURE — 70450 CT HEAD/BRAIN W/O DYE: CPT

## 2025-04-06 PROCEDURE — 6360000002 HC RX W HCPCS: Performed by: EMERGENCY MEDICINE

## 2025-04-06 PROCEDURE — 80048 BASIC METABOLIC PNL TOTAL CA: CPT

## 2025-04-06 PROCEDURE — 2060000000 HC ICU INTERMEDIATE R&B

## 2025-04-06 PROCEDURE — 99285 EMERGENCY DEPT VISIT HI MDM: CPT

## 2025-04-06 PROCEDURE — 81001 URINALYSIS AUTO W/SCOPE: CPT

## 2025-04-06 PROCEDURE — 1100000000 HC RM PRIVATE

## 2025-04-06 PROCEDURE — 96374 THER/PROPH/DIAG INJ IV PUSH: CPT

## 2025-04-06 PROCEDURE — 96375 TX/PRO/DX INJ NEW DRUG ADDON: CPT

## 2025-04-06 RX ORDER — DEXAMETHASONE SODIUM PHOSPHATE 10 MG/ML
10 INJECTION, SOLUTION INTRAMUSCULAR; INTRAVENOUS ONCE
Status: COMPLETED | OUTPATIENT
Start: 2025-04-06 | End: 2025-04-06

## 2025-04-06 RX ORDER — 0.9 % SODIUM CHLORIDE 0.9 %
1000 INTRAVENOUS SOLUTION INTRAVENOUS ONCE
Status: COMPLETED | OUTPATIENT
Start: 2025-04-06 | End: 2025-04-07

## 2025-04-06 RX ORDER — LEVETIRACETAM 500 MG/5ML
1500 INJECTION, SOLUTION, CONCENTRATE INTRAVENOUS ONCE
Status: COMPLETED | OUTPATIENT
Start: 2025-04-06 | End: 2025-04-06

## 2025-04-06 RX ORDER — 0.9 % SODIUM CHLORIDE 0.9 %
1000 INTRAVENOUS SOLUTION INTRAVENOUS ONCE
Status: DISCONTINUED | OUTPATIENT
Start: 2025-04-06 | End: 2025-04-06

## 2025-04-06 RX ADMIN — DEXAMETHASONE SODIUM PHOSPHATE 10 MG: 10 INJECTION, SOLUTION INTRAMUSCULAR; INTRAVENOUS at 22:56

## 2025-04-06 RX ADMIN — SODIUM CHLORIDE 1000 ML: 0.9 INJECTION, SOLUTION INTRAVENOUS at 23:45

## 2025-04-06 RX ADMIN — NICARDIPINE HYDROCHLORIDE 2.5 MG/HR: 0.1 INJECTION, SOLUTION INTRAVENOUS at 23:40

## 2025-04-06 RX ADMIN — LEVETIRACETAM 1500 MG: 100 INJECTION INTRAVENOUS at 23:40

## 2025-04-06 ASSESSMENT — LIFESTYLE VARIABLES
HOW MANY STANDARD DRINKS CONTAINING ALCOHOL DO YOU HAVE ON A TYPICAL DAY: PATIENT DOES NOT DRINK
HOW OFTEN DO YOU HAVE A DRINK CONTAINING ALCOHOL: NEVER

## 2025-04-07 ENCOUNTER — APPOINTMENT (OUTPATIENT)
Facility: HOSPITAL | Age: 50
DRG: 025 | End: 2025-04-07
Payer: MEDICARE

## 2025-04-07 ENCOUNTER — HOSPITAL ENCOUNTER (INPATIENT)
Facility: HOSPITAL | Age: 50
Discharge: HOME | DRG: 025 | End: 2025-04-10
Payer: MEDICARE

## 2025-04-07 PROBLEM — G93.6 VASOGENIC EDEMA (HCC): Status: ACTIVE | Noted: 2025-04-07

## 2025-04-07 PROBLEM — C71.9 GLIOBLASTOMA (HCC): Status: ACTIVE | Noted: 2025-04-07

## 2025-04-07 LAB
ALBUMIN SERPL-MCNC: 3.6 G/DL (ref 3.5–5)
ALBUMIN/GLOB SERPL: 0.8 (ref 1.1–2.2)
ALP SERPL-CCNC: 60 U/L (ref 45–117)
ALT SERPL-CCNC: 19 U/L (ref 12–78)
ANION GAP SERPL CALC-SCNC: 4 MMOL/L (ref 2–12)
APPEARANCE UR: CLEAR
ASPIRIN: 431 ARU
AST SERPL-CCNC: 17 U/L (ref 15–37)
BACTERIA URNS QL MICRO: NEGATIVE /HPF
BASOPHILS # BLD: 0.01 K/UL (ref 0–0.1)
BASOPHILS NFR BLD: 0.1 % (ref 0–1)
BILIRUB SERPL-MCNC: 1 MG/DL (ref 0.2–1)
BILIRUB UR QL: NEGATIVE
BUN SERPL-MCNC: 10 MG/DL (ref 6–20)
BUN/CREAT SERPL: 10 (ref 12–20)
CALCIUM SERPL-MCNC: 9.2 MG/DL (ref 8.5–10.1)
CHLORIDE SERPL-SCNC: 108 MMOL/L (ref 97–108)
CO2 SERPL-SCNC: 25 MMOL/L (ref 21–32)
COLOR UR: NORMAL
CREAT SERPL-MCNC: 1.02 MG/DL (ref 0.7–1.3)
CRP SERPL-MCNC: <0.29 MG/DL (ref 0–0.3)
DIFFERENTIAL METHOD BLD: ABNORMAL
EOSINOPHIL # BLD: 0 K/UL (ref 0–0.4)
EOSINOPHIL NFR BLD: 0 % (ref 0–7)
EPITH CASTS URNS QL MICRO: NORMAL /LPF
ERYTHROCYTE [DISTWIDTH] IN BLOOD BY AUTOMATED COUNT: 11.9 % (ref 11.5–14.5)
GLOBULIN SER CALC-MCNC: 4.3 G/DL (ref 2–4)
GLUCOSE SERPL-MCNC: 129 MG/DL (ref 65–100)
GLUCOSE UR STRIP.AUTO-MCNC: NEGATIVE MG/DL
HCT VFR BLD AUTO: 49.1 % (ref 36.6–50.3)
HGB BLD-MCNC: 17 G/DL (ref 12.1–17)
HGB UR QL STRIP: NEGATIVE
HYALINE CASTS URNS QL MICRO: NORMAL /LPF (ref 0–5)
IMM GRANULOCYTES # BLD AUTO: 0.07 K/UL (ref 0–0.04)
IMM GRANULOCYTES NFR BLD AUTO: 0.7 % (ref 0–0.5)
KETONES UR QL STRIP.AUTO: NEGATIVE MG/DL
LEUKOCYTE ESTERASE UR QL STRIP.AUTO: NEGATIVE
LYMPHOCYTES # BLD: 1.41 K/UL (ref 0.8–3.5)
LYMPHOCYTES NFR BLD: 13.2 % (ref 12–49)
MAGNESIUM SERPL-MCNC: 2.2 MG/DL (ref 1.6–2.4)
MCH RBC QN AUTO: 30.5 PG (ref 26–34)
MCHC RBC AUTO-ENTMCNC: 34.6 G/DL (ref 30–36.5)
MCV RBC AUTO: 88 FL (ref 80–99)
MONOCYTES # BLD: 0.2 K/UL (ref 0–1)
MONOCYTES NFR BLD: 1.9 % (ref 5–13)
NEUTS SEG # BLD: 9.02 K/UL (ref 1.8–8)
NEUTS SEG NFR BLD: 84.1 % (ref 32–75)
NITRITE UR QL STRIP.AUTO: NEGATIVE
NRBC # BLD: 0 K/UL (ref 0–0.01)
NRBC BLD-RTO: 0 PER 100 WBC
NT PRO BNP: 133 PG/ML
PH UR STRIP: 5.5 (ref 5–8)
PHOSPHATE SERPL-MCNC: 2.7 MG/DL (ref 2.6–4.7)
PLATELET # BLD AUTO: 292 K/UL (ref 150–400)
PMV BLD AUTO: 10.9 FL (ref 8.9–12.9)
POTASSIUM SERPL-SCNC: 3.6 MMOL/L (ref 3.5–5.1)
PROT SERPL-MCNC: 7.9 G/DL (ref 6.4–8.2)
PROT UR STRIP-MCNC: NEGATIVE MG/DL
RBC # BLD AUTO: 5.58 M/UL (ref 4.1–5.7)
RBC #/AREA URNS HPF: NORMAL /HPF (ref 0–5)
SODIUM SERPL-SCNC: 137 MMOL/L (ref 136–145)
SP GR UR REFRACTOMETRY: 1.02 (ref 1–1.03)
TSH SERPL DL<=0.05 MIU/L-ACNC: 0.43 UIU/ML (ref 0.36–3.74)
URINE CULTURE IF INDICATED: NORMAL
UROBILINOGEN UR QL STRIP.AUTO: 1 EU/DL (ref 0.2–1)
WBC # BLD AUTO: 10.7 K/UL (ref 4.1–11.1)
WBC URNS QL MICRO: NORMAL /HPF (ref 0–4)

## 2025-04-07 PROCEDURE — 83735 ASSAY OF MAGNESIUM: CPT

## 2025-04-07 PROCEDURE — 6360000002 HC RX W HCPCS: Performed by: INTERNAL MEDICINE

## 2025-04-07 PROCEDURE — 74177 CT ABD & PELVIS W/CONTRAST: CPT

## 2025-04-07 PROCEDURE — 6370000000 HC RX 637 (ALT 250 FOR IP): Performed by: HOSPITALIST

## 2025-04-07 PROCEDURE — 99223 1ST HOSP IP/OBS HIGH 75: CPT | Performed by: INTERNAL MEDICINE

## 2025-04-07 PROCEDURE — 86140 C-REACTIVE PROTEIN: CPT

## 2025-04-07 PROCEDURE — APPNB45 APP NON BILLABLE 31-45 MINUTES: Performed by: NURSE PRACTITIONER

## 2025-04-07 PROCEDURE — 85025 COMPLETE CBC W/AUTO DIFF WBC: CPT

## 2025-04-07 PROCEDURE — 83880 ASSAY OF NATRIURETIC PEPTIDE: CPT

## 2025-04-07 PROCEDURE — 2580000003 HC RX 258: Performed by: INTERNAL MEDICINE

## 2025-04-07 PROCEDURE — 6370000000 HC RX 637 (ALT 250 FOR IP): Performed by: INTERNAL MEDICINE

## 2025-04-07 PROCEDURE — 71045 X-RAY EXAM CHEST 1 VIEW: CPT

## 2025-04-07 PROCEDURE — 6360000004 HC RX CONTRAST MEDICATION: Performed by: STUDENT IN AN ORGANIZED HEALTH CARE EDUCATION/TRAINING PROGRAM

## 2025-04-07 PROCEDURE — 71275 CT ANGIOGRAPHY CHEST: CPT

## 2025-04-07 PROCEDURE — 80053 COMPREHEN METABOLIC PANEL: CPT

## 2025-04-07 PROCEDURE — 84443 ASSAY THYROID STIM HORMONE: CPT

## 2025-04-07 PROCEDURE — 6360000002 HC RX W HCPCS: Performed by: EMERGENCY MEDICINE

## 2025-04-07 PROCEDURE — 2060000000 HC ICU INTERMEDIATE R&B

## 2025-04-07 PROCEDURE — 2500000003 HC RX 250 WO HCPCS: Performed by: INTERNAL MEDICINE

## 2025-04-07 PROCEDURE — 70553 MRI BRAIN STEM W/O & W/DYE: CPT

## 2025-04-07 PROCEDURE — A9579 GAD-BASE MR CONTRAST NOS,1ML: HCPCS | Performed by: INTERNAL MEDICINE

## 2025-04-07 PROCEDURE — 6360000004 HC RX CONTRAST MEDICATION: Performed by: INTERNAL MEDICINE

## 2025-04-07 PROCEDURE — 85576 BLOOD PLATELET AGGREGATION: CPT

## 2025-04-07 PROCEDURE — 84100 ASSAY OF PHOSPHORUS: CPT

## 2025-04-07 RX ORDER — SODIUM CHLORIDE 0.9 % (FLUSH) 0.9 %
5-40 SYRINGE (ML) INJECTION EVERY 12 HOURS SCHEDULED
Status: DISCONTINUED | OUTPATIENT
Start: 2025-04-07 | End: 2025-04-18 | Stop reason: HOSPADM

## 2025-04-07 RX ORDER — IOPAMIDOL 755 MG/ML
100 INJECTION, SOLUTION INTRAVASCULAR
Status: DISCONTINUED | OUTPATIENT
Start: 2025-04-07 | End: 2025-04-07

## 2025-04-07 RX ORDER — LISINOPRIL 40 MG/1
TABLET ORAL
COMMUNITY
Start: 2025-03-07

## 2025-04-07 RX ORDER — ACETAMINOPHEN 650 MG/1
650 SUPPOSITORY RECTAL EVERY 6 HOURS PRN
Status: DISCONTINUED | OUTPATIENT
Start: 2025-04-07 | End: 2025-04-18 | Stop reason: HOSPADM

## 2025-04-07 RX ORDER — DEXAMETHASONE SODIUM PHOSPHATE 4 MG/ML
4 INJECTION, SOLUTION INTRA-ARTICULAR; INTRALESIONAL; INTRAMUSCULAR; INTRAVENOUS; SOFT TISSUE EVERY 8 HOURS
Status: DISCONTINUED | OUTPATIENT
Start: 2025-04-07 | End: 2025-04-15

## 2025-04-07 RX ORDER — MAGNESIUM SULFATE IN WATER 40 MG/ML
2000 INJECTION, SOLUTION INTRAVENOUS PRN
Status: DISCONTINUED | OUTPATIENT
Start: 2025-04-07 | End: 2025-04-18 | Stop reason: HOSPADM

## 2025-04-07 RX ORDER — CARVEDILOL 25 MG/1
TABLET ORAL
COMMUNITY
Start: 2025-03-07

## 2025-04-07 RX ORDER — TORSEMIDE 40 MG/1
TABLET, FILM COATED ORAL
COMMUNITY
Start: 2025-01-07

## 2025-04-07 RX ORDER — SODIUM CHLORIDE 9 MG/ML
INJECTION, SOLUTION INTRAVENOUS PRN
Status: DISCONTINUED | OUTPATIENT
Start: 2025-04-07 | End: 2025-04-18 | Stop reason: HOSPADM

## 2025-04-07 RX ORDER — TIZANIDINE 2 MG/1
TABLET ORAL
COMMUNITY
Start: 2025-04-04

## 2025-04-07 RX ORDER — ACETAMINOPHEN 325 MG/1
650 TABLET ORAL EVERY 6 HOURS PRN
Status: DISCONTINUED | OUTPATIENT
Start: 2025-04-07 | End: 2025-04-18 | Stop reason: HOSPADM

## 2025-04-07 RX ORDER — ENOXAPARIN SODIUM 100 MG/ML
30 INJECTION SUBCUTANEOUS 2 TIMES DAILY
Status: COMPLETED | OUTPATIENT
Start: 2025-04-07 | End: 2025-04-13

## 2025-04-07 RX ORDER — POLYETHYLENE GLYCOL 3350 17 G/17G
17 POWDER, FOR SOLUTION ORAL DAILY PRN
Status: DISCONTINUED | OUTPATIENT
Start: 2025-04-07 | End: 2025-04-18 | Stop reason: HOSPADM

## 2025-04-07 RX ORDER — SODIUM CHLORIDE 0.9 % (FLUSH) 0.9 %
5-40 SYRINGE (ML) INJECTION PRN
Status: DISCONTINUED | OUTPATIENT
Start: 2025-04-07 | End: 2025-04-18 | Stop reason: HOSPADM

## 2025-04-07 RX ORDER — LEVETIRACETAM 500 MG/5ML
1000 INJECTION, SOLUTION, CONCENTRATE INTRAVENOUS EVERY 12 HOURS
Status: DISCONTINUED | OUTPATIENT
Start: 2025-04-07 | End: 2025-04-08 | Stop reason: DRUGHIGH

## 2025-04-07 RX ORDER — OXYCODONE HYDROCHLORIDE 5 MG/1
5 TABLET ORAL EVERY 4 HOURS PRN
Status: DISCONTINUED | OUTPATIENT
Start: 2025-04-07 | End: 2025-04-18 | Stop reason: HOSPADM

## 2025-04-07 RX ORDER — SODIUM CHLORIDE 9 MG/ML
INJECTION, SOLUTION INTRAVENOUS CONTINUOUS
Status: DISCONTINUED | OUTPATIENT
Start: 2025-04-07 | End: 2025-04-08

## 2025-04-07 RX ORDER — IOPAMIDOL 755 MG/ML
100 INJECTION, SOLUTION INTRAVASCULAR
Status: COMPLETED | OUTPATIENT
Start: 2025-04-07 | End: 2025-04-07

## 2025-04-07 RX ORDER — ONDANSETRON 4 MG/1
4 TABLET, ORALLY DISINTEGRATING ORAL EVERY 8 HOURS PRN
Status: DISCONTINUED | OUTPATIENT
Start: 2025-04-07 | End: 2025-04-15 | Stop reason: SDUPTHER

## 2025-04-07 RX ORDER — MORPHINE SULFATE 2 MG/ML
2 INJECTION, SOLUTION INTRAMUSCULAR; INTRAVENOUS EVERY 4 HOURS PRN
Status: DISCONTINUED | OUTPATIENT
Start: 2025-04-07 | End: 2025-04-18 | Stop reason: HOSPADM

## 2025-04-07 RX ORDER — POTASSIUM CHLORIDE 750 MG/1
40 TABLET, EXTENDED RELEASE ORAL PRN
Status: DISCONTINUED | OUTPATIENT
Start: 2025-04-07 | End: 2025-04-18 | Stop reason: HOSPADM

## 2025-04-07 RX ORDER — SPIRONOLACTONE 25 MG/1
25 TABLET ORAL DAILY
COMMUNITY
Start: 2025-03-07

## 2025-04-07 RX ORDER — ONDANSETRON 2 MG/ML
4 INJECTION INTRAMUSCULAR; INTRAVENOUS EVERY 6 HOURS PRN
Status: DISCONTINUED | OUTPATIENT
Start: 2025-04-07 | End: 2025-04-15 | Stop reason: SDUPTHER

## 2025-04-07 RX ORDER — POTASSIUM CHLORIDE 7.45 MG/ML
10 INJECTION INTRAVENOUS PRN
Status: DISCONTINUED | OUTPATIENT
Start: 2025-04-07 | End: 2025-04-18 | Stop reason: HOSPADM

## 2025-04-07 RX ORDER — AMMONIUM LACTATE 12 G/100G
LOTION TOPICAL EVERY 12 HOURS
Status: DISCONTINUED | OUTPATIENT
Start: 2025-04-07 | End: 2025-04-18 | Stop reason: HOSPADM

## 2025-04-07 RX ORDER — AMLODIPINE BESYLATE 5 MG/1
10 TABLET ORAL DAILY
COMMUNITY
Start: 2025-04-04

## 2025-04-07 RX ADMIN — LEVETIRACETAM 1000 MG: 100 INJECTION INTRAVENOUS at 04:57

## 2025-04-07 RX ADMIN — OXYCODONE 5 MG: 5 TABLET ORAL at 06:15

## 2025-04-07 RX ADMIN — IOPAMIDOL 100 ML: 755 INJECTION, SOLUTION INTRAVENOUS at 11:53

## 2025-04-07 RX ADMIN — ENOXAPARIN SODIUM 30 MG: 30 INJECTION SUBCUTANEOUS at 20:56

## 2025-04-07 RX ADMIN — NICARDIPINE HYDROCHLORIDE 2.5 MG/HR: 0.1 INJECTION, SOLUTION INTRAVENOUS at 08:16

## 2025-04-07 RX ADMIN — LEVETIRACETAM 1000 MG: 100 INJECTION INTRAVENOUS at 17:32

## 2025-04-07 RX ADMIN — NICARDIPINE HYDROCHLORIDE 2.5 MG/HR: 0.1 INJECTION, SOLUTION INTRAVENOUS at 04:54

## 2025-04-07 RX ADMIN — FAMOTIDINE 20 MG: 10 INJECTION, SOLUTION INTRAVENOUS at 20:56

## 2025-04-07 RX ADMIN — SODIUM CHLORIDE, PRESERVATIVE FREE 10 ML: 5 INJECTION INTRAVENOUS at 20:57

## 2025-04-07 RX ADMIN — OXYCODONE 5 MG: 5 TABLET ORAL at 17:31

## 2025-04-07 RX ADMIN — Medication: at 20:57

## 2025-04-07 RX ADMIN — FAMOTIDINE 20 MG: 10 INJECTION, SOLUTION INTRAVENOUS at 08:06

## 2025-04-07 RX ADMIN — SODIUM CHLORIDE, PRESERVATIVE FREE 10 ML: 5 INJECTION INTRAVENOUS at 08:06

## 2025-04-07 RX ADMIN — GADOTERIDOL 20 ML: 279.3 INJECTION, SOLUTION INTRAVENOUS at 05:46

## 2025-04-07 RX ADMIN — DEXAMETHASONE SODIUM PHOSPHATE 4 MG: 4 INJECTION INTRA-ARTICULAR; INTRALESIONAL; INTRAMUSCULAR; INTRAVENOUS; SOFT TISSUE at 04:57

## 2025-04-07 RX ADMIN — ENOXAPARIN SODIUM 30 MG: 30 INJECTION SUBCUTANEOUS at 08:06

## 2025-04-07 RX ADMIN — SODIUM CHLORIDE: 0.9 INJECTION, SOLUTION INTRAVENOUS at 04:57

## 2025-04-07 RX ADMIN — DEXAMETHASONE SODIUM PHOSPHATE 4 MG: 4 INJECTION INTRA-ARTICULAR; INTRALESIONAL; INTRAMUSCULAR; INTRAVENOUS; SOFT TISSUE at 12:03

## 2025-04-07 RX ADMIN — DEXAMETHASONE SODIUM PHOSPHATE 4 MG: 4 INJECTION INTRA-ARTICULAR; INTRALESIONAL; INTRAMUSCULAR; INTRAVENOUS; SOFT TISSUE at 18:49

## 2025-04-07 ASSESSMENT — PAIN SCALES - GENERAL
PAINLEVEL_OUTOF10: 4
PAINLEVEL_OUTOF10: 4

## 2025-04-07 ASSESSMENT — PAIN DESCRIPTION - ORIENTATION: ORIENTATION: POSTERIOR

## 2025-04-07 ASSESSMENT — PAIN DESCRIPTION - LOCATION: LOCATION: HEAD

## 2025-04-07 ASSESSMENT — PAIN DESCRIPTION - DESCRIPTORS: DESCRIPTORS: ACHING

## 2025-04-07 NOTE — PROGRESS NOTES
Neurosurgery Progress Note            Admit Date: 2025   LOS: 1 day        Daily Progress Note: 2025      Subjective:   The patient underwent a right occipital craniotomy with resection of GBM in 2022. He completed standard chemo/XRT for GBM with Dr. Saeed. He had redo resection in 2023. He is not currently on any therapy for GBM and has not been seen by oncology in awhile. He had some residual left sided weakness due to his tumor, but was able to get around with a cane. He noticed he was dropping things with his left hand more and having more left sided weakness. He presented to the ER where a head CT revealed recurrent tumor. He reports taking 2 Excedrin daily for headaches recently. Otherwise, does not take blood thinning medications.     Objective:     Vital signs  Temp (24hrs), Av.8 °F (36.6 °C), Min:97.8 °F (36.6 °C), Max:97.8 °F (36.6 °C)   No intake/output data recorded.   1901 -  0700  In: -   Out: 1500 [Urine:1500]    BP (!) 151/86   Pulse 70   Temp 97.8 °F (36.6 °C) (Oral)   Resp 19   Ht 1.626 m (5' 4\")   Wt 114.8 kg (253 lb)   SpO2 93%   BMI 43.43 kg/m²          Pain control       PT/OT  Gait                 Physical Exam:  Gen:NAD.  Neuro: A&Ox3. Follows commands. Speech clear. Affect normal.  PERRL. EOMI. Mild flattening left nasolabial fold. Tongue midline.  MCKNIGHT spontaneously. RUE/RLE 5/5, LUE 3/5 distally, 2/5 proximally, LLE 3/5  Positive left drift  Gait deferred.      24 hour results:    Recent Results (from the past 24 hours)   BMP    Collection Time: 25  9:33 PM   Result Value Ref Range    Sodium 138 136 - 145 mmol/L    Potassium 3.5 3.5 - 5.1 mmol/L    Chloride 107 97 - 108 mmol/L    CO2 27 21 - 32 mmol/L    Anion Gap 4 2 - 12 mmol/L    Glucose 112 (H) 65 - 100 mg/dL    BUN 14 6 - 20 MG/DL    Creatinine 1.31 (H) 0.70 - 1.30 MG/DL    BUN/Creatinine Ratio 11 (L) 12 - 20      Est, Glom Filt Rate 66 >60 ml/min/1.73m2    Calcium 9.3 8.5 - 10.1 MG/DL

## 2025-04-07 NOTE — ED NOTES
Bedside and Verbal shift change report given to Autumn RN (oncoming nurse) by Janene RN (offgoing nurse). Report included the following information ED Encounter Summary, ED SBAR, MAR, and Recent Results.

## 2025-04-07 NOTE — ED NOTES
Patient ripped IV out while trying to move to CT table. Patient has been stuck 2 more times, refusing to be stuck again

## 2025-04-07 NOTE — WOUND CARE
WOCN Note:     New consult placed for \"Left leg wound\"    Assessed in ER04/04.    Blas Delcid is a 50 y.o. y/o male who presented for Brain mass [G93.89]  Admitted on 4/6/2025; LOS: 1    Lab Results   Component Value Date/Time    WBC 10.7 04/07/2025 07:29 AM    HGB 17.0 04/07/2025 07:29 AM    HCT 49.1 04/07/2025 07:29 AM     04/07/2025 07:29 AM      Lab Results   Component Value Date/Time    WBC 10.7 04/07/2025 07:29 AM        Tobacco Use      Smoking status: Not on file      Smokeless tobacco: Not on file     Diet NPO Exceptions are: Ice Chips, Sips of Water with Meds     Assessment:   Patient is alert, communicative and mobile.  Bed: stretcher  GI/: external catheter  Patient reports no pain.   Heels intact without erythema.     Sacrum and buttocks intact without erythema.  Chronic hyperpigmentation noted.       Left low leg, dry flakey skin without open wound.          Wound, Pressure Prevention & Skin Care Recommendations:    Minimize layers of linen/pads under patient to optimize support surface.    2.  Turn/reposition approximately every 2 hours and offload heels.   3.  Manage moisture/ Keep skin folds clean and dry/minimize brief usage.  4.  Legs/feet:  Q12 apply Am-lactin lotion.  5.  Sacrum:  Apply Sacral Transparent Dressing; change every 4 days and as needed; do not use with heavy incontinence.    Discussed above plan with patient & RN.    Transition of Care:   Plan to sign off, reconsult if needed.    KARIN CollinsN RN [facility]  Available on [employer]  Office [phone]     Right ankle sprain and left hand sprain

## 2025-04-07 NOTE — H&P
History and Physical    Date of Service:  4/7/2025  Primary Care Provider: No primary care provider on file.  Source of information: The patient and review of EMR    Chief Complaint: Extremity Weakness, Headache, and Blurred Vision      History of Presenting Illness:   Blas Delcid is a 50 y.o. male with past medical history significant for glioblastoma multiforme with resultant resection 2 years ago, hypertension presented at the emergency room with change in mental status.  Patient symptoms started about 2 days ago.  Patient has chronic left-sided weakness following resection of GBM, weakness seems to have gotten worse in the past couple of days.  Patient fell 3 days ago as a result of the weakness.  It was reported that the patient is more forgetful and not knowing where he is.  CT scan of the head done in the emergency room showed brain mass with vasogenic edema.  Neurosurgery service on-call was consulted with recommendation for admission to the hospitalist service.       REVIEW OF SYSTEMS:  REVIEW OF SYSTEMS:  HEAD, EYES, EARS, NOSE AND THROAT: Positive for headache and dizziness No blurring of vision.  No photophobia.  RESPIRATORY SYSTEM:  No shortness of breath.  No cough.  No hemoptysis.  CARDIOVASCULAR SYSTEM:  No chest pain.  No orthopnea.  No palpitations.  GASTROINTESTINAL SYSTEM:  No nausea or vomiting.  No diarrhea.  No constipation.  GENITOURINARY SYSTEM:  No dysuria, no urgency, and no frequency.     All other systems are reviewed and they are negative.        No past medical history on file.   No past surgical history on file.  Prior to Admission medications    Medication Sig Start Date End Date Taking? Authorizing Provider   amLODIPine (NORVASC) 5 MG tablet  4/4/25  Yes ProviderBong MD   carvedilol (COREG) 25 MG tablet TAKE ONE TABLET BY MOUTH EVERY TWELVE HOURS @9AM & 9PM 3/7/25  Yes Provider, MD Bong   lisinopril (PRINIVIL;ZESTRIL) 40 MG tablet TAKE ONE TABLET BY MOUTH  Exceptions are: Ice Chips, Sips of Water with Meds   ISOLATION PRECAUTIONS: No active isolations  CODE STATUS: Full Code   Central Line:   None  DVT PROPHYLAXIS: Lovenox  FUNCTIONAL STATUS PRIOR TO HOSPITALIZATION: Ambulatory and capable of self-care but relies on assistive devices (rolling walker/cane).   Ambulatory status/function: Ambulates with assistance:  Cane   EARLY MOBILITY ASSESSMENT: Recommend an assessment from physical therapy and/or occupational therapy  ANTICIPATED DISCHARGE: Greater than 48 hours.  ANTICIPATED DISPOSITION: Home with Home Healthcare  EMERGENCY CONTACT/SURROGATE DECISION MAKER: flakita Little    CRITICAL CARE WAS PERFORMED FOR THIS ENCOUNTER: YES. I had a face to face encounter with the patient, reviewed and interpreted patient data including clinical events, labs, images, vital signs, I/O's, and examined patient.  I have discussed the case and the plan and management of the patient's care with the consulting services, the bedside nurses and necessary ancillary providers.  This patient has a high probability of imminent, clinically significant deterioration, which requires the highest level of preparedness to intervene urgently. I participated in the decision-making and personally managed or directed the management of the following life and organ supporting interventions that required my frequent assessment to treat or prevent imminent deterioration.  I personally spent 45 minutes of critical care time.  This is time spent at this critically ill patient's bedside actively involved in patient care as well as the coordination of care and discussions with the patient's family.  This does not include any procedural time which has been billed separately.      Signed By: Maricel Starks MD     April 7, 2025         Please note that this dictation may have been completed with Dragon, the [employer] voice recognition software.  Quite often unanticipated grammatical, syntax, homophones,

## 2025-04-07 NOTE — CONSULTS
MRI brain discussed and reviewed with Dr. Powers. Linear area of diffusion restriction in parasagittal R occipital lobe likely surgical related change. There is no correlate on ADC. Although he has risk factors for stroke including HTN, he would not be a candidate for APTs at this point given planned surgery this week. Imaging also reviewed the JAVIER Cee. Please contact with any questions.     JAVIER Lance  Neurology

## 2025-04-07 NOTE — ED PROVIDER NOTES
[facility] EMERGENCY DEPARTMENT  EMERGENCY DEPARTMENT ENCOUNTER      Pt Name: Blas Delcid  MRN: [id number]  Birthdate 1975  Date of evaluation: 4/6/2025  Provider: Cb Garcia MD    CHIEF COMPLAINT       Chief Complaint   Patient presents with    Extremity Weakness    Headache    Blurred Vision         HISTORY OF PRESENT ILLNESS   (Location/Symptom, Timing/Onset, Context/Setting, Quality, Duration, Modifying Factors, Severity)  Note limiting factors.   50M w/ GBM p/w 1month HA and cognitive changes. Pt has hx of GBM previously treated surgically 2yrs ago (sees Dr Ayala). Here w/ wife who reports 1month cognitive changes w/ increased confusion, disorientation, forgetfullness. Also vision changes (described as tunnel vision) w/ intermittent posterior HA. Has chronic left sided weakness that he thinks worsened 2d ago. Also recent GLF 2d ago. No seizures or syncope/LOC. No F/C, cough, CP/SOB or N/V. Was advised by NSG to have MRI however he was unable to complete this study.            Review of External Medical Records:     Nursing Notes were reviewed.    REVIEW OF SYSTEMS    (2-9 systems for level 4, 10 or more for level 5)     Review of Systems   Constitutional:  Negative for diaphoresis and fever.   HENT:  Negative for nosebleeds.    Eyes:  Negative for visual disturbance.   Respiratory:  Negative for cough, shortness of breath and wheezing.    Cardiovascular:  Negative for chest pain, palpitations and leg swelling.   Gastrointestinal:  Negative for abdominal distention, abdominal pain, anal bleeding, blood in stool, diarrhea, nausea and vomiting.   Endocrine: Negative for polyuria.   Genitourinary:  Negative for difficulty urinating, dysuria and hematuria.   Musculoskeletal:  Negative for joint swelling.   Skin:  Negative for wound.   Neurological:  Positive for weakness and headaches. Negative for dizziness, syncope and light-headedness.   Hematological:  Does not bruise/bleed easily.

## 2025-04-07 NOTE — PROGRESS NOTES
[facility]  [address], [address] [place] [address]  W: [phone]  F: [phone]    Reason for Evaluation   Blas Delcid is a 50 y.o. male who is seen as a new patient for evaluation of GBM.    Treatment History:   [facility] pt/ no records   Cannot see path in system  - s/p crani 02/2022              - s/p redo crani 02/2023    Reported hx of temodar/ radiation   Maintenance temodar for ? 6 months    History of Present Illness:     Seen today in ER for GBM.  Pt of [facility]  Per ER:  presented at the emergency room with change in mental status.  Patient symptoms started about 2 days ago.  Patient has chronic left-sided weakness following resection of GBM, weakness seems to have gotten worse in the past couple of days.  Patient fell 3 days ago as a result of the weakness.  It was reported that the patient is more forgetful and not knowing where he is.  CT scan of the head done in the emergency room showed brain mass with vasogenic edema    No [facility] records here.   MRI:   Significant interval increase in size of residual/recurrent glioblastoma  centered in the right parasagittal parietal lobe since 10/10/2023 as described  in detail above, with mass effect resulting in 9 mm of leftward midline shift.  No recent prior imaging is available for comparison.    Pt reports hx of surgery/ temodar/ radation  Then ? 6 months of temodar.   Not on any therapy at present.   Unclear when last [facility] visit was.   No family at bedside. .  Pt has HA. Presented with change in mental status.   Pt does not live locally.   No fevers/ chills/ chest pain/ SOB/ nausea/ vomiting/diarrhea/ pain/        No past medical history on file.   No past surgical history on file.   Social History     Tobacco Use    Smoking status: Not on file    Smokeless tobacco: Not on file   Substance Use Topics    Alcohol use: Not on file      No family history on file.  Current Facility-Administered Medications   Medication Dose  radiation  leukomalacia, edema due to the mass, and underlying chronic microvascular  ischemic disease.        Electronically signed by Sidney Nix      Records reviewed and summarized above.  Pathology report(s) reviewed above.  Radiology report(s) reviewed above.      Assessment/PLAN:     #) recurrent GBM  Cannot see path in our system or prior records.   Pt of [facility] no records here.   Prior surgery temodar/ XRT by pt report.   Pt says he did ? 6 months of maintenance temodar  Unclear when last chemo was.   Unclear when last [facility] visit was.   D/w [facility] and hx of noncompliance.   Being admitted from ER.   Has worse symptoms and progression on MRI.    D/w neurosurgery and plan is to go to surgery.   Get records/ [facility] and here  Pt will fu with [facility] post surgery    #) vasgenic edema.  Dex and keppra.     #) HTN/ LE LE/ obesity/leg wound.  Per IM.     We will follow as needed  Call/ message if questions  Fu with [facility] post surgery    I appreciate the opportunity to participate in Mr. Blas Delcid's care.    Signed By: Yisel Chao DO

## 2025-04-07 NOTE — PROGRESS NOTES
[facility]  Hospitalist Group                                                                                          Hospitalist Progress Note  Mark Carlisle MD  Office Phone: [phone]        Date of Service:  2025  NAME:  Blas Delcid  :  1975  MRN:  [id number]       Admission Summary:   Blas Delcid is a 50 y.o. male with past medical history significant for glioblastoma multiforme with resultant resection 2 years ago, hypertension presented at the emergency room with change in mental status.  Patient symptoms started about 2 days ago.  Patient has chronic left-sided weakness following resection of GBM, weakness seems to have gotten worse in the past couple of days.  Patient fell 3 days ago as a result of the weakness.  It was reported that the patient is more forgetful and not knowing where he is.  CT scan of the head done in the emergency room showed brain mass with vasogenic edema.  Neurosurgery service on-call was consulted with recommendation for admission to the hospitalist service.       Interval history / Subjective:   Follow up brain mass     Assessment & Plan:     Brain mass POA  Vasogenic edema  Mass effect with midline shift  -MRI brain significant increase in size of residual/recurrent glioblastoma, ?acute infarct  -CT scan of the chest, abdomen and pelvis to evaluate the patient for metastatic disease.  -Decadron/Keppra  -Awaiting Neurosurgery  -Appreciate discussion with oncology    ?Acute infarct  -Consult Neurology    Malignant hypertensive urgency POA:   -Cardene gtt, wean as tolerated    Bilateral lower extremity lymphedema POA  Left leg wound  -Wound care consult will be requested for local wound care.  Will check ultrasound of the lower extremity for DVT.     Obesity class III POA: lifestyle modifications    CRITICAL CARE ATTESTATION:  I had a face to face encounter with the patient, reviewed and interpreted patient data including clinical events,

## 2025-04-07 NOTE — ED NOTES
TRANSFER - OUT REPORT:    Verbal report given to Alannah Delcid  being transferred to [facility] for routine progression of patient care       Report consisted of patient's Situation, Background, Assessment and   Recommendations(SBAR).     Information from the following report(s) ED Encounter Summary, ED SBAR, MAR, and Recent Results was reviewed with the receiving nurse.    [place] Fall Assessment:    Presents to emergency department  because of falls (Syncope, seizure, or loss of consciousness): Yes  Age > 70: No  Altered Mental Status, Intoxication with alcohol or substance confusion (Disorientation, impaired judgment, poor safety awaremess, or inability to follow instructions): No  Impaired Mobility: Ambulates or transfers with assistive devices or assistance; Unable to ambulate or transer.: Yes  Nursing Judgement: Yes          Lines:   Peripheral IV 04/07/25 Right Antecubital (Active)   Site Assessment Clean, dry & intact 04/07/25 0812        Opportunity for questions and clarification was provided.      Patient transported with:  Monitor and Registered Nurse

## 2025-04-07 NOTE — PROGRESS NOTES
I reviewed the MRI which shows a large recurrence of his GBM in the right occipital region  He has survived after his initial surgery and diagnosis for more than 2 years  I spoke to his sister earlier today who is listed as his emergency contact  She told me he is likely to want to proceed with more surgery. I can see him later today and if he wishes , I can likely put him on schedule this week

## 2025-04-07 NOTE — PLAN OF CARE
Problem: Discharge Planning  Goal: Discharge to home or other facility with appropriate resources  Outcome: Progressing  Flowsheets (Taken 4/7/2025 1700)  Discharge to home or other facility with appropriate resources: Identify barriers to discharge with patient and caregiver     Problem: Safety - Adult  Goal: Free from fall injury  Outcome: Progressing  Flowsheets (Taken 4/7/2025 1700)  Free From Fall Injury: Instruct family/caregiver on patient safety     Problem: Skin/Tissue Integrity  Goal: Skin integrity remains intact  Description: 1.  Monitor for areas of redness and/or skin breakdown  2.  Assess vascular access sites hourly  3.  Every 4-6 hours minimum:  Change oxygen saturation probe site  4.  Every 4-6 hours:  If on nasal continuous positive airway pressure, respiratory therapy assess nares and determine need for appliance change or resting period  Outcome: Progressing     Problem: Chronic Conditions and Co-morbidities  Goal: Patient's chronic conditions and co-morbidity symptoms are monitored and maintained or improved  Outcome: Progressing

## 2025-04-07 NOTE — ED TRIAGE NOTES
Pt comes to ED from home with c/o blurred vision which started Friday, 4/4. He reports going to [facility] in [place] about a month ago with c/o left side weakness. Reports he had CT scans and was told to f/u with Dr. Ayala who pt has seen for surgery in the past. Pt went to his PCP on Friday. Pt reports symptoms (weakness, blurred vision, and headaches) have all been increasing. Sister, in triage, reports pt has been forgetful.     Dr Garcia assessing pt in triage.

## 2025-04-08 ENCOUNTER — APPOINTMENT (OUTPATIENT)
Facility: HOSPITAL | Age: 50
DRG: 025 | End: 2025-04-08
Attending: INTERNAL MEDICINE
Payer: MEDICARE

## 2025-04-08 LAB
AMPHET UR QL SCN: NEGATIVE
APPEARANCE UR: CLEAR
BACTERIA URNS QL MICRO: NEGATIVE /HPF
BARBITURATES UR QL SCN: NEGATIVE
BENZODIAZ UR QL: NEGATIVE
BILIRUB UR QL: NEGATIVE
CANNABINOIDS UR QL SCN: NEGATIVE
COCAINE UR QL SCN: NEGATIVE
COLOR UR: ABNORMAL
EPITH CASTS URNS QL MICRO: ABNORMAL /LPF
GLUCOSE UR STRIP.AUTO-MCNC: NEGATIVE MG/DL
HGB UR QL STRIP: NEGATIVE
HYALINE CASTS URNS QL MICRO: ABNORMAL /LPF (ref 0–5)
KETONES UR QL STRIP.AUTO: ABNORMAL MG/DL
LEUKOCYTE ESTERASE UR QL STRIP.AUTO: NEGATIVE
Lab: NORMAL
METHADONE UR QL: NEGATIVE
NITRITE UR QL STRIP.AUTO: NEGATIVE
OPIATES UR QL: NEGATIVE
PCP UR QL: NEGATIVE
PH UR STRIP: 5 (ref 5–8)
PROT UR STRIP-MCNC: NEGATIVE MG/DL
RBC #/AREA URNS HPF: ABNORMAL /HPF (ref 0–5)
SP GR UR REFRACTOMETRY: >1.03
URINE CULTURE IF INDICATED: ABNORMAL
UROBILINOGEN UR QL STRIP.AUTO: 0.2 EU/DL (ref 0.2–1)
WBC URNS QL MICRO: ABNORMAL /HPF (ref 0–4)

## 2025-04-08 PROCEDURE — 6360000002 HC RX W HCPCS: Performed by: NURSE PRACTITIONER

## 2025-04-08 PROCEDURE — 6360000002 HC RX W HCPCS: Performed by: INTERNAL MEDICINE

## 2025-04-08 PROCEDURE — 97535 SELF CARE MNGMENT TRAINING: CPT

## 2025-04-08 PROCEDURE — APPNB45 APP NON BILLABLE 31-45 MINUTES: Performed by: NURSE PRACTITIONER

## 2025-04-08 PROCEDURE — 81001 URINALYSIS AUTO W/SCOPE: CPT

## 2025-04-08 PROCEDURE — 2580000003 HC RX 258: Performed by: INTERNAL MEDICINE

## 2025-04-08 PROCEDURE — 2060000000 HC ICU INTERMEDIATE R&B

## 2025-04-08 PROCEDURE — 6370000000 HC RX 637 (ALT 250 FOR IP): Performed by: INTERNAL MEDICINE

## 2025-04-08 PROCEDURE — 6370000000 HC RX 637 (ALT 250 FOR IP): Performed by: HOSPITALIST

## 2025-04-08 PROCEDURE — 97165 OT EVAL LOW COMPLEX 30 MIN: CPT

## 2025-04-08 PROCEDURE — 97530 THERAPEUTIC ACTIVITIES: CPT

## 2025-04-08 PROCEDURE — 2500000003 HC RX 250 WO HCPCS: Performed by: INTERNAL MEDICINE

## 2025-04-08 PROCEDURE — 97161 PT EVAL LOW COMPLEX 20 MIN: CPT

## 2025-04-08 PROCEDURE — 93970 EXTREMITY STUDY: CPT

## 2025-04-08 PROCEDURE — 80307 DRUG TEST PRSMV CHEM ANLYZR: CPT

## 2025-04-08 RX ORDER — FAMOTIDINE 20 MG/1
20 TABLET, FILM COATED ORAL 2 TIMES DAILY
Status: DISCONTINUED | OUTPATIENT
Start: 2025-04-08 | End: 2025-04-18 | Stop reason: HOSPADM

## 2025-04-08 RX ORDER — LEVETIRACETAM 500 MG/1
1000 TABLET ORAL 2 TIMES DAILY
Status: DISCONTINUED | OUTPATIENT
Start: 2025-04-08 | End: 2025-04-15 | Stop reason: SDUPTHER

## 2025-04-08 RX ORDER — HYDRALAZINE HYDROCHLORIDE 25 MG/1
25 TABLET, FILM COATED ORAL EVERY 8 HOURS SCHEDULED
Status: DISCONTINUED | OUTPATIENT
Start: 2025-04-08 | End: 2025-04-09

## 2025-04-08 RX ORDER — AMLODIPINE BESYLATE 5 MG/1
10 TABLET ORAL DAILY
Status: DISCONTINUED | OUTPATIENT
Start: 2025-04-09 | End: 2025-04-18 | Stop reason: HOSPADM

## 2025-04-08 RX ORDER — HYDRALAZINE HYDROCHLORIDE 20 MG/ML
10 INJECTION INTRAMUSCULAR; INTRAVENOUS EVERY 4 HOURS PRN
Status: DISCONTINUED | OUTPATIENT
Start: 2025-04-08 | End: 2025-04-16

## 2025-04-08 RX ADMIN — SODIUM CHLORIDE, PRESERVATIVE FREE 10 ML: 5 INJECTION INTRAVENOUS at 08:58

## 2025-04-08 RX ADMIN — OXYCODONE 5 MG: 5 TABLET ORAL at 09:15

## 2025-04-08 RX ADMIN — FAMOTIDINE 20 MG: 20 TABLET, FILM COATED ORAL at 22:00

## 2025-04-08 RX ADMIN — Medication: at 22:00

## 2025-04-08 RX ADMIN — LEVETIRACETAM 1000 MG: 100 INJECTION INTRAVENOUS at 04:45

## 2025-04-08 RX ADMIN — SODIUM CHLORIDE, PRESERVATIVE FREE 10 ML: 5 INJECTION INTRAVENOUS at 22:01

## 2025-04-08 RX ADMIN — Medication: at 08:57

## 2025-04-08 RX ADMIN — SODIUM CHLORIDE: 0.9 INJECTION, SOLUTION INTRAVENOUS at 04:50

## 2025-04-08 RX ADMIN — HYDRALAZINE HYDROCHLORIDE 25 MG: 25 TABLET ORAL at 22:05

## 2025-04-08 RX ADMIN — DEXAMETHASONE SODIUM PHOSPHATE 4 MG: 4 INJECTION INTRA-ARTICULAR; INTRALESIONAL; INTRAMUSCULAR; INTRAVENOUS; SOFT TISSUE at 19:16

## 2025-04-08 RX ADMIN — DEXAMETHASONE SODIUM PHOSPHATE 4 MG: 4 INJECTION INTRA-ARTICULAR; INTRALESIONAL; INTRAMUSCULAR; INTRAVENOUS; SOFT TISSUE at 11:38

## 2025-04-08 RX ADMIN — LEVETIRACETAM 1000 MG: 500 TABLET, FILM COATED ORAL at 22:00

## 2025-04-08 RX ADMIN — NICARDIPINE HYDROCHLORIDE 5 MG/HR: 0.1 INJECTION, SOLUTION INTRAVENOUS at 18:21

## 2025-04-08 RX ADMIN — ENOXAPARIN SODIUM 30 MG: 30 INJECTION SUBCUTANEOUS at 08:56

## 2025-04-08 RX ADMIN — NICARDIPINE HYDROCHLORIDE 5 MG/HR: 0.1 INJECTION, SOLUTION INTRAVENOUS at 22:35

## 2025-04-08 RX ADMIN — FAMOTIDINE 20 MG: 10 INJECTION, SOLUTION INTRAVENOUS at 08:56

## 2025-04-08 RX ADMIN — ENOXAPARIN SODIUM 30 MG: 30 INJECTION SUBCUTANEOUS at 21:59

## 2025-04-08 RX ADMIN — DEXAMETHASONE SODIUM PHOSPHATE 4 MG: 4 INJECTION INTRA-ARTICULAR; INTRALESIONAL; INTRAMUSCULAR; INTRAVENOUS; SOFT TISSUE at 04:45

## 2025-04-08 ASSESSMENT — PAIN SCALES - GENERAL: PAINLEVEL_OUTOF10: 4

## 2025-04-08 NOTE — PLAN OF CARE
RN  Minimal or absence of nausea and vomiting:   Administer IV fluids as ordered to ensure adequate hydration   Maintain NPO status until nausea and vomiting are resolved   Nasogastric tube to low intermittent suction as ordered   Administer ordered antiemetic medications as needed   Provide nonpharmacologic comfort measures as appropriate   Advance diet as tolerated, if ordered   Nutrition consult to assist patient with adequate nutrition and appropriate food choices  Goal: Maintains or returns to baseline bowel function  Recent Flowsheet Documentation  Taken 4/8/2025 0800 by Olive Peck RN  Maintains or returns to baseline bowel function:   Assess bowel function   Encourage oral fluids to ensure adequate hydration   Administer IV fluids as ordered to ensure adequate hydration   Administer ordered medications as needed   Encourage mobilization and activity   Nutrition consult to assist patient with appropriate food choices     Problem: Genitourinary - Adult  Goal: Absence of urinary retention  Recent Flowsheet Documentation  Taken 4/8/2025 0800 by Olive Peck RN  Absence of urinary retention:   Assess patient’s ability to void and empty bladder   Monitor intake/output and perform bladder scan as needed   Place urinary catheter per Licensed Independent Practitioner order if needed   Discuss with Licensed Independent Practitioner  medications to alleviate retention as needed   Discuss catheterization for long term situations as appropriate  Goal: Urinary catheter remains patent  Recent Flowsheet Documentation  Taken 4/8/2025 0800 by Olive Peck RN  Urinary catheter remains patent:   Assess patency of urinary catheter   Irrigate catheter per Licensed Independent Practitioner order if indicated and notify Licensed Independent Practitioner if unable to irrigate   Assess need for a larger catheter size or a 3-way catheter for continuous bladder irrigation

## 2025-04-08 NOTE — PLAN OF CARE
Problem: Occupational Therapy - Adult  Goal: By Discharge: Performs self-care activities at highest level of function for planned discharge setting.  See evaluation for individualized goals.  Description: FUNCTIONAL STATUS PRIOR TO ADMISSION: Patient reports he was MOD I at SPC level to manage ADLs. 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 Hx of IPR.      HOME SUPPORT: Patient lived with sister.    Occupational Therapy Goals:  Initiated 4/8/2025  1.  Patient will perform upper body dressing with Supervision within 7 day(s).  2.  Patient will perform lower body dressing with Minimal Assist within 7 day(s).  3.  Patient will perform grooming with Supervision within 7 day(s).  4.  Patient will perform toilet transfers with Minimal Assist and Assist x1  within 7 day(s).  5.  Patient will perform all aspects of toileting with Minimal Assist and Assist x1 within 7 day(s).  6.  Patient will participate in upper extremity therapeutic exercise/activities with Supervision for 5 minutes within 7 day(s).    7.  Patient will utilize energy conservation techniques during functional activities with verbal and visual cues within 7 day(s).    Outcome: Progressing   OCCUPATIONAL THERAPY EVALUATION    Patient: Blas Delcid (50 y.o. male)  Date: 4/8/2025  Primary Diagnosis: Leg swelling [M79.89]  Brain mass [G93.89]  Hypertensive emergency [I16.1]  Vasogenic edema (HCC) [G93.6]         Precautions: Fall Risk (SBP <160)                  ASSESSMENT :  The patient is limited by decreased functional mobility, independence in ADLs, high-level IADLs, strength, activity tolerance, endurance, safety awareness, cognition, coordination, balance, increased L sided weakness compared to residual weakness at baseline following admission for headaches, blurred vision, imaging revealing recurrent R occipital GBM tumor. Patient reports resolution of headaches and blurred

## 2025-04-08 NOTE — PLAN OF CARE
2000 Report received from JADON Jaffe. Patient alert and oriented, resting in bed and no needs expressed. Pt on Nicardipine gtt, vitals trending.    Shift summary.    0400 Nicardipine gtt stopped.    No acute events noted. Pt resting in bed comfortably, call bell left within reach.    0735 Bedside shift change report given to JADON Valverde by JADON Rosas. Report included the following information SBAR, Kardex, MAR, Accordion, and Recent Results and Cardiac Rhythm NSR.    Problem: Safety - Adult  Goal: Free from fall injury  4/7/2025 2236 by Ralph Stephens RN  Outcome: Progressing  4/7/2025 1842 by Alannah Ventuar RN  Outcome: Progressing  Flowsheets (Taken 4/7/2025 1700)  Free From Fall Injury: Instruct family/caregiver on patient safety     Problem: Skin/Tissue Integrity  Goal: Skin integrity remains intact  Description: 1.  Monitor for areas of redness and/or skin breakdown  2.  Assess vascular access sites hourly  3.  Every 4-6 hours minimum:  Change oxygen saturation probe site  4.  Every 4-6 hours:  If on nasal continuous positive airway pressure, respiratory therapy assess nares and determine need for appliance change or resting period  4/7/2025 2236 by Ralph Stephens RN  Outcome: Progressing  4/7/2025 1842 by Alannah Ventura RN  Outcome: Progressing     Problem: Chronic Conditions and Co-morbidities  Goal: Patient's chronic conditions and co-morbidity symptoms are monitored and maintained or improved  4/7/2025 2236 by Ralph Stephens RN  Outcome: Progressing  4/7/2025 1842 by Alannah Ventura RN  Outcome: Progressing     Problem: Neurosensory - Adult  Goal: Remains free of injury related to seizures activity  Outcome: Progressing  Goal: Achieves maximal functionality and self care  Outcome: Progressing     Problem: Cardiovascular - Adult  Goal: Maintains optimal cardiac output and hemodynamic stability  Outcome: Progressing     Problem: Skin/Tissue Integrity - Adult  Goal: Skin integrity remains

## 2025-04-08 NOTE — PLAN OF CARE
Problem: Physical Therapy - Adult  Goal: By Discharge: Performs mobility at highest level of function for planned discharge setting.  See evaluation for individualized goals.  Description: FUNCTIONAL STATUS PRIOR TO ADMISSION: Patient was modified independent using a single point cane for functional mobility - used afo.    HOME SUPPORT PRIOR TO ADMISSION: The patient lived with sister.    Physical Therapy Goals  Initiated 4/8/2025  1.  Patient will move from supine to sit and sit to supine in bed with supervision/set-up within 7 day(s).    2.  Patient will perform sit to stand with supervision/set-up within 7 day(s).  3.  Patient will transfer from bed to chair and chair to bed with supervision/set-up using the least restrictive device within 7 day(s).  4.  Patient will ambulate with contact guard assist for 100 feet with the least restrictive device within 7 day(s).   5.  Patient will ascend/descend 4 stairs with one handrail(s) with minimal assistance within 7 day(s).   Outcome: Progressing       PHYSICAL THERAPY EVALUATION    Patient: Blas Delcid (50 y.o. male)  Date: 4/8/2025  Primary Diagnosis: Leg swelling [M79.89]  Brain mass [G93.89]  Hypertensive emergency [I16.1]  Vasogenic edema (HCC) [G93.6]  Procedure(s) (LRB):  BRAIN LAB GUIDED REDO RIGHT PARIETAL CRANIOTOMY AND TUMOR REMOVAL (N/A)     Precautions: Restrictions/Precautions  Restrictions/Precautions: Fall Risk            ASSESSMENT : Blas Delcid is a 50 y.o. male with past medical history significant for glioblastoma multiforme with resultant resection 2 years ago.  Patient has chronic left-sided weakness following resection of GBM, weakness seems to have gotten worse in the past couple of days- resulting in fall and some confusion.   CT scan of the head done in the emergency room showed brain mass with vasogenic edema.   DEFICITS/IMPAIRMENTS:   The patient is limited by decreased functional mobility, strength, sensation, body mechanics, activity

## 2025-04-08 NOTE — PROGRESS NOTES
Clinical Pharmacy Note: IV to PO Automatic Conversion  Please note: Blas Delcid’s medication (famotidine, levetiracetam) have been changed from IV to PO based on the following critiera:    Patient is tolerating oral medications  Patient is tolerating a diet more advanced than clear liquids  Patient is not requiring vasopressors    This IV to PO conversion is based on the P&[employer] approved automatic conversion policy for eligible patients.  Please call with questions.

## 2025-04-08 NOTE — CARE COORDINATION
Care Management Initial Assessment       RUR:  11%  Readmission? No  1st IM letter given? Yes - 4/7  1st  letter given: No    CARY: Pt admitted from home, likely IPR at dc (pending medical plan and progression)  - IPR recommended 4/8    Transport: BLS [facility] likely    CM met with Pt at bedside to introduce self and role. Pt lives w/ sister in a 1 story home a ramped entrance. Pt address has changed to [address] [place] [address].     ADLs: mostly independent w/ IADLs, needs assist w/ cooking, cleaning, etc.   Currently active , disabled  DME: grab bars, reacher, cane  PCP follow up: PCP confirmed - seen last week   Previous Home Health: none  Previous Skilled Nursing Facility: none  Previous Inpatient Rehab: yes; [facility]   Insurance verified: yes; [facility] Medicaid, [facility] Medicare  Emergency Contact: GEOFFREY MANRIQUEZ (Brother/Sister)  [phone] (Mobile)     330pm: Verified face sheet and demographics w/ Pt at bedside - mostly independent at baseline, sister Geoffrey assists w/ cleaning, shopping, etc. Hx of [facility] IPR then progressed to OP. No hx of SNF or HH. PT OT currently recommending IPR pending Sx plan/medical progression. If IPR at dc, Pt in agreement and prefers [facility]. Referral placed on [facility] for facility to follow.     CM will follow patient progress and assist as needed with CARY plan.       04/08/25 1531   Service Assessment   Patient Orientation Alert and Oriented;Person;Situation;Place;Self   Cognition Alert   History Provided By Patient   Primary Caregiver Self   Support Systems Family Members   Patient's Healthcare Decision Maker is: Legal Next of Kin   PCP Verified by CM Yes   Last Visit to PCP Within last 3 months   Prior Functional Level Assistance with the following:;Shopping;Mobility;Housework;Cooking   Current Functional Level Assistance with the following:;Bathing;Dressing;Toileting;Feeding;Housework;Cooking;Shopping;Mobility   Can patient return to prior living arrangement No   Ability to make

## 2025-04-08 NOTE — PROGRESS NOTES
Neurosurgery Progress Note            Admit Date: 2025   LOS: 2 days        Daily Progress Note: 2025      Subjective:   The patient underwent a right occipital craniotomy with resection of GBM in 2022. He completed standard chemo/XRT for GBM with Dr. Saeed. He had redo resection in 2023. He is not currently on any therapy for GBM and has not been seen by oncology in awhile. He had some residual left sided weakness due to his tumor, but was able to get around with a cane. He noticed he was dropping things with his left hand more and having more left sided weakness. He presented to the ER where a head CT revealed recurrent tumor. He reports taking 2 Excedrin daily for headaches recently. Otherwise, does not take blood thinning medications.     HD2, Cardene drip turned off this morning. Pt reports left side weakness persists. ASA test revealed platelet inhibition present.    Objective:     Vital signs  Temp (24hrs), Av.1 °F (36.7 °C), Min:97.9 °F (36.6 °C), Max:98.3 °F (36.8 °C)   No intake/output data recorded.   1901 -  0700  In: 1193.6 [P.O.:120; I.V.:1073.6]  Out: 1700 [Urine:1700]    /77   Pulse 55   Temp 98.3 °F (36.8 °C) (Oral)   Resp 15   Ht 1.626 m (5' 4\")   Wt 115.2 kg (253 lb 15.5 oz)   SpO2 94%   BMI 43.59 kg/m²          Pain control       PT/OT  Gait                 Physical Exam:  Gen:NAD.  Neuro: A&Ox3. Follows commands. Speech clear. Affect normal.  PERRL. EOMI. Mild flattening left nasolabial fold. Tongue midline.  MCKNIGHT spontaneously. RUE/RLE 5/5, LUE 3/5 distally, 2/5 proximally, LLE 3/5  Positive left drift  Gait deferred.      24 hour results:    Recent Results (from the past 24 hours)   Aspirin Test    Collection Time: 25  1:29 PM   Result Value Ref Range    Aspirin 431 ARU   Urinalysis with Reflex to Culture    Collection Time: 25  4:51 AM    Specimen: Urine   Result Value Ref Range    Color, UA YELLOW/STRAW      Appearance CLEAR CLEAR

## 2025-04-08 NOTE — PROGRESS NOTES
US guided 18 gauge 1.75 length PIV placed to left cehalic without complication for patient with poor vascular access.  Multiple sticks noted to BUE prior to PICC RN arrival    Time-out performed with Nurse. Post-procedure Handoff given to Nurse.  Bed returned to low, locked position with call bell in reach.

## 2025-04-09 ENCOUNTER — APPOINTMENT (OUTPATIENT)
Facility: HOSPITAL | Age: 50
DRG: 025 | End: 2025-04-09
Payer: MEDICARE

## 2025-04-09 LAB
ECHO AO ASC DIAM: 3.5 CM
ECHO AO ASCENDING AORTA INDEX: 1.57 CM/M2
ECHO AV AREA PEAK VELOCITY: 2.6 CM2
ECHO AV AREA VTI: 2.4 CM2
ECHO AV AREA/BSA PEAK VELOCITY: 1.2 CM2/M2
ECHO AV AREA/BSA VTI: 1.1 CM2/M2
ECHO AV MEAN GRADIENT: 5 MMHG
ECHO AV MEAN VELOCITY: 1.1 M/S
ECHO AV PEAK GRADIENT: 8 MMHG
ECHO AV PEAK VELOCITY: 1.4 M/S
ECHO AV VELOCITY RATIO: 0.79
ECHO AV VTI: 29.1 CM
ECHO BSA: 2.28 M2
ECHO LA DIAMETER INDEX: 1.43 CM/M2
ECHO LA DIAMETER: 3.2 CM
ECHO LA VOL A-L A2C: 65 ML (ref 18–58)
ECHO LA VOL A-L A4C: 54 ML (ref 18–58)
ECHO LA VOL MOD A2C: 60 ML (ref 18–58)
ECHO LA VOL MOD A4C: 49 ML (ref 18–58)
ECHO LA VOLUME AREA LENGTH: 59 ML
ECHO LA VOLUME INDEX A-L A2C: 29 ML/M2 (ref 16–34)
ECHO LA VOLUME INDEX A-L A4C: 24 ML/M2 (ref 16–34)
ECHO LA VOLUME INDEX AREA LENGTH: 26 ML/M2 (ref 16–34)
ECHO LA VOLUME INDEX MOD A2C: 27 ML/M2 (ref 16–34)
ECHO LA VOLUME INDEX MOD A4C: 22 ML/M2 (ref 16–34)
ECHO LV E' LATERAL VELOCITY: 6.69 CM/S
ECHO LV E' SEPTAL VELOCITY: 4.55 CM/S
ECHO LV EF PHYSICIAN: 60 %
ECHO LV FRACTIONAL SHORTENING: 31 % (ref 28–44)
ECHO LV INTERNAL DIMENSION DIASTOLE INDEX: 2.29 CM/M2
ECHO LV INTERNAL DIMENSION DIASTOLIC: 5.1 CM (ref 4.2–5.9)
ECHO LV INTERNAL DIMENSION SYSTOLIC INDEX: 1.57 CM/M2
ECHO LV INTERNAL DIMENSION SYSTOLIC: 3.5 CM
ECHO LV IVSD: 1.2 CM (ref 0.6–1)
ECHO LV MASS 2D: 241.2 G (ref 88–224)
ECHO LV MASS INDEX 2D: 108.2 G/M2 (ref 49–115)
ECHO LV POSTERIOR WALL DIASTOLIC: 1.2 CM (ref 0.6–1)
ECHO LV RELATIVE WALL THICKNESS RATIO: 0.47
ECHO LVOT AREA: 3.5 CM2
ECHO LVOT AV VTI INDEX: 0.73
ECHO LVOT DIAM: 2.1 CM
ECHO LVOT MEAN GRADIENT: 3 MMHG
ECHO LVOT PEAK GRADIENT: 5 MMHG
ECHO LVOT PEAK VELOCITY: 1.1 M/S
ECHO LVOT STROKE VOLUME INDEX: 33.1 ML/M2
ECHO LVOT SV: 73.7 ML
ECHO LVOT VTI: 21.3 CM
ECHO MV A VELOCITY: 0.82 M/S
ECHO MV AREA PHT: 3.9 CM2
ECHO MV E DECELERATION TIME (DT): 197 MS
ECHO MV E VELOCITY: 0.68 M/S
ECHO MV E/A RATIO: 0.83
ECHO MV E/E' LATERAL: 10.16
ECHO MV E/E' RATIO (AVERAGED): 12.55
ECHO MV E/E' SEPTAL: 14.95
ECHO MV PRESSURE HALF TIME (PHT): 57.1 MS
ECHO PV MAX VELOCITY: 0.9 M/S
ECHO PV PEAK GRADIENT: 4 MMHG
ECHO RV TAPSE: 1.9 CM (ref 1.7–?)
ECHO TV REGURGITANT MAX VELOCITY: 1.73 M/S
ECHO TV REGURGITANT PEAK GRADIENT: 12 MMHG

## 2025-04-09 PROCEDURE — APPNB45 APP NON BILLABLE 31-45 MINUTES: Performed by: NURSE PRACTITIONER

## 2025-04-09 PROCEDURE — 97530 THERAPEUTIC ACTIVITIES: CPT

## 2025-04-09 PROCEDURE — 2060000000 HC ICU INTERMEDIATE R&B

## 2025-04-09 PROCEDURE — 6360000002 HC RX W HCPCS: Performed by: INTERNAL MEDICINE

## 2025-04-09 PROCEDURE — 6360000002 HC RX W HCPCS: Performed by: NURSE PRACTITIONER

## 2025-04-09 PROCEDURE — 6370000000 HC RX 637 (ALT 250 FOR IP): Performed by: HOSPITALIST

## 2025-04-09 PROCEDURE — 2500000003 HC RX 250 WO HCPCS: Performed by: INTERNAL MEDICINE

## 2025-04-09 PROCEDURE — 97535 SELF CARE MNGMENT TRAINING: CPT

## 2025-04-09 PROCEDURE — 93306 TTE W/DOPPLER COMPLETE: CPT

## 2025-04-09 RX ORDER — HYDRALAZINE HYDROCHLORIDE 50 MG/1
100 TABLET, FILM COATED ORAL EVERY 8 HOURS SCHEDULED
Status: DISCONTINUED | OUTPATIENT
Start: 2025-04-09 | End: 2025-04-16

## 2025-04-09 RX ADMIN — HYDRALAZINE HYDROCHLORIDE 100 MG: 50 TABLET ORAL at 21:29

## 2025-04-09 RX ADMIN — Medication: at 08:20

## 2025-04-09 RX ADMIN — LEVETIRACETAM 1000 MG: 500 TABLET, FILM COATED ORAL at 08:19

## 2025-04-09 RX ADMIN — FAMOTIDINE 20 MG: 20 TABLET, FILM COATED ORAL at 21:29

## 2025-04-09 RX ADMIN — FAMOTIDINE 20 MG: 20 TABLET, FILM COATED ORAL at 08:19

## 2025-04-09 RX ADMIN — ENOXAPARIN SODIUM 30 MG: 30 INJECTION SUBCUTANEOUS at 21:29

## 2025-04-09 RX ADMIN — NICARDIPINE HYDROCHLORIDE 5 MG/HR: 0.1 INJECTION, SOLUTION INTRAVENOUS at 23:35

## 2025-04-09 RX ADMIN — NICARDIPINE HYDROCHLORIDE 5 MG/HR: 0.1 INJECTION, SOLUTION INTRAVENOUS at 13:35

## 2025-04-09 RX ADMIN — DEXAMETHASONE SODIUM PHOSPHATE 4 MG: 4 INJECTION INTRA-ARTICULAR; INTRALESIONAL; INTRAMUSCULAR; INTRAVENOUS; SOFT TISSUE at 18:58

## 2025-04-09 RX ADMIN — DEXAMETHASONE SODIUM PHOSPHATE 4 MG: 4 INJECTION INTRA-ARTICULAR; INTRALESIONAL; INTRAMUSCULAR; INTRAVENOUS; SOFT TISSUE at 02:48

## 2025-04-09 RX ADMIN — SODIUM CHLORIDE, PRESERVATIVE FREE 10 ML: 5 INJECTION INTRAVENOUS at 08:20

## 2025-04-09 RX ADMIN — HYDRALAZINE HYDROCHLORIDE 25 MG: 25 TABLET ORAL at 13:32

## 2025-04-09 RX ADMIN — AMLODIPINE BESYLATE 10 MG: 5 TABLET ORAL at 08:19

## 2025-04-09 RX ADMIN — NICARDIPINE HYDROCHLORIDE 5 MG/HR: 0.1 INJECTION, SOLUTION INTRAVENOUS at 08:36

## 2025-04-09 RX ADMIN — NICARDIPINE HYDROCHLORIDE 5 MG/HR: 0.1 INJECTION, SOLUTION INTRAVENOUS at 19:20

## 2025-04-09 RX ADMIN — LEVETIRACETAM 1000 MG: 500 TABLET, FILM COATED ORAL at 21:27

## 2025-04-09 RX ADMIN — Medication: at 21:31

## 2025-04-09 RX ADMIN — DEXAMETHASONE SODIUM PHOSPHATE 4 MG: 4 INJECTION INTRA-ARTICULAR; INTRALESIONAL; INTRAMUSCULAR; INTRAVENOUS; SOFT TISSUE at 11:50

## 2025-04-09 RX ADMIN — NICARDIPINE HYDROCHLORIDE 5 MG/HR: 0.1 INJECTION, SOLUTION INTRAVENOUS at 02:48

## 2025-04-09 RX ADMIN — ENOXAPARIN SODIUM 30 MG: 30 INJECTION SUBCUTANEOUS at 08:19

## 2025-04-09 RX ADMIN — HYDRALAZINE HYDROCHLORIDE 25 MG: 25 TABLET ORAL at 06:09

## 2025-04-09 ASSESSMENT — PAIN SCALES - GENERAL: PAINLEVEL_OUTOF10: 0

## 2025-04-09 NOTE — PROGRESS NOTES
[facility] Adult  Hospitalist Group                                                                                          Hospitalist Progress Note  Mark Carlisle MD  Office Phone: [phone]        Date of Service:  2025  NAME:  Blas Delcid  :  1975  MRN:  [id number]       Admission Summary:   Blas Delcid is a 50 y.o. male with past medical history significant for glioblastoma multiforme with resultant resection 2 years ago, hypertension presented at the emergency room with change in mental status.  Patient symptoms started about 2 days ago.  Patient has chronic left-sided weakness following resection of GBM, weakness seems to have gotten worse in the past couple of days.  Patient fell 3 days ago as a result of the weakness.  It was reported that the patient is more forgetful and not knowing where he is.  CT scan of the head done in the emergency room showed brain mass with vasogenic edema.  Neurosurgery service on-call was consulted with recommendation for admission to the hospitalist service.       Interval history / Subjective:   Follow up brain mass   No new issues  Multiple family members asking about the delay in surgery  Asking if he can go home \"like last time\"  Assessment & Plan:     Brain mass POA  Vasogenic edema  Mass effect with midline shift  -MRI brain significant increase in size of residual/recurrent glioblastoma, ?acute infarct  -CT scan of the chest, abdomen and pelvis to evaluate the patient for metastatic disease.  -Decadron/Keppra  -Appreciate Neurosurgery, surgery to be done next week  -Appreciate discussion with oncology    ?Acute infarct  -Appreciate Neurology, signed off    Per Neurosurgery chronic systolic CHF  -Echo with EF 60-65%    Malignant hypertensive urgency POA:   -Cardene gtt, wean as tolerated  -Continue amlodipine/hydralazine    Bilateral lower extremity lymphedema POA  Left leg wound  -Wound care consult will be requested for local wound

## 2025-04-09 NOTE — PLAN OF CARE
Problem: Discharge Planning  Goal: Discharge to home or other facility with appropriate resources  Recent Flowsheet Documentation  Taken 4/9/2025 0800 by Olive Peck RN  Discharge to home or other facility with appropriate resources:   Identify barriers to discharge with patient and caregiver   Arrange for needed discharge resources and transportation as appropriate   Identify discharge learning needs (meds, wound care, etc)   Arrange for interpreters to assist at discharge as needed   Refer to discharge planning if patient needs post-hospital services based on physician order or complex needs related to functional status, cognitive ability or social support system     Problem: Safety - Adult  Goal: Free from fall injury  Recent Flowsheet Documentation  Taken 4/9/2025 0800 by Olive Peck RN  Free From Fall Injury: Instruct family/caregiver on patient safety     Problem: Skin/Tissue Integrity  Goal: Skin integrity remains intact  Description: 1.  Monitor for areas of redness and/or skin breakdown  2.  Assess vascular access sites hourly  3.  Every 4-6 hours minimum:  Change oxygen saturation probe site  4.  Every 4-6 hours:  If on nasal continuous positive airway pressure, respiratory therapy assess nares and determine need for appliance change or resting period  Recent Flowsheet Documentation  Taken 4/9/2025 0800 by Olive Peck RN  Skin Integrity Remains Intact:   Monitor for areas of redness and/or skin breakdown   Assess vascular access sites hourly   Every 4-6 hours minimum:  Change oxygen saturation probe site   Every 4-6 hours:  If on nasal continuous positive airway pressure, assess nares and determine need for appliance change or resting period   Turn and reposition as indicated   Assess need for specialty bed   Positioning devices   Pressure redistribution bed/mattress (bed type)   Check visual cues for pain   Monitor skin under medical devices     Problem: Chronic Conditions and

## 2025-04-09 NOTE — PLAN OF CARE
Problem: Physical Therapy - Adult  Goal: By Discharge: Performs mobility at highest level of function for planned discharge setting.  See evaluation for individualized goals.  Description: FUNCTIONAL STATUS PRIOR TO ADMISSION: Patient was modified independent using a single point cane for functional mobility - used afo.    HOME SUPPORT PRIOR TO ADMISSION: The patient lived with sister.    Physical Therapy Goals  Initiated 4/8/2025  1.  Patient will move from supine to sit and sit to supine in bed with supervision/set-up within 7 day(s).    2.  Patient will perform sit to stand with supervision/set-up within 7 day(s).  3.  Patient will transfer from bed to chair and chair to bed with supervision/set-up using the least restrictive device within 7 day(s).  4.  Patient will ambulate with contact guard assist for 100 feet with the least restrictive device within 7 day(s).   5.  Patient will ascend/descend 4 stairs with one handrail(s) with minimal assistance within 7 day(s).   Outcome: Progressing       PHYSICAL THERAPY TREATMENT    Patient: Blas Delcid (50 y.o. male)  Date: 4/9/2025  Diagnosis: Leg swelling [M79.89]  Brain mass [G93.89]  Hypertensive emergency [I16.1]  Vasogenic edema (HCC) [G93.6] Brain mass  Procedure(s) (LRB):  BRAIN LAB GUIDED REDO RIGHT PARIETAL CRANIOTOMY AND TUMOR REMOVAL (N/A)    Precautions: Restrictions/Precautions  Restrictions/Precautions: Fall Risk            ASSESSMENT:  Patient continues to benefit from skilled PT services and is progressing towards goals. Pt with decrease sequencing and safety. Pt having increase difficulties with ADLs and left UE movement. Pt able to stand pivot with assistance. Attempted gait but pt declined due to wanting to eat.          PLAN:  Patient continues to benefit from skilled intervention to address the above impairments.  Continue treatment per established plan of care.        Recommendation for discharge: (in order for the patient to meet his/her long

## 2025-04-09 NOTE — PROGRESS NOTES
Neurosurgery Progress Note            Admit Date: 2025   LOS: 3 days        Daily Progress Note: 2025      Subjective:   The patient underwent a right occipital craniotomy with resection of GBM in 2022. He completed standard chemo/XRT for GBM with Dr. Saeed. He had redo resection in 2023. He is not currently on any therapy for GBM and has not been seen by oncology in awhile. He had some residual left sided weakness due to his tumor, but was able to get around with a cane. He noticed he was dropping things with his left hand more and having more left sided weakness. He presented to the ER where a head CT revealed recurrent tumor. He reports taking 2 Excedrin daily for headaches recently. Otherwise, does not take blood thinning medications.     HD2, Cardene drip turned off this morning. Pt reports left side weakness persists. ASA test revealed platelet inhibition present.    HD3, sitting up in chair. Family at bedside. BP improving.    Objective:     Vital signs  Temp (24hrs), Av.9 °F (36.6 °C), Min:97.6 °F (36.4 °C), Max:98.1 °F (36.7 °C)   No intake/output data recorded.   1901 -  0700  In: 1243.6 [P.O.:170; I.V.:1073.6]  Out: 1400 [Urine:1400]    /77   Pulse 69   Temp 97.6 °F (36.4 °C) (Oral)   Resp 15   Ht 1.626 m (5' 4\")   Wt 123.8 kg (272 lb 14.9 oz)   SpO2 95%   BMI 46.85 kg/m²          Pain control       PT/OT  Gait                 Physical Exam:  Gen:NAD.  Neuro: A&Ox3. Follows commands. Speech clear. Affect normal.  PERRL. EOMI. Mild flattening left nasolabial fold. Tongue midline.  MCKNIGHT spontaneously. RUE/RLE 5/5, LUE 3/5 distally, 2/5 proximally, LLE 3/5  Positive left drift  Gait deferred.      24 hour results:    No results found for this or any previous visit (from the past 24 hours).         Assessment:     Principal Problem:    Brain mass  Active Problems:    Glioblastoma (HCC)    Vasogenic edema (HCC)  Resolved Problems:    * No resolved hospital problems.

## 2025-04-09 NOTE — PROGRESS NOTES
Pt has lived quite a long time with diagnosis of a glioblastoma  He has a left hemiparesis bot otherwise is sitting up talking, eating  will plan on re do craniotomy and removal of recurrent tumor next week

## 2025-04-09 NOTE — PLAN OF CARE
Therapy - Adult  Goal: By Discharge: Performs mobility at highest level of function for planned discharge setting.  See evaluation for individualized goals.  Description: FUNCTIONAL STATUS PRIOR TO ADMISSION: Patient was modified independent using a single point cane for functional mobility - used afo.    HOME SUPPORT PRIOR TO ADMISSION: The patient lived with sister.    Physical Therapy Goals  Initiated 4/8/2025  1.  Patient will move from supine to sit and sit to supine in bed with supervision/set-up within 7 day(s).    2.  Patient will perform sit to stand with supervision/set-up within 7 day(s).  3.  Patient will transfer from bed to chair and chair to bed with supervision/set-up using the least restrictive device within 7 day(s).  4.  Patient will ambulate with contact guard assist for 100 feet with the least restrictive device within 7 day(s).   5.  Patient will ascend/descend 4 stairs with one handrail(s) with minimal assistance within 7 day(s).   4/9/2025 1333 by Autumn Singh, PTA  Outcome: Progressing     Problem: Occupational Therapy - Adult  Goal: By Discharge: Performs self-care activities at highest level of function for planned discharge setting.  See evaluation for individualized goals.  Description: FUNCTIONAL STATUS PRIOR TO ADMISSION: Patient reports he was MOD I at SPC level to manage ADLs. 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 Hx of IPR.      HOME SUPPORT: Patient lived with sister.    Occupational Therapy Goals:  Initiated 4/8/2025  1.  Patient will perform upper body dressing with Supervision within 7 day(s).  2.  Patient will perform lower body dressing with Minimal Assist within 7 day(s).  3.  Patient will perform grooming with Supervision within 7 day(s).  4.  Patient will perform toilet transfers with Minimal Assist and Assist x1  within 7 day(s).  5.  Patient will perform all aspects of toileting with

## 2025-04-09 NOTE — PLAN OF CARE
FUNCTIONAL STATUS PRIOR TO ADMISSION: Patient reports he was MOD I at SPC level to manage ADLs. 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 Hx of IPR.      HOME SUPPORT: Patient lived with sister.    Occupational Therapy Goals:  Initiated 4/8/2025  1.  Patient will perform upper body dressing with Supervision within 7 day(s).  2.  Patient will perform lower body dressing with Minimal Assist within 7 day(s).  3.  Patient will perform grooming with Supervision within 7 day(s).  4.  Patient will perform toilet transfers with Minimal Assist and Assist x1  within 7 day(s).  5.  Patient will perform all aspects of toileting with Minimal Assist and Assist x1 within 7 day(s).  6.  Patient will participate in upper extremity therapeutic exercise/activities with Supervision for 5 minutes within 7 day(s).    7.  Patient will utilize energy conservation techniques during functional activities with verbal and visual cues within 7 day(s).  8. Patient will improve Fugl Álvarez score by 1-5 within 7 days. (Initial 0/66)    Outcome: Progressing   OCCUPATIONAL THERAPY TREATMENT  Patient: Blas Delcid (50 y.o. male)  Date: 4/9/2025  Primary Diagnosis: Leg swelling [M79.89]  Brain mass [G93.89]  Hypertensive emergency [I16.1]  Vasogenic edema (HCC) [G93.6]  Procedure(s) (LRB):  BRAIN LAB GUIDED REDO RIGHT PARIETAL CRANIOTOMY AND TUMOR REMOVAL (N/A)     Precautions: Fall Risk                Chart, occupational therapy assessment, plan of care, and goals were reviewed.    ASSESSMENT  Patient continues to benefit from skilled OT services and is progressing towards goals. Patient received with saturated linens/gown, requiring up to max A to manage LB dressing needs (outlined below). Patient continues to be limited by increased LUE weakness compared to baseline, noting patient unable to grasp clothing/bathing wipes with LUE (patient reporting he is able to manipulate

## 2025-04-09 NOTE — PROGRESS NOTES
Med/ONC   Recurrent GBM pt of [facility]  Plan for neurosurgery/ date pending  Pt will fu with [facility] for systemic therapy treatment plan  Call/ message if questions

## 2025-04-09 NOTE — CARE COORDINATION
Transition of Care Plan:    RUR: 12%  Prior Level of Functioning: mostly independent  Disposition: IPR (preference [facility]) versus TBD  ANKUSH: 4/16/25  If SNF or IPR: Date FOC offered: TBD  Date FOC received:   Accepting facility:   Date authorization started with reference number:   Date authorization received and expires:   Follow up appointments:   DME needed: None  Transportation at discharge: probable BLS  IM/IMM Medicare/ letter given: 1st 4/7/25  Is patient a [facility] and [facility] with [facility]?    If yes, was [facility] transfer form completed and [facility] notified?   Caregiver Contact: Valeria Delcid  [phone]  Discharge Caregiver contacted prior to discharge?   Care Conference needed?   Barriers to discharge:     CM reviewed case with treatment team during [facility]. Plan is for surgery next week.

## 2025-04-10 LAB — ECHO BSA: 2.28 M2

## 2025-04-10 PROCEDURE — 6370000000 HC RX 637 (ALT 250 FOR IP): Performed by: HOSPITALIST

## 2025-04-10 PROCEDURE — 97116 GAIT TRAINING THERAPY: CPT

## 2025-04-10 PROCEDURE — 6360000002 HC RX W HCPCS: Performed by: INTERNAL MEDICINE

## 2025-04-10 PROCEDURE — 6360000002 HC RX W HCPCS: Performed by: NURSE PRACTITIONER

## 2025-04-10 PROCEDURE — APPNB30 APP NON BILLABLE TIME 0-30 MINS: Performed by: NURSE PRACTITIONER

## 2025-04-10 PROCEDURE — 97530 THERAPEUTIC ACTIVITIES: CPT

## 2025-04-10 PROCEDURE — 97112 NEUROMUSCULAR REEDUCATION: CPT

## 2025-04-10 PROCEDURE — 2060000000 HC ICU INTERMEDIATE R&B

## 2025-04-10 PROCEDURE — 2500000003 HC RX 250 WO HCPCS: Performed by: INTERNAL MEDICINE

## 2025-04-10 RX ORDER — CARVEDILOL 12.5 MG/1
12.5 TABLET ORAL 2 TIMES DAILY WITH MEALS
Status: DISCONTINUED | OUTPATIENT
Start: 2025-04-10 | End: 2025-04-12

## 2025-04-10 RX ADMIN — HYDRALAZINE HYDROCHLORIDE 100 MG: 50 TABLET ORAL at 21:52

## 2025-04-10 RX ADMIN — DEXAMETHASONE SODIUM PHOSPHATE 4 MG: 4 INJECTION INTRA-ARTICULAR; INTRALESIONAL; INTRAMUSCULAR; INTRAVENOUS; SOFT TISSUE at 06:09

## 2025-04-10 RX ADMIN — LEVETIRACETAM 1000 MG: 500 TABLET, FILM COATED ORAL at 08:34

## 2025-04-10 RX ADMIN — LEVETIRACETAM 1000 MG: 500 TABLET, FILM COATED ORAL at 21:52

## 2025-04-10 RX ADMIN — ENOXAPARIN SODIUM 30 MG: 30 INJECTION SUBCUTANEOUS at 08:35

## 2025-04-10 RX ADMIN — FAMOTIDINE 20 MG: 20 TABLET, FILM COATED ORAL at 21:52

## 2025-04-10 RX ADMIN — DEXAMETHASONE SODIUM PHOSPHATE 4 MG: 4 INJECTION INTRA-ARTICULAR; INTRALESIONAL; INTRAMUSCULAR; INTRAVENOUS; SOFT TISSUE at 18:35

## 2025-04-10 RX ADMIN — DEXAMETHASONE SODIUM PHOSPHATE 4 MG: 4 INJECTION INTRA-ARTICULAR; INTRALESIONAL; INTRAMUSCULAR; INTRAVENOUS; SOFT TISSUE at 12:08

## 2025-04-10 RX ADMIN — SODIUM CHLORIDE, PRESERVATIVE FREE 10 ML: 5 INJECTION INTRAVENOUS at 08:38

## 2025-04-10 RX ADMIN — NICARDIPINE HYDROCHLORIDE 5 MG/HR: 0.1 INJECTION, SOLUTION INTRAVENOUS at 03:50

## 2025-04-10 RX ADMIN — CARVEDILOL 12.5 MG: 12.5 TABLET, FILM COATED ORAL at 17:59

## 2025-04-10 RX ADMIN — HYDRALAZINE HYDROCHLORIDE 100 MG: 50 TABLET ORAL at 13:39

## 2025-04-10 RX ADMIN — Medication: at 08:37

## 2025-04-10 RX ADMIN — ENOXAPARIN SODIUM 30 MG: 30 INJECTION SUBCUTANEOUS at 21:52

## 2025-04-10 RX ADMIN — NICARDIPINE HYDROCHLORIDE 2.5 MG/HR: 0.1 INJECTION, SOLUTION INTRAVENOUS at 13:44

## 2025-04-10 RX ADMIN — AMLODIPINE BESYLATE 10 MG: 5 TABLET ORAL at 08:32

## 2025-04-10 RX ADMIN — Medication: at 21:53

## 2025-04-10 RX ADMIN — FAMOTIDINE 20 MG: 20 TABLET, FILM COATED ORAL at 08:33

## 2025-04-10 RX ADMIN — HYDRALAZINE HYDROCHLORIDE 100 MG: 50 TABLET ORAL at 06:09

## 2025-04-10 RX ADMIN — NICARDIPINE HYDROCHLORIDE 5 MG/HR: 0.1 INJECTION, SOLUTION INTRAVENOUS at 08:31

## 2025-04-10 NOTE — PROGRESS NOTES
[facility] Adult  Hospitalist Group                                                                                          Hospitalist Progress Note  Dorcas Buenrostro MD  Office Phone: [phone]        Date of Service:  4/10/2025  NAME:  Blas Delcid  :  1975  MRN:  [id number]       Admission Summary:   Blas Delcid is a 50 y.o. male with past medical history significant for glioblastoma multiforme with resultant resection 2 years ago, hypertension presented at the emergency room with change in mental status.  Patient symptoms started about 2 days ago.  Patient has chronic left-sided weakness following resection of GBM, weakness seems to have gotten worse in the past couple of days.  Patient fell 3 days ago as a result of the weakness.  It was reported that the patient is more forgetful and not knowing where he is.  CT scan of the head done in the emergency room showed brain mass with vasogenic edema.  Neurosurgery service on-call was consulted with recommendation for admission to the hospitalist service.       Interval history / Subjective:   Follow up brain mass   No new issues  BP better on cardene drip   Assessment & Plan:     Brain mass POA  Vasogenic edema  Mass effect with midline shift  -MRI brain significant increase in size of residual/recurrent glioblastoma, ?acute infarct  -CT scan of the chest, abdomen and pelvis to evaluate the patient for metastatic disease.  -Decadron/Keppra  -Appreciate Neurosurgery, . Plan for OR on Tues 04/15 at 1:00 due to ASA usage   -Appreciate discussion with oncology  -rule out acute stroke per neurologist        chronic systolic CHF  -Echo with EF 60-65%  -repeat echo pending     Malignant hypertensive urgency POA:   -Cardene gtt, wean as tolerated  -Continue amlodipine/hydralazine  -coreg added 4/10-wean off cardene today  Goal of sbp< 160     Bilateral lower extremity lymphedema POA  Left leg wound  -Wound care consult will be requested for local

## 2025-04-10 NOTE — PROGRESS NOTES
Neurosurgery Progress Note            Admit Date: 2025   LOS: 4 days        Daily Progress Note: 4/10/2025      Subjective:   The patient underwent a right occipital craniotomy with resection of GBM in 2022. He completed standard chemo/XRT for GBM with Dr. Saeed. He had redo resection in 2023. He is not currently on any therapy for GBM and has not been seen by oncology in awhile. He had some residual left sided weakness due to his tumor, but was able to get around with a cane. He noticed he was dropping things with his left hand more and having more left sided weakness. He presented to the ER where a head CT revealed recurrent tumor. He reports taking 2 Excedrin daily for headaches recently. Otherwise, does not take blood thinning medications.     HD2, Cardene drip turned off this morning. Pt reports left side weakness persists. ASA test revealed platelet inhibition present.    HD3, sitting up in chair. Family at bedside. BP improving.    HD4, pt resting comfortably in chair.    Objective:     Vital signs  Temp (24hrs), Av.2 °F (36.8 °C), Min:97.4 °F (36.3 °C), Max:98.8 °F (37.1 °C)   No intake/output data recorded.   1901 - 04/10 0700  In: 50 [P.O.:50]  Out: 1200 [Urine:1200]    BP (!) 162/91   Pulse 70   Temp 98.4 °F (36.9 °C) (Oral)   Resp 24   Ht 1.626 m (5' 4\")   Wt 122 kg (268 lb 15.4 oz)   SpO2 95%   BMI 46.17 kg/m²          Pain control       PT/OT  Gait                 Physical Exam:  Gen:NAD.  Neuro: A&Ox3. Follows commands. Speech clear. Affect normal.  PERRL. EOMI. Mild flattening left nasolabial fold. Tongue midline.  MCKNIGHT spontaneously. RUE/RLE 5/5, LUE 3/5 distally, 2/5 proximally, LLE 3/5  Positive left drift  Gait deferred.      24 hour results:    No results found for this or any previous visit (from the past 24 hours).         Assessment:     Principal Problem:    Brain mass  Active Problems:    Glioblastoma (HCC)    Vasogenic edema (HCC)  Resolved Problems:    * No  resolved hospital problems. *      Plan:   Right occipital GBM with cerebral edema and brain compression   - s/p crani 02/2022   - s/p redo crani 02/2023   - oncology consulted   - will need surgical resection for decompression but not emergently. Plan for OR on Tues 04/15 at 1:00 due to ASA usage   - Repeat ASA test on Monday   - cont decadron   - cont keppra   - PT/OT to mobilize patient pre-op  2. Hx HTN, mixed systolic/diastolic HF   - Check ECHO. Last reported ECHO in previous chart from 2022 shows LVEF 35%. Repeat ECHO shows EF improved to 60-65%    - will ask hospitalist to eval medial clearance for surgery   - home meds per hospitalist   - SBP goal less than 160. Neurology did not feel diffusion restriction on MRI was a stroke, so BP goals do not need to be 170-190. Recommend restarting home meds    Plan d/w Dr. Ayala, nurse. Lovenox ok for DVT ppx. Last dose on Sunday night.      CURTIS Yan - NP

## 2025-04-10 NOTE — PLAN OF CARE
Problem: Physical Therapy - Adult  Goal: By Discharge: Performs mobility at highest level of function for planned discharge setting.  See evaluation for individualized goals.  Description: FUNCTIONAL STATUS PRIOR TO ADMISSION: Patient was modified independent using a single point cane for functional mobility - used afo.    HOME SUPPORT PRIOR TO ADMISSION: The patient lived with sister.    Physical Therapy Goals  Initiated 4/8/2025  1.  Patient will move from supine to sit and sit to supine in bed with supervision/set-up within 7 day(s).    2.  Patient will perform sit to stand with supervision/set-up within 7 day(s).  3.  Patient will transfer from bed to chair and chair to bed with supervision/set-up using the least restrictive device within 7 day(s).  4.  Patient will ambulate with contact guard assist for 100 feet with the least restrictive device within 7 day(s).   5.  Patient will ascend/descend 4 stairs with one handrail(s) with minimal assistance within 7 day(s).   Outcome: Progressing     \  PHYSICAL THERAPY TREATMENT    Patient: Blas Delcid (50 y.o. male)  Date: 4/10/2025  Diagnosis: Leg swelling [M79.89]  Brain mass [G93.89]  Hypertensive emergency [I16.1]  Vasogenic edema (HCC) [G93.6] Brain mass  Procedure(s) (LRB):  BRAIN LAB GUIDED REDO RIGHT PARIETAL CRANIOTOMY AND TUMOR REMOVAL (N/A)    Precautions: Restrictions/Precautions  Restrictions/Precautions: Fall Risk            ASSESSMENT:  Patient continues to benefit from skilled PT services and is progressing towards goals. Pt was able to increase mobility. Pt was able to ambulate with cane and left AFO. Pt with an unsteady gait. Pt hesitant with gait. Pt gait was rotated more of side stepping technique. Pt was light sensitive with hallway light         PLAN:  Patient continues to benefit from skilled intervention to address the above impairments.  Continue treatment per established plan of care.        Recommendation for discharge: (in order for the

## 2025-04-10 NOTE — PLAN OF CARE
Problem: Discharge Planning  Goal: Discharge to home or other facility with appropriate resources  Outcome: Progressing  Flowsheets (Taken 4/10/2025 0800)  Discharge to home or other facility with appropriate resources:   Identify barriers to discharge with patient and caregiver   Identify discharge learning needs (meds, wound care, etc)   Arrange for needed discharge resources and transportation as appropriate     Problem: Safety - Adult  Goal: Free from fall injury  Outcome: Progressing  Flowsheets (Taken 4/10/2025 0800)  Free From Fall Injury:   Instruct family/caregiver on patient safety   Based on caregiver fall risk screen, instruct family/caregiver to ask for assistance with transferring infant if caregiver noted to have fall risk factors     Problem: Skin/Tissue Integrity  Goal: Skin integrity remains intact  Description: 1.  Monitor for areas of redness and/or skin breakdown  2.  Assess vascular access sites hourly  3.  Every 4-6 hours minimum:  Change oxygen saturation probe site  4.  Every 4-6 hours:  If on nasal continuous positive airway pressure, respiratory therapy assess nares and determine need for appliance change or resting period  Outcome: Progressing  Flowsheets (Taken 4/10/2025 0800)  Skin Integrity Remains Intact:   Monitor for areas of redness and/or skin breakdown   Assess vascular access sites hourly   Every 4-6 hours minimum:  Change oxygen saturation probe site     Problem: Chronic Conditions and Co-morbidities  Goal: Patient's chronic conditions and co-morbidity symptoms are monitored and maintained or improved  Outcome: Progressing  Flowsheets (Taken 4/10/2025 0800)  Care Plan - Patient's Chronic Conditions and Co-Morbidity Symptoms are Monitored and Maintained or Improved:   Monitor and assess patient's chronic conditions and comorbid symptoms for stability, deterioration, or improvement   Collaborate with multidisciplinary team to address chronic and comorbid conditions and prevent

## 2025-04-10 NOTE — CARE COORDINATION
Transition of Care Plan: d/c plan to be determined pending clinical progression and pt/ot recommendations;    Plan for craniotomy with removal of recurrent tumor surgery Tuesday, 4/15;    Pt is presently on a Cardene infusion;    RUR: 12%  Prior Level of Functioning: mostly independent  Disposition: IPR (preference [facility]) versus TBD  ANKUSH: 4/16/25  If SNF or IPR: Date FOC offered: TBD  Date FOC received:   Accepting facility:   Date authorization started with reference number:   Date authorization received and expires:   Follow up appointments:   DME needed: None  Transportation at discharge: probable BLS  IM/IMM Medicare/ letter given: 1st 4/7/25  Is patient a [place] and [facility] with [facility]?    If yes, was [place] transfer form completed and [facility] notified?   Caregiver Contact: Valeria Delcid  [phone]  Discharge Caregiver contacted prior to discharge?   Care Conference needed?   Barriers to discharge: surgery 4/15, medical stability  -1300-CM reviewed pt chart and discussed pt case in rounds. Per Attending report, [facility] following and plan is for crani with tumor removal next week as noted. CM to follow for transitional care planning.  Zoila Kaur RN BSN CCM

## 2025-04-10 NOTE — PLAN OF CARE
Problem: Occupational Therapy - Adult  Goal: By Discharge: Performs self-care activities at highest level of function for planned discharge setting.  See evaluation for individualized goals.  Description: FUNCTIONAL STATUS PRIOR TO ADMISSION: Patient reports he was MOD I at SPC level to manage ADLs. 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 Hx of IPR.      HOME SUPPORT: Patient lived with sister.    Occupational Therapy Goals:  Initiated 4/8/2025  1.  Patient will perform upper body dressing with Supervision within 7 day(s).  2.  Patient will perform lower body dressing with Minimal Assist within 7 day(s).  3.  Patient will perform grooming with Supervision within 7 day(s).  4.  Patient will perform toilet transfers with Minimal Assist and Assist x1  within 7 day(s).  5.  Patient will perform all aspects of toileting with Minimal Assist and Assist x1 within 7 day(s).  6.  Patient will participate in upper extremity therapeutic exercise/activities with Supervision for 5 minutes within 7 day(s).    7.  Patient will utilize energy conservation techniques during functional activities with verbal and visual cues within 7 day(s).  8. Patient will improve Fugl Álvarez score by 1-5 within 7 days. (Initial 0/66)    Outcome: Progressing   OCCUPATIONAL THERAPY TREATMENT  Patient: Blas Delcid (50 y.o. male)  Date: 4/10/2025  Primary Diagnosis: Leg swelling [M79.89]  Brain mass [G93.89]  Hypertensive emergency [I16.1]  Vasogenic edema (HCC) [G93.6]  Procedure(s) (LRB):  BRAIN LAB GUIDED REDO RIGHT PARIETAL CRANIOTOMY AND TUMOR REMOVAL (N/A)     Precautions: Fall Risk  Chart, occupational therapy assessment, plan of care, and goals were reviewed.    ASSESSMENT  Patient continues to benefit from skilled OT services and is progressing towards goals. Patient received semi fowlers in bed and agreeable to working with therapy. Patient transferred to EOB and  assistance  Problem Solving: Assistance required to generate solutions  Insights: Decreased awareness of deficits    Functional Mobility and Transfers for ADLs:  Bed Mobility:  Bed Mobility Training  Bed Mobility Training: Yes  Supine to Sit: Minimal assistance  Scooting: Minimal assistance     Transfers:   Transfer Training  Transfer Training: Yes  Sit to Stand: Contact guard assistance  Stand to Sit: Contact guard assistance  Bed to Chair: Contact guard assistance      Balance:     Balance  Sitting: Impaired  Sitting - Static: Good (unsupported)  Sitting - Dynamic: Fair (occasional)  Standing: Impaired  Standing - Static: Fair  Standing - Dynamic: Fair      ADL Intervention:                Upper Extremity Dressing: .  - Hospital Gown : Moderate Assistance, Seated EOB, and donning gown around back for coverage, assist for closures     Lower Extremity Dressing: .  - Shoes with Laces : Moderate Assistance and Seated EOB  - Legs Crossed Method Used : No                      Pain Rating:  Reports headache pain with light sensitivity in hallway     Pain Intervention(s):   nursing notified and returned to darkened room    Activity Tolerance:   Fair , requires rest breaks, and SpO2 stable on room air  Please refer to the flowsheet for vital signs taken during this treatment.    After treatment:   Patient left in no apparent distress sitting up in chair, Call bell within reach, Bed/ chair alarm activated, and Caregiver / family present    COMMUNICATION/EDUCATION:   The patient's plan of care was discussed with: physical therapy assistant and registered nurse    Patient Education  Education Given To: Patient;Family  Education Provided: Role of Therapy;Plan of Care;Transfer Training;Fall Prevention Strategies;Mobility Training  Education Method: Verbal  Barriers to Learning: Cognition  Education Outcome: Verbalized understanding;Continued education needed    Thank you for this referral.  Kristi Kendrick OTR/L  Minutes: 24

## 2025-04-11 PROCEDURE — 6370000000 HC RX 637 (ALT 250 FOR IP): Performed by: HOSPITALIST

## 2025-04-11 PROCEDURE — 2500000003 HC RX 250 WO HCPCS: Performed by: INTERNAL MEDICINE

## 2025-04-11 PROCEDURE — 6360000002 HC RX W HCPCS: Performed by: INTERNAL MEDICINE

## 2025-04-11 PROCEDURE — 94760 N-INVAS EAR/PLS OXIMETRY 1: CPT

## 2025-04-11 PROCEDURE — 2060000000 HC ICU INTERMEDIATE R&B

## 2025-04-11 PROCEDURE — 6360000002 HC RX W HCPCS: Performed by: NURSE PRACTITIONER

## 2025-04-11 PROCEDURE — 99024 POSTOP FOLLOW-UP VISIT: CPT | Performed by: PHYSICIAN ASSISTANT

## 2025-04-11 RX ADMIN — DEXAMETHASONE SODIUM PHOSPHATE 4 MG: 4 INJECTION INTRA-ARTICULAR; INTRALESIONAL; INTRAMUSCULAR; INTRAVENOUS; SOFT TISSUE at 19:20

## 2025-04-11 RX ADMIN — SODIUM CHLORIDE, PRESERVATIVE FREE 10 ML: 5 INJECTION INTRAVENOUS at 08:33

## 2025-04-11 RX ADMIN — AMLODIPINE BESYLATE 10 MG: 5 TABLET ORAL at 08:33

## 2025-04-11 RX ADMIN — DEXAMETHASONE SODIUM PHOSPHATE 4 MG: 4 INJECTION INTRA-ARTICULAR; INTRALESIONAL; INTRAMUSCULAR; INTRAVENOUS; SOFT TISSUE at 05:45

## 2025-04-11 RX ADMIN — SODIUM CHLORIDE, PRESERVATIVE FREE 10 ML: 5 INJECTION INTRAVENOUS at 21:00

## 2025-04-11 RX ADMIN — CARVEDILOL 12.5 MG: 12.5 TABLET, FILM COATED ORAL at 08:33

## 2025-04-11 RX ADMIN — LEVETIRACETAM 1000 MG: 500 TABLET, FILM COATED ORAL at 08:33

## 2025-04-11 RX ADMIN — Medication: at 21:00

## 2025-04-11 RX ADMIN — HYDRALAZINE HYDROCHLORIDE 100 MG: 50 TABLET ORAL at 14:57

## 2025-04-11 RX ADMIN — ENOXAPARIN SODIUM 30 MG: 30 INJECTION SUBCUTANEOUS at 08:33

## 2025-04-11 RX ADMIN — FAMOTIDINE 20 MG: 20 TABLET, FILM COATED ORAL at 08:33

## 2025-04-11 RX ADMIN — DEXAMETHASONE SODIUM PHOSPHATE 4 MG: 4 INJECTION INTRA-ARTICULAR; INTRALESIONAL; INTRAMUSCULAR; INTRAVENOUS; SOFT TISSUE at 12:05

## 2025-04-11 RX ADMIN — CARVEDILOL 12.5 MG: 12.5 TABLET, FILM COATED ORAL at 17:22

## 2025-04-11 RX ADMIN — Medication: at 08:35

## 2025-04-11 NOTE — PROGRESS NOTES
Occupational Therapy Contact Note  04/11/25    Patient requesting to rest at this time, deferring OT tx at this time. Will continue to follow up per POC.    Thank you,  Priscilla Lewis, KAYLI, OTR/L

## 2025-04-11 NOTE — PROGRESS NOTES
Physical Therapy    Reviewed chart and attempted to treat pt. Pt declined reporting just wanting to rest in the chair. Pt reports poor sleep last night. Will defer and continue to follow.

## 2025-04-11 NOTE — PLAN OF CARE
Problem: Discharge Planning  Goal: Discharge to home or other facility with appropriate resources  Outcome: Progressing  Flowsheets (Taken 4/11/2025 0800)  Discharge to home or other facility with appropriate resources:   Identify barriers to discharge with patient and caregiver   Arrange for needed discharge resources and transportation as appropriate   Identify discharge learning needs (meds, wound care, etc)     Problem: Safety - Adult  Goal: Free from fall injury  Outcome: Progressing  Flowsheets (Taken 4/11/2025 0800)  Free From Fall Injury:   Instruct family/caregiver on patient safety   Based on caregiver fall risk screen, instruct family/caregiver to ask for assistance with transferring infant if caregiver noted to have fall risk factors     Problem: Skin/Tissue Integrity  Goal: Skin integrity remains intact  Description: 1.  Monitor for areas of redness and/or skin breakdown  2.  Assess vascular access sites hourly  3.  Every 4-6 hours minimum:  Change oxygen saturation probe site  4.  Every 4-6 hours:  If on nasal continuous positive airway pressure, respiratory therapy assess nares and determine need for appliance change or resting period  Outcome: Progressing  Flowsheets (Taken 4/11/2025 0800)  Skin Integrity Remains Intact:   Monitor for areas of redness and/or skin breakdown   Assess vascular access sites hourly   Every 4-6 hours minimum:  Change oxygen saturation probe site     Problem: Chronic Conditions and Co-morbidities  Goal: Patient's chronic conditions and co-morbidity symptoms are monitored and maintained or improved  Outcome: Progressing  Flowsheets (Taken 4/11/2025 0800)  Care Plan - Patient's Chronic Conditions and Co-Morbidity Symptoms are Monitored and Maintained or Improved: Monitor and assess patient's chronic conditions and comorbid symptoms for stability, deterioration, or improvement     Problem: Neurosensory - Adult  Goal: Remains free of injury related to seizures

## 2025-04-11 NOTE — PROGRESS NOTES
Neurosurgery Progress Note      Admit Date: 2025   LOS: 5 days        Daily Progress Note: 2025      Subjective:   The patient underwent a right occipital craniotomy with resection of GBM in 2022. He completed standard chemo/XRT for GBM with Dr. Saeed. He had redo resection in 2023. He is not currently on any therapy for GBM and has not been seen by oncology in awhile. He had some residual left sided weakness due to his tumor, but was able to get around with a cane. He noticed he was dropping things with his left hand more and having more left sided weakness. He presented to the ER where a head CT revealed recurrent tumor. He reports taking 2 Excedrin daily for headaches recently. Otherwise, does not take blood thinning medications.     HD2, Cardene drip turned off this morning. Pt reports left side weakness persists. ASA test revealed platelet inhibition present.    HD3, sitting up in chair. Family at bedside. BP improving.    HD4, pt resting comfortably in chair.    HD5, patient resting in chair. Trying to sleep, states he did not sleep well last night. No complaints. No questions about surgery.    Objective:     Vital signs  Temp (24hrs), Av °F (36.7 °C), Min:97 °F (36.1 °C), Max:98.8 °F (37.1 °C)    0701 -  1900  In: 240 [P.O.:240]  Out: 1250 [Urine:1250]  No intake/output data recorded.    BP (!) 148/84   Pulse 67   Temp 97.9 °F (36.6 °C) (Oral)   Resp 21   Ht 1.626 m (5' 4\")   Wt 122 kg (268 lb 15.4 oz)   SpO2 98%   BMI 46.17 kg/m²          Pain control       PT/OT  Gait                 Physical Exam:  Gen:NAD.  Neuro: A&Ox3. Follows commands. Speech clear. Affect normal.  PERRL. EOMI. Mild flattening left nasolabial fold. Tongue midline.  MCKNIGHT spontaneously. RUE/RLE 5/5, LUE 3/5 distally, 2/5 proximally, LLE 3/5  Positive left drift  Gait deferred.      24 hour results:    No results found for this or any previous visit (from the past 24 hours).         Assessment:

## 2025-04-11 NOTE — PROGRESS NOTES
refill less than 2 seconds  Abd: soft/ non tender, non distended, BS physiological,   Ext: no clubbing, no cyanosis, no edema, brisk 2+ DP pulses  Neuro/Psych: pleasant mood and affect, CN 2-12 grossly intact, sensory grossly within normal limit, Strength 5/5 in all extremities, DTR 1+ x 4  Skin: warm     Data Review:    Review and/or order of clinical lab test      I have personally and independently reviewed all pertinent labs, diagnostic studies, imaging, and have provided independent interpretation of the same.     Labs:     No results for input(s): \"WBC\", \"HGB\", \"HCT\", \"PLT\" in the last 72 hours.    No results for input(s): \"NA\", \"K\", \"CL\", \"CO2\", \"BUN\", \"GLU\", \"MG\", \"PHOS\" in the last 72 hours.    Invalid input(s): \"CREA\", \"CA\", \"URICA\"    No results for input(s): \"ALT\", \"TP\", \"GLOB\", \"GGT\" in the last 72 hours.    Invalid input(s): \"SGOT\", \"GPT\", \"AP\", \"TBIL\", \"TBILI\", \"ALB\", \"AML\", \"AMYP\", \"LPSE\", \"HLPSE\"    No results for input(s): \"INR\", \"APTT\" in the last 72 hours.    Invalid input(s): \"PTP\"   No results for input(s): \"TIBC\" in the last 72 hours.    Invalid input(s): \"FE\", \"PSAT\", \"FERR\"   No results found for: \"RBCF\"   No results for input(s): \"PH\", \"PCO2\", \"PO2\" in the last 72 hours.  No results for input(s): \"CPK\" in the last 72 hours.    Invalid input(s): \"CPKMB\", \"CKNDX\", \"TROIQ\"  No results found for: \"CHOL\", \"CHLST\", \"CHOLV\", \"HDL\", \"HDLC\", \"LDL\", \"LDLC\"  No results found for: \"GLUCPOC\"  [unfilled]    Notes reviewed from all clinical/nonclinical/nursing services involved in patient's clinical care. Care coordination discussions were held with appropriate clinical/nonclinical/ nursing providers based on care coordination needs.         Patients current active Medications were reviewed, considered, added and adjusted based on the clinical condition today.      Home Medications were reconciled to the best of my ability given all available resources at the time of admission. Route is PO if not otherwise

## 2025-04-12 PROCEDURE — 6360000002 HC RX W HCPCS: Performed by: NURSE PRACTITIONER

## 2025-04-12 PROCEDURE — 6370000000 HC RX 637 (ALT 250 FOR IP): Performed by: HOSPITALIST

## 2025-04-12 PROCEDURE — 6370000000 HC RX 637 (ALT 250 FOR IP): Performed by: INTERNAL MEDICINE

## 2025-04-12 PROCEDURE — 6360000002 HC RX W HCPCS: Performed by: INTERNAL MEDICINE

## 2025-04-12 PROCEDURE — 2500000003 HC RX 250 WO HCPCS: Performed by: INTERNAL MEDICINE

## 2025-04-12 PROCEDURE — 2060000000 HC ICU INTERMEDIATE R&B

## 2025-04-12 RX ORDER — CARVEDILOL 12.5 MG/1
25 TABLET ORAL 2 TIMES DAILY WITH MEALS
Status: DISCONTINUED | OUTPATIENT
Start: 2025-04-12 | End: 2025-04-13

## 2025-04-12 RX ADMIN — HYDRALAZINE HYDROCHLORIDE 100 MG: 50 TABLET ORAL at 14:05

## 2025-04-12 RX ADMIN — DEXAMETHASONE SODIUM PHOSPHATE 4 MG: 4 INJECTION INTRA-ARTICULAR; INTRALESIONAL; INTRAMUSCULAR; INTRAVENOUS; SOFT TISSUE at 10:24

## 2025-04-12 RX ADMIN — DEXAMETHASONE SODIUM PHOSPHATE 4 MG: 4 INJECTION INTRA-ARTICULAR; INTRALESIONAL; INTRAMUSCULAR; INTRAVENOUS; SOFT TISSUE at 02:51

## 2025-04-12 RX ADMIN — SODIUM CHLORIDE, PRESERVATIVE FREE 10 ML: 5 INJECTION INTRAVENOUS at 09:01

## 2025-04-12 RX ADMIN — FAMOTIDINE 20 MG: 20 TABLET, FILM COATED ORAL at 00:54

## 2025-04-12 RX ADMIN — FAMOTIDINE 20 MG: 20 TABLET, FILM COATED ORAL at 22:40

## 2025-04-12 RX ADMIN — DEXAMETHASONE SODIUM PHOSPHATE 4 MG: 4 INJECTION INTRA-ARTICULAR; INTRALESIONAL; INTRAMUSCULAR; INTRAVENOUS; SOFT TISSUE at 18:43

## 2025-04-12 RX ADMIN — CARVEDILOL 25 MG: 12.5 TABLET, FILM COATED ORAL at 17:54

## 2025-04-12 RX ADMIN — LEVETIRACETAM 1000 MG: 500 TABLET, FILM COATED ORAL at 09:00

## 2025-04-12 RX ADMIN — LEVETIRACETAM 1000 MG: 500 TABLET, FILM COATED ORAL at 00:54

## 2025-04-12 RX ADMIN — ENOXAPARIN SODIUM 30 MG: 30 INJECTION SUBCUTANEOUS at 00:54

## 2025-04-12 RX ADMIN — HYDRALAZINE HYDROCHLORIDE 100 MG: 50 TABLET ORAL at 22:40

## 2025-04-12 RX ADMIN — LEVETIRACETAM 1000 MG: 500 TABLET, FILM COATED ORAL at 22:40

## 2025-04-12 RX ADMIN — Medication: at 09:02

## 2025-04-12 RX ADMIN — Medication: at 22:42

## 2025-04-12 RX ADMIN — HYDRALAZINE HYDROCHLORIDE 100 MG: 50 TABLET ORAL at 01:00

## 2025-04-12 RX ADMIN — OXYCODONE 5 MG: 5 TABLET ORAL at 02:50

## 2025-04-12 RX ADMIN — ENOXAPARIN SODIUM 30 MG: 30 INJECTION SUBCUTANEOUS at 22:40

## 2025-04-12 RX ADMIN — ENOXAPARIN SODIUM 30 MG: 30 INJECTION SUBCUTANEOUS at 09:00

## 2025-04-12 RX ADMIN — CARVEDILOL 12.5 MG: 12.5 TABLET, FILM COATED ORAL at 08:59

## 2025-04-12 RX ADMIN — FAMOTIDINE 20 MG: 20 TABLET, FILM COATED ORAL at 09:00

## 2025-04-12 RX ADMIN — AMLODIPINE BESYLATE 10 MG: 5 TABLET ORAL at 09:00

## 2025-04-12 NOTE — PLAN OF CARE
Problem: Discharge Planning  Goal: Discharge to home or other facility with appropriate resources  Outcome: Progressing  Flowsheets (Taken 4/12/2025 0800)  Discharge to home or other facility with appropriate resources:   Refer to discharge planning if patient needs post-hospital services based on physician order or complex needs related to functional status, cognitive ability or social support system   Arrange for interpreters to assist at discharge as needed   Identify discharge learning needs (meds, wound care, etc)   Arrange for needed discharge resources and transportation as appropriate   Identify barriers to discharge with patient and caregiver     Problem: Safety - Adult  Goal: Free from fall injury  Outcome: Progressing     Problem: Skin/Tissue Integrity  Goal: Skin integrity remains intact  Description: 1.  Monitor for areas of redness and/or skin breakdown  2.  Assess vascular access sites hourly  3.  Every 4-6 hours minimum:  Change oxygen saturation probe site  4.  Every 4-6 hours:  If on nasal continuous positive airway pressure, respiratory therapy assess nares and determine need for appliance change or resting period  Outcome: Progressing  Flowsheets (Taken 4/12/2025 0800)  Skin Integrity Remains Intact:   Monitor for areas of redness and/or skin breakdown   Assess vascular access sites hourly   Every 4-6 hours minimum:  Change oxygen saturation probe site   Every 4-6 hours:  If on nasal continuous positive airway pressure, assess nares and determine need for appliance change or resting period   Turn and reposition as indicated   Assess need for specialty bed   Positioning devices   Pressure redistribution bed/mattress (bed type)   Check visual cues for pain   Monitor skin under medical devices     Problem: Chronic Conditions and Co-morbidities  Goal: Patient's chronic conditions and co-morbidity symptoms are monitored and maintained or improved  Outcome: Progressing  Flowsheets (Taken 4/12/2025  care per orders   Initiate isolation precautions as appropriate   TWICE DAILY: Assess and document dressing/incision, wound bed, drain sites and surrounding tissue   TWICE DAILY: Assess and document skin integrity   ADMISSION and DAILY: Assess and document risk factors for pressure ulcer development     Problem: Musculoskeletal - Adult  Goal: Return mobility to safest level of function  Outcome: Progressing  Flowsheets (Taken 4/12/2025 0800)  Return Mobility to Safest Level of Function:   Assess patient stability and activity tolerance for standing, transferring and ambulating with or without assistive devices   Assist with transfers and ambulation using safe patient handling equipment as needed   Ensure adequate protection for wounds/incisions during mobilization   Obtain physical therapy/occupational therapy consults as needed   Apply continuous passive motion per provider or physical therapy orders to increase flexion toward goal   Instruct patient/family in ordered activity level  Goal: Maintain proper alignment of affected body part  Outcome: Progressing  Flowsheets (Taken 4/12/2025 0800)  Maintain proper alignment of affected body part:   Instruct and reinforce with patient and family use of appropriate assistive device and precautions (e.g. spinal or hip dislocation precautions)   Support and protect limb and body alignment per provider's orders  Goal: Return ADL status to a safe level of function  Outcome: Progressing  Flowsheets (Taken 4/12/2025 0800)  Return ADL Status to a Safe Level of Function:   Assist and instruct patient to increase activity and self care as tolerated   Obtain physical therapy/occupational therapy consults as needed   Assess activities of daily living deficits and provide assistive devices as needed   Administer medication as ordered     Problem: Gastrointestinal - Adult  Goal: Minimal or absence of nausea and vomiting  Outcome: Progressing  Flowsheets (Taken 4/12/2025 0800)  Minimal

## 2025-04-12 NOTE — PROGRESS NOTES
[facility] Adult  Hospitalist Group                                                                                          Hospitalist Progress Note  Dorcas Buenrostro MD  Office Phone: [phone]        Date of Service:  2025  NAME:  Blas Delcid  :  1975  MRN:  [id number]       Admission Summary:   Blas Delcid is a 50 y.o. male with past medical history significant for glioblastoma multiforme with resultant resection 2 years ago, hypertension presented at the emergency room with change in mental status.  Patient symptoms started about 2 days ago.  Patient has chronic left-sided weakness following resection of GBM, weakness seems to have gotten worse in the past couple of days.  Patient fell 3 days ago as a result of the weakness.  It was reported that the patient is more forgetful and not knowing where he is.  CT scan of the head done in the emergency room showed brain mass with vasogenic edema.  Neurosurgery service on-call was consulted with recommendation for admission to the hospitalist service.       Interval history / Subjective:   Follow up brain mass   No new issues  Off cardede BP still high    No results found.   Assessment & Plan:     Brain mass POA  Vasogenic edema  Mass effect with midline shift  -MRI brain significant increase in size of residual/recurrent glioblastoma,   -CT scan of the chest, abdomen and pelvis to evaluate the patient for metastatic disease: neg for mets   -Decadron/Keppra  -Appreciate Neurosurgery, . Plan for OR on Tues 04/15 at 1:00 due to ASA usage   -Appreciate discussion with oncology  -rule out acute stroke per neurologist        chronic systolic CHF  -Echo with EF 60-65%  -repeat echo pending     Malignant hypertensive urgency POA:   -Cardene gtt, wean as tolerated  -Continue amlodipine/hydralazine  -coreg added 4/10-wean off cardene 4/10  Goal of sbp< 160   Coreg added and dose adjusted    Bilateral lower extremity lymphedema POA  Left leg

## 2025-04-13 LAB
ALBUMIN SERPL-MCNC: 2.9 G/DL (ref 3.5–5)
ALBUMIN/GLOB SERPL: 0.8 (ref 1.1–2.2)
ALP SERPL-CCNC: 51 U/L (ref 45–117)
ALT SERPL-CCNC: 64 U/L (ref 12–78)
ANION GAP SERPL CALC-SCNC: 6 MMOL/L (ref 2–12)
AST SERPL-CCNC: 21 U/L (ref 15–37)
BASOPHILS # BLD: 0 K/UL (ref 0–0.1)
BASOPHILS NFR BLD: 0 % (ref 0–1)
BILIRUB SERPL-MCNC: 0.9 MG/DL (ref 0.2–1)
BUN SERPL-MCNC: 22 MG/DL (ref 6–20)
BUN/CREAT SERPL: 23 (ref 12–20)
CALCIUM SERPL-MCNC: 8.4 MG/DL (ref 8.5–10.1)
CHLORIDE SERPL-SCNC: 103 MMOL/L (ref 97–108)
CO2 SERPL-SCNC: 28 MMOL/L (ref 21–32)
CREAT SERPL-MCNC: 0.97 MG/DL (ref 0.7–1.3)
DIFFERENTIAL METHOD BLD: ABNORMAL
EOSINOPHIL # BLD: 0 K/UL (ref 0–0.4)
EOSINOPHIL NFR BLD: 0 % (ref 0–7)
ERYTHROCYTE [DISTWIDTH] IN BLOOD BY AUTOMATED COUNT: 12 % (ref 11.5–14.5)
GLOBULIN SER CALC-MCNC: 3.7 G/DL (ref 2–4)
GLUCOSE BLD STRIP.AUTO-MCNC: 122 MG/DL (ref 65–117)
GLUCOSE SERPL-MCNC: 109 MG/DL (ref 65–100)
HCT VFR BLD AUTO: 46.3 % (ref 36.6–50.3)
HGB BLD-MCNC: 15.8 G/DL (ref 12.1–17)
IMM GRANULOCYTES # BLD AUTO: 0 K/UL
IMM GRANULOCYTES NFR BLD AUTO: 0 %
LYMPHOCYTES # BLD: 0.96 K/UL (ref 0.8–3.5)
LYMPHOCYTES NFR BLD: 7 % (ref 12–49)
MCH RBC QN AUTO: 30.1 PG (ref 26–34)
MCHC RBC AUTO-ENTMCNC: 34.1 G/DL (ref 30–36.5)
MCV RBC AUTO: 88.2 FL (ref 80–99)
MONOCYTES # BLD: 0.96 K/UL (ref 0–1)
MONOCYTES NFR BLD: 7 % (ref 5–13)
NEUTS SEG # BLD: 11.78 K/UL (ref 1.8–8)
NEUTS SEG NFR BLD: 86 % (ref 32–75)
NRBC # BLD: 0 K/UL (ref 0–0.01)
NRBC BLD-RTO: 0 PER 100 WBC
PLATELET # BLD AUTO: 328 K/UL (ref 150–400)
PMV BLD AUTO: 11 FL (ref 8.9–12.9)
POTASSIUM SERPL-SCNC: 3.4 MMOL/L (ref 3.5–5.1)
PROT SERPL-MCNC: 6.6 G/DL (ref 6.4–8.2)
RBC # BLD AUTO: 5.25 M/UL (ref 4.1–5.7)
RBC MORPH BLD: ABNORMAL
SERVICE CMNT-IMP: ABNORMAL
SODIUM SERPL-SCNC: 137 MMOL/L (ref 136–145)
WBC # BLD AUTO: 13.7 K/UL (ref 4.1–11.1)

## 2025-04-13 PROCEDURE — 2060000000 HC ICU INTERMEDIATE R&B

## 2025-04-13 PROCEDURE — 85025 COMPLETE CBC W/AUTO DIFF WBC: CPT

## 2025-04-13 PROCEDURE — 6360000002 HC RX W HCPCS: Performed by: INTERNAL MEDICINE

## 2025-04-13 PROCEDURE — 2500000003 HC RX 250 WO HCPCS: Performed by: INTERNAL MEDICINE

## 2025-04-13 PROCEDURE — 82962 GLUCOSE BLOOD TEST: CPT

## 2025-04-13 PROCEDURE — 6370000000 HC RX 637 (ALT 250 FOR IP): Performed by: HOSPITALIST

## 2025-04-13 PROCEDURE — 6360000002 HC RX W HCPCS: Performed by: NURSE PRACTITIONER

## 2025-04-13 PROCEDURE — 80053 COMPREHEN METABOLIC PANEL: CPT

## 2025-04-13 RX ORDER — CARVEDILOL 12.5 MG/1
12.5 TABLET ORAL 2 TIMES DAILY WITH MEALS
Status: DISCONTINUED | OUTPATIENT
Start: 2025-04-13 | End: 2025-04-16

## 2025-04-13 RX ORDER — POTASSIUM CHLORIDE 750 MG/1
40 TABLET, EXTENDED RELEASE ORAL ONCE
Status: COMPLETED | OUTPATIENT
Start: 2025-04-13 | End: 2025-04-13

## 2025-04-13 RX ADMIN — FAMOTIDINE 20 MG: 20 TABLET, FILM COATED ORAL at 08:37

## 2025-04-13 RX ADMIN — HYDRALAZINE HYDROCHLORIDE 100 MG: 50 TABLET ORAL at 21:24

## 2025-04-13 RX ADMIN — HYDRALAZINE HYDROCHLORIDE 100 MG: 50 TABLET ORAL at 13:30

## 2025-04-13 RX ADMIN — ENOXAPARIN SODIUM 30 MG: 30 INJECTION SUBCUTANEOUS at 21:24

## 2025-04-13 RX ADMIN — CARVEDILOL 12.5 MG: 12.5 TABLET, FILM COATED ORAL at 18:05

## 2025-04-13 RX ADMIN — HYDRALAZINE HYDROCHLORIDE 100 MG: 50 TABLET ORAL at 08:58

## 2025-04-13 RX ADMIN — SODIUM CHLORIDE, PRESERVATIVE FREE 10 ML: 5 INJECTION INTRAVENOUS at 21:30

## 2025-04-13 RX ADMIN — DEXAMETHASONE SODIUM PHOSPHATE 4 MG: 4 INJECTION INTRA-ARTICULAR; INTRALESIONAL; INTRAMUSCULAR; INTRAVENOUS; SOFT TISSUE at 18:05

## 2025-04-13 RX ADMIN — AMLODIPINE BESYLATE 10 MG: 5 TABLET ORAL at 08:38

## 2025-04-13 RX ADMIN — Medication: at 08:39

## 2025-04-13 RX ADMIN — CARVEDILOL 25 MG: 12.5 TABLET, FILM COATED ORAL at 08:38

## 2025-04-13 RX ADMIN — LEVETIRACETAM 1000 MG: 500 TABLET, FILM COATED ORAL at 08:37

## 2025-04-13 RX ADMIN — POTASSIUM CHLORIDE 40 MEQ: 750 TABLET, FILM COATED, EXTENDED RELEASE ORAL at 10:37

## 2025-04-13 RX ADMIN — DEXAMETHASONE SODIUM PHOSPHATE 4 MG: 4 INJECTION INTRA-ARTICULAR; INTRALESIONAL; INTRAMUSCULAR; INTRAVENOUS; SOFT TISSUE at 11:27

## 2025-04-13 RX ADMIN — LEVETIRACETAM 1000 MG: 500 TABLET, FILM COATED ORAL at 21:23

## 2025-04-13 RX ADMIN — SODIUM CHLORIDE, PRESERVATIVE FREE 10 ML: 5 INJECTION INTRAVENOUS at 08:39

## 2025-04-13 RX ADMIN — ENOXAPARIN SODIUM 30 MG: 30 INJECTION SUBCUTANEOUS at 08:37

## 2025-04-13 RX ADMIN — FAMOTIDINE 20 MG: 20 TABLET, FILM COATED ORAL at 21:23

## 2025-04-13 RX ADMIN — Medication: at 21:30

## 2025-04-13 NOTE — PLAN OF CARE
Problem: Discharge Planning  Goal: Discharge to home or other facility with appropriate resources  4/13/2025 1244 by Olive Peck RN  Outcome: Progressing  4/13/2025 1244 by Olive Peck RN  Outcome: Progressing  Flowsheets (Taken 4/13/2025 0800)  Discharge to home or other facility with appropriate resources:   Identify barriers to discharge with patient and caregiver   Arrange for needed discharge resources and transportation as appropriate   Identify discharge learning needs (meds, wound care, etc)   Arrange for interpreters to assist at discharge as needed   Refer to discharge planning if patient needs post-hospital services based on physician order or complex needs related to functional status, cognitive ability or social support system  4/13/2025 0222 by Annabel Brown RN  Outcome: Progressing  Flowsheets (Taken 4/12/2025 2000)  Discharge to home or other facility with appropriate resources: Identify barriers to discharge with patient and caregiver     Problem: Safety - Adult  Goal: Free from fall injury  4/13/2025 1244 by Olive Peck RN  Outcome: Progressing  4/13/2025 0222 by Annabel Brown RN  Outcome: Progressing  Flowsheets (Taken 4/12/2025 2000)  Free From Fall Injury: Instruct family/caregiver on patient safety     Problem: Skin/Tissue Integrity  Goal: Skin integrity remains intact  Description: 1.  Monitor for areas of redness and/or skin breakdown  2.  Assess vascular access sites hourly  3.  Every 4-6 hours minimum:  Change oxygen saturation probe site  4.  Every 4-6 hours:  If on nasal continuous positive airway pressure, respiratory therapy assess nares and determine need for appliance change or resting period  4/13/2025 1244 by Olive Peck RN  Outcome: Progressing  Flowsheets (Taken 4/13/2025 0800)  Skin Integrity Remains Intact:   Monitor for areas of redness and/or skin breakdown   Assess vascular access sites hourly   Every 4-6 hours minimum:  Change oxygen

## 2025-04-13 NOTE — PROGRESS NOTES
[facility] Adult  Hospitalist Group                                                                                          Hospitalist Progress Note  Dorcas Buenrostro MD  Office Phone: [phone]        Date of Service:  2025  NAME:  Blas Delcid  :  1975  MRN:  [id number]       Admission Summary:   Blas Delcid is a 50 y.o. male with past medical history significant for glioblastoma multiforme with resultant resection 2 years ago, hypertension presented at the emergency room with change in mental status.  Patient symptoms started about 2 days ago.  Patient has chronic left-sided weakness following resection of GBM, weakness seems to have gotten worse in the past couple of days.  Patient fell 3 days ago as a result of the weakness.  It was reported that the patient is more forgetful and not knowing where he is.  CT scan of the head done in the emergency room showed brain mass with vasogenic edema.  Neurosurgery service on-call was consulted with recommendation for admission to the hospitalist service.       Interval history / Subjective:   Follow up brain mass   No new issues  BP much better     No results found.   Assessment & Plan:     Brain mass POA  Vasogenic edema  Mass effect with midline shift  -MRI brain significant increase in size of residual/recurrent glioblastoma,   -CT scan of the chest, abdomen and pelvis to evaluate the patient for metastatic disease: neg for mets   -Decadron/Keppra  -Appreciate Neurosurgery, . Plan for OR on Tues 04/15 at 1:00 due to ASA usage   -Appreciate discussion with oncology  -rule out acute stroke per neurologist        chronic systolic CHF  -Echo with EF 60-65%  -repeat echo pending     Malignant hypertensive urgency POA:   -Cardene gtt, wean as tolerated  -Continue amlodipine/hydralazine  -coreg added 4/10-wean off cardene 4/10  Goal of sbp< 160   Coreg added and dose adjusted  Adjust coreg     Bilateral lower extremity lymphedema POA  Left leg  LM for pt to call our office to schedule her MA Supervisit with Serina Hart sometime in September when Serina Hart will hopefully be back in the office.

## 2025-04-13 NOTE — PLAN OF CARE
Problem: Discharge Planning  Goal: Discharge to home or other facility with appropriate resources  4/13/2025 0222 by Annabel Brown, RN  Outcome: Progressing  Flowsheets (Taken 4/12/2025 2000)  Discharge to home or other facility with appropriate resources: Identify barriers to discharge with patient and caregiver  4/12/2025 1646 by Annabel Skelton, RN  Outcome: Progressing  Flowsheets (Taken 4/12/2025 0800)  Discharge to home or other facility with appropriate resources:   Refer to discharge planning if patient needs post-hospital services based on physician order or complex needs related to functional status, cognitive ability or social support system   Arrange for interpreters to assist at discharge as needed   Identify discharge learning needs (meds, wound care, etc)   Arrange for needed discharge resources and transportation as appropriate   Identify barriers to discharge with patient and caregiver     Problem: Safety - Adult  Goal: Free from fall injury  4/13/2025 0222 by Annabel Brown, RN  Outcome: Progressing  Flowsheets (Taken 4/12/2025 2000)  Free From Fall Injury: Instruct family/caregiver on patient safety  4/12/2025 1646 by Annabel Skelton RN  Outcome: Progressing     Problem: Skin/Tissue Integrity  Goal: Skin integrity remains intact  Description: 1.  Monitor for areas of redness and/or skin breakdown  2.  Assess vascular access sites hourly  3.  Every 4-6 hours minimum:  Change oxygen saturation probe site  4.  Every 4-6 hours:  If on nasal continuous positive airway pressure, respiratory therapy assess nares and determine need for appliance change or resting period  4/13/2025 0222 by Annabel Brown, RN  Outcome: Progressing  Flowsheets (Taken 4/12/2025 2000)  Skin Integrity Remains Intact: Monitor for areas of redness and/or skin breakdown  4/12/2025 1646 by Annabel Skelton, RN  Outcome: Progressing  Flowsheets (Taken 4/12/2025 0800)  Skin Integrity Remains Intact:   Monitor for

## 2025-04-14 LAB — ASPIRIN: 661 ARU

## 2025-04-14 PROCEDURE — APPNB30 APP NON BILLABLE TIME 0-30 MINS: Performed by: NURSE PRACTITIONER

## 2025-04-14 PROCEDURE — 97116 GAIT TRAINING THERAPY: CPT | Performed by: PHYSICAL THERAPIST

## 2025-04-14 PROCEDURE — 85576 BLOOD PLATELET AGGREGATION: CPT

## 2025-04-14 PROCEDURE — 2060000000 HC ICU INTERMEDIATE R&B

## 2025-04-14 PROCEDURE — 6370000000 HC RX 637 (ALT 250 FOR IP): Performed by: HOSPITALIST

## 2025-04-14 PROCEDURE — 97530 THERAPEUTIC ACTIVITIES: CPT | Performed by: PHYSICAL THERAPIST

## 2025-04-14 PROCEDURE — 6360000002 HC RX W HCPCS: Performed by: INTERNAL MEDICINE

## 2025-04-14 PROCEDURE — 97535 SELF CARE MNGMENT TRAINING: CPT

## 2025-04-14 PROCEDURE — 2500000003 HC RX 250 WO HCPCS: Performed by: INTERNAL MEDICINE

## 2025-04-14 RX ADMIN — SODIUM CHLORIDE, PRESERVATIVE FREE 10 ML: 5 INJECTION INTRAVENOUS at 21:23

## 2025-04-14 RX ADMIN — DEXAMETHASONE SODIUM PHOSPHATE 4 MG: 4 INJECTION INTRA-ARTICULAR; INTRALESIONAL; INTRAMUSCULAR; INTRAVENOUS; SOFT TISSUE at 18:56

## 2025-04-14 RX ADMIN — HYDRALAZINE HYDROCHLORIDE 100 MG: 50 TABLET ORAL at 14:33

## 2025-04-14 RX ADMIN — CARVEDILOL 12.5 MG: 12.5 TABLET, FILM COATED ORAL at 08:18

## 2025-04-14 RX ADMIN — FAMOTIDINE 20 MG: 20 TABLET, FILM COATED ORAL at 08:18

## 2025-04-14 RX ADMIN — CARVEDILOL 12.5 MG: 12.5 TABLET, FILM COATED ORAL at 16:01

## 2025-04-14 RX ADMIN — SODIUM CHLORIDE, PRESERVATIVE FREE 10 ML: 5 INJECTION INTRAVENOUS at 08:25

## 2025-04-14 RX ADMIN — LEVETIRACETAM 1000 MG: 500 TABLET, FILM COATED ORAL at 21:22

## 2025-04-14 RX ADMIN — DEXAMETHASONE SODIUM PHOSPHATE 4 MG: 4 INJECTION INTRA-ARTICULAR; INTRALESIONAL; INTRAMUSCULAR; INTRAVENOUS; SOFT TISSUE at 12:00

## 2025-04-14 RX ADMIN — AMLODIPINE BESYLATE 10 MG: 5 TABLET ORAL at 08:25

## 2025-04-14 RX ADMIN — LEVETIRACETAM 1000 MG: 500 TABLET, FILM COATED ORAL at 08:26

## 2025-04-14 RX ADMIN — DEXAMETHASONE SODIUM PHOSPHATE 4 MG: 4 INJECTION INTRA-ARTICULAR; INTRALESIONAL; INTRAMUSCULAR; INTRAVENOUS; SOFT TISSUE at 04:18

## 2025-04-14 RX ADMIN — HYDRALAZINE HYDROCHLORIDE 100 MG: 50 TABLET ORAL at 05:52

## 2025-04-14 RX ADMIN — Medication: at 21:21

## 2025-04-14 RX ADMIN — HYDRALAZINE HYDROCHLORIDE 100 MG: 50 TABLET ORAL at 21:26

## 2025-04-14 RX ADMIN — Medication: at 08:29

## 2025-04-14 RX ADMIN — FAMOTIDINE 20 MG: 20 TABLET, FILM COATED ORAL at 21:22

## 2025-04-14 ASSESSMENT — PAIN SCALES - GENERAL: PAINLEVEL_OUTOF10: 0

## 2025-04-14 NOTE — PLAN OF CARE
Problem: Occupational Therapy - Adult  Goal: By Discharge: Performs self-care activities at highest level of function for planned discharge setting.  See evaluation for individualized goals.  Description: FUNCTIONAL STATUS PRIOR TO ADMISSION: Patient reports he was MOD I at SPC level to manage ADLs. 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 Hx of IPR.      HOME SUPPORT: Patient lived with sister.    Occupational Therapy Goals:  Initiated 4/8/2025  1.  Patient will perform upper body dressing with Supervision within 7 day(s).  2.  Patient will perform lower body dressing with Minimal Assist within 7 day(s).  3.  Patient will perform grooming with Supervision within 7 day(s).  4.  Patient will perform toilet transfers with Minimal Assist and Assist x1  within 7 day(s).  5.  Patient will perform all aspects of toileting with Minimal Assist and Assist x1 within 7 day(s).  6.  Patient will participate in upper extremity therapeutic exercise/activities with Supervision for 5 minutes within 7 day(s).    7.  Patient will utilize energy conservation techniques during functional activities with verbal and visual cues within 7 day(s).  8. Patient will improve Fugl Álvarez score by 1-5 within 7 days. (Initial 0/66)    Outcome: Progressing   OCCUPATIONAL THERAPY TREATMENT  Patient: Blas Delcid (50 y.o. male)  Date: 4/14/2025  Primary Diagnosis: Leg swelling [M79.89]  Brain mass [G93.89]  Hypertensive emergency [I16.1]  Vasogenic edema (HCC) [G93.6]  Procedure(s) (LRB):  BRAIN LAB GUIDED RE-DO RIGHT PARIETAL CRANIOTOMY AND TUMOR REMOVAL (N/A)     Precautions: Fall Risk                Chart, occupational therapy assessment, plan of care, and goals were reviewed.        ASSESSMENT  Patient continues to benefit from skilled OT services and is slowly progressing towards goals.  Nursing cleared for therapy.  Received in bed resting, initially reluctant for

## 2025-04-14 NOTE — PROGRESS NOTES
[facility] Adult  Hospitalist Group                                                                                          Hospitalist Progress Note  Dorcas Buenrostro MD  Office Phone: [phone]        Date of Service:  2025  NAME:  Blas Delcid  :  1975  MRN:  [id number]       Admission Summary:   Blas Delcid is a 50 y.o. male with past medical history significant for glioblastoma multiforme with resultant resection 2 years ago, hypertension presented at the emergency room with change in mental status.  Patient symptoms started about 2 days ago.  Patient has chronic left-sided weakness following resection of GBM, weakness seems to have gotten worse in the past couple of days.  Patient fell 3 days ago as a result of the weakness.  It was reported that the patient is more forgetful and not knowing where he is.  CT scan of the head done in the emergency room showed brain mass with vasogenic edema.  Neurosurgery service on-call was consulted with recommendation for admission to the hospitalist service.       Interval history / Subjective:   Follow up brain mass   No new issues  BP much better     No results found.   Assessment & Plan:     Brain mass POA  Vasogenic edema  Mass effect with midline shift  -MRI brain significant increase in size of residual/recurrent glioblastoma,   -CT scan of the chest, abdomen and pelvis to evaluate the patient for metastatic disease: neg for mets   -Decadron/Keppra  -Appreciate Neurosurgery, . Plan for OR on Tues 04/15 at 1:00 due to ASA usage   -Appreciate discussion with oncology  -rule out acute stroke per neurologist   OR tomorrow, npo after midnight        chronic systolic CHF  -Echo with EF 60-65%      Malignant hypertensive urgency POA:   -Cardene gtt, wean as tolerated  -Continue amlodipine/hydralazine  -coreg added 4/10-wean off cardene 4/10  Goal of sbp< 160   Coreg added and dose adjusted  Adjust coreg   BP better     Bilateral lower extremity

## 2025-04-14 NOTE — PLAN OF CARE
Problem: Discharge Planning  Goal: Discharge to home or other facility with appropriate resources  Outcome: Progressing  Flowsheets (Taken 4/14/2025 0800)  Discharge to home or other facility with appropriate resources: Identify barriers to discharge with patient and caregiver     Problem: Safety - Adult  Goal: Free from fall injury  Outcome: Progressing  Flowsheets (Taken 4/14/2025 0800)  Free From Fall Injury: Instruct family/caregiver on patient safety     Problem: Skin/Tissue Integrity  Goal: Skin integrity remains intact  Description: 1.  Monitor for areas of redness and/or skin breakdown  2.  Assess vascular access sites hourly  3.  Every 4-6 hours minimum:  Change oxygen saturation probe site  4.  Every 4-6 hours:  If on nasal continuous positive airway pressure, respiratory therapy assess nares and determine need for appliance change or resting period  Outcome: Progressing  Flowsheets (Taken 4/14/2025 0800)  Skin Integrity Remains Intact: Monitor for areas of redness and/or skin breakdown     Problem: Chronic Conditions and Co-morbidities  Goal: Patient's chronic conditions and co-morbidity symptoms are monitored and maintained or improved  Outcome: Progressing  Flowsheets (Taken 4/14/2025 0800)  Care Plan - Patient's Chronic Conditions and Co-Morbidity Symptoms are Monitored and Maintained or Improved: Monitor and assess patient's chronic conditions and comorbid symptoms for stability, deterioration, or improvement     Problem: Neurosensory - Adult  Goal: Remains free of injury related to seizures activity  Outcome: Progressing  Flowsheets (Taken 4/14/2025 0800)  Remains free of injury related to seizure activity:   Maintain airway, patient safety  and administer oxygen as ordered   Monitor patient for seizure activity, document and report duration and description of seizure to Licensed Independent Practitioner  Goal: Achieves maximal functionality and self care  Outcome: Progressing  Flowsheets (Taken

## 2025-04-14 NOTE — PLAN OF CARE
Problem: Physical Therapy - Adult  Goal: By Discharge: Performs mobility at highest level of function for planned discharge setting.  See evaluation for individualized goals.  Description: FUNCTIONAL STATUS PRIOR TO ADMISSION: Patient was modified independent using a single point cane for functional mobility - used afo.    HOME SUPPORT PRIOR TO ADMISSION: The patient lived with sister.    Physical Therapy Goals  Initiated 4/8/2025  1.  Patient will move from supine to sit and sit to supine in bed with supervision/set-up within 7 day(s).    2.  Patient will perform sit to stand with supervision/set-up within 7 day(s).  3.  Patient will transfer from bed to chair and chair to bed with supervision/set-up using the least restrictive device within 7 day(s).  4.  Patient will ambulate with contact guard assist for 100 feet with the least restrictive device within 7 day(s).   5.  Patient will ascend/descend 4 stairs with one handrail(s) with minimal assistance within 7 day(s).   Outcome: Progressing   PHYSICAL THERAPY TREATMENT    Patient: Blas Delcid (50 y.o. male)  Date: 4/14/2025  Diagnosis: Leg swelling [M79.89]  Brain mass [G93.89]  Hypertensive emergency [I16.1]  Vasogenic edema (HCC) [G93.6] Brain mass  Procedure(s) (LRB):  BRAIN LAB GUIDED RE-DO RIGHT PARIETAL CRANIOTOMY AND TUMOR REMOVAL (N/A)    Precautions: Restrictions/Precautions  Restrictions/Precautions: Fall Risk            ASSESSMENT:  Patient continues to benefit from skilled PT services and is progressing towards goals. Patient overall limited by impaired balance, L hemiparesis, gait instability and decreased activity tolerance.  Currently patient requires modA for bed mobility and Rea for transfers.  Able to take a few steps to the chair with AFO in place and holds onto arm of chair.  Assisted into the bathroom via recliner and transferred to commode and back with Rea.  Requires totalA for clean up after BM.  Plan for surgery tmrw.  Will continue

## 2025-04-14 NOTE — PROGRESS NOTES
Neurosurgery Progress Note      Admit Date: 2025   LOS: 8 days        Daily Progress Note: 2025      Subjective:   The patient underwent a right occipital craniotomy with resection of GBM in 2022. He completed standard chemo/XRT for GBM with Dr. Saeed. He had redo resection in 2023. He is not currently on any therapy for GBM and has not been seen by oncology in awhile. He had some residual left sided weakness due to his tumor, but was able to get around with a cane. He noticed he was dropping things with his left hand more and having more left sided weakness. He presented to the ER where a head CT revealed recurrent tumor. He reports taking 2 Excedrin daily for headaches recently. Otherwise, does not take blood thinning medications.     HD2, Cardene drip turned off this morning. Pt reports left side weakness persists. ASA test revealed platelet inhibition present.    HD3, sitting up in chair. Family at bedside. BP improving.    HD4, pt resting comfortably in chair.    HD5, patient resting in chair. Trying to sleep, states he did not sleep well last night. No complaints. No questions about surgery.    HD8, pt's aspirin test came back showing that it is out of his system. He is ready for the OR tomorrow.    Objective:     Vital signs  Temp (24hrs), Av.4 °F (36.9 °C), Min:98 °F (36.7 °C), Max:98.7 °F (37.1 °C)   No intake/output data recorded.   1901 -  0700  In: -   Out: 850 [Urine:850]    BP (!) 147/88   Pulse (!) 108   Temp 98.4 °F (36.9 °C)   Resp 20   Ht 1.626 m (5' 4\")   Wt 122.4 kg (269 lb 13.5 oz)   SpO2 100%   BMI 46.32 kg/m²          Pain control       PT/OT  Gait                 Physical Exam:  Gen:NAD.  Neuro: A&Ox3. Follows commands. Speech clear. Affect normal.  PERRL. EOMI. Mild flattening left nasolabial fold. Tongue midline.  MCKNIGHT spontaneously. RUE/RLE 5/5, LUE 3/5 distally, 2/5 proximally, LLE 3/5  Positive left drift  Gait deferred.      24 hour results:    Recent

## 2025-04-15 ENCOUNTER — APPOINTMENT (OUTPATIENT)
Facility: HOSPITAL | Age: 50
DRG: 025 | End: 2025-04-15
Payer: MEDICARE

## 2025-04-15 ENCOUNTER — ANESTHESIA EVENT (OUTPATIENT)
Facility: HOSPITAL | Age: 50
DRG: 025 | End: 2025-04-15
Payer: MEDICARE

## 2025-04-15 ENCOUNTER — ANESTHESIA (OUTPATIENT)
Facility: HOSPITAL | Age: 50
DRG: 025 | End: 2025-04-15
Payer: MEDICARE

## 2025-04-15 LAB
ABO + RH BLD: NORMAL
BLOOD GROUP ANTIBODIES SERPL: NORMAL
SPECIMEN EXP DATE BLD: NORMAL

## 2025-04-15 PROCEDURE — 2720000010 HC SURG SUPPLY STERILE: Performed by: NEUROLOGICAL SURGERY

## 2025-04-15 PROCEDURE — 86850 RBC ANTIBODY SCREEN: CPT

## 2025-04-15 PROCEDURE — 6360000004 HC RX CONTRAST MEDICATION: Performed by: RADIOLOGY

## 2025-04-15 PROCEDURE — 2000000000 HC ICU R&B

## 2025-04-15 PROCEDURE — 2500000003 HC RX 250 WO HCPCS: Performed by: NEUROLOGICAL SURGERY

## 2025-04-15 PROCEDURE — 7100000000 HC PACU RECOVERY - FIRST 15 MIN: Performed by: NEUROLOGICAL SURGERY

## 2025-04-15 PROCEDURE — 3700000001 HC ADD 15 MINUTES (ANESTHESIA): Performed by: NEUROLOGICAL SURGERY

## 2025-04-15 PROCEDURE — 86901 BLOOD TYPING SEROLOGIC RH(D): CPT

## 2025-04-15 PROCEDURE — 2500000003 HC RX 250 WO HCPCS

## 2025-04-15 PROCEDURE — 6370000000 HC RX 637 (ALT 250 FOR IP): Performed by: NEUROLOGICAL SURGERY

## 2025-04-15 PROCEDURE — 88307 TISSUE EXAM BY PATHOLOGIST: CPT

## 2025-04-15 PROCEDURE — 6360000002 HC RX W HCPCS: Performed by: NURSE PRACTITIONER

## 2025-04-15 PROCEDURE — 3600000015 HC SURGERY LEVEL 5 ADDTL 15MIN: Performed by: NEUROLOGICAL SURGERY

## 2025-04-15 PROCEDURE — 6360000002 HC RX W HCPCS: Performed by: STUDENT IN AN ORGANIZED HEALTH CARE EDUCATION/TRAINING PROGRAM

## 2025-04-15 PROCEDURE — 2500000003 HC RX 250 WO HCPCS: Performed by: INTERNAL MEDICINE

## 2025-04-15 PROCEDURE — 3600000005 HC SURGERY LEVEL 5 BASE: Performed by: NEUROLOGICAL SURGERY

## 2025-04-15 PROCEDURE — 2709999900 HC NON-CHARGEABLE SUPPLY: Performed by: NEUROLOGICAL SURGERY

## 2025-04-15 PROCEDURE — 2580000003 HC RX 258: Performed by: NEUROLOGICAL SURGERY

## 2025-04-15 PROCEDURE — 2580000003 HC RX 258

## 2025-04-15 PROCEDURE — 00B70ZZ EXCISION OF CEREBRAL HEMISPHERE, OPEN APPROACH: ICD-10-PCS | Performed by: NEUROLOGICAL SURGERY

## 2025-04-15 PROCEDURE — 7100000001 HC PACU RECOVERY - ADDTL 15 MIN: Performed by: NEUROLOGICAL SURGERY

## 2025-04-15 PROCEDURE — 6360000002 HC RX W HCPCS

## 2025-04-15 PROCEDURE — 3700000000 HC ANESTHESIA ATTENDED CARE: Performed by: NEUROLOGICAL SURGERY

## 2025-04-15 PROCEDURE — C1713 ANCHOR/SCREW BN/BN,TIS/BN: HCPCS | Performed by: NEUROLOGICAL SURGERY

## 2025-04-15 PROCEDURE — 6360000002 HC RX W HCPCS: Performed by: INTERNAL MEDICINE

## 2025-04-15 PROCEDURE — 6360000002 HC RX W HCPCS: Performed by: NEUROLOGICAL SURGERY

## 2025-04-15 PROCEDURE — 86900 BLOOD TYPING SEROLOGIC ABO: CPT

## 2025-04-15 PROCEDURE — 6370000000 HC RX 637 (ALT 250 FOR IP): Performed by: HOSPITALIST

## 2025-04-15 PROCEDURE — 70460 CT HEAD/BRAIN W/DYE: CPT

## 2025-04-15 DEVICE — SCREW BNE L4MM DIA1.5MM CORT MAXILLOMANDIBULAR GRN TI SELF: Type: IMPLANTABLE DEVICE | Site: BRAIN | Status: FUNCTIONAL

## 2025-04-15 DEVICE — DURAGEN® PLUS DURAL REGENERATION MATRIX, 2 IN X 2 IN (5 CM X 5 CM)
Type: IMPLANTABLE DEVICE | Site: BRAIN | Status: FUNCTIONAL
Brand: DURAGEN® PLUS

## 2025-04-15 DEVICE — PLATE BONE NEURO RECT: Type: IMPLANTABLE DEVICE | Site: BRAIN | Status: FUNCTIONAL

## 2025-04-15 DEVICE — COVER BUR H L DIA18.5MM THK0.5MM 5 H NEURO TI W/O TAB: Type: IMPLANTABLE DEVICE | Site: BRAIN | Status: FUNCTIONAL

## 2025-04-15 RX ORDER — SODIUM CHLORIDE 0.9 % (FLUSH) 0.9 %
5-40 SYRINGE (ML) INJECTION EVERY 12 HOURS SCHEDULED
Status: DISCONTINUED | OUTPATIENT
Start: 2025-04-15 | End: 2025-04-15 | Stop reason: HOSPADM

## 2025-04-15 RX ORDER — SODIUM CHLORIDE 0.9 % (FLUSH) 0.9 %
5-40 SYRINGE (ML) INJECTION PRN
Status: DISCONTINUED | OUTPATIENT
Start: 2025-04-15 | End: 2025-04-15 | Stop reason: HOSPADM

## 2025-04-15 RX ORDER — ONDANSETRON 4 MG/1
4 TABLET, ORALLY DISINTEGRATING ORAL EVERY 8 HOURS PRN
Status: DISCONTINUED | OUTPATIENT
Start: 2025-04-15 | End: 2025-04-18 | Stop reason: HOSPADM

## 2025-04-15 RX ORDER — PROPOFOL 10 MG/ML
INJECTION, EMULSION INTRAVENOUS
Status: DISCONTINUED | OUTPATIENT
Start: 2025-04-15 | End: 2025-04-15 | Stop reason: SDUPTHER

## 2025-04-15 RX ORDER — SODIUM CHLORIDE, SODIUM LACTATE, POTASSIUM CHLORIDE, CALCIUM CHLORIDE 600; 310; 30; 20 MG/100ML; MG/100ML; MG/100ML; MG/100ML
INJECTION, SOLUTION INTRAVENOUS CONTINUOUS
Status: DISCONTINUED | OUTPATIENT
Start: 2025-04-15 | End: 2025-04-16

## 2025-04-15 RX ORDER — FENTANYL CITRATE 50 UG/ML
25 INJECTION, SOLUTION INTRAMUSCULAR; INTRAVENOUS EVERY 5 MIN PRN
Status: DISCONTINUED | OUTPATIENT
Start: 2025-04-15 | End: 2025-04-15 | Stop reason: HOSPADM

## 2025-04-15 RX ORDER — SODIUM CHLORIDE, SODIUM LACTATE, POTASSIUM CHLORIDE, CALCIUM CHLORIDE 600; 310; 30; 20 MG/100ML; MG/100ML; MG/100ML; MG/100ML
INJECTION, SOLUTION INTRAVENOUS CONTINUOUS
Status: DISCONTINUED | OUTPATIENT
Start: 2025-04-15 | End: 2025-04-15 | Stop reason: HOSPADM

## 2025-04-15 RX ORDER — HYDRALAZINE HYDROCHLORIDE 20 MG/ML
10 INJECTION INTRAMUSCULAR; INTRAVENOUS
Status: DISCONTINUED | OUTPATIENT
Start: 2025-04-15 | End: 2025-04-15 | Stop reason: HOSPADM

## 2025-04-15 RX ORDER — MORPHINE SULFATE 2 MG/ML
2 INJECTION, SOLUTION INTRAMUSCULAR; INTRAVENOUS ONCE
Status: DISCONTINUED | OUTPATIENT
Start: 2025-04-15 | End: 2025-04-15

## 2025-04-15 RX ORDER — ACETAMINOPHEN 500 MG
1000 TABLET ORAL ONCE
Status: DISCONTINUED | OUTPATIENT
Start: 2025-04-15 | End: 2025-04-15 | Stop reason: HOSPADM

## 2025-04-15 RX ORDER — PROCHLORPERAZINE EDISYLATE 5 MG/ML
5 INJECTION INTRAMUSCULAR; INTRAVENOUS
Status: DISCONTINUED | OUTPATIENT
Start: 2025-04-15 | End: 2025-04-15 | Stop reason: HOSPADM

## 2025-04-15 RX ORDER — SODIUM CHLORIDE 0.9 % (FLUSH) 0.9 %
5-40 SYRINGE (ML) INJECTION PRN
Status: DISCONTINUED | OUTPATIENT
Start: 2025-04-15 | End: 2025-04-18 | Stop reason: HOSPADM

## 2025-04-15 RX ORDER — SODIUM CHLORIDE 9 MG/ML
INJECTION, SOLUTION INTRAVENOUS PRN
Status: DISCONTINUED | OUTPATIENT
Start: 2025-04-15 | End: 2025-04-15 | Stop reason: HOSPADM

## 2025-04-15 RX ORDER — BACITRACIN ZINC 500 [USP'U]/G
OINTMENT TOPICAL PRN
Status: DISCONTINUED | OUTPATIENT
Start: 2025-04-15 | End: 2025-04-15 | Stop reason: HOSPADM

## 2025-04-15 RX ORDER — SODIUM CHLORIDE 0.9 % (FLUSH) 0.9 %
5-40 SYRINGE (ML) INJECTION EVERY 12 HOURS SCHEDULED
Status: DISCONTINUED | OUTPATIENT
Start: 2025-04-15 | End: 2025-04-18 | Stop reason: HOSPADM

## 2025-04-15 RX ORDER — FENTANYL CITRATE 50 UG/ML
INJECTION, SOLUTION INTRAMUSCULAR; INTRAVENOUS
Status: DISCONTINUED | OUTPATIENT
Start: 2025-04-15 | End: 2025-04-15 | Stop reason: SDUPTHER

## 2025-04-15 RX ORDER — LIDOCAINE HYDROCHLORIDE 10 MG/ML
1 INJECTION, SOLUTION EPIDURAL; INFILTRATION; INTRACAUDAL; PERINEURAL
Status: DISCONTINUED | OUTPATIENT
Start: 2025-04-15 | End: 2025-04-15 | Stop reason: HOSPADM

## 2025-04-15 RX ORDER — LABETALOL HYDROCHLORIDE 5 MG/ML
10 INJECTION, SOLUTION INTRAVENOUS ONCE
Status: COMPLETED | OUTPATIENT
Start: 2025-04-16 | End: 2025-04-15

## 2025-04-15 RX ORDER — DEXAMETHASONE SODIUM PHOSPHATE 4 MG/ML
4 INJECTION, SOLUTION INTRA-ARTICULAR; INTRALESIONAL; INTRAMUSCULAR; INTRAVENOUS; SOFT TISSUE EVERY 6 HOURS
Status: DISCONTINUED | OUTPATIENT
Start: 2025-04-15 | End: 2025-04-17

## 2025-04-15 RX ORDER — OXYCODONE HYDROCHLORIDE 5 MG/1
5 TABLET ORAL
Status: DISCONTINUED | OUTPATIENT
Start: 2025-04-15 | End: 2025-04-15 | Stop reason: HOSPADM

## 2025-04-15 RX ORDER — SODIUM CHLORIDE, SODIUM LACTATE, POTASSIUM CHLORIDE, CALCIUM CHLORIDE 600; 310; 30; 20 MG/100ML; MG/100ML; MG/100ML; MG/100ML
INJECTION, SOLUTION INTRAVENOUS
Status: DISCONTINUED | OUTPATIENT
Start: 2025-04-15 | End: 2025-04-15 | Stop reason: SDUPTHER

## 2025-04-15 RX ORDER — POLYETHYLENE GLYCOL 3350 17 G/17G
17 POWDER, FOR SOLUTION ORAL DAILY
Status: DISCONTINUED | OUTPATIENT
Start: 2025-04-15 | End: 2025-04-18 | Stop reason: HOSPADM

## 2025-04-15 RX ORDER — FENTANYL CITRATE 50 UG/ML
100 INJECTION, SOLUTION INTRAMUSCULAR; INTRAVENOUS
Status: DISCONTINUED | OUTPATIENT
Start: 2025-04-15 | End: 2025-04-15 | Stop reason: HOSPADM

## 2025-04-15 RX ORDER — IOPAMIDOL 612 MG/ML
100 INJECTION, SOLUTION INTRAVASCULAR
Status: COMPLETED | OUTPATIENT
Start: 2025-04-15 | End: 2025-04-15

## 2025-04-15 RX ORDER — ONDANSETRON 2 MG/ML
INJECTION INTRAMUSCULAR; INTRAVENOUS
Status: DISCONTINUED | OUTPATIENT
Start: 2025-04-15 | End: 2025-04-15 | Stop reason: SDUPTHER

## 2025-04-15 RX ORDER — LABETALOL HYDROCHLORIDE 5 MG/ML
10 INJECTION, SOLUTION INTRAVENOUS EVERY 4 HOURS PRN
Status: DISCONTINUED | OUTPATIENT
Start: 2025-04-15 | End: 2025-04-16

## 2025-04-15 RX ORDER — NALOXONE HYDROCHLORIDE 0.4 MG/ML
INJECTION, SOLUTION INTRAMUSCULAR; INTRAVENOUS; SUBCUTANEOUS PRN
Status: DISCONTINUED | OUTPATIENT
Start: 2025-04-15 | End: 2025-04-15 | Stop reason: HOSPADM

## 2025-04-15 RX ORDER — ONDANSETRON 2 MG/ML
4 INJECTION INTRAMUSCULAR; INTRAVENOUS
Status: DISCONTINUED | OUTPATIENT
Start: 2025-04-15 | End: 2025-04-15 | Stop reason: HOSPADM

## 2025-04-15 RX ORDER — DEXAMETHASONE SODIUM PHOSPHATE 4 MG/ML
INJECTION, SOLUTION INTRA-ARTICULAR; INTRALESIONAL; INTRAMUSCULAR; INTRAVENOUS; SOFT TISSUE
Status: DISCONTINUED | OUTPATIENT
Start: 2025-04-15 | End: 2025-04-15 | Stop reason: SDUPTHER

## 2025-04-15 RX ORDER — HYDROMORPHONE HYDROCHLORIDE 1 MG/ML
INJECTION, SOLUTION INTRAMUSCULAR; INTRAVENOUS; SUBCUTANEOUS
Status: DISCONTINUED | OUTPATIENT
Start: 2025-04-15 | End: 2025-04-15 | Stop reason: SDUPTHER

## 2025-04-15 RX ORDER — MIDAZOLAM HYDROCHLORIDE 2 MG/2ML
2 INJECTION, SOLUTION INTRAMUSCULAR; INTRAVENOUS PRN
Status: DISCONTINUED | OUTPATIENT
Start: 2025-04-15 | End: 2025-04-15 | Stop reason: HOSPADM

## 2025-04-15 RX ORDER — LEVETIRACETAM 500 MG/5ML
INJECTION, SOLUTION, CONCENTRATE INTRAVENOUS
Status: DISCONTINUED | OUTPATIENT
Start: 2025-04-15 | End: 2025-04-15 | Stop reason: SDUPTHER

## 2025-04-15 RX ORDER — MANNITOL 20 G/100ML
INJECTION, SOLUTION INTRAVENOUS
Status: DISCONTINUED | OUTPATIENT
Start: 2025-04-15 | End: 2025-04-15 | Stop reason: SDUPTHER

## 2025-04-15 RX ORDER — ROCURONIUM BROMIDE 10 MG/ML
INJECTION, SOLUTION INTRAVENOUS
Status: DISCONTINUED | OUTPATIENT
Start: 2025-04-15 | End: 2025-04-15 | Stop reason: SDUPTHER

## 2025-04-15 RX ORDER — HYDROMORPHONE HYDROCHLORIDE 1 MG/ML
0.5 INJECTION, SOLUTION INTRAMUSCULAR; INTRAVENOUS; SUBCUTANEOUS EVERY 5 MIN PRN
Status: DISCONTINUED | OUTPATIENT
Start: 2025-04-15 | End: 2025-04-15 | Stop reason: HOSPADM

## 2025-04-15 RX ORDER — SODIUM CHLORIDE 9 MG/ML
INJECTION, SOLUTION INTRAVENOUS PRN
Status: DISCONTINUED | OUTPATIENT
Start: 2025-04-15 | End: 2025-04-18 | Stop reason: HOSPADM

## 2025-04-15 RX ORDER — LEVETIRACETAM 500 MG/5ML
1000 INJECTION, SOLUTION, CONCENTRATE INTRAVENOUS EVERY 12 HOURS
Status: DISCONTINUED | OUTPATIENT
Start: 2025-04-16 | End: 2025-04-17

## 2025-04-15 RX ORDER — BUPIVACAINE HYDROCHLORIDE AND EPINEPHRINE 5; 5 MG/ML; UG/ML
INJECTION, SOLUTION EPIDURAL; INTRACAUDAL; PERINEURAL PRN
Status: DISCONTINUED | OUTPATIENT
Start: 2025-04-15 | End: 2025-04-15 | Stop reason: HOSPADM

## 2025-04-15 RX ORDER — LIDOCAINE HYDROCHLORIDE 20 MG/ML
INJECTION, SOLUTION EPIDURAL; INFILTRATION; INTRACAUDAL; PERINEURAL
Status: DISCONTINUED | OUTPATIENT
Start: 2025-04-15 | End: 2025-04-15 | Stop reason: SDUPTHER

## 2025-04-15 RX ORDER — ONDANSETRON 2 MG/ML
4 INJECTION INTRAMUSCULAR; INTRAVENOUS EVERY 6 HOURS PRN
Status: DISCONTINUED | OUTPATIENT
Start: 2025-04-15 | End: 2025-04-18 | Stop reason: HOSPADM

## 2025-04-15 RX ADMIN — HYDROMORPHONE HYDROCHLORIDE 0.2 MG: 1 INJECTION, SOLUTION INTRAMUSCULAR; INTRAVENOUS; SUBCUTANEOUS at 17:13

## 2025-04-15 RX ADMIN — SUGAMMADEX 200 MG: 100 INJECTION, SOLUTION INTRAVENOUS at 17:36

## 2025-04-15 RX ADMIN — FAMOTIDINE 20 MG: 20 TABLET, FILM COATED ORAL at 20:21

## 2025-04-15 RX ADMIN — SODIUM CHLORIDE, SODIUM LACTATE, POTASSIUM CHLORIDE, CALCIUM CHLORIDE: 600; 310; 30; 20 INJECTION, SOLUTION INTRAVENOUS at 14:30

## 2025-04-15 RX ADMIN — LEVETIRACETAM 1000 MG: 500 TABLET, FILM COATED ORAL at 08:13

## 2025-04-15 RX ADMIN — ROCURONIUM BROMIDE 20 MG: 10 SOLUTION INTRAVENOUS at 16:06

## 2025-04-15 RX ADMIN — HYDRALAZINE HYDROCHLORIDE 100 MG: 50 TABLET ORAL at 07:19

## 2025-04-15 RX ADMIN — SODIUM CHLORIDE, PRESERVATIVE FREE 10 ML: 5 INJECTION INTRAVENOUS at 20:39

## 2025-04-15 RX ADMIN — AMLODIPINE BESYLATE 10 MG: 5 TABLET ORAL at 08:14

## 2025-04-15 RX ADMIN — LIDOCAINE HYDROCHLORIDE 100 MG: 20 INJECTION, SOLUTION EPIDURAL; INFILTRATION; INTRACAUDAL; PERINEURAL at 14:20

## 2025-04-15 RX ADMIN — SODIUM CHLORIDE, PRESERVATIVE FREE 10 ML: 5 INJECTION INTRAVENOUS at 08:12

## 2025-04-15 RX ADMIN — SODIUM CHLORIDE 5 MG/HR: 9 INJECTION, SOLUTION INTRAVENOUS at 17:21

## 2025-04-15 RX ADMIN — FENTANYL CITRATE 25 MCG: 50 INJECTION INTRAMUSCULAR; INTRAVENOUS at 18:29

## 2025-04-15 RX ADMIN — HYDRALAZINE HYDROCHLORIDE 100 MG: 50 TABLET ORAL at 22:04

## 2025-04-15 RX ADMIN — Medication: at 08:18

## 2025-04-15 RX ADMIN — PROPOFOL 20 MG: 10 INJECTION, EMULSION INTRAVENOUS at 17:32

## 2025-04-15 RX ADMIN — DEXAMETHASONE SODIUM PHOSPHATE 4 MG: 4 INJECTION INTRA-ARTICULAR; INTRALESIONAL; INTRAMUSCULAR; INTRAVENOUS; SOFT TISSUE at 02:06

## 2025-04-15 RX ADMIN — ROCURONIUM BROMIDE 50 MG: 10 SOLUTION INTRAVENOUS at 14:22

## 2025-04-15 RX ADMIN — HYDROMORPHONE HYDROCHLORIDE 0.5 MG: 1 INJECTION, SOLUTION INTRAMUSCULAR; INTRAVENOUS; SUBCUTANEOUS at 14:47

## 2025-04-15 RX ADMIN — SODIUM CHLORIDE, POTASSIUM CHLORIDE, SODIUM LACTATE AND CALCIUM CHLORIDE: 600; 310; 30; 20 INJECTION, SOLUTION INTRAVENOUS at 14:11

## 2025-04-15 RX ADMIN — ROCURONIUM BROMIDE 30 MG: 10 SOLUTION INTRAVENOUS at 14:47

## 2025-04-15 RX ADMIN — SODIUM CHLORIDE 3000 MG: 9 INJECTION, SOLUTION INTRAVENOUS at 14:50

## 2025-04-15 RX ADMIN — MORPHINE SULFATE 2 MG: 2 INJECTION, SOLUTION INTRAMUSCULAR; INTRAVENOUS at 20:22

## 2025-04-15 RX ADMIN — FENTANYL CITRATE 100 MCG: 50 INJECTION INTRAMUSCULAR; INTRAVENOUS at 14:20

## 2025-04-15 RX ADMIN — PROPOFOL 30 MG: 10 INJECTION, EMULSION INTRAVENOUS at 17:29

## 2025-04-15 RX ADMIN — HYDRALAZINE HYDROCHLORIDE 10 MG: 20 INJECTION INTRAMUSCULAR; INTRAVENOUS at 23:12

## 2025-04-15 RX ADMIN — LEVETIRACETAM 500 MG: 100 INJECTION INTRAVENOUS at 14:30

## 2025-04-15 RX ADMIN — LABETALOL HYDROCHLORIDE 10 MG: 5 INJECTION, SOLUTION INTRAVENOUS at 23:42

## 2025-04-15 RX ADMIN — CARVEDILOL 12.5 MG: 12.5 TABLET, FILM COATED ORAL at 08:16

## 2025-04-15 RX ADMIN — OXYCODONE 5 MG: 5 TABLET ORAL at 21:13

## 2025-04-15 RX ADMIN — PROPOFOL 150 MG: 10 INJECTION, EMULSION INTRAVENOUS at 14:20

## 2025-04-15 RX ADMIN — PROPOFOL 50 MG: 10 INJECTION, EMULSION INTRAVENOUS at 14:21

## 2025-04-15 RX ADMIN — NICARDIPINE HYDROCHLORIDE 5 MG/HR: 2.5 INJECTION, SOLUTION INTRAVENOUS at 20:31

## 2025-04-15 RX ADMIN — HYDROMORPHONE HYDROCHLORIDE 0.5 MG: 1 INJECTION, SOLUTION INTRAMUSCULAR; INTRAVENOUS; SUBCUTANEOUS at 22:26

## 2025-04-15 RX ADMIN — FENTANYL CITRATE 25 MCG: 50 INJECTION INTRAMUSCULAR; INTRAVENOUS at 18:23

## 2025-04-15 RX ADMIN — MANNITOL 60 G: 20 INJECTION, SOLUTION INTRAVENOUS at 14:45

## 2025-04-15 RX ADMIN — HYDROMORPHONE HYDROCHLORIDE 0.3 MG: 1 INJECTION, SOLUTION INTRAMUSCULAR; INTRAVENOUS; SUBCUTANEOUS at 17:31

## 2025-04-15 RX ADMIN — DEXAMETHASONE SODIUM PHOSPHATE 10 MG: 4 INJECTION, SOLUTION INTRAMUSCULAR; INTRAVENOUS at 14:30

## 2025-04-15 RX ADMIN — DEXAMETHASONE SODIUM PHOSPHATE 4 MG: 4 INJECTION INTRA-ARTICULAR; INTRALESIONAL; INTRAMUSCULAR; INTRAVENOUS; SOFT TISSUE at 20:21

## 2025-04-15 RX ADMIN — ONDANSETRON 4 MG: 2 INJECTION, SOLUTION INTRAMUSCULAR; INTRAVENOUS at 17:12

## 2025-04-15 RX ADMIN — HYDROMORPHONE HYDROCHLORIDE 0.5 MG: 1 INJECTION, SOLUTION INTRAMUSCULAR; INTRAVENOUS; SUBCUTANEOUS at 14:44

## 2025-04-15 RX ADMIN — NICARDIPINE HYDROCHLORIDE 10 MG/HR: 2.5 INJECTION, SOLUTION INTRAVENOUS at 23:16

## 2025-04-15 RX ADMIN — POLYETHYLENE GLYCOL 3350 17 G: 17 POWDER, FOR SOLUTION ORAL at 20:21

## 2025-04-15 RX ADMIN — SODIUM CHLORIDE, SODIUM LACTATE, POTASSIUM CHLORIDE, AND CALCIUM CHLORIDE 125 ML/HR: .6; .31; .03; .02 INJECTION, SOLUTION INTRAVENOUS at 19:36

## 2025-04-15 RX ADMIN — Medication: at 21:13

## 2025-04-15 RX ADMIN — FAMOTIDINE 20 MG: 20 TABLET, FILM COATED ORAL at 08:18

## 2025-04-15 RX ADMIN — SODIUM CHLORIDE, PRESERVATIVE FREE 10 ML: 5 INJECTION INTRAVENOUS at 20:21

## 2025-04-15 RX ADMIN — IOPAMIDOL 100 ML: 612 INJECTION, SOLUTION INTRAVENOUS at 12:22

## 2025-04-15 RX ADMIN — DEXAMETHASONE SODIUM PHOSPHATE 4 MG: 4 INJECTION INTRA-ARTICULAR; INTRALESIONAL; INTRAMUSCULAR; INTRAVENOUS; SOFT TISSUE at 11:13

## 2025-04-15 ASSESSMENT — PAIN SCALES - GENERAL
PAINLEVEL_OUTOF10: 5
PAINLEVEL_OUTOF10: 6
PAINLEVEL_OUTOF10: 0

## 2025-04-15 ASSESSMENT — PAIN DESCRIPTION - DESCRIPTORS
DESCRIPTORS: ACHING

## 2025-04-15 ASSESSMENT — PAIN DESCRIPTION - LOCATION
LOCATION: HEAD

## 2025-04-15 ASSESSMENT — PAIN - FUNCTIONAL ASSESSMENT: PAIN_FUNCTIONAL_ASSESSMENT: 0-10

## 2025-04-15 ASSESSMENT — PAIN SCALES - WONG BAKER: WONGBAKER_NUMERICALRESPONSE: NO HURT

## 2025-04-15 NOTE — PERIOP NOTE
TRANSFER - OUT REPORT:    Verbal report given to JADON Butt(name) on Blas Delcid  being transferred to ICU 12(unit) for routine post-op       Report consisted of patient’s Situation, Background, Assessment and   Recommendations(SBAR).     Time Pre op antibiotic given:1450  Anesthesia Stop time: 1810    Information from the following report(s) SBAR, OR Summary, Procedure Summary, Intake/Output, MAR, and Cardiac Rhythm NSR  was reviewed with the receiving nurse.    Opportunity for questions and clarification was provided.     Is the patient on 02? Yes       L/Min 2       Other n/a    Is the patient on a monitor? Yes    Is the nurse transporting with the patient? Yes    At transfer, are there Patient Belongings with the patient?  If Yes, please note/list: Bag of shoes    Surgical Waiting Area notified of patient's transfer from PACU? Yes

## 2025-04-15 NOTE — PROGRESS NOTES
1905 rec'd patient from PACU.  Drowsy but open eyes to command .  Skin warm to touch.  Transferred to bed,  Drsg to head C/D/I      Patient able to raise R arm/leg  moderated /and legs strength.   Weak  on L can't hold arm up falls to bed. L leg is also weak can not raise leg.      LR @125 infusing at present.     Report given to Beth DIOP

## 2025-04-15 NOTE — OR NURSING
FLOSEAL 10 ML WAS GIVEN TO THE STERILE FIELD TO BE USED BY MD DURING PROCEDURE  REF: [id number]  LOT: [id number]  EXP: 11/26/2026

## 2025-04-15 NOTE — ANESTHESIA PROCEDURE NOTES
Arterial Line:    An arterial line was placed using ultrasound guidance, in the pre-op for the following indication(s): continuous blood pressure monitoring and blood sampling needed.    A 20 gauge (size), 1 and 3/8 inch (length), Arrow (type) catheter was placed, Seldinger technique used, into the left radial artery, secured by tape and Tegaderm.  Anesthesia type: Local  Local infiltration: Injection    Events:  patient tolerated procedure well with no complications.4/15/2025 1:08 PM4/15/2025 1:12 PM  Performed: Anesthesiologist   Preanesthetic Checklist  Completed: patient identified, IV checked, site marked, risks and benefits discussed, surgical/procedural consents, equipment checked, pre-op evaluation, timeout performed, anesthesia consent given, oxygen available and monitors applied/VS acknowledged

## 2025-04-15 NOTE — PLAN OF CARE
Problem: Discharge Planning  Goal: Discharge to home or other facility with appropriate resources  4/14/2025 2311 by Laura Kwon LPN  Outcome: Progressing  Flowsheets (Taken 4/14/2025 2000)  Discharge to home or other facility with appropriate resources:   Identify barriers to discharge with patient and caregiver   Arrange for needed discharge resources and transportation as appropriate   Identify discharge learning needs (meds, wound care, etc)   Refer to discharge planning if patient needs post-hospital services based on physician order or complex needs related to functional status, cognitive ability or social support system  4/14/2025 2311 by Laura Kwon LPN  Outcome: Progressing  Flowsheets (Taken 4/14/2025 2000)  Discharge to home or other facility with appropriate resources:   Identify barriers to discharge with patient and caregiver   Arrange for needed discharge resources and transportation as appropriate   Identify discharge learning needs (meds, wound care, etc)   Refer to discharge planning if patient needs post-hospital services based on physician order or complex needs related to functional status, cognitive ability or social support system  4/14/2025 1153 by Martha Ann, RN  Outcome: Progressing  Flowsheets (Taken 4/14/2025 0800)  Discharge to home or other facility with appropriate resources: Identify barriers to discharge with patient and caregiver     Problem: Safety - Adult  Goal: Free from fall injury  4/14/2025 2311 by Laura Kwon LPN  Outcome: Progressing  Flowsheets (Taken 4/14/2025 2000)  Free From Fall Injury:   Instruct family/caregiver on patient safety   Based on caregiver fall risk screen, instruct family/caregiver to ask for assistance with transferring infant if caregiver noted to have fall risk factors  4/14/2025 2311 by Laura Kwon LPN  Outcome: Progressing  Flowsheets (Taken 4/14/2025 2000)  Free From Fall Injury:   Instruct family/caregiver on patient  urinary retention  4/14/2025 2311 by Laura Kwon LPN  Outcome: Progressing  Flowsheets (Taken 4/14/2025 2000)  Absence of urinary retention:   Assess patient’s ability to void and empty bladder   Monitor intake/output and perform bladder scan as needed   Place urinary catheter per Licensed Independent Practitioner order if needed   Discuss with Licensed Independent Practitioner  medications to alleviate retention as needed   Discuss catheterization for long term situations as appropriate  4/14/2025 1153 by Martha Ann RN  Outcome: Progressing  Flowsheets (Taken 4/14/2025 0800)  Absence of urinary retention: Assess patient’s ability to void and empty bladder  Goal: Urinary catheter remains patent  4/14/2025 2311 by Laura Kwon LPN  Outcome: Progressing  Flowsheets (Taken 4/14/2025 2000)  Urinary catheter remains patent:   Assess patency of urinary catheter   Irrigate catheter per Licensed Independent Practitioner order if indicated and notify Licensed Independent Practitioner if unable to irrigate   Assess need for a larger catheter size or a 3-way catheter for continuous bladder irrigation  4/14/2025 1153 by Martha Ann RN  Outcome: Progressing  Flowsheets (Taken 4/14/2025 0800)  Urinary catheter remains patent: Assess patency of urinary catheter

## 2025-04-15 NOTE — PROGRESS NOTES
Comprehensive Nutrition Assessment    Type and Reason for Visit: Initial, LOS    Nutrition Recommendations/Plan:   NPO, advance diet once medically appropriate   Once diet advances, add oatmeal to breakfast order daily and snacks BID (chocolate and vanilla pudding)     Continue to trend weights   Please document % intake of meals in flowsheets once diet advances        Malnutrition Assessment:  Malnutrition Status:  No malnutrition (04/15/25 1445)    Context:  Acute Illness          Nutrition Assessment:    PMH of glioblastoma with resection 2 years ago. NKA.     Presents with change in mental status. CT scan showing brain mass with vasogenic edema. CT scan of chest/abd/pelvis, negative for mets. Neurosurgery following, pending surgical resection today (4/15).     Chart reviewed for LOS. RD visited pt at [facility]. Pt endorses a good appetite/intake. Pt currently NPO however, reports eating 100% of dinner tray last night. Normal intakes reported PTA, pt states that he is \"a big eater\", generally consumes ~3-4 meals/day. Pt states that he usually feels satisfied s/p meals, depends on what the meals are. Pt amenable to add snacks to trays (chocolate/vanilla pudding). Pt likes oatmeal for breakfast, will add to diet order once diet advances. No reported wt changes, no nutritionally significant wt loss via EMR. No updated labs today.       Nutritionally Significant Medications:  Ammonium lactate, Decadron, Pepcid, Keppra, Lactated ringers, Oxycodone prn       Estimated Daily Nutrient Needs:  Energy Requirements Based On: Formula  Weight Used for Energy Requirements: Admission  Energy (kcal/day): 2109 - 2492 kcal/day (MSJ x 1.1 - 1.3 SF)  Weight Used for Protein Requirements: Admission  Protein (g/day): 92 - 115 g/day (0.8 - 1.0 g/kg or ~17 - 18% kcal from pro)  Method Used for Fluid Requirements: 1 ml/kcal  Fluid (ml/day): 2109 - 2492 ml/day (1ml/kcal)    Nutrition Related Findings:   Edema: Right lower extremity

## 2025-04-15 NOTE — PROGRESS NOTES
Physical Therapy 4/15/2025     Chart reviewed up to date, therapy session attempted but deferred as patient is off the floor to OR for planned craniotomy. Will hold PT today and follow up tomorrow for PT re-evaluation as medically appropriate.    Thank you,  Emerita Barakat PT, DPT, NCS

## 2025-04-15 NOTE — FLOWSHEET NOTE
04/15/25 1635   Family Communication    Relationship to Patient Sister   Family/Significant Other Update Called   Delivery Origin Nurse   Message Disposition Family present - message delivered   Update Given Yes   Family Communication   Family Update Message Surgeon working

## 2025-04-15 NOTE — BRIEF OP NOTE
Brief Postoperative Note      Patient: Blas Delcid  YOB: 1975  MRN: [id number]    Date of Procedure: 4/15/2025    Pre-Op Diagnosis Codes:      * Malignant neoplasm of brain, unspecified location (HCC) [C71.9]    Post-Op Diagnosis: Same       Procedure(s):  BRAIN LAB GUIDED RE-DO RIGHT PARIETAL CRANIOTOMY AND TUMOR REMOVAL    Surgeon(s):  Enrike Ayala MD    Assistant:  Surgical Assistant: Alessio Michel; Hal Wong    Anesthesia: General    Estimated Blood Loss (mL): Minimal    Complications: None    Specimens:   ID Type Source Tests Collected by Time Destination   1 : Recurrent Glioma Tissue Brain SURGICAL PATHOLOGY Enrike Ayala MD 4/15/2025 1643        Implants:  Implant Name Type Inv. Item Serial No.  Lot No. LRB No. Used Action   GRAFT DURA [id number] ULTRAPURE DURAGN+ 5PK/EA - SNA  GRAFT DURA [id number] ULTRAPURE DURAGN+ 5PK/EA NA [employer] [id number] Right 1 Implanted   COVER BUR H L DIA18.5MM THK0.5MM 5 H NEURO TI W/O TAB - SNA  COVER BUR H L DIA18.5MM THK0.5MM 5 H NEURO TI W/O TAB NA ELAINE [employer]ET MICROFIXATION-WD NA Right 2 Implanted   PLATE BONE NEURO RECT - SNA  PLATE BONE NEURO RECT NA ELAINE BIOMET MICROFIXATION-WD NA Right 1 Implanted   SCREW BNE L4MM DIA1.5MM JOSE MAXILLOMANDIBULAR GRN TI SELF - SNA  SCREW BNE L4MM DIA1.5MM JOSE MAXILLOMANDIBULAR GRN TI SELF NA ELAINE BIOMET MICROFIXATION-WD NA Right 12 Implanted         Drains:   Urinary Catheter 04/15/25 [place] (Active)   $ Urethral catheter insertion Inserted for procedure 04/15/25 1427   Catheter Indications Perioperative use for selected surgical procedures 04/15/25 1427   Urine Color Yellow 04/15/25 1427   Urine Appearance Clear 04/15/25 1427   Collection Container Standard 04/15/25 1427   Securement Method Securing device (Describe) 04/15/25 1427   Catheter Care  Perineal wipes 04/15/25 1427   Catheter Best Practices  Catheter secured to thigh;Tamper seal intact;Bag below bladder;Lack of

## 2025-04-15 NOTE — ANESTHESIA PRE PROCEDURE
04/15/25 0812   • sodium chloride flush 0.9 % injection 5-40 mL  5-40 mL IntraVENous PRN Maricel Starks MD       • 0.9 % sodium chloride infusion   IntraVENous PRN Maricel Starks MD       • potassium chloride (KLOR-CON) extended release tablet 40 mEq  40 mEq Oral PRN Maricel Starks MD        Or   • potassium bicarb-citric acid (EFFER-K) effervescent tablet 40 mEq  40 mEq Oral PRN Maricel Starks MD        Or   • potassium chloride 10 mEq/100 mL IVPB (Peripheral Line)  10 mEq IntraVENous PRN Maricel Starks MD       • magnesium sulfate 2000 mg in 50 mL IVPB premix  2,000 mg IntraVENous PRN Maricel Starks MD       • ondansetron (ZOFRAN-ODT) disintegrating tablet 4 mg  4 mg Oral Q8H PRN Maricel Starks MD        Or   • ondansetron (ZOFRAN) injection 4 mg  4 mg IntraVENous Q6H PRN Maricel Starks MD       • polyethylene glycol (GLYCOLAX) packet 17 g  17 g Oral Daily PRN Maricel Starks MD       • acetaminophen (TYLENOL) tablet 650 mg  650 mg Oral Q6H PRN Maricel Starks MD        Or   • acetaminophen (TYLENOL) suppository 650 mg  650 mg Rectal Q6H PRN Maricel Starks MD       • oxyCODONE (ROXICODONE) immediate release tablet 5 mg  5 mg Oral Q4H PRN Maricel Starks MD   5 mg at 04/12/25 0250   • morphine (PF) injection 2 mg  2 mg IntraVENous Q4H PRN Maricel Starks MD       • dexAMETHasone (DECADRON) injection 4 mg  4 mg IntraVENous Q8H Maricel Starks MD   4 mg at 04/15/25 1113   • ammonium lactate (LAC-HYDRIN) 12 % lotion   Topical Q12H Mark Carlisle MD   Given at 04/15/25 0818       Allergies:  No Known Allergies    Problem List:    Patient Active Problem List   Diagnosis Code   • Brain mass G93.89   • Glioblastoma (HCC) C71.9   • Vasogenic edema (HCC) G93.6       Past Medical History:  No past medical history on file.    Past Surgical History:  No past surgical history on file.    Social History:    Social History     Tobacco Use   • Smoking status: Not on file   • Smokeless tobacco:

## 2025-04-15 NOTE — PLAN OF CARE
alignment of affected body part  4/15/2025 1053 by Martha Ann RN  Outcome: Progressing  Flowsheets (Taken 4/15/2025 0800)  Maintain proper alignment of affected body part: Support and protect limb and body alignment per provider's orders  4/14/2025 2311 by Laura Kwon LPN  Outcome: Progressing  Flowsheets (Taken 4/14/2025 2000)  Maintain proper alignment of affected body part:   Support and protect limb and body alignment per provider's orders   Instruct and reinforce with patient and family use of appropriate assistive device and precautions (e.g. spinal or hip dislocation precautions)  Goal: Return ADL status to a safe level of function  4/15/2025 1053 by Martha Ann RN  Outcome: Progressing  Flowsheets (Taken 4/15/2025 0800)  Return ADL Status to a Safe Level of Function: Administer medication as ordered  4/14/2025 2311 by Laura Kwon LPN  Outcome: Progressing  Flowsheets (Taken 4/14/2025 2000)  Return ADL Status to a Safe Level of Function:   Administer medication as ordered   Obtain physical therapy/occupational therapy consults as needed   Assess activities of daily living deficits and provide assistive devices as needed   Assist and instruct patient to increase activity and self care as tolerated     Problem: Gastrointestinal - Adult  Goal: Minimal or absence of nausea and vomiting  4/15/2025 1053 by Martha Ann RN  Outcome: Progressing  Flowsheets (Taken 4/15/2025 0800)  Minimal or absence of nausea and vomiting: Administer IV fluids as ordered to ensure adequate hydration  4/14/2025 2311 by Laura Kwon LPN  Outcome: Progressing  Flowsheets (Taken 4/14/2025 2000)  Minimal or absence of nausea and vomiting:   Administer IV fluids as ordered to ensure adequate hydration   Maintain NPO status until nausea and vomiting are resolved   Nasogastric tube to low intermittent suction as ordered   Administer ordered antiemetic medications as needed   Provide nonpharmacologic

## 2025-04-15 NOTE — OP NOTE
[address]  [address]                            OPERATIVE REPORT      PATIENT NAME: DULCE MANRIQUEZ                 : 1975  MED REC NO: [id number]                       ROOM: OR  ACCOUNT NO: [id number]                       ADMIT DATE: 2025  PROVIDER: Enrike Ayala MD    DATE OF SERVICE:  04/15/2025    PREOPERATIVE DIAGNOSES:  Recurrent glioma.    POSTOPERATIVE DIAGNOSES:  Recurrent glioma.    PROCEDURES PERFORMED:  Redo right parietal craniotomy, use of neuronavigation BrainLAB, and removal of recurrent glioma and necrosis.    SURGEON:  Enrike Ayala MD    ASSISTANT:  [facility].    ANESTHESIA:  General.    ESTIMATED BLOOD LOSS:  Minimal.    SPECIMENS REMOVED:  Tumor.    INTRAOPERATIVE FINDINGS:  See body of report.     COMPLICATIONS:  None.    IMPLANTS:  None.    INDICATIONS:  Recurrent tumor.    DESCRIPTION OF PROCEDURE:  Review of imaging studies revealed a contrast-enhancing inhomogeneous lesion consistent with recurrent glioma in this patient.  I have been operated on twice before for high-grade glioma.  After discussing the risks, benefits and alternatives of surgery with him, he wished to proceed with surgery.  He was taken to the operating room after undergoing a CT scan with contrast for use with the BrainLAB neuronavigation system.  He was induced under general endotracheal anesthesia, placed on the operating table in the supine position.  The head supported in a three-point Tapia head shelton.  A time-out performed to identify the correct patient and procedure.  Sterile scrub, prep and drape done.  Appropriate antibiotics administered and sequential stockings applied for DVT prophylaxis.  The old incision was opened over the right parietal region and self-retaining retractors were inserted into the edges of the incision.  The plates and screws were removed from the bone flap and the bone flap was elevated.  The

## 2025-04-16 ENCOUNTER — APPOINTMENT (OUTPATIENT)
Facility: HOSPITAL | Age: 50
DRG: 025 | End: 2025-04-16
Payer: MEDICARE

## 2025-04-16 LAB
ANION GAP SERPL CALC-SCNC: 8 MMOL/L (ref 2–12)
BASOPHILS # BLD: 0 K/UL (ref 0–0.1)
BASOPHILS NFR BLD: 0 % (ref 0–1)
BUN SERPL-MCNC: 20 MG/DL (ref 6–20)
BUN/CREAT SERPL: 21 (ref 12–20)
CALCIUM SERPL-MCNC: 8.2 MG/DL (ref 8.5–10.1)
CHLORIDE SERPL-SCNC: 104 MMOL/L (ref 97–108)
CO2 SERPL-SCNC: 24 MMOL/L (ref 21–32)
CREAT SERPL-MCNC: 0.96 MG/DL (ref 0.7–1.3)
DIFFERENTIAL METHOD BLD: ABNORMAL
EOSINOPHIL # BLD: 0 K/UL (ref 0–0.4)
EOSINOPHIL NFR BLD: 0 % (ref 0–7)
ERYTHROCYTE [DISTWIDTH] IN BLOOD BY AUTOMATED COUNT: 11.9 % (ref 11.5–14.5)
EST. AVERAGE GLUCOSE BLD GHB EST-MCNC: 111 MG/DL
GLUCOSE SERPL-MCNC: 166 MG/DL (ref 65–100)
HBA1C MFR BLD: 5.5 % (ref 4–5.6)
HCT VFR BLD AUTO: 45.9 % (ref 36.6–50.3)
HGB BLD-MCNC: 15.6 G/DL (ref 12.1–17)
IMM GRANULOCYTES # BLD AUTO: 0 K/UL
IMM GRANULOCYTES NFR BLD AUTO: 0 %
LYMPHOCYTES # BLD: 1.36 K/UL (ref 0.8–3.5)
LYMPHOCYTES NFR BLD: 6 % (ref 12–49)
MAGNESIUM SERPL-MCNC: 2 MG/DL (ref 1.6–2.4)
MCH RBC QN AUTO: 30.4 PG (ref 26–34)
MCHC RBC AUTO-ENTMCNC: 34 G/DL (ref 30–36.5)
MCV RBC AUTO: 89.3 FL (ref 80–99)
MONOCYTES # BLD: 0.91 K/UL (ref 0–1)
MONOCYTES NFR BLD: 4 % (ref 5–13)
MYELOCYTES NFR BLD MANUAL: 1 %
NEUTS SEG # BLD: 20.2 K/UL (ref 1.8–8)
NEUTS SEG NFR BLD: 89 % (ref 32–75)
NRBC # BLD: 0 K/UL (ref 0–0.01)
NRBC BLD-RTO: 0 PER 100 WBC
PHOSPHATE SERPL-MCNC: 2.7 MG/DL (ref 2.6–4.7)
PLATELET # BLD AUTO: 358 K/UL (ref 150–400)
PMV BLD AUTO: 10.6 FL (ref 8.9–12.9)
POTASSIUM SERPL-SCNC: 3.6 MMOL/L (ref 3.5–5.1)
RBC # BLD AUTO: 5.14 M/UL (ref 4.1–5.7)
RBC MORPH BLD: ABNORMAL
SODIUM SERPL-SCNC: 136 MMOL/L (ref 136–145)
WBC # BLD AUTO: 22.7 K/UL (ref 4.1–11.1)

## 2025-04-16 PROCEDURE — 6370000000 HC RX 637 (ALT 250 FOR IP): Performed by: NEUROLOGICAL SURGERY

## 2025-04-16 PROCEDURE — 2060000000 HC ICU INTERMEDIATE R&B

## 2025-04-16 PROCEDURE — 2580000003 HC RX 258: Performed by: NURSE PRACTITIONER

## 2025-04-16 PROCEDURE — 2500000003 HC RX 250 WO HCPCS: Performed by: NEUROLOGICAL SURGERY

## 2025-04-16 PROCEDURE — 6360000002 HC RX W HCPCS: Performed by: NEUROLOGICAL SURGERY

## 2025-04-16 PROCEDURE — 97168 OT RE-EVAL EST PLAN CARE: CPT

## 2025-04-16 PROCEDURE — 6360000004 HC RX CONTRAST MEDICATION: Performed by: NURSE PRACTITIONER

## 2025-04-16 PROCEDURE — 6360000002 HC RX W HCPCS: Performed by: NURSE PRACTITIONER

## 2025-04-16 PROCEDURE — 85025 COMPLETE CBC W/AUTO DIFF WBC: CPT

## 2025-04-16 PROCEDURE — 99024 POSTOP FOLLOW-UP VISIT: CPT | Performed by: NURSE PRACTITIONER

## 2025-04-16 PROCEDURE — 2580000003 HC RX 258: Performed by: NEUROLOGICAL SURGERY

## 2025-04-16 PROCEDURE — 83036 HEMOGLOBIN GLYCOSYLATED A1C: CPT

## 2025-04-16 PROCEDURE — 83735 ASSAY OF MAGNESIUM: CPT

## 2025-04-16 PROCEDURE — 6370000000 HC RX 637 (ALT 250 FOR IP): Performed by: INTERNAL MEDICINE

## 2025-04-16 PROCEDURE — 84100 ASSAY OF PHOSPHORUS: CPT

## 2025-04-16 PROCEDURE — A9579 GAD-BASE MR CONTRAST NOS,1ML: HCPCS | Performed by: NURSE PRACTITIONER

## 2025-04-16 PROCEDURE — 97164 PT RE-EVAL EST PLAN CARE: CPT

## 2025-04-16 PROCEDURE — 97530 THERAPEUTIC ACTIVITIES: CPT

## 2025-04-16 PROCEDURE — 70553 MRI BRAIN STEM W/O & W/DYE: CPT

## 2025-04-16 PROCEDURE — 97535 SELF CARE MNGMENT TRAINING: CPT

## 2025-04-16 PROCEDURE — 80048 BASIC METABOLIC PNL TOTAL CA: CPT

## 2025-04-16 RX ORDER — HYDRALAZINE HYDROCHLORIDE 20 MG/ML
10 INJECTION INTRAMUSCULAR; INTRAVENOUS ONCE
Status: COMPLETED | OUTPATIENT
Start: 2025-04-16 | End: 2025-04-16

## 2025-04-16 RX ORDER — SPIRONOLACTONE 25 MG/1
25 TABLET ORAL DAILY
Status: DISCONTINUED | OUTPATIENT
Start: 2025-04-16 | End: 2025-04-18 | Stop reason: HOSPADM

## 2025-04-16 RX ORDER — CARVEDILOL 12.5 MG/1
25 TABLET ORAL 2 TIMES DAILY WITH MEALS
Status: DISCONTINUED | OUTPATIENT
Start: 2025-04-16 | End: 2025-04-16

## 2025-04-16 RX ORDER — HYDRALAZINE HYDROCHLORIDE 20 MG/ML
20 INJECTION INTRAMUSCULAR; INTRAVENOUS EVERY 4 HOURS PRN
Status: DISCONTINUED | OUTPATIENT
Start: 2025-04-16 | End: 2025-04-18 | Stop reason: HOSPADM

## 2025-04-16 RX ORDER — LABETALOL HYDROCHLORIDE 5 MG/ML
20 INJECTION, SOLUTION INTRAVENOUS EVERY 4 HOURS PRN
Status: DISCONTINUED | OUTPATIENT
Start: 2025-04-16 | End: 2025-04-18 | Stop reason: HOSPADM

## 2025-04-16 RX ORDER — HYDRALAZINE HYDROCHLORIDE 20 MG/ML
20 INJECTION INTRAMUSCULAR; INTRAVENOUS EVERY 4 HOURS PRN
Status: DISCONTINUED | OUTPATIENT
Start: 2025-04-16 | End: 2025-04-16

## 2025-04-16 RX ORDER — LISINOPRIL 20 MG/1
20 TABLET ORAL DAILY
Status: DISCONTINUED | OUTPATIENT
Start: 2025-04-16 | End: 2025-04-18 | Stop reason: HOSPADM

## 2025-04-16 RX ORDER — CARVEDILOL 12.5 MG/1
25 TABLET ORAL 2 TIMES DAILY WITH MEALS
Status: DISCONTINUED | OUTPATIENT
Start: 2025-04-16 | End: 2025-04-18 | Stop reason: HOSPADM

## 2025-04-16 RX ORDER — LABETALOL HYDROCHLORIDE 5 MG/ML
20 INJECTION, SOLUTION INTRAVENOUS EVERY 4 HOURS PRN
Status: DISCONTINUED | OUTPATIENT
Start: 2025-04-16 | End: 2025-04-16

## 2025-04-16 RX ORDER — HYDRALAZINE HYDROCHLORIDE 20 MG/ML
10 INJECTION INTRAMUSCULAR; INTRAVENOUS EVERY 4 HOURS PRN
Status: DISCONTINUED | OUTPATIENT
Start: 2025-04-16 | End: 2025-04-16

## 2025-04-16 RX ORDER — LABETALOL HYDROCHLORIDE 5 MG/ML
10 INJECTION, SOLUTION INTRAVENOUS ONCE
Status: COMPLETED | OUTPATIENT
Start: 2025-04-16 | End: 2025-04-16

## 2025-04-16 RX ADMIN — POLYETHYLENE GLYCOL 3350 17 G: 17 POWDER, FOR SOLUTION ORAL at 08:47

## 2025-04-16 RX ADMIN — SPIRONOLACTONE 25 MG: 25 TABLET ORAL at 08:48

## 2025-04-16 RX ADMIN — GADOTERIDOL 20 ML: 279.3 INJECTION, SOLUTION INTRAVENOUS at 19:04

## 2025-04-16 RX ADMIN — SODIUM CHLORIDE, PRESERVATIVE FREE 10 ML: 5 INJECTION INTRAVENOUS at 21:05

## 2025-04-16 RX ADMIN — LISINOPRIL 20 MG: 20 TABLET ORAL at 08:47

## 2025-04-16 RX ADMIN — CEFAZOLIN 3000 MG: 3 INJECTION, POWDER, FOR SOLUTION INTRAVENOUS at 21:06

## 2025-04-16 RX ADMIN — DEXAMETHASONE SODIUM PHOSPHATE 4 MG: 4 INJECTION INTRA-ARTICULAR; INTRALESIONAL; INTRAMUSCULAR; INTRAVENOUS; SOFT TISSUE at 21:04

## 2025-04-16 RX ADMIN — HYDRALAZINE HYDROCHLORIDE 10 MG: 20 INJECTION INTRAMUSCULAR; INTRAVENOUS at 02:14

## 2025-04-16 RX ADMIN — LABETALOL HYDROCHLORIDE 20 MG: 5 INJECTION, SOLUTION INTRAVENOUS at 04:06

## 2025-04-16 RX ADMIN — CARVEDILOL 25 MG: 12.5 TABLET, FILM COATED ORAL at 12:07

## 2025-04-16 RX ADMIN — HYDRALAZINE HYDROCHLORIDE 100 MG: 50 TABLET ORAL at 05:50

## 2025-04-16 RX ADMIN — DEXAMETHASONE SODIUM PHOSPHATE 4 MG: 4 INJECTION INTRA-ARTICULAR; INTRALESIONAL; INTRAMUSCULAR; INTRAVENOUS; SOFT TISSUE at 14:25

## 2025-04-16 RX ADMIN — CEFAZOLIN 3000 MG: 3 INJECTION, POWDER, FOR SOLUTION INTRAVENOUS at 10:30

## 2025-04-16 RX ADMIN — Medication: at 21:36

## 2025-04-16 RX ADMIN — DEXAMETHASONE SODIUM PHOSPHATE 4 MG: 4 INJECTION INTRA-ARTICULAR; INTRALESIONAL; INTRAMUSCULAR; INTRAVENOUS; SOFT TISSUE at 06:53

## 2025-04-16 RX ADMIN — OXYCODONE 5 MG: 5 TABLET ORAL at 01:47

## 2025-04-16 RX ADMIN — NICARDIPINE HYDROCHLORIDE 15 MG/HR: 2.5 INJECTION, SOLUTION INTRAVENOUS at 01:14

## 2025-04-16 RX ADMIN — NICARDIPINE HYDROCHLORIDE 10 MG/HR: 2.5 INJECTION, SOLUTION INTRAVENOUS at 09:18

## 2025-04-16 RX ADMIN — SODIUM CHLORIDE, SODIUM LACTATE, POTASSIUM CHLORIDE, AND CALCIUM CHLORIDE: .6; .31; .03; .02 INJECTION, SOLUTION INTRAVENOUS at 02:52

## 2025-04-16 RX ADMIN — LEVETIRACETAM 1000 MG: 100 INJECTION INTRAVENOUS at 14:25

## 2025-04-16 RX ADMIN — OXYCODONE 5 MG: 5 TABLET ORAL at 05:51

## 2025-04-16 RX ADMIN — AMLODIPINE BESYLATE 10 MG: 5 TABLET ORAL at 08:48

## 2025-04-16 RX ADMIN — MORPHINE SULFATE 2 MG: 2 INJECTION, SOLUTION INTRAMUSCULAR; INTRAVENOUS at 04:05

## 2025-04-16 RX ADMIN — LEVETIRACETAM 1000 MG: 100 INJECTION INTRAVENOUS at 01:47

## 2025-04-16 RX ADMIN — Medication: at 08:48

## 2025-04-16 RX ADMIN — FAMOTIDINE 20 MG: 20 TABLET, FILM COATED ORAL at 08:48

## 2025-04-16 RX ADMIN — DEXAMETHASONE SODIUM PHOSPHATE 4 MG: 4 INJECTION INTRA-ARTICULAR; INTRALESIONAL; INTRAMUSCULAR; INTRAVENOUS; SOFT TISSUE at 01:07

## 2025-04-16 RX ADMIN — CARVEDILOL 25 MG: 12.5 TABLET, FILM COATED ORAL at 20:16

## 2025-04-16 RX ADMIN — LABETALOL HYDROCHLORIDE 10 MG: 5 INJECTION, SOLUTION INTRAVENOUS at 01:12

## 2025-04-16 RX ADMIN — NICARDIPINE HYDROCHLORIDE 12.5 MG/HR: 2.5 INJECTION, SOLUTION INTRAVENOUS at 04:43

## 2025-04-16 RX ADMIN — FAMOTIDINE 20 MG: 20 TABLET, FILM COATED ORAL at 21:05

## 2025-04-16 RX ADMIN — NICARDIPINE HYDROCHLORIDE 15 MG/HR: 2.5 INJECTION, SOLUTION INTRAVENOUS at 02:55

## 2025-04-16 RX ADMIN — SODIUM CHLORIDE, PRESERVATIVE FREE 10 ML: 5 INJECTION INTRAVENOUS at 10:31

## 2025-04-16 RX ADMIN — NICARDIPINE HYDROCHLORIDE 10 MG/HR: 2.5 INJECTION, SOLUTION INTRAVENOUS at 06:52

## 2025-04-16 ASSESSMENT — PAIN SCALES - GENERAL
PAINLEVEL_OUTOF10: 2
PAINLEVEL_OUTOF10: 0
PAINLEVEL_OUTOF10: 6
PAINLEVEL_OUTOF10: 5
PAINLEVEL_OUTOF10: 6
PAINLEVEL_OUTOF10: 5
PAINLEVEL_OUTOF10: 4
PAINLEVEL_OUTOF10: 3

## 2025-04-16 ASSESSMENT — PAIN DESCRIPTION - LOCATION
LOCATION: HEAD

## 2025-04-16 ASSESSMENT — PAIN DESCRIPTION - ORIENTATION: ORIENTATION: POSTERIOR

## 2025-04-16 ASSESSMENT — PAIN SCALES - WONG BAKER
WONGBAKER_NUMERICALRESPONSE: NO HURT
WONGBAKER_NUMERICALRESPONSE: NO HURT

## 2025-04-16 ASSESSMENT — PAIN DESCRIPTION - DESCRIPTORS
DESCRIPTORS: ACHING

## 2025-04-16 NOTE — PROGRESS NOTES
Neurosurgery Progress Note      Admit Date: 2025   LOS: 10 days        Daily Progress Note: 2025      Subjective:   The patient underwent a right occipital craniotomy with resection of GBM in 2022. He completed standard chemo/XRT for GBM with Dr. Saeed. He had redo resection in 2023. He is not currently on any therapy for GBM and has not been seen by oncology in awhile. He had some residual left sided weakness due to his tumor, but was able to get around with a cane. He noticed he was dropping things with his left hand more and having more left sided weakness. He presented to the ER where a head CT revealed recurrent tumor. He reports taking 2 Excedrin daily for headaches recently. Otherwise, does not take blood thinning medications.     HD2, Cardene drip turned off this morning. Pt reports left side weakness persists. ASA test revealed platelet inhibition present.    HD3, sitting up in chair. Family at bedside. BP improving.    HD4, pt resting comfortably in chair.    HD5, patient resting in chair. Trying to sleep, states he did not sleep well last night. No complaints. No questions about surgery.    HD8, pt's aspirin test came back showing that it is out of his system. He is ready for the OR tomorrow.    POD1, pt doing well. BP elevated, but awake and alert. Talking on the phone.    Objective:     Vital signs  Temp (24hrs), Av.3 °F (36.8 °C), Min:98 °F (36.7 °C), Max:98.5 °F (36.9 °C)   No intake/output data recorded.   1901 -  0700  In: 3513.7 [I.V.:3213.7]  Out: 3210 [Urine:3210]    /65   Pulse 93   Temp 98.5 °F (36.9 °C) (Oral)   Resp 16   Ht 1.626 m (5' 4.02\")   Wt 121.5 kg (267 lb 13.7 oz)   SpO2 95%   BMI 45.95 kg/m²          Pain control       PT/OT  Gait                 Physical Exam:  Gen:NAD.  Neuro: A&Ox3. Follows commands. Speech clear. Affect normal.  PERRL. EOMI. Mild flattening left nasolabial fold. Tongue midline.  MCKNIGHT spontaneously. RUE/RLE 5/, LUE 3/

## 2025-04-16 NOTE — PLAN OF CARE
Problem: Physical Therapy - Adult  Goal: By Discharge: Performs mobility at highest level of function for planned discharge setting.  See evaluation for individualized goals.  Description: FUNCTIONAL STATUS PRIOR TO ADMISSION: Patient was modified independent using a single point cane for functional mobility - used afo.    HOME SUPPORT PRIOR TO ADMISSION: The patient lived with sister.    Physical Therapy Goals  Goals reviewed & remain appropriate as below at time of re-evaluation 4/16/2025    Initiated 4/8/2025  1.  Patient will move from supine to sit and sit to supine in bed with supervision/set-up within 7 day(s).    2.  Patient will perform sit to stand with supervision/set-up within 7 day(s).  3.  Patient will transfer from bed to chair and chair to bed with supervision/set-up using the least restrictive device within 7 day(s).  4.  Patient will ambulate with contact guard assist for 100 feet with the least restrictive device within 7 day(s).   5.  Patient will ascend/descend 4 stairs with one handrail(s) with minimal assistance within 7 day(s).   Outcome: Progressing   PHYSICAL THERAPY RE-EVALUATION    Patient: Blas Delcid (50 y.o. male)  Date: 4/16/2025  Primary Diagnosis: Leg swelling [M79.89]  Brain mass [G93.89]  Hypertensive emergency [I16.1]  Vasogenic edema (HCC) [G93.6]  Procedure(s) (LRB):  BRAIN LAB GUIDED RE-DO RIGHT PARIETAL CRANIOTOMY AND TUMOR REMOVAL (N/A) 1 Day Post-Op   Precautions: Restrictions/Precautions  Restrictions/Precautions: Fall Risk            ASSESSMENT :  DEFICITS/IMPAIRMENTS:   The patient is limited by decreased functional mobility, independence in ADLs, high-level IADLs, ROM, strength, body mechanics, activity tolerance, safety awareness, cognition, command following, attention/concentration, coordination, balance. Patient is now POD # 1 from re-do R parietal craniotomy & tumor removal and remains below his previously Mod I level of function. He was received in bed, agreeable

## 2025-04-16 NOTE — H&P
History and Physical    Date of Service:  4/16/2025  Primary Care Provider: No primary care provider on file.  Source of information: patient, electronic medical record    Chief Complaint: Extremity Weakness, Headache, and Blurred Vision      History of Presenting Illness:   Blas Delcid is a 50 y.o. male with a pmhx glioblstoma multiforme s/p resection, and htn who was admitted for altered mental status on 4/7, and was admitted for increase in size of glioblastoma       REVIEW OF SYSTEMS:  A comprehensive review of systems was negative except for that written in the History of Present Illness.     No past medical history on file.   Past Surgical History:   Procedure Laterality Date    CRANIOTOMY N/A 4/15/2025    BRAIN LAB GUIDED RE-DO RIGHT PARIETAL CRANIOTOMY AND TUMOR REMOVAL performed by Enrike Ayala MD at [facility] MAIN OR     Prior to Admission medications    Medication Sig Start Date End Date Taking? Authorizing Provider   amLODIPine (NORVASC) 5 MG tablet  4/4/25  Yes Bong Echols MD   carvedilol (COREG) 25 MG tablet TAKE ONE TABLET BY MOUTH EVERY TWELVE HOURS @9AM & 9PM 3/7/25  Yes ProviderBong MD   lisinopril (PRINIVIL;ZESTRIL) 40 MG tablet TAKE ONE TABLET BY MOUTH DAILY AT 9AM 3/7/25  Yes ProviderBong MD   spironolactone (ALDACTONE) 25 MG tablet Take 1 tablet by mouth daily 3/7/25  Yes Bong Echols MD   tiZANidine (ZANAFLEX) 2 MG tablet  4/4/25  Yes Bong Echols MD   SOAANZ 40 MG TABS TAKE ONE TABLET BY MOUTH TWICE DAILY @ 9AM - 5PM 1/7/25  Yes ProviderBong MD     No Known Allergies   No family history on file.   Social History:     Social Drivers of Health     Tobacco Use: Not on file   Alcohol Use: Not At Risk (4/6/2025)    AUDIT-C     Frequency of Alcohol Consumption: Never     Average Number of Drinks: Patient does not drink     Frequency of Binge Drinking: Never   Financial Resource Strain: Not on file   Food Insecurity: Not on file

## 2025-04-16 NOTE — PROGRESS NOTES
Physical Therapy 4/16/2025     Chart reviewed up to date, therapy session attempted but deferred due to RN request to hold PT at this time due to reported BP of 134/76. Though goal SBP is < 140 per Critical Care note on 4/15, RN states patient is due for several medications and she would like to provide medications prior to therapy re-evaluation. Will continue to follow.    Thank you,  Emerita Barakat PT, DPT, NCS

## 2025-04-16 NOTE — PROGRESS NOTES
Occupational Therapy Note:     Attempted to see pt this morning for re-evaluation per orders.  RN requesting therapy to wait due to elevated BP (134/76).  Per orders by Dr. Ayala, SBP goals , diastolic goal .  Critical care notes documenting SBP goal <140.  Nursing requesting to provide morning medication and for therapy to follow up later this morning.  Will defer per nursing request and follow up later.     Morena Fuentes, OTR/L

## 2025-04-16 NOTE — ANESTHESIA POSTPROCEDURE EVALUATION
Post-Anesthesia Evaluation and Assessment    Patient: Blas Delcid MRN: [id number]  SSN: [id number]    YOB: 1975  Age: 50 y.o.  Sex: male      I have evaluated the patient and they are stable and ready for discharge from the PACU.     Cardiovascular Function/Vital Signs  Visit Vitals  /72   Pulse 91   Temp 98.5 °F (36.9 °C) (Oral)   Resp 18   Ht 1.626 m (5' 4.02\")   Wt 121.5 kg (267 lb 13.7 oz)   SpO2 93%   BMI 45.95 kg/m²       Patient is status post General anesthesia for Procedure(s):  BRAIN LAB GUIDED RE-DO RIGHT PARIETAL CRANIOTOMY AND TUMOR REMOVAL.    Nausea/Vomiting: None    Postoperative hydration reviewed and adequate.    Pain:      Managed    Neurological Status:       At baseline    Mental Status, Level of Consciousness: Arousable    Pulmonary Status:       Adequate oxygenation and airway patent    Complications related to anesthesia: None    Post-anesthesia assessment completed. No concerns    Signed By: Kenny Live MD     April 16, 2025

## 2025-04-16 NOTE — PROGRESS NOTES
[facility]     ICU TEAM Progress Note           Name: Blas Delcid   : 1975   MRN: [id number]   Date: 2025          CU PROBLEM LIST   -Brain mass  -Hypertension  -Bilateral lower extremity lymphedema      HISTORY OF PRESENT ILLNESS:     Blas Delcid is a 50 y.o. male with past medical history significant for glioblastoma multiforme with resultant resection 2 years ago and hypertension presented to the emergency room with weakness, blurred vision, and headaches on 25. Symptoms started on 25. Patient has chronic left-sided weakness following resection of GBM, weakness seems to have gotten worse in the past couple of days prior to presentation. Patient fell  three days ago prior to admission as well as result of the weakness.  It was reported that the patient is more forgetful and not knowing where he is. CT scan of the head was done in the emergency room showed brain mass with vasogenic edema. Neurosurgery service on-call was consulted with recommendation for admission to the hospitalist service. Patient was admitted on 25. Evaluated by neurosurgery and oncology. Asprin was held and patient was taken to the OR for crani and resection on 4/15/25. Now presents to ICU post op for continued management.     SUBJECTIVE:     No acute events overnight.  Hypertension Cardene drip.    Hypertension  Change blood pressure goal to 160   -- Resume Coreg, lisinopril, continue Norvasc, spironolactone  -- DC hydralazine  -- DC cardiac    Right occipital GBM with cerebral edema and brain compression  s/p crani 2022, s/p redo crani 2023, oncology consulted, s/p crani 04/15  -- Continue Decadron, Keppra  -- Continue PT/OT  -- Appreciate neurosurgery recommendations    Resume rest of home meds     ICU DAILY CHECKLIST      Code Status: full  DVT Prophylaxis: SCD, rest as per KANDY  T/L/D: PIV, remove art line, remove Bradshaw  SUP: Pepcid  Diet: ADAT  Activity Level: PT/OT, as

## 2025-04-16 NOTE — PROGRESS NOTES
Pt is awake alert Ox3 in good spirits remains weak on the left  subtotal resection of likely recurrence of GBM, 2 prior surgeries  stable

## 2025-04-16 NOTE — CONSULTS
Consult note    Name: Blas Delcid   : 1975   MRN: [id number]   Date: 4/15/2025      Reason for ICU Admission: Post op management      ASSESSMENT and PLAN     Brain mass: recurrent glioblastoma with vasogenic edema and mass effect with midline shift s/p redo right parietal craniotomy and removal of recurrent glioma and necrosis 4/15/25.      S/p crani 2022  S/p redo crani 2023  Neurosurgery following   Decadron/Keppra per neurosurgery   Post op pain management  Dilaudid 0.5 mg IV x 1 given for breakthrough pain.   Will get CT head for any acute neurological changes.   Q 1 hr neuro checks     CT scan of the chest, abdomen and pelvis to evaluate the patient for metastatic disease: negative for mets   Post oncology consult - Pt will follow up with [facility] for systemic therapy treatment plan.    Chronic systolic CHF, Echo with EF 60-65%  Malignant hypertensive urgency POA:   Continue amlodipine/hydralazine/coreg  PRN Cardene for BP goal   Goal of sbp< 140 mmHg    Bilateral lower extremity lymphedema  Left leg wound  Wound care per wound nurse recommendations  Dopplers neg for DVT  Obesity class III POA    Admission Summary:     Blas Delcid is a 50 y.o. male with past medical history significant for glioblastoma multiforme with resultant resection 2 years ago and hypertension presented to the emergency room with weakness, blurred vision, and headaches on 25. Symptoms started on 25. Patient has chronic left-sided weakness following resection of GBM, weakness seems to have gotten worse in the past couple of days prior to presentation. Patient fell  three days ago prior to admission as well as result of the weakness.  It was reported that the patient is more forgetful and not knowing where he is. CT scan of the head was done in the emergency room showed brain mass with vasogenic edema. Neurosurgery service on-call was consulted with recommendation for admission to the hospitalist service. Patient was

## 2025-04-16 NOTE — PLAN OF CARE
Problem: Occupational Therapy - Adult  Goal: By Discharge: Performs self-care activities at highest level of function for planned discharge setting.  See evaluation for individualized goals.  Description: FUNCTIONAL STATUS PRIOR TO ADMISSION: Patient reports he was MOD I at SPC level to manage ADLs. 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 Hx of IPR.      HOME SUPPORT: Patient lived with sister.    Occupational Therapy Goals:  Reevaluated 4/16/2025   1.  Patient will perform upper body dressing with mod A within 7 day(s).  2.  Patient will perform lower body dressing with mod A Assist within 7 day(s).  3.  Patient will perform grooming with Supervision within 7 day(s).  4.  Patient will perform toilet transfers with Minimal Assist and Assist x1  within 7 day(s).  5.  Patient will perform all aspects of toileting with Minimal Assist and Assist x1 within 7 day(s).  6.  Patient will participate in upper extremity therapeutic exercise/activities with Supervision for 5 minutes within 7 day(s).    7.  Patient will utilize energy conservation techniques during functional activities with verbal and visual cues within 7 day(s).    Initiated 4/8/2025  1.  Patient will perform upper body dressing with Supervision within 7 day(s).  2.  Patient will perform lower body dressing with Minimal Assist within 7 day(s).  3.  Patient will perform grooming with Supervision within 7 day(s).  4.  Patient will perform toilet transfers with Minimal Assist and Assist x1  within 7 day(s).  5.  Patient will perform all aspects of toileting with Minimal Assist and Assist x1 within 7 day(s).  6.  Patient will participate in upper extremity therapeutic exercise/activities with Supervision for 5 minutes within 7 day(s).    7.  Patient will utilize energy conservation techniques during functional activities with verbal and visual cues within 7 day(s).  8. Patient will

## 2025-04-17 LAB
ANION GAP SERPL CALC-SCNC: 4 MMOL/L (ref 2–12)
BASOPHILS # BLD: 0 K/UL (ref 0–0.1)
BASOPHILS NFR BLD: 0 % (ref 0–1)
BUN SERPL-MCNC: 20 MG/DL (ref 6–20)
BUN/CREAT SERPL: 25 (ref 12–20)
CALCIUM SERPL-MCNC: 8.2 MG/DL (ref 8.5–10.1)
CHLORIDE SERPL-SCNC: 104 MMOL/L (ref 97–108)
CO2 SERPL-SCNC: 27 MMOL/L (ref 21–32)
CREAT SERPL-MCNC: 0.81 MG/DL (ref 0.7–1.3)
DIFFERENTIAL METHOD BLD: ABNORMAL
EOSINOPHIL # BLD: 0 K/UL (ref 0–0.4)
EOSINOPHIL NFR BLD: 0 % (ref 0–7)
ERYTHROCYTE [DISTWIDTH] IN BLOOD BY AUTOMATED COUNT: 12 % (ref 11.5–14.5)
GLUCOSE BLD STRIP.AUTO-MCNC: 133 MG/DL (ref 65–117)
GLUCOSE SERPL-MCNC: 149 MG/DL (ref 65–100)
HCT VFR BLD AUTO: 43 % (ref 36.6–50.3)
HGB BLD-MCNC: 14.6 G/DL (ref 12.1–17)
IMM GRANULOCYTES # BLD AUTO: 0 K/UL
IMM GRANULOCYTES NFR BLD AUTO: 0 %
LYMPHOCYTES # BLD: 1.45 K/UL (ref 0.8–3.5)
LYMPHOCYTES NFR BLD: 8 % (ref 12–49)
MCH RBC QN AUTO: 30 PG (ref 26–34)
MCHC RBC AUTO-ENTMCNC: 34 G/DL (ref 30–36.5)
MCV RBC AUTO: 88.3 FL (ref 80–99)
MONOCYTES # BLD: 1.27 K/UL (ref 0–1)
MONOCYTES NFR BLD: 7 % (ref 5–13)
NEUTS SEG # BLD: 15.38 K/UL (ref 1.8–8)
NEUTS SEG NFR BLD: 85 % (ref 32–75)
NRBC # BLD: 0 K/UL (ref 0–0.01)
NRBC BLD-RTO: 0 PER 100 WBC
PLATELET # BLD AUTO: 292 K/UL (ref 150–400)
PMV BLD AUTO: 10.9 FL (ref 8.9–12.9)
POTASSIUM SERPL-SCNC: 4.4 MMOL/L (ref 3.5–5.1)
RBC # BLD AUTO: 4.87 M/UL (ref 4.1–5.7)
RBC MORPH BLD: ABNORMAL
SERVICE CMNT-IMP: ABNORMAL
SODIUM SERPL-SCNC: 135 MMOL/L (ref 136–145)
WBC # BLD AUTO: 18.1 K/UL (ref 4.1–11.1)

## 2025-04-17 PROCEDURE — 82962 GLUCOSE BLOOD TEST: CPT

## 2025-04-17 PROCEDURE — 6370000000 HC RX 637 (ALT 250 FOR IP): Performed by: INTERNAL MEDICINE

## 2025-04-17 PROCEDURE — 6370000000 HC RX 637 (ALT 250 FOR IP): Performed by: NEUROLOGICAL SURGERY

## 2025-04-17 PROCEDURE — 99024 POSTOP FOLLOW-UP VISIT: CPT | Performed by: NURSE PRACTITIONER

## 2025-04-17 PROCEDURE — 85025 COMPLETE CBC W/AUTO DIFF WBC: CPT

## 2025-04-17 PROCEDURE — 80048 BASIC METABOLIC PNL TOTAL CA: CPT

## 2025-04-17 PROCEDURE — 2060000000 HC ICU INTERMEDIATE R&B

## 2025-04-17 PROCEDURE — 6360000002 HC RX W HCPCS: Performed by: NEUROLOGICAL SURGERY

## 2025-04-17 PROCEDURE — 2500000003 HC RX 250 WO HCPCS: Performed by: NEUROLOGICAL SURGERY

## 2025-04-17 PROCEDURE — 6370000000 HC RX 637 (ALT 250 FOR IP): Performed by: NURSE PRACTITIONER

## 2025-04-17 PROCEDURE — 6360000002 HC RX W HCPCS: Performed by: NURSE PRACTITIONER

## 2025-04-17 PROCEDURE — 97116 GAIT TRAINING THERAPY: CPT

## 2025-04-17 PROCEDURE — 97535 SELF CARE MNGMENT TRAINING: CPT

## 2025-04-17 PROCEDURE — 97530 THERAPEUTIC ACTIVITIES: CPT

## 2025-04-17 RX ORDER — DEXAMETHASONE 1 MG
2 TABLET ORAL DAILY
Status: DISCONTINUED | OUTPATIENT
Start: 2025-04-23 | End: 2025-04-18 | Stop reason: HOSPADM

## 2025-04-17 RX ORDER — LEVETIRACETAM 500 MG/1
1000 TABLET ORAL 2 TIMES DAILY
Status: DISCONTINUED | OUTPATIENT
Start: 2025-04-17 | End: 2025-04-18 | Stop reason: HOSPADM

## 2025-04-17 RX ORDER — DEXAMETHASONE 1 MG
2 TABLET ORAL EVERY 12 HOURS SCHEDULED
Status: DISCONTINUED | OUTPATIENT
Start: 2025-04-21 | End: 2025-04-18 | Stop reason: HOSPADM

## 2025-04-17 RX ORDER — DEXAMETHASONE 4 MG/1
4 TABLET ORAL EVERY 12 HOURS SCHEDULED
Status: DISCONTINUED | OUTPATIENT
Start: 2025-04-19 | End: 2025-04-18 | Stop reason: HOSPADM

## 2025-04-17 RX ORDER — DEXAMETHASONE 4 MG/1
4 TABLET ORAL EVERY 8 HOURS SCHEDULED
Status: DISCONTINUED | OUTPATIENT
Start: 2025-04-17 | End: 2025-04-18 | Stop reason: HOSPADM

## 2025-04-17 RX ADMIN — DEXAMETHASONE SODIUM PHOSPHATE 4 MG: 4 INJECTION INTRA-ARTICULAR; INTRALESIONAL; INTRAMUSCULAR; INTRAVENOUS; SOFT TISSUE at 02:10

## 2025-04-17 RX ADMIN — DEXAMETHASONE 4 MG: 4 TABLET ORAL at 13:38

## 2025-04-17 RX ADMIN — Medication: at 09:11

## 2025-04-17 RX ADMIN — SODIUM CHLORIDE, PRESERVATIVE FREE 10 ML: 5 INJECTION INTRAVENOUS at 23:14

## 2025-04-17 RX ADMIN — POLYETHYLENE GLYCOL 3350 17 G: 17 POWDER, FOR SOLUTION ORAL at 09:05

## 2025-04-17 RX ADMIN — FAMOTIDINE 20 MG: 20 TABLET, FILM COATED ORAL at 21:30

## 2025-04-17 RX ADMIN — ONDANSETRON 4 MG: 2 INJECTION, SOLUTION INTRAMUSCULAR; INTRAVENOUS at 16:38

## 2025-04-17 RX ADMIN — AMLODIPINE BESYLATE 10 MG: 5 TABLET ORAL at 09:05

## 2025-04-17 RX ADMIN — DEXAMETHASONE 4 MG: 4 TABLET ORAL at 21:30

## 2025-04-17 RX ADMIN — SODIUM CHLORIDE, PRESERVATIVE FREE 10 ML: 5 INJECTION INTRAVENOUS at 09:10

## 2025-04-17 RX ADMIN — SPIRONOLACTONE 25 MG: 25 TABLET ORAL at 09:05

## 2025-04-17 RX ADMIN — DEXAMETHASONE SODIUM PHOSPHATE 4 MG: 4 INJECTION INTRA-ARTICULAR; INTRALESIONAL; INTRAMUSCULAR; INTRAVENOUS; SOFT TISSUE at 07:19

## 2025-04-17 RX ADMIN — LISINOPRIL 20 MG: 20 TABLET ORAL at 09:05

## 2025-04-17 RX ADMIN — Medication: at 23:13

## 2025-04-17 RX ADMIN — SODIUM CHLORIDE, PRESERVATIVE FREE 10 ML: 5 INJECTION INTRAVENOUS at 09:05

## 2025-04-17 RX ADMIN — LEVETIRACETAM 1000 MG: 500 TABLET, FILM COATED ORAL at 21:30

## 2025-04-17 RX ADMIN — FAMOTIDINE 20 MG: 20 TABLET, FILM COATED ORAL at 09:05

## 2025-04-17 RX ADMIN — LEVETIRACETAM 1000 MG: 100 INJECTION INTRAVENOUS at 02:10

## 2025-04-17 RX ADMIN — ACETAMINOPHEN 650 MG: 325 TABLET ORAL at 02:10

## 2025-04-17 RX ADMIN — CARVEDILOL 25 MG: 12.5 TABLET, FILM COATED ORAL at 09:05

## 2025-04-17 RX ADMIN — CARVEDILOL 25 MG: 12.5 TABLET, FILM COATED ORAL at 18:15

## 2025-04-17 NOTE — PROGRESS NOTES
Hospitalist Progress Note  Reina Arias MD  Answering service: [phone] OR [phone] from in house phone        Date of Service:  2025  NAME:  Blas Delcid  :  1975  MRN:  [id number]      Admission Summary:   HPI: \"Blas Delcid is a 50 y.o. male with a pmhx glioblstoma multiforme s/p resection, and htn who was admitted for altered mental status on , and was admitted for increase in size of glioblastoma \"       Interval history / Subjective:   Patient seen examined at bedside earlier feels well status post surgery 4/15      Assessment & Plan:     #Glioblastoma multiforme  #Vasogenic edema  #Mass effect with midline shift  - Status post right parietal craniotomy, tumor removal per neurosurgery on 4/15  -CT C/A/P negative for mets, pending [facility] f/u op   -Continue Decadron, and Keppra  -repeat MRI shows residual GBM per neurosurgery not a candidate for any further surgical intervention  - Pain control  - Q2 neurochecks  - Neurosurgery following, per them stable for discharge pending IPR placement     #Hypertension  #Combined heart failure  - Echocardiogram with EF 60 to 65%  - Goal SBP less than 160     #Bilateral lower extremity lymphedema  - Wound care     #Chronic HFrEF  - Continue home medications  - As needed Cardene for goal SBP less than 160    Principal Problem:    Brain mass  Active Problems:    Glioblastoma (HCC)    Vasogenic edema (HCC)  Resolved Problems:    * No resolved hospital problems. *      Code: Full  DVT PPx: SCDs  Anticipated discharge: Medically ready for discharge pending IPR placement per CM      Review of Systems:   Pertinent items are noted in HPI.         Vital Signs:    Last 24hrs VS reviewed since prior progress note. Most recent are:  /83   Pulse 77   Temp 98.3 °F (36.8 °C) (Oral)   Resp 16   Ht 1.626 m (5' 4.02\")   Wt 124 kg (273 lb 4.8 oz)   SpO2 97%   BMI 46.89

## 2025-04-17 NOTE — PLAN OF CARE
Problem: Physical Therapy - Adult  Goal: By Discharge: Performs mobility at highest level of function for planned discharge setting.  See evaluation for individualized goals.  Description: FUNCTIONAL STATUS PRIOR TO ADMISSION: Patient was modified independent using a single point cane for functional mobility - used afo.    HOME SUPPORT PRIOR TO ADMISSION: The patient lived with sister.    Physical Therapy Goals  Goals reviewed & remain appropriate as below at time of re-evaluation 4/16/2025    Initiated 4/8/2025  1.  Patient will move from supine to sit and sit to supine in bed with supervision/set-up within 7 day(s).    2.  Patient will perform sit to stand with supervision/set-up within 7 day(s).  3.  Patient will transfer from bed to chair and chair to bed with supervision/set-up using the least restrictive device within 7 day(s).  4.  Patient will ambulate with contact guard assist for 100 feet with the least restrictive device within 7 day(s).   5.  Patient will ascend/descend 4 stairs with one handrail(s) with minimal assistance within 7 day(s).   Outcome: Progressing     PHYSICAL THERAPY TREATMENT    Patient: Blas Delcid (50 y.o. male)  Date: 4/17/2025  Diagnosis: Leg swelling [M79.89]  Brain mass [G93.89]  Hypertensive emergency [I16.1]  Vasogenic edema (HCC) [G93.6] Brain mass  Procedure(s) (LRB):  BRAIN LAB GUIDED RE-DO RIGHT PARIETAL CRANIOTOMY AND TUMOR REMOVAL (N/A) 2 Days Post-Op  Precautions: Restrictions/Precautions  Restrictions/Precautions: Fall Risk          ASSESSMENT:  Patient continues to benefit from skilled PT services and is progressing towards goals. Able to progress ambulation this date for short hallway distance with chair follow. Ambulates with cane, AFO, and overall minimal assist with hemiplegic gait and is unsteady but no overt loss of balance. He demonstrates improved insight/safety awareness this date. He is highly motivated to participate and progress mobility. Remains below  functional baseline and is anticipated to progress well with progression of mobility.      PLAN:  Patient continues to benefit from skilled intervention to address the above impairments.  Continue treatment per established plan of care.        Recommendation for discharge: (in order for the patient to meet his/her long term goals):   High intensity/comprehensive skilled physical therapy in a multidisciplinary setting as patient is working towards tolerating up to 3 hours of therapy/day 5-7x/week    Other factors to consider for discharge: high risk for falls    IF patient discharges home will need the following DME: continuing to assess with progress     SUBJECTIVE:   Patient stated, \"I can't drive to Outpatient I just had a crani.\" \"I'm going to be different after this crani\"    OBJECTIVE DATA SUMMARY:   Critical Behavior:  Orientation  Overall Orientation Status: Within Normal Limits  Cognition  Arousal/Alertness: Appears intact  Following Commands: Follows multistep commands with repitition;Follows one step commands with increased time;Follows multistep commands with increased time  Attention Span: Attends with cues to redirect;Difficulty dividing attention    Functional Mobility Training:  Bed Mobility:  Bed Mobility Training  Bed Mobility Training: No (OOB in the chair)  Transfers:  Transfer Training  Transfer Training: Yes  Sit to Stand: Minimal assistance  Stand to Sit: Minimal assistance  Bed to Chair: Minimal assistance  Toilet Transfer: Minimal assistance  Balance:  Balance  Sitting: Intact  Sitting - Static: Good (unsupported)  Sitting - Dynamic: Good (unsupported)  Standing: Impaired  Standing - Static: Fair  Standing - Dynamic: Fair   Ambulation/Gait Training:     Gait  Overall Level of Assistance: Minimal assistance  Distance (ft): 30 Feet (x 2)  Assistive Device: Gait belt;Orthotic device;Cane, straight  Interventions: Safety awareness training;Tactile cues;Verbal cues;Visual cues;Weight shifting

## 2025-04-17 NOTE — PROGRESS NOTES
Neurosurgery Progress Note      Admit Date: 2025   LOS: 11 days        Daily Progress Note: 2025      Subjective:   The patient underwent a right occipital craniotomy with resection of GBM in 2022. He completed standard chemo/XRT for GBM with Dr. Saeed. He had redo resection in 2023. He is not currently on any therapy for GBM and has not been seen by oncology in awhile. He had some residual left sided weakness due to his tumor, but was able to get around with a cane. He noticed he was dropping things with his left hand more and having more left sided weakness. He presented to the ER where a head CT revealed recurrent tumor. He reports taking 2 Excedrin daily for headaches recently. Otherwise, does not take blood thinning medications.     HD2, Cardene drip turned off this morning. Pt reports left side weakness persists. ASA test revealed platelet inhibition present.    HD3, sitting up in chair. Family at bedside. BP improving.    HD4, pt resting comfortably in chair.    HD5, patient resting in chair. Trying to sleep, states he did not sleep well last night. No complaints. No questions about surgery.    HD8, pt's aspirin test came back showing that it is out of his system. He is ready for the OR tomorrow.    POD1, pt doing well. BP elevated, but awake and alert. Talking on the phone.    POD2, no acute events. Pt sitting up in chair. PT/OT recommending inpatient rehab.    Objective:     Vital signs  Temp (24hrs), Av.3 °F (36.8 °C), Min:98 °F (36.7 °C), Max:98.7 °F (37.1 °C)   No intake/output data recorded.  04/15 1901 -  0700  In: 3305.7 [I.V.:3205.7]  Out: 2235 [Urine:2235]    /83   Pulse 74   Temp 98.3 °F (36.8 °C)   Resp 16   Ht 1.626 m (5' 4.02\")   Wt 124 kg (273 lb 4.8 oz)   SpO2 97%   BMI 46.89 kg/m²          Pain control       PT/OT  Gait                 Physical Exam:  Gen:NAD.  Neuro: A&Ox3. Follows commands. Speech clear. Affect normal.  PERRL. EOMI. Mild flattening left

## 2025-04-17 NOTE — PROGRESS NOTES
1633 Checked on pt. They stated they were feeling nauseous, weak and tired. Offered them saltines, ginger ale and zofran. Pt accepted. Made MD aware. Vitals were stable, POC was 133. Will continue to monitor.

## 2025-04-17 NOTE — PROGRESS NOTES
Looks and feels well  not really candidate for more surgery  might consider Treatment Trials. Continue post op care

## 2025-04-17 NOTE — PLAN OF CARE
Problem: Discharge Planning  Goal: Discharge to home or other facility with appropriate resources  4/17/2025 1412 by Kristi Jacob RN  Outcome: Progressing  4/17/2025 0256 by Marissa Andrade RN  Outcome: Progressing  Flowsheets (Taken 4/16/2025 1530 by Hiral Reinoso, RN)  Discharge to home or other facility with appropriate resources: Identify barriers to discharge with patient and caregiver     Problem: Safety - Adult  Goal: Free from fall injury  4/17/2025 1412 by Kristi Jacob RN  Outcome: Progressing  4/17/2025 0256 by Marissa Andrade RN  Outcome: Progressing     Problem: Skin/Tissue Integrity  Goal: Skin integrity remains intact  Description: 1.  Monitor for areas of redness and/or skin breakdown  2.  Assess vascular access sites hourly  3.  Every 4-6 hours minimum:  Change oxygen saturation probe site  4.  Every 4-6 hours:  If on nasal continuous positive airway pressure, respiratory therapy assess nares and determine need for appliance change or resting period  4/17/2025 1412 by Kristi Jacob RN  Outcome: Progressing  4/17/2025 0256 by Marissa Andrade RN  Outcome: Progressing  Flowsheets (Taken 4/16/2025 1530 by Hiral Reinoso, RN)  Skin Integrity Remains Intact: Monitor for areas of redness and/or skin breakdown     Problem: Chronic Conditions and Co-morbidities  Goal: Patient's chronic conditions and co-morbidity symptoms are monitored and maintained or improved  4/17/2025 1412 by Kristi Jacob RN  Outcome: Progressing  4/17/2025 0256 by Marissa Andrade RN  Outcome: Progressing  Flowsheets (Taken 4/16/2025 1530 by Hiral Reinoso, RN)  Care Plan - Patient's Chronic Conditions and Co-Morbidity Symptoms are Monitored and Maintained or Improved: Monitor and assess patient's chronic conditions and comorbid symptoms for stability, deterioration, or improvement     Problem: Neurosensory - Adult  Goal: Remains free of injury related to seizures activity  4/17/2025 1412 by Kristi Jacob

## 2025-04-17 NOTE — CARE COORDINATION
Transition of Care Plan:    IPR - Referral sent to [facility] - pending insurance auth through [facility] Medicare started 4/17    Transport: BLS    RUR: 12%  Prior Level of Functioning: MOD I   Disposition: IPR  ANKUSH: 4/19  If SNF or IPR: Date FOC offered: 4/17   Date FOC received: 4/17   Accepting facility:   Date authorization started with reference number: 4/17  Date authorization received and expires:   Follow up appointments: PCP, Neurosurgery   DME needed: None  Transportation at discharge: BLS  IM/IMM Medicare/ letter given: 4/7  Caregiver Contact: GEOFFREY MANRIQUEZ (Brother/Sister)  [phone] (Mobile)   Discharge Caregiver contacted prior to discharge?   Care Conference needed? No  Barriers to discharge: Placement/auth     PT/OT and Hospitalist recommending IPR at discharge. CM met with Pt at bedside, he has been to [facility] in the past and CM inforomed him unfortunately [facility] no longer in network. Pt called his sister Geoffrey to discuss choice; they have chosen [facility]. Referral sent in [facility], auth needed through [facility] Medicare.    3:30pm: [facility] has accepted Pt and will initiate auth today.    STEPHANIE Mejia (Ally).

## 2025-04-17 NOTE — CONSULTS
Patient consult received.    Previously seen by Dr. Saeed with [facility]. Evaluated inpatient by Dr. Jerome. Plan to follow up outpatient with Dr. Saeed.   Discussed case with Dr. Arias.     Thank you for choosing our hematology/oncology group to be a part of your care team.  Zoey POLK NP      [facility] at [address], [address] [place] [address]  W: [phone]  F: [phone]

## 2025-04-17 NOTE — DISCHARGE INSTRUCTIONS
After Hospital Care Plan:  Discharge Instructions Craniotomy  [facility]    Patient Name: Blas Delcid      Procedure: Procedure(s):  BRAIN LAB GUIDED RE-DO RIGHT PARIETAL CRANIOTOMY AND TUMOR REMOVAL      Follow up appointments  Follow up with your neurosurgeon in 10-14 days.  Call [phone] to make an appointment as soon as you get home from the hospital.  Follow-up care is a key part of your treatment and safety. Be sure to make and go to all appointments, and call your doctor if you are having problems. It's also a good idea to know your test results and keep a list of the medicines you take.    A craniotomy is surgery to open your skull to fix a problem in your brain. It can be done for many reasons. For example, you may need a craniotomy if your brain or blood vessels are damaged or if you have a tumor or an infection in your brain.    You will probably feel very tired for several weeks after surgery. You may also have headaches or problems concentrating.  It can take 4 to 8 weeks to recover from surgery.  Your cuts (incisions) may be sore for about 5 days after surgery. You may also have numbness and shooting pains near your wound, or swelling and bruising around your eyes.  As your wound starts to heal, it may begin to itch. Medicines and ice packs can help with headaches, pain, swelling, and itching.    The stitches that hold your incisions together may go away on their own or will be removed in 7 to 10 days. This depends on the type of stitches the doctor uses. Staples are usually removed in 10-14 days.It is common for your scalp to swell with fluid. After the swelling goes down, you may have a dent in your head.    Some kinds of plates stay attached to hold the skull flap to your head. If your head was shaved, you may wear clean hats or scarves on your head until your hair grows back.     This care sheet gives you a general idea about how long it will take for you to recover. But each  increase the amount you walk. Walking boosts blood flow and helps prevent pneumonia and constipation.  Avoid heavy lifting until your doctor says it is okay.  Do not drive for 2 to 3 weeks or until your doctor says it is okay. When you begin driving again, start with short, familiar routes in the daytime.  Ask your doctor if it is safe for you to travel by plane.  Avoid risky activities, such as climbing a ladder, for 3 months after surgery.  Avoid strenuous activities, such as bicycle riding, jogging, weight lifting, or aerobic exercise, for 3 months or until your doctor says it is okay.  Do not play any rough or contact sports for 3 months or until your doctor says it is okay.  You may engage in sexual activity.    Diet  Resume usual diet; drink plenty of fluids; eat foods high in fiber  It is important to have regular bowel movements.  Pain medications may cause constipation.  You may want to take a stool softener (such as Senokot-S or Colace) to prevent constipation.  If constipation occurs, take a laxative (such as Dulcolax tablets, Milk of Magnesia, or a suppository).  Laxatives should only be used if the above preventable measures have failed and you still have not had a bowel movement after three days  Try to avoid constipation and straining with bowel movements.  Incision Care      You may shower now with a shower cap. You can wash your hair 7 days after your surgery. But do not soak your head or swim for 2 to 3 weeks.  Do not dye or color your hair for 4 weeks after your surgery.  Do not rub or apply any lotions or ointments to your incision site.   Do not scrub your wound    Medicines   If your doctor or nurse practitioner prescribed antibiotics, take them as directed. Do not stop taking them just because you feel better. You need to take the full course of antibiotics.   If you get medicines to prevent seizures, take them exactly as directed.  If the doctor or nurse practitioner gave you a prescription

## 2025-04-17 NOTE — PLAN OF CARE
Problem: Discharge Planning  Goal: Discharge to home or other facility with appropriate resources  Outcome: Progressing  Flowsheets   Discharge to home or other facility with appropriate resources: Identify barriers to discharge with patient and caregiver     Problem: Safety - Adult  Goal: Free from fall injury  Outcome: Progressing     Problem: Chronic Conditions and Co-morbidities  Goal: Patient's chronic conditions and co-morbidity symptoms are monitored and maintained or improved  Outcome: Progressing  Flowsheets  Care Plan - Patient's Chronic Conditions and Co-Morbidity Symptoms are Monitored and Maintained or Improved: Monitor and assess patient's chronic conditions and comorbid symptoms for stability, deterioration, or improvement     Problem: Neurosensory - Adult  Goal: Remains free of injury related to seizures activity  Outcome: Progressing  Flowsheets  Remains free of injury related to seizure activity: Maintain airway, patient safety  and administer oxygen as ordered  Goal: Achieves maximal functionality and self care  Outcome: Progressing  Flowsheets   Achieves maximal functionality and self care: Monitor swallowing and airway patency with patient fatigue and changes in neurological status     Problem: Cardiovascular - Adult  Goal: Maintains optimal cardiac output and hemodynamic stability  Outcome: Progressing  Flowsheets   Maintains optimal cardiac output and hemodynamic stability: Monitor blood pressure and heart rate  Goal: Absence of cardiac dysrhythmias or at baseline  Outcome: Progressing  Flowsheets   Absence of cardiac dysrhythmias or at baseline: Monitor cardiac rate and rhythm     Problem: Skin/Tissue Integrity - Adult  Goal: Skin integrity remains intact  Description: 1.  Monitor for areas of redness and/or skin breakdown  2.  Assess vascular access sites hourly  3.  Every 4-6 hours minimum:  Change oxygen saturation probe site  4.  Every 4-6 hours:  If on nasal continuous positive airway

## 2025-04-18 ENCOUNTER — TELEPHONE (OUTPATIENT)
Age: 50
End: 2025-04-18

## 2025-04-18 VITALS
RESPIRATION RATE: 16 BRPM | BODY MASS INDEX: 46.96 KG/M2 | SYSTOLIC BLOOD PRESSURE: 130 MMHG | HEART RATE: 63 BPM | HEIGHT: 64 IN | TEMPERATURE: 98 F | WEIGHT: 275.1 LBS | OXYGEN SATURATION: 96 % | DIASTOLIC BLOOD PRESSURE: 87 MMHG

## 2025-04-18 LAB
ANION GAP SERPL CALC-SCNC: 5 MMOL/L (ref 2–12)
BASOPHILS # BLD: 0 K/UL (ref 0–0.1)
BASOPHILS NFR BLD: 0 % (ref 0–1)
BUN SERPL-MCNC: 22 MG/DL (ref 6–20)
BUN/CREAT SERPL: 29 (ref 12–20)
CALCIUM SERPL-MCNC: 8.4 MG/DL (ref 8.5–10.1)
CHLORIDE SERPL-SCNC: 103 MMOL/L (ref 97–108)
CO2 SERPL-SCNC: 28 MMOL/L (ref 21–32)
CREAT SERPL-MCNC: 0.77 MG/DL (ref 0.7–1.3)
DIFFERENTIAL METHOD BLD: ABNORMAL
EOSINOPHIL # BLD: 0 K/UL (ref 0–0.4)
EOSINOPHIL NFR BLD: 0 % (ref 0–7)
ERYTHROCYTE [DISTWIDTH] IN BLOOD BY AUTOMATED COUNT: 11.7 % (ref 11.5–14.5)
GLUCOSE SERPL-MCNC: 127 MG/DL (ref 65–100)
HCT VFR BLD AUTO: 43.5 % (ref 36.6–50.3)
HGB BLD-MCNC: 14.5 G/DL (ref 12.1–17)
IMM GRANULOCYTES # BLD AUTO: 0 K/UL
IMM GRANULOCYTES NFR BLD AUTO: 0 %
LYMPHOCYTES # BLD: 1.34 K/UL (ref 0.8–3.5)
LYMPHOCYTES NFR BLD: 8 % (ref 12–49)
MCH RBC QN AUTO: 30.7 PG (ref 26–34)
MCHC RBC AUTO-ENTMCNC: 33.3 G/DL (ref 30–36.5)
MCV RBC AUTO: 92.2 FL (ref 80–99)
MONOCYTES # BLD: 2.02 K/UL (ref 0–1)
MONOCYTES NFR BLD: 12 % (ref 5–13)
NEUTS SEG # BLD: 13.44 K/UL (ref 1.8–8)
NEUTS SEG NFR BLD: 80 % (ref 32–75)
NRBC # BLD: 0 K/UL (ref 0–0.01)
NRBC BLD-RTO: 0 PER 100 WBC
PLATELET # BLD AUTO: 298 K/UL (ref 150–400)
PMV BLD AUTO: 11.1 FL (ref 8.9–12.9)
POTASSIUM SERPL-SCNC: 4.5 MMOL/L (ref 3.5–5.1)
RBC # BLD AUTO: 4.72 M/UL (ref 4.1–5.7)
RBC MORPH BLD: ABNORMAL
SODIUM SERPL-SCNC: 136 MMOL/L (ref 136–145)
WBC # BLD AUTO: 16.8 K/UL (ref 4.1–11.1)
WBC MORPH BLD: ABNORMAL

## 2025-04-18 PROCEDURE — 80048 BASIC METABOLIC PNL TOTAL CA: CPT

## 2025-04-18 PROCEDURE — 85025 COMPLETE CBC W/AUTO DIFF WBC: CPT

## 2025-04-18 PROCEDURE — 6370000000 HC RX 637 (ALT 250 FOR IP): Performed by: NEUROLOGICAL SURGERY

## 2025-04-18 PROCEDURE — 2500000003 HC RX 250 WO HCPCS: Performed by: NEUROLOGICAL SURGERY

## 2025-04-18 PROCEDURE — 6370000000 HC RX 637 (ALT 250 FOR IP): Performed by: INTERNAL MEDICINE

## 2025-04-18 PROCEDURE — 6370000000 HC RX 637 (ALT 250 FOR IP): Performed by: NURSE PRACTITIONER

## 2025-04-18 PROCEDURE — 6360000002 HC RX W HCPCS: Performed by: NURSE PRACTITIONER

## 2025-04-18 PROCEDURE — 99024 POSTOP FOLLOW-UP VISIT: CPT | Performed by: PHYSICIAN ASSISTANT

## 2025-04-18 RX ORDER — FAMOTIDINE 20 MG/1
20 TABLET, FILM COATED ORAL 2 TIMES DAILY
COMMUNITY

## 2025-04-18 RX ORDER — AMMONIUM LACTATE 12 G/100G
LOTION TOPICAL
Qty: 225 G | Refills: 0 | Status: SHIPPED
Start: 2025-04-18

## 2025-04-18 RX ORDER — LOPERAMIDE HYDROCHLORIDE 2 MG/1
2 CAPSULE ORAL 4 TIMES DAILY PRN
Status: DISCONTINUED | OUTPATIENT
Start: 2025-04-18 | End: 2025-04-18 | Stop reason: HOSPADM

## 2025-04-18 RX ORDER — LEVETIRACETAM 500 MG/1
1000 TABLET ORAL 2 TIMES DAILY
COMMUNITY

## 2025-04-18 RX ORDER — DEXAMETHASONE 2 MG/1
TABLET ORAL
Qty: 20 TABLET | Refills: 0 | Status: SHIPPED
Start: 2025-04-18 | End: 2025-04-25

## 2025-04-18 RX ADMIN — FAMOTIDINE 20 MG: 20 TABLET, FILM COATED ORAL at 08:40

## 2025-04-18 RX ADMIN — Medication: at 08:38

## 2025-04-18 RX ADMIN — SPIRONOLACTONE 25 MG: 25 TABLET ORAL at 08:40

## 2025-04-18 RX ADMIN — DEXAMETHASONE 4 MG: 4 TABLET ORAL at 07:01

## 2025-04-18 RX ADMIN — SODIUM CHLORIDE, PRESERVATIVE FREE 10 ML: 5 INJECTION INTRAVENOUS at 10:28

## 2025-04-18 RX ADMIN — SODIUM CHLORIDE, PRESERVATIVE FREE 10 ML: 5 INJECTION INTRAVENOUS at 10:15

## 2025-04-18 RX ADMIN — LEVETIRACETAM 1000 MG: 500 TABLET, FILM COATED ORAL at 08:40

## 2025-04-18 RX ADMIN — AMLODIPINE BESYLATE 10 MG: 5 TABLET ORAL at 08:40

## 2025-04-18 RX ADMIN — LISINOPRIL 20 MG: 20 TABLET ORAL at 08:40

## 2025-04-18 RX ADMIN — CARVEDILOL 25 MG: 12.5 TABLET, FILM COATED ORAL at 07:01

## 2025-04-18 ASSESSMENT — PAIN SCALES - GENERAL: PAINLEVEL_OUTOF10: 0

## 2025-04-18 NOTE — DISCHARGE SUMMARY
Discharge Summary       PATIENT ID: Blas Delcid  MRN: [id number]   YOB: 1975    DATE OF ADMISSION: 4/6/2025  9:20 PM    DATE OF DISCHARGE: 04/18/25    PRIMARY CARE PROVIDER: No primary care provider on file.     ATTENDING PHYSICIAN: Reina Arias MD   DISCHARGING PROVIDER: Reina Arias MD    To contact this individual call [phone] and ask the  to page.  If unavailable ask to be transferred the Adult Hospitalist Department.    CONSULTATIONS: IP CONSULT TO ONCOLOGY  IP WOUND CARE NURSE CONSULT TO EVAL  IP CONSULT TO NEUROLOGY  IP CONSULT TO ONCOLOGY    PROCEDURES/SURGERIES: Procedure(s):  BRAIN LAB GUIDED RE-DO RIGHT PARIETAL CRANIOTOMY AND TUMOR REMOVAL     ADMITTING DIAGNOSES & HOSPITAL COURSE:   HPI: \"Blas Delcid is a 50 y.o. male with a pmhx glioblstoma multiforme s/p resection, and htn who was admitted for altered mental status on 4/7, and was admitted for increase in size of glioblastoma \"         DISCHARGE DIAGNOSES / PLAN:      #Glioblastoma multiforme  #Vasogenic edema  #Mass effect with midline shift  - Status post right parietal craniotomy, tumor removal per neurosurgery on 4/15  -CT C/A/P negative for mets, follows w/ [facility],  f/u op   -Continue Decadron taper, and Keppra  -repeat MRI shows residual GBM per neurosurgery not a candidate for any further surgical intervention  - Pain control  - Q2 neurochecks  - Neurosurgery following, per them stable for discharge      #Hypertension  #Combined heart failure  - Echocardiogram with EF 60 to 65%  - Goal SBP less than 160     #Bilateral lower extremity lymphedema  - Wound care     #Chronic HFrEF  - Continue home medications  - As needed Cardene for goal SBP less than 160    Loose stool  -improved, happened after eating some \"off food \" per him prn immodium      Principal Problem:    Brain mass  Active Problems:    Glioblastoma (HCC)    Vasogenic edema (HCC)  Resolved Problems:    * No resolved hospital problems. *      Code:

## 2025-04-18 NOTE — TELEPHONE ENCOUNTER
[facility] called to state there were initially no recommendations, however after the stroke was confirmed via MRI they would like a call back to let them know any neurology recommendations.    Please call back at [phone].

## 2025-04-18 NOTE — PROGRESS NOTES
Neurosurgery Progress Note      Admit Date: 2025   LOS: 12 days        Daily Progress Note: 2025      Subjective:   The patient underwent a right occipital craniotomy with resection of GBM in 2022. He completed standard chemo/XRT for GBM with Dr. Saeed. He had redo resection in 2023. He is not currently on any therapy for GBM and has not been seen by oncology in awhile. He had some residual left sided weakness due to his tumor, but was able to get around with a cane. He noticed he was dropping things with his left hand more and having more left sided weakness. He presented to the ER where a head CT revealed recurrent tumor. He reports taking 2 Excedrin daily for headaches recently. Otherwise, does not take blood thinning medications.     HD2, Cardene drip turned off this morning. Pt reports left side weakness persists. ASA test revealed platelet inhibition present.    HD3, sitting up in chair. Family at bedside. BP improving.    HD4, pt resting comfortably in chair.    HD5, patient resting in chair. Trying to sleep, states he did not sleep well last night. No complaints. No questions about surgery.    HD8, pt's aspirin test came back showing that it is out of his system. He is ready for the OR tomorrow.    POD1, pt doing well. BP elevated, but awake and alert. Talking on the phone.    POD2, no acute events. Pt sitting up in chair. PT/OT recommending inpatient rehab.    POD3, pt notes feeling fatigued but no acute events noted. No new neurological deficits.    Objective:     Vital signs  Temp (24hrs), Av °F (36.7 °C), Min:97.5 °F (36.4 °C), Max:98.4 °F (36.9 °C)   No intake/output data recorded.   1901 -  0700  In: -   Out: 1580 [Urine:1580]    /87   Pulse 63   Temp 98 °F (36.7 °C) (Oral)   Resp 16   Ht 1.626 m (5' 4.02\")   Wt 124.8 kg (275 lb 1.6 oz)   SpO2 96%   BMI 47.20 kg/m²          Pain control       PT/OT  Gait                 Physical Exam:  Gen:NAD.  Neuro: A&Ox3.

## 2025-04-18 NOTE — CARE COORDINATION
Transition of Care Plan:    [facility] -  [facility] - Today  RN to call report to [phone]    Transport: BLS 2pm [facility]    RUR: 12%  Prior Level of Functioning: MOD I   Disposition: IPR  ANKUSH: 4/19  If SNF or IPR: Date FOC offered: 4/17   Date FOC received: 4/17   Accepting facility:   Date authorization started with reference number: 4/17  Date authorization received and expires:   Follow up appointments: PCP, Neurosurgery   DME needed: None  Transportation at discharge: BLS  IM/IMM Medicare/ letter given: 4/7  Caregiver Contact: MITRAGEOFFREY SWENSON (Brother/Sister)  [phone] (Mobile)   Discharge Caregiver contacted prior to discharge?   Care Conference needed? No  Barriers to discharge: none    Per [facility] - they will accept Pt with retro auth today after 2pm.     [facility] requested for 2pm. [facility] changed request to  at 1pm, JW still in agreement to accept Pt earlier, RN updated.     DELON Mejia (Ally).S.[email or web address].

## 2025-04-18 NOTE — TELEPHONE ENCOUNTER
I called the rehab back at [phone]. I let the nurse know that as of now we were not following the patient as an out patient. I deferred to Neurosurgery.     I was informed that they already spoke to Neurosurgery. They did not have any further concerns.

## 2025-04-18 NOTE — PLAN OF CARE
Problem: Discharge Planning  Goal: Discharge to home or other facility with appropriate resources  4/18/2025 0048 by Marissa Andrade, RN  Outcome: Progressing     Problem: Safety - Adult  Goal: Free from fall injury  4/18/2025 0048 by Marissa Andrade, RN     Problem: Skin/Tissue Integrity  Goal: Skin integrity remains intact  Description: 1.  Monitor for areas of redness and/or skin breakdown  2.  Assess vascular access sites hourly  3.  Every 4-6 hours minimum:  Change oxygen saturation probe site  4.  Every 4-6 hours:  If on nasal continuous positive airway pressure, respiratory therapy assess nares and determine need for appliance change or resting period  4/18/2025 0048 by Marissa Andrade, RN  Outcome: Progressing     Problem: Chronic Conditions and Co-morbidities  Goal: Patient's chronic conditions and co-morbidity symptoms are monitored and maintained or improved  4/18/2025 0048 by Marissa Andrade, RN  Outcome: Progressing     Problem: Neurosensory - Adult  Goal: Remains free of injury related to seizures activity  4/18/2025 0048 by Marissa Andrade, RN  Outcome: Progressing  Goal: Achieves maximal functionality and self care  4/18/2025 0048 by Marissa Andrade, RN  Outcome: Progressing     Problem: Cardiovascular - Adult  Goal: Maintains optimal cardiac output and hemodynamic stability  Outcome: Progressing  Flowsheets   Maintains optimal cardiac output and hemodynamic stability:   Monitor blood pressure and heart rate   Monitor urine output and notify Licensed Independent Practitioner for values outside of normal range     Problem: Skin/Tissue Integrity - Adult  Goal: Skin integrity remains intact  Description: 1.  Monitor for areas of redness and/or skin breakdown  2.  Assess vascular access sites hourly  3.  Every 4-6 hours minimum:  Change oxygen saturation probe site  4.  Every 4-6 hours:  If on nasal continuous positive airway pressure, respiratory therapy assess nares and determine need for

## 2025-04-18 NOTE — PROGRESS NOTES
..TRANSFER - OUT REPORT:    Verbal report given to JADON Leyva on Blas Delcid  being transferred to  Rehab for routine progression of patient care       Report consisted of patient's Situation, Background, Assessment and   Recommendations(SBAR).     Information from the following report(s) Nurse Handoff Report was reviewed with the receiving nurse.           Lines:       Opportunity for questions and clarification was provided.      Patient transported with:  Patient-specific medications from Pharmacy

## 2025-04-23 NOTE — PROGRESS NOTES
Nephrology Progress Note  Bret Cardoso  Date of Admission : 3/2/2022    CC:  Follow up for hyponatremia       Assessment and Plan     Hyponatremia:  - suspect this is 2/2 SIADH from his CNS process  - urine studies support SIADH  - Na stable after tolvaptan on 3/9  - ok for d/c to [facility]  - would cont lasix and FR 1.5L/d  - we can see him at [facility], [place], operated by [facility] if needed      R parietal mass s/p resection on 3/2 - path pending  Seizures  HFrEF  HTN  Obesity  KELL       Interval History:  Seen and examined. Feeling well. Na 131 this AM.  No cp, sob, n/v/d reported at this time. Current Medications: all current  Medications have been eviewed in EPIC  Review of Systems: Pertinent items are noted in HPI. Objective:  Vitals:    Vitals:    03/10/22 0610 03/10/22 0800 03/10/22 0914 03/10/22 1000   BP: (!) 99/50  117/69    Pulse: 73 73 74 72   Resp: 25      Temp: 98.2 °F (36.8 °C)      SpO2: 98%      Weight:       Height:         Intake and Output:  03/10 0701 - 03/10 1900  In: 400 [P.O.:400]  Out: 725 [Urine:725]  03/08 1901 - 03/10 0700  In: 2150 [P.O.:2150]  Out: 3350 [Urine:3350]    Physical Examination:    General: NAD,Conversant   Neck:  Supple, no mass  Resp:  Lungs CTA B/L, no wheezing , normal respiratory effort  CV:  RRR,  no murmur or rub, trace LE edema  GI:  Soft, NT, + Bowel sounds, no hepatosplenomegaly  Neurologic:  Non focal  Psych:             AAO x 3 appropriate affect   Skin:  No Rash  :  No cho    []    High complexity decision making was performed  []    Patient is at high-risk of decompensation with multiple organ involvement    Lab Data Personally Reviewed: I have reviewed all the pertinent labs, microbiology data and radiology studies during assessment.     Recent Labs     03/10/22  0314 03/09/22  1726 03/09/22  0500 03/08/22  0937 03/08/22  0202   * 129* 126*   < > 127*   K 3.9 3.6 3.9   < > 3.5   CL 90* 88* 90*   < > 88*   CO2 36* 33* 31   < > 32   * 149* 135*   < > 201*   BUN 32* 32* 32* < > 31*   CREA 1.02 1.25 0.97   < > 1.14   CA 8.7 8.5 8.4*   < > 8.4*   PHOS  --  3.6  --   --  3.3   ALB  --  3.1*  --   --  2.9*    < > = values in this interval not displayed.      Recent Labs     03/08/22  0202   WBC 20.8*   HGB 16.5   HCT 50.3        No results found for: SDES  Lab Results   Component Value Date/Time    Culture result: NO GROWTH 5 DAYS 02/21/2022 04:34 PM     Recent Results (from the past 24 hour(s))   GLUCOSE, POC    Collection Time: 03/09/22 11:53 AM   Result Value Ref Range    Glucose (POC) 111 65 - 117 mg/dL    Performed by Tano Townsend    GLUCOSE, POC    Collection Time: 03/09/22  5:01 PM   Result Value Ref Range    Glucose (POC) 179 (H) 65 - 117 mg/dL    Performed by Tano Townsend    RENAL FUNCTION PANEL    Collection Time: 03/09/22  5:26 PM   Result Value Ref Range    Sodium 129 (L) 136 - 145 mmol/L    Potassium 3.6 3.5 - 5.1 mmol/L    Chloride 88 (L) 97 - 108 mmol/L    CO2 33 (H) 21 - 32 mmol/L    Anion gap 8 5 - 15 mmol/L    Glucose 149 (H) 65 - 100 mg/dL    BUN 32 (H) 6 - 20 MG/DL    Creatinine 1.25 0.70 - 1.30 MG/DL    BUN/Creatinine ratio 26 (H) 12 - 20      GFR est AA >60 >60 ml/min/1.73m2    GFR est non-AA >60 >60 ml/min/1.73m2    Calcium 8.5 8.5 - 10.1 MG/DL    Phosphorus 3.6 2.6 - 4.7 MG/DL    Albumin 3.1 (L) 3.5 - 5.0 g/dL   GLUCOSE, POC    Collection Time: 03/09/22 10:41 PM   Result Value Ref Range    Glucose (POC) 131 (H) 65 - 117 mg/dL    Performed by Mayra Steele ([facility])    METABOLIC PANEL, BASIC    Collection Time: 03/10/22  3:14 AM   Result Value Ref Range    Sodium 131 (L) 136 - 145 mmol/L    Potassium 3.9 3.5 - 5.1 mmol/L    Chloride 90 (L) 97 - 108 mmol/L    CO2 36 (H) 21 - 32 mmol/L    Anion gap 5 5 - 15 mmol/L    Glucose 143 (H) 65 - 100 mg/dL    BUN 32 (H) 6 - 20 MG/DL    Creatinine 1.02 0.70 - 1.30 MG/DL    BUN/Creatinine ratio 31 (H) 12 - 20      GFR est AA >60 >60 ml/min/1.73m2    GFR est non-AA >60 >60 ml/min/1.73m2    Calcium 8.7 8.5 - 10.1 MG/DL Opt out GLUCOSE, POC    Collection Time: 03/10/22  7:08 AM   Result Value Ref Range    Glucose (POC) 130 (H) 65 - 117 mg/dL    Performed by Tanda Councilman ([facility])                  Yas Toure MD  [address]  Phone - [phone]   Fax - [phone]  [email or web address]

## 2025-04-25 ENCOUNTER — TELEPHONE (OUTPATIENT)
Facility: HOSPITAL | Age: 50
End: 2025-04-25

## 2025-04-25 NOTE — TELEPHONE ENCOUNTER
[facility]  Brain Navigator Encounter    Name:    Blas Delcid  Age:    50 y.o.  Diagnosis: pending      Narrative: I attempted to call Mr. Delcid to follow up with his hospital visit and to see if he had a chance to make a follow up appointment with Dr. Saeed. He did not answer and I couldn't leave a voicemail due to a full mailbox. I did call Dr. Stout's office and update them on recent events for Mr. Delcid and they are going to reach out to him/his family to get him scheduled for a follow up.            Medina ARGUETA, RN.  Primary Brain Tumor Navigator   [facility]  [address] [place] [address]  [address]  Office: [phone]  Cell: [phone]  F: [phone]  [email or web address]  Good Help to Those in Need®

## 2025-05-07 ENCOUNTER — TELEPHONE (OUTPATIENT)
Age: 50
End: 2025-05-07

## 2025-05-07 NOTE — TELEPHONE ENCOUNTER
[facility]  Brain Navigator Encounter    Name:    Blas Delcid  Age:    50 y.o.  Diagnosis: brain tumor      Narrative: I spoke with Valeria (sister) and they would like to pursue oncology and radiation for Blas. I told her I could get the appointment set up with Dr. Montero and then she could try to get him in with Christophe around the same time if possible. I was able to get Blas scheduled for 5/12 at 12pm with Dr. Montero. I sent a message to Valeria to let her know.            Medina ARGUETA, RN.  Primary Brain Tumor Navigator   [address]  [address]  Office: [phone]  Cell: [phone]  F: [phone]  [email or web address]  Good Help to Those in Need®

## 2025-05-12 ENCOUNTER — APPOINTMENT (OUTPATIENT)
Facility: HOSPITAL | Age: 50
DRG: 080 | End: 2025-05-12
Payer: MEDICARE

## 2025-05-12 ENCOUNTER — HOSPITAL ENCOUNTER (INPATIENT)
Facility: HOSPITAL | Age: 50
LOS: 6 days | Discharge: HOME | DRG: 080 | End: 2025-05-18
Attending: STUDENT IN AN ORGANIZED HEALTH CARE EDUCATION/TRAINING PROGRAM | Admitting: INTERNAL MEDICINE
Payer: MEDICARE

## 2025-05-12 ENCOUNTER — TELEPHONE (OUTPATIENT)
Facility: HOSPITAL | Age: 50
End: 2025-05-12

## 2025-05-12 DIAGNOSIS — R62.7 FAILURE TO THRIVE IN ADULT: ICD-10-CM

## 2025-05-12 DIAGNOSIS — G93.6 CEREBRAL EDEMA (HCC): Primary | ICD-10-CM

## 2025-05-12 DIAGNOSIS — R53.1 GENERALIZED WEAKNESS: ICD-10-CM

## 2025-05-12 DIAGNOSIS — I16.0 HYPERTENSIVE URGENCY: ICD-10-CM

## 2025-05-12 DIAGNOSIS — G89.3 CANCER RELATED PAIN: ICD-10-CM

## 2025-05-12 DIAGNOSIS — C71.9 GLIOBLASTOMA MULTIFORME (HCC): ICD-10-CM

## 2025-05-12 DIAGNOSIS — M79.89 LEG SWELLING: ICD-10-CM

## 2025-05-12 LAB
ALBUMIN SERPL-MCNC: 3.3 G/DL (ref 3.5–5)
ALBUMIN/GLOB SERPL: 0.8 (ref 1.1–2.2)
ALP SERPL-CCNC: 71 U/L (ref 45–117)
ALT SERPL-CCNC: 16 U/L (ref 12–78)
ANION GAP SERPL CALC-SCNC: 5 MMOL/L (ref 2–12)
APPEARANCE UR: CLEAR
AST SERPL-CCNC: 12 U/L (ref 15–37)
BACTERIA URNS QL MICRO: NEGATIVE /HPF
BASOPHILS # BLD: 0.03 K/UL (ref 0–0.1)
BASOPHILS NFR BLD: 0.3 % (ref 0–1)
BILIRUB SERPL-MCNC: 0.9 MG/DL (ref 0.2–1)
BILIRUB UR QL: NEGATIVE
BUN SERPL-MCNC: 7 MG/DL (ref 6–20)
BUN/CREAT SERPL: 7 (ref 12–20)
CALCIUM SERPL-MCNC: 9.3 MG/DL (ref 8.5–10.1)
CHLORIDE SERPL-SCNC: 103 MMOL/L (ref 97–108)
CO2 SERPL-SCNC: 31 MMOL/L (ref 21–32)
COLOR UR: ABNORMAL
COMMENT:: NORMAL
CREAT SERPL-MCNC: 1.04 MG/DL (ref 0.7–1.3)
DIFFERENTIAL METHOD BLD: ABNORMAL
EOSINOPHIL # BLD: 0.04 K/UL (ref 0–0.4)
EOSINOPHIL NFR BLD: 0.4 % (ref 0–7)
EPITH CASTS URNS QL MICRO: ABNORMAL /LPF
ERYTHROCYTE [DISTWIDTH] IN BLOOD BY AUTOMATED COUNT: 11.8 % (ref 11.5–14.5)
GLOBULIN SER CALC-MCNC: 4.2 G/DL (ref 2–4)
GLUCOSE SERPL-MCNC: 104 MG/DL (ref 65–100)
GLUCOSE UR STRIP.AUTO-MCNC: NEGATIVE MG/DL
HCT VFR BLD AUTO: 42.8 % (ref 36.6–50.3)
HGB BLD-MCNC: 14.4 G/DL (ref 12.1–17)
HGB UR QL STRIP: NEGATIVE
HYALINE CASTS URNS QL MICRO: ABNORMAL /LPF (ref 0–5)
IMM GRANULOCYTES # BLD AUTO: 0.12 K/UL (ref 0–0.04)
IMM GRANULOCYTES NFR BLD AUTO: 1.1 % (ref 0–0.5)
KETONES UR QL STRIP.AUTO: NEGATIVE MG/DL
LEUKOCYTE ESTERASE UR QL STRIP.AUTO: ABNORMAL
LYMPHOCYTES # BLD: 1.97 K/UL (ref 0.8–3.5)
LYMPHOCYTES NFR BLD: 18.3 % (ref 12–49)
MCH RBC QN AUTO: 30.4 PG (ref 26–34)
MCHC RBC AUTO-ENTMCNC: 33.6 G/DL (ref 30–36.5)
MCV RBC AUTO: 90.5 FL (ref 80–99)
MONOCYTES # BLD: 1.1 K/UL (ref 0–1)
MONOCYTES NFR BLD: 10.2 % (ref 5–13)
NEUTS SEG # BLD: 7.48 K/UL (ref 1.8–8)
NEUTS SEG NFR BLD: 69.7 % (ref 32–75)
NITRITE UR QL STRIP.AUTO: NEGATIVE
NRBC # BLD: 0 K/UL (ref 0–0.01)
NRBC BLD-RTO: 0 PER 100 WBC
PH UR STRIP: 7 (ref 5–8)
PLATELET # BLD AUTO: 349 K/UL (ref 150–400)
PMV BLD AUTO: 11.1 FL (ref 8.9–12.9)
POTASSIUM SERPL-SCNC: 3.1 MMOL/L (ref 3.5–5.1)
PROT SERPL-MCNC: 7.5 G/DL (ref 6.4–8.2)
PROT UR STRIP-MCNC: NEGATIVE MG/DL
RBC # BLD AUTO: 4.73 M/UL (ref 4.1–5.7)
RBC #/AREA URNS HPF: ABNORMAL /HPF (ref 0–5)
SODIUM SERPL-SCNC: 139 MMOL/L (ref 136–145)
SP GR UR REFRACTOMETRY: 1.02 (ref 1–1.03)
SPECIMEN HOLD: NORMAL
SPECIMEN HOLD: NORMAL
TROPONIN I SERPL HS-MCNC: 10 NG/L (ref 0–76)
UROBILINOGEN UR QL STRIP.AUTO: 2 EU/DL (ref 0.2–1)
WBC # BLD AUTO: 10.7 K/UL (ref 4.1–11.1)
WBC URNS QL MICRO: ABNORMAL /HPF (ref 0–4)

## 2025-05-12 PROCEDURE — 93005 ELECTROCARDIOGRAM TRACING: CPT

## 2025-05-12 PROCEDURE — 70450 CT HEAD/BRAIN W/O DYE: CPT

## 2025-05-12 PROCEDURE — 96376 TX/PRO/DX INJ SAME DRUG ADON: CPT

## 2025-05-12 PROCEDURE — 81001 URINALYSIS AUTO W/SCOPE: CPT

## 2025-05-12 PROCEDURE — 96374 THER/PROPH/DIAG INJ IV PUSH: CPT

## 2025-05-12 PROCEDURE — 6360000002 HC RX W HCPCS: Performed by: STUDENT IN AN ORGANIZED HEALTH CARE EDUCATION/TRAINING PROGRAM

## 2025-05-12 PROCEDURE — 71045 X-RAY EXAM CHEST 1 VIEW: CPT

## 2025-05-12 PROCEDURE — 99285 EMERGENCY DEPT VISIT HI MDM: CPT

## 2025-05-12 PROCEDURE — 96375 TX/PRO/DX INJ NEW DRUG ADDON: CPT

## 2025-05-12 PROCEDURE — 84484 ASSAY OF TROPONIN QUANT: CPT

## 2025-05-12 PROCEDURE — 80053 COMPREHEN METABOLIC PANEL: CPT

## 2025-05-12 PROCEDURE — 6370000000 HC RX 637 (ALT 250 FOR IP): Performed by: STUDENT IN AN ORGANIZED HEALTH CARE EDUCATION/TRAINING PROGRAM

## 2025-05-12 PROCEDURE — 85025 COMPLETE CBC W/AUTO DIFF WBC: CPT

## 2025-05-12 PROCEDURE — 2060000000 HC ICU INTERMEDIATE R&B

## 2025-05-12 RX ORDER — DEXAMETHASONE SODIUM PHOSPHATE 10 MG/ML
10 INJECTION, SOLUTION INTRAMUSCULAR; INTRAVENOUS ONCE
Status: COMPLETED | OUTPATIENT
Start: 2025-05-12 | End: 2025-05-12

## 2025-05-12 RX ORDER — HYDRALAZINE HYDROCHLORIDE 20 MG/ML
10 INJECTION INTRAMUSCULAR; INTRAVENOUS ONCE
Status: COMPLETED | OUTPATIENT
Start: 2025-05-12 | End: 2025-05-12

## 2025-05-12 RX ADMIN — HYDRALAZINE HYDROCHLORIDE 10 MG: 20 INJECTION INTRAMUSCULAR; INTRAVENOUS at 18:39

## 2025-05-12 RX ADMIN — HYDRALAZINE HYDROCHLORIDE 10 MG: 20 INJECTION INTRAMUSCULAR; INTRAVENOUS at 19:55

## 2025-05-12 RX ADMIN — DEXAMETHASONE SODIUM PHOSPHATE 10 MG: 10 INJECTION INTRAMUSCULAR; INTRAVENOUS at 19:48

## 2025-05-12 RX ADMIN — LIDOCAINE HYDROCHLORIDE 40 ML: 20 SOLUTION ORAL at 19:50

## 2025-05-12 ASSESSMENT — PAIN SCALES - GENERAL
PAINLEVEL_OUTOF10: 0
PAINLEVEL_OUTOF10: 5

## 2025-05-12 ASSESSMENT — PAIN DESCRIPTION - ONSET: ONSET: ON-GOING

## 2025-05-12 ASSESSMENT — PAIN - FUNCTIONAL ASSESSMENT
PAIN_FUNCTIONAL_ASSESSMENT: ACTIVITIES ARE NOT PREVENTED
PAIN_FUNCTIONAL_ASSESSMENT: 0-10

## 2025-05-12 ASSESSMENT — PAIN DESCRIPTION - ORIENTATION: ORIENTATION: ANTERIOR;POSTERIOR

## 2025-05-12 ASSESSMENT — PAIN DESCRIPTION - FREQUENCY: FREQUENCY: CONTINUOUS

## 2025-05-12 ASSESSMENT — PAIN DESCRIPTION - LOCATION: LOCATION: HEAD

## 2025-05-12 ASSESSMENT — PAIN DESCRIPTION - PAIN TYPE: TYPE: ACUTE PAIN

## 2025-05-12 ASSESSMENT — PAIN DESCRIPTION - DESCRIPTORS: DESCRIPTORS: ACHING

## 2025-05-12 NOTE — ED NOTES
2:42 PM  I have evaluated the patient as the Provider in Rapid Medical Evaluation (RME). I have reviewed his vital signs and the triage nurse assessment. I have talked with the patient and any available family and advised that I am the provider in triage and have ordered the appropriate study to initiate their work up based on the clinical presentation during my assessment. I have advised that the patient will be accommodated in the Main ED as soon as possible. I have also requested to contact the triage nurse or myself immediately if the patient experiences any changes in their condition during this brief waiting period.  Jah Little PA-C       50-year-old male past medical history of glioblastoma with brain surgery 3 weeks ago presenting with pain to his head, confusion, lack of appetite, weakness.  Was scheduled to see Dr. Montero with oncology today at 12 PM though was unable to make the appointment due to his symptoms.  Sister has had to employ in-home care due to his inability to care for himself.  Sister also reports slurred speech though this started after the most recent surgery.  Pain to head is reported to be all over.  Reports he has also heard a whooshing noise.     Jah Little PA-C  05/12/25 [id number]

## 2025-05-12 NOTE — ED TRIAGE NOTES
Pt arrives in wheelchair with sister (Valeria) cc of increased confusion. Pt recently had third brain surgery for glioblastoma has loss of appetite, slurred speech, and weakness since. Sister reports pt is unable to care for self at this time

## 2025-05-12 NOTE — TELEPHONE ENCOUNTER
Patient called concerned about her EGD tomorrow with Dr. Wilkins stating \"one of the risks they told me about is that I could die.\" Writer reassured pt that this is very rare and she will be watched very closely under MAC sedation, and that Dr. Wilkins has performed very many of these procedures. Pt states she feels more assured and verbalized understanding. Agreeable to procedure tomorrow.   [facility]  Brain Navigator Encounter    Name:    Blas Delcid  Age:    50 y.o.        Narrative: I spoke with Valeria (sister) this morning and Blas had a rough weekend, he cannot take more than a few steps before falling, he is having issues with his speech and just not doing well. [facility] cannot get him to his appointment at 12pm with Kylah today, I let her know. EMS wants to take him to [place] and will not bring him to [place] so she is going to have them help load him into the car and then bring him to [facility] ED and then ask assistance to get him in the wheel chair and get him admitted. I told her to keep me updated.            Medina ARGUETA, RN.  Primary Brain Tumor Navigator   [facility]  [address] [place]  [address]  Office: [phone]  Cell: [phone]  F: [phone]  [email or web address]  Good Help to Those in Need®

## 2025-05-13 ENCOUNTER — APPOINTMENT (OUTPATIENT)
Facility: HOSPITAL | Age: 50
DRG: 080 | End: 2025-05-13
Attending: INTERNAL MEDICINE
Payer: MEDICARE

## 2025-05-13 ENCOUNTER — APPOINTMENT (OUTPATIENT)
Facility: HOSPITAL | Age: 50
DRG: 080 | End: 2025-05-13
Payer: MEDICARE

## 2025-05-13 ENCOUNTER — HOSPITAL ENCOUNTER (OUTPATIENT)
Facility: HOSPITAL | Age: 50
Discharge: HOME | End: 2025-05-16

## 2025-05-13 DIAGNOSIS — C71.0: Primary | ICD-10-CM

## 2025-05-13 LAB
ALBUMIN SERPL-MCNC: 3.4 G/DL (ref 3.5–5)
ALBUMIN/GLOB SERPL: 0.8 (ref 1.1–2.2)
ALP SERPL-CCNC: 77 U/L (ref 45–117)
ALT SERPL-CCNC: 20 U/L (ref 12–78)
ANION GAP SERPL CALC-SCNC: 7 MMOL/L (ref 2–12)
AST SERPL-CCNC: 8 U/L (ref 15–37)
BASOPHILS # BLD: 0 K/UL (ref 0–0.1)
BASOPHILS NFR BLD: 0 % (ref 0–1)
BILIRUB SERPL-MCNC: 0.8 MG/DL (ref 0.2–1)
BUN SERPL-MCNC: 6 MG/DL (ref 6–20)
BUN/CREAT SERPL: 8 (ref 12–20)
CALCIUM SERPL-MCNC: 9.7 MG/DL (ref 8.5–10.1)
CHLORIDE SERPL-SCNC: 105 MMOL/L (ref 97–108)
CO2 SERPL-SCNC: 24 MMOL/L (ref 21–32)
CREAT SERPL-MCNC: 0.78 MG/DL (ref 0.7–1.3)
DIFFERENTIAL METHOD BLD: ABNORMAL
EOSINOPHIL # BLD: 0 K/UL (ref 0–0.4)
EOSINOPHIL NFR BLD: 0 % (ref 0–7)
ERYTHROCYTE [DISTWIDTH] IN BLOOD BY AUTOMATED COUNT: 11.8 % (ref 11.5–14.5)
GLOBULIN SER CALC-MCNC: 4.5 G/DL (ref 2–4)
GLUCOSE SERPL-MCNC: 123 MG/DL (ref 65–100)
HCT VFR BLD AUTO: 46.3 % (ref 36.6–50.3)
HGB BLD-MCNC: 16 G/DL (ref 12.1–17)
IMM GRANULOCYTES # BLD AUTO: 0 K/UL
IMM GRANULOCYTES NFR BLD AUTO: 0 %
LYMPHOCYTES # BLD: 1.5 K/UL (ref 0.8–3.5)
LYMPHOCYTES NFR BLD: 12 % (ref 12–49)
MAGNESIUM SERPL-MCNC: 2.3 MG/DL (ref 1.6–2.4)
MCH RBC QN AUTO: 30.4 PG (ref 26–34)
MCHC RBC AUTO-ENTMCNC: 34.6 G/DL (ref 30–36.5)
MCV RBC AUTO: 88 FL (ref 80–99)
MONOCYTES # BLD: 0.75 K/UL (ref 0–1)
MONOCYTES NFR BLD: 6 % (ref 5–13)
NEUTS SEG # BLD: 10.25 K/UL (ref 1.8–8)
NEUTS SEG NFR BLD: 82 % (ref 32–75)
NRBC # BLD: 0 K/UL (ref 0–0.01)
NRBC BLD-RTO: 0 PER 100 WBC
NT PRO BNP: 492 PG/ML
PHOSPHATE SERPL-MCNC: 2.9 MG/DL (ref 2.6–4.7)
PLATELET # BLD AUTO: 361 K/UL (ref 150–400)
PMV BLD AUTO: 10.3 FL (ref 8.9–12.9)
POTASSIUM SERPL-SCNC: 3.6 MMOL/L (ref 3.5–5.1)
PROT SERPL-MCNC: 7.9 G/DL (ref 6.4–8.2)
RBC # BLD AUTO: 5.26 M/UL (ref 4.1–5.7)
RBC MORPH BLD: ABNORMAL
SODIUM SERPL-SCNC: 136 MMOL/L (ref 136–145)
TSH SERPL DL<=0.05 MIU/L-ACNC: 0.42 UIU/ML (ref 0.36–3.74)
WBC # BLD AUTO: 12.5 K/UL (ref 4.1–11.1)

## 2025-05-13 PROCEDURE — 6360000004 HC RX CONTRAST MEDICATION: Performed by: INTERNAL MEDICINE

## 2025-05-13 PROCEDURE — 2500000003 HC RX 250 WO HCPCS: Performed by: INTERNAL MEDICINE

## 2025-05-13 PROCEDURE — 6370000000 HC RX 637 (ALT 250 FOR IP): Performed by: HOSPITALIST

## 2025-05-13 PROCEDURE — 85025 COMPLETE CBC W/AUTO DIFF WBC: CPT

## 2025-05-13 PROCEDURE — 6370000000 HC RX 637 (ALT 250 FOR IP): Performed by: INTERNAL MEDICINE

## 2025-05-13 PROCEDURE — 6360000002 HC RX W HCPCS: Performed by: INTERNAL MEDICINE

## 2025-05-13 PROCEDURE — 6360000002 HC RX W HCPCS: Performed by: HOSPITALIST

## 2025-05-13 PROCEDURE — 80053 COMPREHEN METABOLIC PANEL: CPT

## 2025-05-13 PROCEDURE — 83880 ASSAY OF NATRIURETIC PEPTIDE: CPT

## 2025-05-13 PROCEDURE — 83735 ASSAY OF MAGNESIUM: CPT

## 2025-05-13 PROCEDURE — 70553 MRI BRAIN STEM W/O & W/DYE: CPT

## 2025-05-13 PROCEDURE — 2580000003 HC RX 258: Performed by: INTERNAL MEDICINE

## 2025-05-13 PROCEDURE — 2060000000 HC ICU INTERMEDIATE R&B

## 2025-05-13 PROCEDURE — 84443 ASSAY THYROID STIM HORMONE: CPT

## 2025-05-13 PROCEDURE — A9579 GAD-BASE MR CONTRAST NOS,1ML: HCPCS | Performed by: INTERNAL MEDICINE

## 2025-05-13 PROCEDURE — 93970 EXTREMITY STUDY: CPT

## 2025-05-13 PROCEDURE — 84100 ASSAY OF PHOSPHORUS: CPT

## 2025-05-13 PROCEDURE — APPNB15 APP NON BILLABLE TIME 0-15 MINS: Performed by: NURSE PRACTITIONER

## 2025-05-13 RX ORDER — POTASSIUM CHLORIDE 750 MG/1
40 TABLET, EXTENDED RELEASE ORAL PRN
Status: DISCONTINUED | OUTPATIENT
Start: 2025-05-13 | End: 2025-05-18 | Stop reason: HOSPADM

## 2025-05-13 RX ORDER — DEXAMETHASONE SODIUM PHOSPHATE 4 MG/ML
4 INJECTION, SOLUTION INTRA-ARTICULAR; INTRALESIONAL; INTRAMUSCULAR; INTRAVENOUS; SOFT TISSUE EVERY 8 HOURS
Status: DISCONTINUED | OUTPATIENT
Start: 2025-05-13 | End: 2025-05-18 | Stop reason: HOSPADM

## 2025-05-13 RX ORDER — SPIRONOLACTONE 25 MG/1
25 TABLET ORAL DAILY
Status: DISCONTINUED | OUTPATIENT
Start: 2025-05-13 | End: 2025-05-18 | Stop reason: HOSPADM

## 2025-05-13 RX ORDER — LORAZEPAM 2 MG/ML
1 INJECTION INTRAMUSCULAR ONCE
Status: COMPLETED | OUTPATIENT
Start: 2025-05-13 | End: 2025-05-13

## 2025-05-13 RX ORDER — MORPHINE SULFATE 2 MG/ML
2 INJECTION, SOLUTION INTRAMUSCULAR; INTRAVENOUS EVERY 4 HOURS PRN
Refills: 0 | Status: DISCONTINUED | OUTPATIENT
Start: 2025-05-13 | End: 2025-05-14

## 2025-05-13 RX ORDER — ONDANSETRON 2 MG/ML
4 INJECTION INTRAMUSCULAR; INTRAVENOUS EVERY 6 HOURS PRN
Status: DISCONTINUED | OUTPATIENT
Start: 2025-05-13 | End: 2025-05-18 | Stop reason: HOSPADM

## 2025-05-13 RX ORDER — LEVETIRACETAM 500 MG/1
1000 TABLET ORAL 2 TIMES DAILY
Status: DISCONTINUED | OUTPATIENT
Start: 2025-05-13 | End: 2025-05-13

## 2025-05-13 RX ORDER — ACETAMINOPHEN 650 MG/1
650 SUPPOSITORY RECTAL EVERY 6 HOURS PRN
Status: DISCONTINUED | OUTPATIENT
Start: 2025-05-13 | End: 2025-05-18 | Stop reason: HOSPADM

## 2025-05-13 RX ORDER — PANTOPRAZOLE SODIUM 40 MG/1
40 TABLET, DELAYED RELEASE ORAL
Status: DISCONTINUED | OUTPATIENT
Start: 2025-05-13 | End: 2025-05-13

## 2025-05-13 RX ORDER — SODIUM CHLORIDE 9 MG/ML
INJECTION, SOLUTION INTRAVENOUS PRN
Status: DISCONTINUED | OUTPATIENT
Start: 2025-05-13 | End: 2025-05-18 | Stop reason: HOSPADM

## 2025-05-13 RX ORDER — POLYETHYLENE GLYCOL 3350 17 G/17G
17 POWDER, FOR SOLUTION ORAL DAILY PRN
Status: DISCONTINUED | OUTPATIENT
Start: 2025-05-13 | End: 2025-05-18 | Stop reason: HOSPADM

## 2025-05-13 RX ORDER — ENOXAPARIN SODIUM 100 MG/ML
30 INJECTION SUBCUTANEOUS 2 TIMES DAILY
Status: DISCONTINUED | OUTPATIENT
Start: 2025-05-13 | End: 2025-05-18 | Stop reason: HOSPADM

## 2025-05-13 RX ORDER — HYDRALAZINE HYDROCHLORIDE 20 MG/ML
20 INJECTION INTRAMUSCULAR; INTRAVENOUS EVERY 6 HOURS PRN
Status: DISCONTINUED | OUTPATIENT
Start: 2025-05-13 | End: 2025-05-18 | Stop reason: HOSPADM

## 2025-05-13 RX ORDER — POTASSIUM CHLORIDE 7.45 MG/ML
10 INJECTION INTRAVENOUS PRN
Status: DISCONTINUED | OUTPATIENT
Start: 2025-05-13 | End: 2025-05-18 | Stop reason: HOSPADM

## 2025-05-13 RX ORDER — CARVEDILOL 12.5 MG/1
25 TABLET ORAL 2 TIMES DAILY
Status: DISCONTINUED | OUTPATIENT
Start: 2025-05-13 | End: 2025-05-18 | Stop reason: HOSPADM

## 2025-05-13 RX ORDER — ACETAMINOPHEN 325 MG/1
650 TABLET ORAL EVERY 6 HOURS PRN
Status: DISCONTINUED | OUTPATIENT
Start: 2025-05-13 | End: 2025-05-18 | Stop reason: HOSPADM

## 2025-05-13 RX ORDER — AMLODIPINE BESYLATE 5 MG/1
10 TABLET ORAL DAILY
Status: DISCONTINUED | OUTPATIENT
Start: 2025-05-13 | End: 2025-05-18 | Stop reason: HOSPADM

## 2025-05-13 RX ORDER — SODIUM CHLORIDE 0.9 % (FLUSH) 0.9 %
5-40 SYRINGE (ML) INJECTION PRN
Status: DISCONTINUED | OUTPATIENT
Start: 2025-05-13 | End: 2025-05-18 | Stop reason: HOSPADM

## 2025-05-13 RX ORDER — LEVETIRACETAM 500 MG/5ML
1000 INJECTION, SOLUTION, CONCENTRATE INTRAVENOUS EVERY 12 HOURS
Status: DISCONTINUED | OUTPATIENT
Start: 2025-05-13 | End: 2025-05-16 | Stop reason: CLARIF

## 2025-05-13 RX ORDER — LISINOPRIL 20 MG/1
40 TABLET ORAL DAILY
Status: DISCONTINUED | OUTPATIENT
Start: 2025-05-13 | End: 2025-05-18 | Stop reason: HOSPADM

## 2025-05-13 RX ORDER — ONDANSETRON 4 MG/1
4 TABLET, ORALLY DISINTEGRATING ORAL EVERY 8 HOURS PRN
Status: DISCONTINUED | OUTPATIENT
Start: 2025-05-13 | End: 2025-05-18 | Stop reason: HOSPADM

## 2025-05-13 RX ORDER — NALOXONE HYDROCHLORIDE 0.4 MG/ML
0.4 INJECTION, SOLUTION INTRAMUSCULAR; INTRAVENOUS; SUBCUTANEOUS PRN
Status: DISCONTINUED | OUTPATIENT
Start: 2025-05-13 | End: 2025-05-18 | Stop reason: HOSPADM

## 2025-05-13 RX ORDER — MAGNESIUM SULFATE IN WATER 40 MG/ML
2000 INJECTION, SOLUTION INTRAVENOUS PRN
Status: DISCONTINUED | OUTPATIENT
Start: 2025-05-13 | End: 2025-05-18 | Stop reason: HOSPADM

## 2025-05-13 RX ORDER — SODIUM CHLORIDE 0.9 % (FLUSH) 0.9 %
5-40 SYRINGE (ML) INJECTION EVERY 12 HOURS SCHEDULED
Status: DISCONTINUED | OUTPATIENT
Start: 2025-05-13 | End: 2025-05-18 | Stop reason: HOSPADM

## 2025-05-13 RX ORDER — OXYCODONE HYDROCHLORIDE 5 MG/1
5 TABLET ORAL EVERY 4 HOURS PRN
Refills: 0 | Status: DISCONTINUED | OUTPATIENT
Start: 2025-05-13 | End: 2025-05-14

## 2025-05-13 RX ORDER — POTASSIUM CHLORIDE 750 MG/1
40 TABLET, EXTENDED RELEASE ORAL ONCE
Status: COMPLETED | OUTPATIENT
Start: 2025-05-13 | End: 2025-05-13

## 2025-05-13 RX ADMIN — SODIUM CHLORIDE, PRESERVATIVE FREE 10 ML: 5 INJECTION INTRAVENOUS at 23:01

## 2025-05-13 RX ADMIN — SPIRONOLACTONE 25 MG: 25 TABLET ORAL at 09:22

## 2025-05-13 RX ADMIN — DEXAMETHASONE SODIUM PHOSPHATE 4 MG: 4 INJECTION, SOLUTION INTRAMUSCULAR; INTRAVENOUS at 04:47

## 2025-05-13 RX ADMIN — LORAZEPAM 1 MG: 2 INJECTION INTRAMUSCULAR; INTRAVENOUS at 11:26

## 2025-05-13 RX ADMIN — CARVEDILOL 25 MG: 12.5 TABLET, FILM COATED ORAL at 11:02

## 2025-05-13 RX ADMIN — ENOXAPARIN SODIUM 30 MG: 100 INJECTION SUBCUTANEOUS at 09:21

## 2025-05-13 RX ADMIN — NICARDIPINE HYDROCHLORIDE 2.5 MG/HR: 25 INJECTION INTRAVENOUS at 09:53

## 2025-05-13 RX ADMIN — MORPHINE SULFATE 2 MG: 2 INJECTION, SOLUTION INTRAMUSCULAR; INTRAVENOUS at 09:22

## 2025-05-13 RX ADMIN — LEVETIRACETAM 1000 MG: 100 INJECTION INTRAVENOUS at 22:00

## 2025-05-13 RX ADMIN — DEXAMETHASONE SODIUM PHOSPHATE 4 MG: 4 INJECTION, SOLUTION INTRAMUSCULAR; INTRAVENOUS at 23:00

## 2025-05-13 RX ADMIN — TIZANIDINE 4 MG: 4 TABLET ORAL at 11:02

## 2025-05-13 RX ADMIN — ENOXAPARIN SODIUM 30 MG: 100 INJECTION SUBCUTANEOUS at 22:00

## 2025-05-13 RX ADMIN — LEVETIRACETAM 1000 MG: 100 INJECTION INTRAVENOUS at 09:21

## 2025-05-13 RX ADMIN — DEXAMETHASONE SODIUM PHOSPHATE 4 MG: 4 INJECTION, SOLUTION INTRAMUSCULAR; INTRAVENOUS at 13:32

## 2025-05-13 RX ADMIN — POTASSIUM CHLORIDE 40 MEQ: 750 TABLET, FILM COATED, EXTENDED RELEASE ORAL at 04:53

## 2025-05-13 RX ADMIN — AMLODIPINE BESYLATE 10 MG: 5 TABLET ORAL at 11:02

## 2025-05-13 RX ADMIN — GADOTERIDOL 20 ML: 279.3 INJECTION, SOLUTION INTRAVENOUS at 12:25

## 2025-05-13 RX ADMIN — FAMOTIDINE 20 MG: 10 INJECTION, SOLUTION INTRAVENOUS at 09:36

## 2025-05-13 RX ADMIN — FAMOTIDINE 20 MG: 10 INJECTION, SOLUTION INTRAVENOUS at 23:00

## 2025-05-13 RX ADMIN — LISINOPRIL 40 MG: 20 TABLET ORAL at 11:02

## 2025-05-13 RX ADMIN — NICARDIPINE HYDROCHLORIDE 5 MG/HR: 25 INJECTION INTRAVENOUS at 04:52

## 2025-05-13 ASSESSMENT — PAIN SCALES - GENERAL
PAINLEVEL_OUTOF10: 0

## 2025-05-13 ASSESSMENT — PAIN SCALES - WONG BAKER: WONGBAKER_NUMERICALRESPONSE: NO HURT

## 2025-05-13 NOTE — H&P
History and Physical    Date of Service:  5/12/2025  Primary Care Provider: Janak Anderson MD  Source of information: The patient and review of EMR    Chief Complaint: Altered Mental Status      History of Presenting Illness:   Blas Delcid is a 50 y.o. male with past medical history significant for hypertension, glioblastoma multiform, obstructive sleep apnea, congestive heart failure presented at the emergency room with confusion.  Patient was recently admitted to this hospital from 4/6/2025 to 4/18/2025, patient underwent resection of the glioblastoma and was discharged to rehab.  Patient came home from rehab about a week ago and patient has not been doing well since then.  Patient sister stated that the patient is unable to care for himself.  Patient has also developed significant weakness and unable to walk.  Patient was unable to keep his appointment with radiation oncology service today for evaluation for potential palliative radiation because of the patient weakness.  CT scan of the head without contrast showed interval worsening of right to left subfalcine herniation worrisome for interval progression.  Patient was started on Decadron and was referred to the study service for admission.         REVIEW OF SYSTEMS:  Review of systems not obtained due to patient factors.     Past Medical History:   Diagnosis Date    Adverse effect of anesthesia     NEVER HAD ANESTHESIA    Brain mass 02/25/2022    Glioblastoma (HCC)     Hypertension     NICM (nonischemic cardiomyopathy) (HCC) 02/25/2022    cath open cors, high LVEDP, EF on echo was 35% day before    Seizures (HCC) 06/23/2023    Sleep apnea     NO CPAP    Systolic CHF, chronic (HCC) 2020    EF 31 then, now 35% 2022      Past Surgical History:   Procedure Laterality Date    CRANIOTOMY Right 10/16/2023    BRAIN LAB GUIDED RIGHT OCCIPITAL RE-DO CRANIOTOMY FOR REMOVAL OF GLIOMA performed by Enrike Ayala MD at [facility] MAIN OR    CRANIOTOMY N/A 4/15/2025

## 2025-05-13 NOTE — ED PROVIDER NOTES
following components:    Urobilinogen, Urine 2.0 (*)     Leukocyte Esterase, Urine TRACE (*)     All other components within normal limits   URINE CULTURE HOLD SAMPLE   EXTRA TUBES HOLD   TROPONIN       All other labs were within normal range or not returned as of this dictation.          COURSE/REASSESSMENT     ED Course as of 05/12/25 2006   Mon May 12, 2025   1529 EKG time 3:22 PM  Normal sinus rhythm rate of 69 with repolarization abnormalities, T wave inversions inferiorly and laterally with ST depressions but no ST elevations [CC]      ED Course User Index  [CC] Jerome López, DO           CONSULTS:  None    PROCEDURES:  Unless otherwise noted below, none     Procedures      DIFFERENTIAL DIAGNOSIS/MDM:   Medical Decision Making  Patient is a 50-year-old male history of glioblastoma with a recent resection here today secondary to declining mental status, marked fatigue and difficulty managing ADLs at home even with assistance.  Difficulty even getting to palliative radiation appointment today.  Hypertensive in the 200s upon arrival.  Hydralazine IV x 2 given.  Labs show UA clear, no leukocytosis or anemia, renal function acceptable, mildly hypokalemic with otherwise normal electrolytes.  CT head unfortunately shows worsening right to left subfalcine herniation concerning for interval progression, MRI recommended.  I discussed with his neurosurgeon, Dr. Ayala who will see the patient however notes that there is no further surgical intervention that can be done at this time.  This patient was discussed at tumor board a few weeks ago and did not feel any further surgical management would be beneficial to the patient.  Patient and family are interested in potentially starting palliative radiation.  I have started him on IV steroids.  Plan to admit for further management of blood pressure and to possibly have radiation oncology consultation patient.    Problems Addressed:  Cerebral edema (HCC): acute

## 2025-05-13 NOTE — ED NOTES
RN attempted to place nasal cannula as SpO2 is 90% on room air. Pt is refusing. Pt made aware of bed assignment. Pt phone returned to him.

## 2025-05-13 NOTE — CONSULTS
See Dr. Ayala's note from 05/13/25. This note serves to clear consult request. Spoke with Dr. Montero of radiation oncology and she will call the patient's sister as well to discuss role for XRT and also notify them of palliative care consult.    JAQUELIN Santana NP

## 2025-05-13 NOTE — CONSULTS
[facility] [place] [facility]  [facility]    Radiation Oncology Consultation    Blas Delcid  [id number]  1975     Diagnosis   1.   DIAGNOSIS & STAGING:  Cancer Staging   No matching staging information was found for the patient.      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  History of Present Illness   Mr. Delcid is a 50 y.o. male seen in consultation at the request of Dr. Ayala to assess the role of radiation for his above diagnosis.    He is well known to me. He initially presented 3 years ago with a right parietal mass s/p STR 3/2/22. At first path and imaging suggested a HGG/GBM, but final path suggested a supratentorial ependymoma, WHO grade 2. He was already half way through his 60Gy in 30 fractions with concurrent Temodar, so he opted to continue with this regimen and completed radiation 5/20/22.    Dr. Ayala then performed re-resection the following year, 2/20/23.    He was not on any therapy and had not seen Dr. Saeed for a while when he presented earlier this spring with worsening left sided weakness.    CT showed recurrent tumor.    Dr. Ayala performed re-resection for decompression 4/15/25 and was able to perform a STR due to concerns about getting into eloquent areas:  \"The tumor unfortunately invaded deep into the basal ganglia and the ventricle on the right side, so it was at that point at that depth that I decided to terminate the resection for concern of injuring more eloquent sites. There was a good decompression of the brain as a result of the resection.\"  Path: Abundant necrosis with admixed neoplastic cells morphologically compatible with recurrent supratentorial ependymoma     He was presented in Neuro tumor board, and he was deemed not a candidate for additional surgery because the tumor invaded deeper structures making it not amenable to complete resection, so additional surgery would not improve his

## 2025-05-14 PROBLEM — G89.29 CHRONIC INTRACTABLE HEADACHE: Status: ACTIVE | Noted: 2025-05-14

## 2025-05-14 PROBLEM — Z51.5 PALLIATIVE CARE BY SPECIALIST: Status: ACTIVE | Noted: 2025-05-14

## 2025-05-14 PROBLEM — R51.9 CHRONIC INTRACTABLE HEADACHE: Status: ACTIVE | Noted: 2025-05-14

## 2025-05-14 PROBLEM — G89.3 CANCER RELATED PAIN: Status: ACTIVE | Noted: 2025-05-14

## 2025-05-14 PROCEDURE — 2060000000 HC ICU INTERMEDIATE R&B

## 2025-05-14 PROCEDURE — 6370000000 HC RX 637 (ALT 250 FOR IP): Performed by: INTERNAL MEDICINE

## 2025-05-14 PROCEDURE — 97162 PT EVAL MOD COMPLEX 30 MIN: CPT

## 2025-05-14 PROCEDURE — 6360000002 HC RX W HCPCS: Performed by: INTERNAL MEDICINE

## 2025-05-14 PROCEDURE — 99223 1ST HOSP IP/OBS HIGH 75: CPT | Performed by: INTERNAL MEDICINE

## 2025-05-14 PROCEDURE — 97530 THERAPEUTIC ACTIVITIES: CPT

## 2025-05-14 PROCEDURE — 6370000000 HC RX 637 (ALT 250 FOR IP): Performed by: HOSPITALIST

## 2025-05-14 PROCEDURE — 6360000002 HC RX W HCPCS: Performed by: HOSPITALIST

## 2025-05-14 PROCEDURE — 2580000003 HC RX 258: Performed by: INTERNAL MEDICINE

## 2025-05-14 PROCEDURE — 97535 SELF CARE MNGMENT TRAINING: CPT

## 2025-05-14 PROCEDURE — 97165 OT EVAL LOW COMPLEX 30 MIN: CPT

## 2025-05-14 PROCEDURE — 2500000003 HC RX 250 WO HCPCS: Performed by: INTERNAL MEDICINE

## 2025-05-14 RX ORDER — OXYCODONE HYDROCHLORIDE 5 MG/1
7.5 TABLET ORAL EVERY 4 HOURS PRN
Refills: 0 | Status: DISCONTINUED | OUTPATIENT
Start: 2025-05-14 | End: 2025-05-18 | Stop reason: HOSPADM

## 2025-05-14 RX ORDER — OXYCODONE HYDROCHLORIDE 5 MG/1
7.5 TABLET ORAL EVERY 8 HOURS
Refills: 0 | Status: DISCONTINUED | OUTPATIENT
Start: 2025-05-14 | End: 2025-05-18 | Stop reason: HOSPADM

## 2025-05-14 RX ORDER — MORPHINE SULFATE 2 MG/ML
1 INJECTION, SOLUTION INTRAMUSCULAR; INTRAVENOUS EVERY 4 HOURS PRN
Status: DISCONTINUED | OUTPATIENT
Start: 2025-05-14 | End: 2025-05-18 | Stop reason: HOSPADM

## 2025-05-14 RX ORDER — SENNOSIDES A AND B 8.6 MG/1
1 TABLET, FILM COATED ORAL NIGHTLY
Status: DISCONTINUED | OUTPATIENT
Start: 2025-05-14 | End: 2025-05-18 | Stop reason: HOSPADM

## 2025-05-14 RX ADMIN — OXYCODONE 5 MG: 5 TABLET ORAL at 08:39

## 2025-05-14 RX ADMIN — FAMOTIDINE 20 MG: 10 INJECTION, SOLUTION INTRAVENOUS at 20:49

## 2025-05-14 RX ADMIN — OXYCODONE 7.5 MG: 5 TABLET ORAL at 20:48

## 2025-05-14 RX ADMIN — MORPHINE SULFATE 1 MG: 2 INJECTION, SOLUTION INTRAMUSCULAR; INTRAVENOUS at 18:34

## 2025-05-14 RX ADMIN — CARVEDILOL 25 MG: 12.5 TABLET, FILM COATED ORAL at 20:49

## 2025-05-14 RX ADMIN — TIZANIDINE 4 MG: 4 TABLET ORAL at 08:39

## 2025-05-14 RX ADMIN — TIZANIDINE 4 MG: 4 TABLET ORAL at 20:49

## 2025-05-14 RX ADMIN — TIZANIDINE 4 MG: 4 TABLET ORAL at 12:32

## 2025-05-14 RX ADMIN — FAMOTIDINE 20 MG: 10 INJECTION, SOLUTION INTRAVENOUS at 08:39

## 2025-05-14 RX ADMIN — SPIRONOLACTONE 25 MG: 25 TABLET ORAL at 08:39

## 2025-05-14 RX ADMIN — SODIUM CHLORIDE, PRESERVATIVE FREE 10 ML: 5 INJECTION INTRAVENOUS at 20:50

## 2025-05-14 RX ADMIN — DEXAMETHASONE SODIUM PHOSPHATE 4 MG: 4 INJECTION, SOLUTION INTRAMUSCULAR; INTRAVENOUS at 05:37

## 2025-05-14 RX ADMIN — ENOXAPARIN SODIUM 30 MG: 100 INJECTION SUBCUTANEOUS at 20:49

## 2025-05-14 RX ADMIN — AMLODIPINE BESYLATE 10 MG: 5 TABLET ORAL at 08:39

## 2025-05-14 RX ADMIN — LEVETIRACETAM 1000 MG: 100 INJECTION INTRAVENOUS at 08:39

## 2025-05-14 RX ADMIN — MORPHINE SULFATE 1 MG: 2 INJECTION, SOLUTION INTRAMUSCULAR; INTRAVENOUS at 15:32

## 2025-05-14 RX ADMIN — OXYCODONE 7.5 MG: 5 TABLET ORAL at 12:32

## 2025-05-14 RX ADMIN — LISINOPRIL 40 MG: 20 TABLET ORAL at 08:39

## 2025-05-14 RX ADMIN — DEXAMETHASONE SODIUM PHOSPHATE 4 MG: 4 INJECTION, SOLUTION INTRAMUSCULAR; INTRAVENOUS at 20:51

## 2025-05-14 RX ADMIN — DEXAMETHASONE SODIUM PHOSPHATE 4 MG: 4 INJECTION, SOLUTION INTRAMUSCULAR; INTRAVENOUS at 12:33

## 2025-05-14 RX ADMIN — LEVETIRACETAM 1000 MG: 100 INJECTION INTRAVENOUS at 20:49

## 2025-05-14 RX ADMIN — ENOXAPARIN SODIUM 30 MG: 100 INJECTION SUBCUTANEOUS at 08:39

## 2025-05-14 RX ADMIN — SENNOSIDES 8.6 MG: 8.6 TABLET, FILM COATED ORAL at 20:49

## 2025-05-14 RX ADMIN — CARVEDILOL 25 MG: 12.5 TABLET, FILM COATED ORAL at 08:39

## 2025-05-14 ASSESSMENT — PAIN SCALES - WONG BAKER: WONGBAKER_NUMERICALRESPONSE: NO HURT

## 2025-05-14 ASSESSMENT — PAIN SCALES - GENERAL
PAINLEVEL_OUTOF10: 0
PAINLEVEL_OUTOF10: 7

## 2025-05-14 ASSESSMENT — PAIN DESCRIPTION - DESCRIPTORS: DESCRIPTORS: ACHING

## 2025-05-14 ASSESSMENT — PAIN DESCRIPTION - LOCATION: LOCATION: HEAD

## 2025-05-14 NOTE — CONSULTS
Palliative Medicine  Patient Name: Blas Delcid  YOB: 1975  MRN: [id number]  Age: 50 y.o.  Gender: male    Date of Initial Consult: 5/14/2025  Date of Service: 5/14/2025  Time: 12:25 PM  Provider: Karina Crandall MD  Hospital Day: 3  Admit Date: 5/12/2025  Referring Provider: Dr. Kapoor       Reasons for Consultation:  Overwhelming Symptoms    HISTORY OF PRESENT ILLNESS (HPI):   Blas Delcid is a 50 y.o. 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     Psychosocial: patient is single.   He has 15 children ages 5-30.  He lives with his sister, 1mpoa , Saulo Delcid [phone] in [facility]  2[address], mother, Linh Delcid [phone]  Enjoys watching Football (used to play)       PALLIATIVE DIAGNOSES:    Advanced brain cancer  No longer a candidate for more surgical resections  Would like to pursue palliative XRT  Would like to pursue more chemo if offered  Headache, cancer related pain -  not well controlled  Left sided weakness, debility  Care goals discussion  Palliative medicine encounter       ASSESSMENT AND PLAN:   Case discussed with Dr. Montero  Palliative Medicine introduced to patient, family as added layer of support in chronic illness.  Patient has heard a lot from Dr. Montero and are interested in palliative XRT.  Sister also request information from oncology (Dr. Saeed) about possible chemo pill.   They were planning to see Dr. Saeed as outpatient after visit with Rad/Onc (now seen inpatient)  Symptom management -- big concern right now is headaches.  Pretty constant, cannot sleep because of headache.  Was sensitive to 2mg IV morphine (slept for a while).  Oxy IR 5mg did not help.   Recommend Oxycodone

## 2025-05-15 PROCEDURE — 2060000000 HC ICU INTERMEDIATE R&B

## 2025-05-15 PROCEDURE — 6370000000 HC RX 637 (ALT 250 FOR IP): Performed by: INTERNAL MEDICINE

## 2025-05-15 PROCEDURE — 2500000003 HC RX 250 WO HCPCS: Performed by: INTERNAL MEDICINE

## 2025-05-15 PROCEDURE — 6360000002 HC RX W HCPCS: Performed by: INTERNAL MEDICINE

## 2025-05-15 PROCEDURE — 6370000000 HC RX 637 (ALT 250 FOR IP): Performed by: HOSPITALIST

## 2025-05-15 PROCEDURE — 99231 SBSQ HOSP IP/OBS SF/LOW 25: CPT | Performed by: INTERNAL MEDICINE

## 2025-05-15 PROCEDURE — 6360000002 HC RX W HCPCS: Performed by: HOSPITALIST

## 2025-05-15 PROCEDURE — 97535 SELF CARE MNGMENT TRAINING: CPT

## 2025-05-15 PROCEDURE — 2580000003 HC RX 258: Performed by: INTERNAL MEDICINE

## 2025-05-15 PROCEDURE — 97530 THERAPEUTIC ACTIVITIES: CPT

## 2025-05-15 RX ORDER — TORSEMIDE 20 MG/1
40 TABLET ORAL DAILY
Status: DISCONTINUED | OUTPATIENT
Start: 2025-05-15 | End: 2025-05-18 | Stop reason: HOSPADM

## 2025-05-15 RX ADMIN — OXYCODONE 7.5 MG: 5 TABLET ORAL at 21:32

## 2025-05-15 RX ADMIN — SPIRONOLACTONE 25 MG: 25 TABLET ORAL at 10:11

## 2025-05-15 RX ADMIN — LEVETIRACETAM 1000 MG: 100 INJECTION INTRAVENOUS at 10:00

## 2025-05-15 RX ADMIN — FAMOTIDINE 20 MG: 10 INJECTION, SOLUTION INTRAVENOUS at 10:08

## 2025-05-15 RX ADMIN — DEXAMETHASONE SODIUM PHOSPHATE 4 MG: 4 INJECTION, SOLUTION INTRAMUSCULAR; INTRAVENOUS at 14:07

## 2025-05-15 RX ADMIN — DEXAMETHASONE SODIUM PHOSPHATE 4 MG: 4 INJECTION, SOLUTION INTRAMUSCULAR; INTRAVENOUS at 05:11

## 2025-05-15 RX ADMIN — LISINOPRIL 40 MG: 20 TABLET ORAL at 10:07

## 2025-05-15 RX ADMIN — SENNOSIDES 8.6 MG: 8.6 TABLET, FILM COATED ORAL at 21:31

## 2025-05-15 RX ADMIN — SODIUM CHLORIDE, PRESERVATIVE FREE 10 ML: 5 INJECTION INTRAVENOUS at 21:32

## 2025-05-15 RX ADMIN — TIZANIDINE 4 MG: 4 TABLET ORAL at 10:10

## 2025-05-15 RX ADMIN — SODIUM CHLORIDE, PRESERVATIVE FREE 10 ML: 5 INJECTION INTRAVENOUS at 10:10

## 2025-05-15 RX ADMIN — ENOXAPARIN SODIUM 30 MG: 100 INJECTION SUBCUTANEOUS at 10:12

## 2025-05-15 RX ADMIN — CARVEDILOL 25 MG: 12.5 TABLET, FILM COATED ORAL at 21:32

## 2025-05-15 RX ADMIN — AMLODIPINE BESYLATE 10 MG: 5 TABLET ORAL at 10:11

## 2025-05-15 RX ADMIN — DEXAMETHASONE SODIUM PHOSPHATE 4 MG: 4 INJECTION, SOLUTION INTRAMUSCULAR; INTRAVENOUS at 21:31

## 2025-05-15 RX ADMIN — TIZANIDINE 4 MG: 4 TABLET ORAL at 14:07

## 2025-05-15 RX ADMIN — OXYCODONE 7.5 MG: 5 TABLET ORAL at 14:07

## 2025-05-15 RX ADMIN — TORSEMIDE 40 MG: 20 TABLET ORAL at 19:11

## 2025-05-15 RX ADMIN — FAMOTIDINE 20 MG: 10 INJECTION, SOLUTION INTRAVENOUS at 21:31

## 2025-05-15 RX ADMIN — TIZANIDINE 4 MG: 4 TABLET ORAL at 21:31

## 2025-05-15 RX ADMIN — LEVETIRACETAM 1000 MG: 100 INJECTION INTRAVENOUS at 21:31

## 2025-05-15 NOTE — CONSULTS
Palliative Medicine  Patient Name: Blas Delcid  YOB: 1975  MRN: [id number]  Age: 50 y.o.  Gender: male    Date of Initial Consult: 5/14/2025  Date of Service: 5/15/2025  Time: 1:36 PM  Provider: Karina Crandall MD  Hospital Day: 4  Admit Date: 5/12/2025  Referring Provider: Dr. Kapoor       Reasons for Consultation:  Overwhelming Symptoms    HISTORY OF PRESENT ILLNESS (HPI):   Blas Delcid is a 50 y.o. 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     Psychosocial: patient is single.   He has 15 children ages 5-30.  He lives with his sister, 1mpoa , Saulo Delcid [phone] in [facility]  [address], mother, Linh Delcid [phone]  Enjoys watching Football (used to play)       PALLIATIVE DIAGNOSES:    Advanced brain cancer  No longer a candidate for more surgical resections  Would like to pursue palliative XRT  Would like to pursue more chemo if offered  Headache, cancer related pain -  not well controlled  Left sided weakness, debility  Care goals discussion  Palliative medicine encounter       ASSESSMENT AND PLAN:   Case discussed with Dr. Montero yesterday  Palliative Medicine introduced to patient, family as added layer of support in chronic illness.  Patient has heard a lot from Dr. Montero and are interested in palliative XRT.  Sister also request information from oncology (Dr. Saeed) about possible chemo pill.   They were planning to see Dr. Saeed as outpatient after visit with Rad/Onc (now seen inpatient)  Will refer to Dr. Saeed palliative NP for follow up.   Symptom management -- big concern right now is headaches.  Pretty constant, cannot sleep because of headache.  Was sensitive to 2mg IV morphine (slept

## 2025-05-15 NOTE — PROGRESS NOTES
I have heard that patient might want to consider radiation and chemo/pill. Dr. Saeed is out of town but can see him as an outpatient when he returns. Inpatient team can coordinate before discharge for him to see Dr. Saeed with [facility] as an outpatient. I can either perform CT simulation for radiation planning tomorrow, or we could wait and do that after he sees Dr. Saeed and hears what he suggests and then do CT simulation as an outpatient. Also, if patient decides to get a [facility] opinion, we can faciliate a consultation with medical oncology or radiation oncology at [facility]. Please let me know if anything I can do while he is an inpatient. I tried his sister and she did not answer, but I did leave her a message to let me know what they want to do.

## 2025-05-15 NOTE — CARE COORDINATION
12pm: Pt tentative plan, per Attending and specialists, is XRT then possible home hospice. CM will follow w/ formal assessment.    KARIN CookW

## 2025-05-16 ENCOUNTER — APPOINTMENT (OUTPATIENT)
Facility: HOSPITAL | Age: 50
DRG: 080 | End: 2025-05-16
Payer: MEDICARE

## 2025-05-16 ENCOUNTER — HOSPITAL ENCOUNTER (OUTPATIENT)
Facility: HOSPITAL | Age: 50
Discharge: HOME | End: 2025-05-19

## 2025-05-16 LAB
ANION GAP SERPL CALC-SCNC: 6 MMOL/L (ref 2–12)
BASOPHILS # BLD: 0 K/UL (ref 0–0.1)
BASOPHILS NFR BLD: 0 % (ref 0–1)
BUN SERPL-MCNC: 25 MG/DL (ref 6–20)
BUN/CREAT SERPL: 21 (ref 12–20)
CALCIUM SERPL-MCNC: 9.3 MG/DL (ref 8.5–10.1)
CHLORIDE SERPL-SCNC: 100 MMOL/L (ref 97–108)
CO2 SERPL-SCNC: 30 MMOL/L (ref 21–32)
CREAT SERPL-MCNC: 1.18 MG/DL (ref 0.7–1.3)
DIFFERENTIAL METHOD BLD: ABNORMAL
ECHO BSA: 2.33 M2
EKG ATRIAL RATE: 69 BPM
EKG DIAGNOSIS: NORMAL
EKG P AXIS: 53 DEGREES
EKG P-R INTERVAL: 144 MS
EKG Q-T INTERVAL: 376 MS
EKG QRS DURATION: 88 MS
EKG QTC CALCULATION (BAZETT): 402 MS
EKG R AXIS: 0 DEGREES
EKG T AXIS: -48 DEGREES
EKG VENTRICULAR RATE: 69 BPM
EOSINOPHIL # BLD: 0 K/UL (ref 0–0.4)
EOSINOPHIL NFR BLD: 0 % (ref 0–7)
ERYTHROCYTE [DISTWIDTH] IN BLOOD BY AUTOMATED COUNT: 12 % (ref 11.5–14.5)
GLUCOSE SERPL-MCNC: 138 MG/DL (ref 65–100)
HCT VFR BLD AUTO: 49.8 % (ref 36.6–50.3)
HGB BLD-MCNC: 17 G/DL (ref 12.1–17)
IMM GRANULOCYTES # BLD AUTO: 0 K/UL
IMM GRANULOCYTES NFR BLD AUTO: 0 %
LYMPHOCYTES # BLD: 2.33 K/UL (ref 0.8–3.5)
LYMPHOCYTES NFR BLD: 15 % (ref 12–49)
MCH RBC QN AUTO: 30.2 PG (ref 26–34)
MCHC RBC AUTO-ENTMCNC: 34.1 G/DL (ref 30–36.5)
MCV RBC AUTO: 88.6 FL (ref 80–99)
MONOCYTES # BLD: 1.09 K/UL (ref 0–1)
MONOCYTES NFR BLD: 7 % (ref 5–13)
NEUTS SEG # BLD: 12.08 K/UL (ref 1.8–8)
NEUTS SEG NFR BLD: 78 % (ref 32–75)
NRBC # BLD: 0 K/UL (ref 0–0.01)
NRBC BLD-RTO: 0 PER 100 WBC
PLATELET # BLD AUTO: 406 K/UL (ref 150–400)
PMV BLD AUTO: 10.6 FL (ref 8.9–12.9)
POTASSIUM SERPL-SCNC: 3.9 MMOL/L (ref 3.5–5.1)
RBC # BLD AUTO: 5.62 M/UL (ref 4.1–5.7)
RBC MORPH BLD: ABNORMAL
SODIUM SERPL-SCNC: 136 MMOL/L (ref 136–145)
WBC # BLD AUTO: 15.5 K/UL (ref 4.1–11.1)

## 2025-05-16 PROCEDURE — 77470 SPECIAL RADIATION TREATMENT: CPT

## 2025-05-16 PROCEDURE — 2500000003 HC RX 250 WO HCPCS: Performed by: INTERNAL MEDICINE

## 2025-05-16 PROCEDURE — 77334 RADIATION TREATMENT AID(S): CPT

## 2025-05-16 PROCEDURE — 6370000000 HC RX 637 (ALT 250 FOR IP): Performed by: HOSPITALIST

## 2025-05-16 PROCEDURE — 85025 COMPLETE CBC W/AUTO DIFF WBC: CPT

## 2025-05-16 PROCEDURE — 77290 THER RAD SIMULAJ FIELD CPLX: CPT

## 2025-05-16 PROCEDURE — 6360000002 HC RX W HCPCS: Performed by: HOSPITALIST

## 2025-05-16 PROCEDURE — 6360000002 HC RX W HCPCS: Performed by: INTERNAL MEDICINE

## 2025-05-16 PROCEDURE — 77014 CT GUIDE PLACEMENT RADIATION THERAPY: CPT

## 2025-05-16 PROCEDURE — 80048 BASIC METABOLIC PNL TOTAL CA: CPT

## 2025-05-16 PROCEDURE — 2060000000 HC ICU INTERMEDIATE R&B

## 2025-05-16 PROCEDURE — 6370000000 HC RX 637 (ALT 250 FOR IP): Performed by: INTERNAL MEDICINE

## 2025-05-16 RX ORDER — LORAZEPAM 2 MG/ML
1 INJECTION INTRAMUSCULAR ONCE
Status: COMPLETED | OUTPATIENT
Start: 2025-05-16 | End: 2025-05-16

## 2025-05-16 RX ORDER — FAMOTIDINE 20 MG/1
20 TABLET, FILM COATED ORAL 2 TIMES DAILY
Status: DISCONTINUED | OUTPATIENT
Start: 2025-05-16 | End: 2025-05-18 | Stop reason: HOSPADM

## 2025-05-16 RX ORDER — LEVETIRACETAM 500 MG/1
1000 TABLET ORAL 2 TIMES DAILY
Status: DISCONTINUED | OUTPATIENT
Start: 2025-05-16 | End: 2025-05-18 | Stop reason: HOSPADM

## 2025-05-16 RX ADMIN — LISINOPRIL 40 MG: 20 TABLET ORAL at 08:46

## 2025-05-16 RX ADMIN — FAMOTIDINE 20 MG: 20 TABLET, FILM COATED ORAL at 08:46

## 2025-05-16 RX ADMIN — OXYCODONE 7.5 MG: 5 TABLET ORAL at 20:42

## 2025-05-16 RX ADMIN — OXYCODONE 7.5 MG: 5 TABLET ORAL at 05:33

## 2025-05-16 RX ADMIN — DEXAMETHASONE SODIUM PHOSPHATE 4 MG: 4 INJECTION, SOLUTION INTRAMUSCULAR; INTRAVENOUS at 12:29

## 2025-05-16 RX ADMIN — LEVETIRACETAM 1000 MG: 500 TABLET, FILM COATED ORAL at 08:46

## 2025-05-16 RX ADMIN — ENOXAPARIN SODIUM 30 MG: 100 INJECTION SUBCUTANEOUS at 20:41

## 2025-05-16 RX ADMIN — AMLODIPINE BESYLATE 10 MG: 5 TABLET ORAL at 08:47

## 2025-05-16 RX ADMIN — SODIUM CHLORIDE, PRESERVATIVE FREE 10 ML: 5 INJECTION INTRAVENOUS at 20:42

## 2025-05-16 RX ADMIN — TIZANIDINE 4 MG: 4 TABLET ORAL at 08:46

## 2025-05-16 RX ADMIN — DEXAMETHASONE SODIUM PHOSPHATE 4 MG: 4 INJECTION, SOLUTION INTRAMUSCULAR; INTRAVENOUS at 20:42

## 2025-05-16 RX ADMIN — SENNOSIDES 8.6 MG: 8.6 TABLET, FILM COATED ORAL at 20:41

## 2025-05-16 RX ADMIN — OXYCODONE 7.5 MG: 5 TABLET ORAL at 12:28

## 2025-05-16 RX ADMIN — CARVEDILOL 25 MG: 12.5 TABLET, FILM COATED ORAL at 20:41

## 2025-05-16 RX ADMIN — ENOXAPARIN SODIUM 30 MG: 100 INJECTION SUBCUTANEOUS at 08:47

## 2025-05-16 RX ADMIN — LEVETIRACETAM 1000 MG: 500 TABLET, FILM COATED ORAL at 20:41

## 2025-05-16 RX ADMIN — FAMOTIDINE 20 MG: 20 TABLET, FILM COATED ORAL at 20:42

## 2025-05-16 RX ADMIN — DEXAMETHASONE SODIUM PHOSPHATE 4 MG: 4 INJECTION, SOLUTION INTRAMUSCULAR; INTRAVENOUS at 05:33

## 2025-05-16 RX ADMIN — TORSEMIDE 40 MG: 20 TABLET ORAL at 08:46

## 2025-05-16 RX ADMIN — TIZANIDINE 4 MG: 4 TABLET ORAL at 12:29

## 2025-05-16 RX ADMIN — LORAZEPAM 1 MG: 2 INJECTION INTRAMUSCULAR; INTRAVENOUS at 14:44

## 2025-05-16 RX ADMIN — MORPHINE SULFATE 1 MG: 2 INJECTION, SOLUTION INTRAMUSCULAR; INTRAVENOUS at 08:47

## 2025-05-16 ASSESSMENT — PAIN SCALES - GENERAL: PAINLEVEL_OUTOF10: 0

## 2025-05-16 NOTE — CARE COORDINATION
Care Management Initial Assessment       RUR:  19%  Readmission? No  1st IM letter given?   1st  letter given: no    CARY: Pt admitted from home w/ sister, dispo TBD (pending Onc/radiation plan) >48 hrs    Transport: [facility]     CM completed assessment w/ Pt sister/Caregiver Geoffrey. Pt lives w/ sister in a 1 story home w/ ramped entrance.  Noted that Pt physical address is [address] [place] [id number].     ADLs: dependent in all ADLs - Pt is disabled, does not work   Sister assist w/ all   DME: reacher, grab bars, cane   PCP follow up: PCP confirmed - seen within 6 months   Previous Home Health: none  Previous Skilled Nursing Facility: none  Previous Inpatient Rehab: yes; [facility] and [facility] (4/18)  Insurance verified: yes; [facility] Medicare, [employer] plan, [facility] Medicaid  Pharmacy: [facility]   Emergency Contact: GEOFFREY MANRIQUEZ (Sister)  [phone] (Mobile)     4pm: Verified face sheet and demographics w/ Pt sister Geoffrey - Pt dependent in all ADLs, sister assists w/ all to include driving Pt. No Hx of SNF OR HH, Hx of [facility] and [facility]. Sister reports being in contact w/ [facility] department to obtain paid personal care nurse for Pt during the day. Endorsed not being familiar w/ [facility] completion, unsure if [facility] hs completed or not.  Pt currently being followed by Oncology deciding radiation plan - possible plan is to complete XRT at [facility]? Venus is still unsure of plan.   She mentioned Pt  possibly needing LTC in the future but did not want to d/w Pt yet -  she will focus first on radiation. CM will follow.      CM will follow patient progress and assist as needed with CARY plan.       05/16/25 6200   Service Assessment   Patient Orientation Alert and Oriented;Person;Place;Situation;Self   Cognition Alert   History Provided By Child/Family  (sister Geoffrey)   Primary Caregiver Family   Support Systems Family Members   Patient's Healthcare Decision Maker is: Named in Scanned

## 2025-05-17 LAB
ANION GAP SERPL CALC-SCNC: 10 MMOL/L (ref 2–12)
BASOPHILS # BLD: 0.04 K/UL (ref 0–0.1)
BASOPHILS NFR BLD: 0.2 % (ref 0–1)
BUN SERPL-MCNC: 37 MG/DL (ref 6–20)
BUN/CREAT SERPL: 29 (ref 12–20)
CALCIUM SERPL-MCNC: 9.4 MG/DL (ref 8.5–10.1)
CHLORIDE SERPL-SCNC: 99 MMOL/L (ref 97–108)
CO2 SERPL-SCNC: 28 MMOL/L (ref 21–32)
CREAT SERPL-MCNC: 1.29 MG/DL (ref 0.7–1.3)
DIFFERENTIAL METHOD BLD: ABNORMAL
EOSINOPHIL # BLD: 0 K/UL (ref 0–0.4)
EOSINOPHIL NFR BLD: 0 % (ref 0–7)
ERYTHROCYTE [DISTWIDTH] IN BLOOD BY AUTOMATED COUNT: 11.9 % (ref 11.5–14.5)
GLUCOSE SERPL-MCNC: 140 MG/DL (ref 65–100)
HCT VFR BLD AUTO: 48 % (ref 36.6–50.3)
HGB BLD-MCNC: 16 G/DL (ref 12.1–17)
IMM GRANULOCYTES # BLD AUTO: 0.32 K/UL (ref 0–0.04)
IMM GRANULOCYTES NFR BLD AUTO: 1.9 % (ref 0–0.5)
LYMPHOCYTES # BLD: 1.89 K/UL (ref 0.8–3.5)
LYMPHOCYTES NFR BLD: 11.3 % (ref 12–49)
MCH RBC QN AUTO: 30 PG (ref 26–34)
MCHC RBC AUTO-ENTMCNC: 33.3 G/DL (ref 30–36.5)
MCV RBC AUTO: 90.1 FL (ref 80–99)
MONOCYTES # BLD: 1.33 K/UL (ref 0–1)
MONOCYTES NFR BLD: 8 % (ref 5–13)
NEUTS SEG # BLD: 13.08 K/UL (ref 1.8–8)
NEUTS SEG NFR BLD: 78.6 % (ref 32–75)
NRBC # BLD: 0 K/UL (ref 0–0.01)
NRBC BLD-RTO: 0 PER 100 WBC
PLATELET # BLD AUTO: 453 K/UL (ref 150–400)
PMV BLD AUTO: 10.7 FL (ref 8.9–12.9)
POTASSIUM SERPL-SCNC: 4.3 MMOL/L (ref 3.5–5.1)
RBC # BLD AUTO: 5.33 M/UL (ref 4.1–5.7)
SODIUM SERPL-SCNC: 137 MMOL/L (ref 136–145)
WBC # BLD AUTO: 16.7 K/UL (ref 4.1–11.1)

## 2025-05-17 PROCEDURE — 6360000002 HC RX W HCPCS: Performed by: INTERNAL MEDICINE

## 2025-05-17 PROCEDURE — 85025 COMPLETE CBC W/AUTO DIFF WBC: CPT

## 2025-05-17 PROCEDURE — 6370000000 HC RX 637 (ALT 250 FOR IP): Performed by: HOSPITALIST

## 2025-05-17 PROCEDURE — 6370000000 HC RX 637 (ALT 250 FOR IP): Performed by: INTERNAL MEDICINE

## 2025-05-17 PROCEDURE — 2500000003 HC RX 250 WO HCPCS: Performed by: INTERNAL MEDICINE

## 2025-05-17 PROCEDURE — 80048 BASIC METABOLIC PNL TOTAL CA: CPT

## 2025-05-17 PROCEDURE — 2060000000 HC ICU INTERMEDIATE R&B

## 2025-05-17 RX ADMIN — DEXAMETHASONE SODIUM PHOSPHATE 4 MG: 4 INJECTION, SOLUTION INTRAMUSCULAR; INTRAVENOUS at 13:15

## 2025-05-17 RX ADMIN — SODIUM CHLORIDE, PRESERVATIVE FREE 10 ML: 5 INJECTION INTRAVENOUS at 21:22

## 2025-05-17 RX ADMIN — AMLODIPINE BESYLATE 10 MG: 5 TABLET ORAL at 08:45

## 2025-05-17 RX ADMIN — TIZANIDINE 4 MG: 4 TABLET ORAL at 21:16

## 2025-05-17 RX ADMIN — LISINOPRIL 40 MG: 20 TABLET ORAL at 08:45

## 2025-05-17 RX ADMIN — FAMOTIDINE 20 MG: 20 TABLET, FILM COATED ORAL at 08:45

## 2025-05-17 RX ADMIN — SODIUM CHLORIDE, PRESERVATIVE FREE 10 ML: 5 INJECTION INTRAVENOUS at 08:44

## 2025-05-17 RX ADMIN — SPIRONOLACTONE 25 MG: 25 TABLET ORAL at 08:45

## 2025-05-17 RX ADMIN — TIZANIDINE 4 MG: 4 TABLET ORAL at 08:45

## 2025-05-17 RX ADMIN — ENOXAPARIN SODIUM 30 MG: 100 INJECTION SUBCUTANEOUS at 08:44

## 2025-05-17 RX ADMIN — FAMOTIDINE 20 MG: 20 TABLET, FILM COATED ORAL at 21:15

## 2025-05-17 RX ADMIN — LEVETIRACETAM 1000 MG: 500 TABLET, FILM COATED ORAL at 08:45

## 2025-05-17 RX ADMIN — DEXAMETHASONE SODIUM PHOSPHATE 4 MG: 4 INJECTION, SOLUTION INTRAMUSCULAR; INTRAVENOUS at 05:29

## 2025-05-17 RX ADMIN — ENOXAPARIN SODIUM 30 MG: 100 INJECTION SUBCUTANEOUS at 21:16

## 2025-05-17 RX ADMIN — TORSEMIDE 40 MG: 20 TABLET ORAL at 08:45

## 2025-05-17 RX ADMIN — CARVEDILOL 25 MG: 12.5 TABLET, FILM COATED ORAL at 08:45

## 2025-05-17 RX ADMIN — OXYCODONE 7.5 MG: 5 TABLET ORAL at 05:29

## 2025-05-17 RX ADMIN — TIZANIDINE 4 MG: 4 TABLET ORAL at 13:14

## 2025-05-17 RX ADMIN — OXYCODONE 7.5 MG: 5 TABLET ORAL at 13:14

## 2025-05-17 RX ADMIN — OXYCODONE 7.5 MG: 5 TABLET ORAL at 21:16

## 2025-05-17 RX ADMIN — LEVETIRACETAM 1000 MG: 500 TABLET, FILM COATED ORAL at 21:15

## 2025-05-17 RX ADMIN — DEXAMETHASONE SODIUM PHOSPHATE 4 MG: 4 INJECTION, SOLUTION INTRAMUSCULAR; INTRAVENOUS at 21:15

## 2025-05-17 ASSESSMENT — PAIN SCALES - GENERAL
PAINLEVEL_OUTOF10: 4
PAINLEVEL_OUTOF10: 1

## 2025-05-17 ASSESSMENT — PAIN DESCRIPTION - LOCATION: LOCATION: HEAD

## 2025-05-17 NOTE — CARE COORDINATION
Per verbal discuss with attending, sister/caregiver, Valeria plans to take patient home and follow-up with Dr. Saeed outpatient and receive radiation at [facility]. CM noted HH PT, OT, SN order; [facility]: [facility], [facility], [facility], [facility]  - [facility] accepted stating patient already receiving services. AVS updated.     CM noted DME recommendations for WC. CM sent referral to [facility] via [facility]; awaiting insurance approval. WC requested to be delivered to patient's home ASAP with sister/caregiver as point of contact for delivery coordination.     Makenna Wong, CODY   [phone]

## 2025-05-18 VITALS
SYSTOLIC BLOOD PRESSURE: 127 MMHG | DIASTOLIC BLOOD PRESSURE: 85 MMHG | HEIGHT: 64 IN | WEIGHT: 257.28 LBS | RESPIRATION RATE: 18 BRPM | OXYGEN SATURATION: 100 % | TEMPERATURE: 98 F | HEART RATE: 61 BPM | BODY MASS INDEX: 43.92 KG/M2

## 2025-05-18 LAB
ANION GAP SERPL CALC-SCNC: 10 MMOL/L (ref 2–12)
BASOPHILS # BLD: 0.08 K/UL (ref 0–0.1)
BASOPHILS NFR BLD: 0.4 % (ref 0–1)
BUN SERPL-MCNC: 55 MG/DL (ref 6–20)
BUN/CREAT SERPL: 28 (ref 12–20)
CALCIUM SERPL-MCNC: 9 MG/DL (ref 8.5–10.1)
CHLORIDE SERPL-SCNC: 96 MMOL/L (ref 97–108)
CO2 SERPL-SCNC: 30 MMOL/L (ref 21–32)
CREAT SERPL-MCNC: 1.97 MG/DL (ref 0.7–1.3)
DIFFERENTIAL METHOD BLD: ABNORMAL
EOSINOPHIL # BLD: 0 K/UL (ref 0–0.4)
EOSINOPHIL NFR BLD: 0 % (ref 0–7)
ERYTHROCYTE [DISTWIDTH] IN BLOOD BY AUTOMATED COUNT: 11.9 % (ref 11.5–14.5)
GLUCOSE SERPL-MCNC: 143 MG/DL (ref 65–100)
HCT VFR BLD AUTO: 49.1 % (ref 36.6–50.3)
HGB BLD-MCNC: 16.6 G/DL (ref 12.1–17)
IMM GRANULOCYTES # BLD AUTO: 0.47 K/UL (ref 0–0.04)
IMM GRANULOCYTES NFR BLD AUTO: 2.4 % (ref 0–0.5)
LYMPHOCYTES # BLD: 2.1 K/UL (ref 0.8–3.5)
LYMPHOCYTES NFR BLD: 10.7 % (ref 12–49)
MCH RBC QN AUTO: 30.2 PG (ref 26–34)
MCHC RBC AUTO-ENTMCNC: 33.8 G/DL (ref 30–36.5)
MCV RBC AUTO: 89.3 FL (ref 80–99)
MONOCYTES # BLD: 1.8 K/UL (ref 0–1)
MONOCYTES NFR BLD: 9.2 % (ref 5–13)
NEUTS SEG # BLD: 15.15 K/UL (ref 1.8–8)
NEUTS SEG NFR BLD: 77.3 % (ref 32–75)
NRBC # BLD: 0 K/UL (ref 0–0.01)
NRBC BLD-RTO: 0 PER 100 WBC
PLATELET # BLD AUTO: 460 K/UL (ref 150–400)
PMV BLD AUTO: 10.3 FL (ref 8.9–12.9)
POTASSIUM SERPL-SCNC: 4.3 MMOL/L (ref 3.5–5.1)
RBC # BLD AUTO: 5.5 M/UL (ref 4.1–5.7)
RBC MORPH BLD: ABNORMAL
SODIUM SERPL-SCNC: 136 MMOL/L (ref 136–145)
WBC # BLD AUTO: 19.6 K/UL (ref 4.1–11.1)

## 2025-05-18 PROCEDURE — 80048 BASIC METABOLIC PNL TOTAL CA: CPT

## 2025-05-18 PROCEDURE — 6370000000 HC RX 637 (ALT 250 FOR IP): Performed by: INTERNAL MEDICINE

## 2025-05-18 PROCEDURE — 85025 COMPLETE CBC W/AUTO DIFF WBC: CPT

## 2025-05-18 PROCEDURE — 6360000002 HC RX W HCPCS: Performed by: INTERNAL MEDICINE

## 2025-05-18 PROCEDURE — 2500000003 HC RX 250 WO HCPCS: Performed by: INTERNAL MEDICINE

## 2025-05-18 PROCEDURE — 6370000000 HC RX 637 (ALT 250 FOR IP): Performed by: HOSPITALIST

## 2025-05-18 RX ORDER — DEXAMETHASONE 2 MG/1
TABLET ORAL
Qty: 26 TABLET | Refills: 0 | Status: SHIPPED | OUTPATIENT
Start: 2025-05-18 | End: 2025-05-26

## 2025-05-18 RX ORDER — OXYCODONE HYDROCHLORIDE 5 MG/1
5 TABLET ORAL EVERY 6 HOURS PRN
Qty: 20 TABLET | Refills: 0 | Status: SHIPPED | OUTPATIENT
Start: 2025-05-18 | End: 2025-05-23

## 2025-05-18 RX ADMIN — OXYCODONE 7.5 MG: 5 TABLET ORAL at 05:55

## 2025-05-18 RX ADMIN — TIZANIDINE 4 MG: 4 TABLET ORAL at 13:07

## 2025-05-18 RX ADMIN — OXYCODONE 7.5 MG: 5 TABLET ORAL at 13:06

## 2025-05-18 RX ADMIN — LISINOPRIL 40 MG: 20 TABLET ORAL at 08:08

## 2025-05-18 RX ADMIN — SODIUM CHLORIDE, PRESERVATIVE FREE 10 ML: 5 INJECTION INTRAVENOUS at 08:09

## 2025-05-18 RX ADMIN — ENOXAPARIN SODIUM 30 MG: 100 INJECTION SUBCUTANEOUS at 08:09

## 2025-05-18 RX ADMIN — AMLODIPINE BESYLATE 10 MG: 5 TABLET ORAL at 08:09

## 2025-05-18 RX ADMIN — TIZANIDINE 4 MG: 4 TABLET ORAL at 08:08

## 2025-05-18 RX ADMIN — CARVEDILOL 25 MG: 12.5 TABLET, FILM COATED ORAL at 08:09

## 2025-05-18 RX ADMIN — SPIRONOLACTONE 25 MG: 25 TABLET ORAL at 08:09

## 2025-05-18 RX ADMIN — LEVETIRACETAM 1000 MG: 500 TABLET, FILM COATED ORAL at 08:09

## 2025-05-18 RX ADMIN — DEXAMETHASONE SODIUM PHOSPHATE 4 MG: 4 INJECTION, SOLUTION INTRAMUSCULAR; INTRAVENOUS at 13:06

## 2025-05-18 RX ADMIN — TORSEMIDE 40 MG: 20 TABLET ORAL at 08:09

## 2025-05-18 RX ADMIN — DEXAMETHASONE SODIUM PHOSPHATE 4 MG: 4 INJECTION, SOLUTION INTRAMUSCULAR; INTRAVENOUS at 06:08

## 2025-05-18 RX ADMIN — FAMOTIDINE 20 MG: 20 TABLET, FILM COATED ORAL at 08:09

## 2025-05-18 ASSESSMENT — PAIN SCALES - GENERAL
PAINLEVEL_OUTOF10: 3
PAINLEVEL_OUTOF10: 5

## 2025-05-18 ASSESSMENT — PAIN DESCRIPTION - LOCATION
LOCATION: BACK
LOCATION: BACK

## 2025-05-18 NOTE — DISCHARGE SUMMARY
progression. MR can be performed for confirmation and further  evaluation, as indicated.  -Cont on Decadron and continue Keppra for seizure prophylaxis.   -Appreciate discussion with Radiation oncology as well as Palliative care. Dr Montero to contact sister if they want to get discharged with outpatient follow up with Dr Saeed before starting radiation or finish radiation here and then see Dr Saeed   -pending plan for radiation--35Gy in 10 fractions      Hypokalemia POA: resolved  Bilateral leg swelling/chronic lymphedema POA: Will consider stopping norvasc  Malignant hypertensive urgency POA:  --  Patient presented with markedly elevated blood pressure in the emergency room.  -- on Cardene. steroid causing worsening of BP  -- start oral and patch as well     Chronic diastolic CHF: Echo  4/9/25 showed EF of 60 to 65%. Cont diuresis  Obstructive sleep apnea POA: Patient is intolerant of CPAP.  Supportive therapy.  Obesity Body mass index is 43.89 kg/m².      Poor functional status, poor prognosis as well , pall care consult          PENDING TEST RESULTS:   At the time of discharge the following test results are still pending: NONE     [facility]MENTS:    [facility]     ADDITIONAL CARE RECOMMENDATIONS:   Please follow up your oncologist Dr. Saeed and radiation oncologist, to start radiation treatment   You may take OTC miralax  if  having constipation due to pain meds, should get pain medicine refill from your oncologist   Taper decadron to be off in 10 days, you may need extend the dose if  your radiation treatment postponed, further dose may be adjusted by your oncologist   Fall precaution   Stop the diuretics for now including spironolactone and torsemide, follow up your primary care physician in 1-2 weeks, your doctor will decide when to resume     DIET: regular diet      ACTIVITY: activity as tolerated        DISCHARGE MEDICATIONS:     Medication List        START taking these medications

## 2025-05-18 NOTE — PROGRESS NOTES
Hospitalist Progress Note  Dorcas Buenrostro MD  Answering service: [phone] OR [phone] from in house phone        Date of Service:  2025  NAME:  Blas Delcid  :  1975  MRN:  [id number]      Admission Summary:   Blas Delcid is a 50 y.o. male with past medical history significant for hypertension, glioblastoma multiform, obstructive sleep apnea, congestive heart failure presented at the emergency room with confusion.    Patient was recently admitted to this hospital from 2025 to 2025, patient underwent resection of the glioblastoma and was discharged to rehab.  Patient came home from rehab about a week ago and patient has not been doing well since then.  Patient sister stated that the patient is unable to care for himself.  Patient has also developed significant weakness and unable to walk.  Patient was unable to keep his appointment with radiation oncology service today for evaluation for potential palliative radiation because of the patient weakness.    CT scan of the head without contrast showed interval worsening of right to left subfalcine herniation worrisome for interval progression.  Patient was started on Decadron and was referred to the study service for admission.       Interval history / Subjective:   Follow up GBM  Pt isn't a candidate for further surgery  XRT planned     35Gy in 10 fractions      Assessment & Plan:     Cerebral edema with sub falcine herniation POA  Glioblastoma multiforme POA  Mass effect  -CT Interval worsening of right to left subfalcine herniation worrisome  for interval progression. MR can be performed for confirmation and further  evaluation, as indicated.  -Cont on Decadron and continue Keppra for seizure prophylaxis.   -Appreciate discussion with Radiation oncology as well as Palliative care. Dr Montero to contact sister if they want to get discharged with outpatient 
                                                                                                Hospitalist Progress Note  Kaya Kapoor MD  Answering service: [phone] OR [phone] from in house phone        Date of Service:  2025  NAME:  Blas Delcid  :  1975  MRN:  [id number]      Admission Summary:   Blas Delcid is a 50 y.o. male with past medical history significant for hypertension, glioblastoma multiform, obstructive sleep apnea, congestive heart failure presented at the emergency room with confusion.    Patient was recently admitted to this hospital from 2025 to 2025, patient underwent resection of the glioblastoma and was discharged to rehab.  Patient came home from rehab about a week ago and patient has not been doing well since then.  Patient sister stated that the patient is unable to care for himself.  Patient has also developed significant weakness and unable to walk.  Patient was unable to keep his appointment with radiation oncology service today for evaluation for potential palliative radiation because of the patient weakness.    CT scan of the head without contrast showed interval worsening of right to left subfalcine herniation worrisome for interval progression.  Patient was started on Decadron and was referred to the study service for admission.       Interval history / Subjective:     Pt isn't a candidate for further surgery  XRT planned , pall care seen pt as well   GOC for radiation now -- 35Gy in 10 fractions      Assessment & Plan:     1.  Cerebral edema with sub falcine herniation POA:   -- CT Interval worsening of right to left subfalcine herniation worrisome  for interval progression. MR can be performed for confirmation and further  evaluation, as indicated.  -- This is most likely as result of the patient glioblastoma multiforme.   --  Cont on Decadron and continue Keppra for seizure prophylaxis.   --  pending plan for radiation--35Gy in 10 fractions      2.  
                                                                                                Hospitalist Progress Note  Kaya Kapoor MD  Answering service: [phone] OR [phone] from in house phone        Date of Service:  2025  NAME:  Blas Delcid  :  1975  MRN:  [id number]      Admission Summary:   Blas Delcid is a 50 y.o. male with past medical history significant for hypertension, glioblastoma multiform, obstructive sleep apnea, congestive heart failure presented at the emergency room with confusion.    Patient was recently admitted to this hospital from 2025 to 2025, patient underwent resection of the glioblastoma and was discharged to rehab.  Patient came home from rehab about a week ago and patient has not been doing well since then.  Patient sister stated that the patient is unable to care for himself.  Patient has also developed significant weakness and unable to walk.  Patient was unable to keep his appointment with radiation oncology service today for evaluation for potential palliative radiation because of the patient weakness.    CT scan of the head without contrast showed interval worsening of right to left subfalcine herniation worrisome for interval progression.  Patient was started on Decadron and was referred to the study service for admission.       Interval history / Subjective:   Pt is restless, left sided hemiparesis  noted  Pt is on Cardene drip for malignant HTN  Pt scheduled for MRI      Assessment & Plan:     1.  Cerebral edema with sub falcine herniation POA:   -- CT Interval worsening of right to left subfalcine herniation worrisome  for interval progression. MR can be performed for confirmation and further  evaluation, as indicated.  -- This is most likely as result of the patient glioblastoma multiforme.   --  Cont on Decadron and continue Keppra for seizure prophylaxis.   --  Pending MRI and NSGY consult     2.  Hypokalemia POA: resolved     3.  Bilateral leg 
                                                                                                Hospitalist Progress Note  Mark Carlisle MD  Answering service: [phone] OR [phone] from in house phone        Date of Service:  2025  NAME:  Blas Delcid  :  1975  MRN:  [id number]      Admission Summary:   Blas Delcid is a 50 y.o. male with past medical history significant for hypertension, glioblastoma multiform, obstructive sleep apnea, congestive heart failure presented at the emergency room with confusion.    Patient was recently admitted to this hospital from 2025 to 2025, patient underwent resection of the glioblastoma and was discharged to rehab.  Patient came home from rehab about a week ago and patient has not been doing well since then.  Patient sister stated that the patient is unable to care for himself.  Patient has also developed significant weakness and unable to walk.  Patient was unable to keep his appointment with radiation oncology service today for evaluation for potential palliative radiation because of the patient weakness.    CT scan of the head without contrast showed interval worsening of right to left subfalcine herniation worrisome for interval progression.  Patient was started on Decadron and was referred to the study service for admission.       Interval history / Subjective:   Follow up GBM  Pt isn't a candidate for further surgery  XRT planned , pall care seen pt as well   GOC for radiation now -- 35Gy in 10 fractions      Assessment & Plan:     Cerebral edema with sub falcine herniation POA  Glioblastoma multiforme POA  Mass effect  -CT Interval worsening of right to left subfalcine herniation worrisome  for interval progression. MR can be performed for confirmation and further  evaluation, as indicated.  -Cont on Decadron and continue Keppra for seizure prophylaxis.   -Appreciate discussion with Radiation oncology as well as Palliative care. Dr Montero to contact 
                                                                                                Hospitalist Progress Note  Mark Carlisle MD  Answering service: [phone] OR [phone] from in house phone        Date of Service:  5/15/2025  NAME:  Blas Delcid  :  1975  MRN:  [id number]      Admission Summary:   Blas Delcid is a 50 y.o. male with past medical history significant for hypertension, glioblastoma multiform, obstructive sleep apnea, congestive heart failure presented at the emergency room with confusion.    Patient was recently admitted to this hospital from 2025 to 2025, patient underwent resection of the glioblastoma and was discharged to rehab.  Patient came home from rehab about a week ago and patient has not been doing well since then.  Patient sister stated that the patient is unable to care for himself.  Patient has also developed significant weakness and unable to walk.  Patient was unable to keep his appointment with radiation oncology service today for evaluation for potential palliative radiation because of the patient weakness.    CT scan of the head without contrast showed interval worsening of right to left subfalcine herniation worrisome for interval progression.  Patient was started on Decadron and was referred to the study service for admission.       Interval history / Subjective:     Pt isn't a candidate for further surgery  XRT planned , pall care seen pt as well   GOC for radiation now -- 35Gy in 10 fractions      Assessment & Plan:     Cerebral edema with sub falcine herniation POA  Glioblastoma multiforme POA  Mass effect  -CT Interval worsening of right to left subfalcine herniation worrisome  for interval progression. MR can be performed for confirmation and further  evaluation, as indicated.  -Cont on Decadron and continue Keppra for seizure prophylaxis.   -Appreciate discussion with Radiation oncology as well as Palliative care. Dr Montero to contact sister if they 
.4 Eyes Skin Assessment     NAME:  Blas Delcid  YOB: 1975  MEDICAL RECORD NUMBER:  [id number]    The patient is being assessed for  Admission    I agree that at least one RN has performed a thorough Head to Toe Skin Assessment on the patient. ALL assessment sites listed below have been assessed.      Areas assessed by both nurses:    Head, Face, Ears, Shoulders, Back, Chest, Arms, Elbows, Hands, Sacrum. Buttock, Coccyx, Ischium, and Legs. Feet and Heels        Does the Patient have a Wound? No noted wound(s)       He Prevention initiated by RN: Yes  Wound Care Orders initiated by RN: No    Pressure Injury (Stage 3,4, Unstageable, DTI, NWPT, and Complex wounds) if present, place Wound referral order by RN under : No    New Ostomies, if present place, Ostomy referral order under : No     Nurse 1 eSignature: Electronically signed by Shyla Mendoza RN on 5/12/25 at 11:42 PM EDT    **SHARE this note so that the co-signing nurse can place an eSignature**    Nurse 2 eSignature: Electronically signed by CARLOS DUPONT RN on 5/13/25 at 6:40 AM EDT    
0847: Provided AM medications. BP soft this AM, held 2 BP medications so that pt may have morphine for HA. Pt wanting to refuse lovenox, educated on importance of lovenox during hospital stay. Okay to receive. Irritated when RN started asking orientation questions, stated \"are you serious? We have to do this again?\".    0940: Rounds, RN is to clarify w/ sister about pt going home.     0945: Call from Dr. Washington's office. [phone]. Office would like to clarify w/ pt and sister, to see whether they would like to do radiation planning w/ Dr. Astorga prior to d/c OR d/c home and follow-up w/ Dr. Saeed for chemo pill. Stated if they want to follow up with Dr. Stout that pt MUST have an appointment set prior to discharging. RN is to call back with answer and notify hospitalist.     0948\" Called sister, states she is very busy at this time will be at bedside in about 45 minutes. Stated she can answer a few questions. In regards to planning to discharge home, sister states \"did you ask him what he wants to do? I guess he wants to go home\". RN asked if she has the capability of taking care for him, stated \"yes\". Sister states she does not want him to discharge today. Also, asked about radiation planning. Sister said she is \"under the impression that Dr. Washington's team already knows what they wants\" States that she would like him to have palliative chemo (pill or infusions is fine) and radiation. Would like it to be close to [place], stated that [facility] and [facility] are over 1hr away from pt - which is not convenient for family. Stated that whatever facility that is closest and provides radiation and chemo. Notes that she wants to move away from using any [facility] d/t distance. States if there is a facility in [place] that could do his radiation she would like to go there. States if family is to continue w/ Dr. Washington, would want to go to [place] for radiation.     1056: family at bedside, states that 
0910: Pt screaming out \"NURSE!!\" Went into room, demanding food. Stated trays are coming around. Gave AM medications. Set pts tray up. Ate 60% of meal. Call bell within reach    0930: Screaming out \"NURSE\". Came in, asked what is wrong and why he did not press call button. Pt stated he did not have the call bell present - it was on his breakfast tray in front of him. Pt demanding nurse to transfer pt to commode to have a BM. States that he has been transfering fine at home to bedside commode. Called sister, stated that he really has not been transferring well d/t weakness. Stated that RN can attempt but understands that team may not be able to transfer safely. Flagged down PT, Kristi, JADON, and this RN in with pt. Pt insist on attempting to side step towards bedside commode. Attempted to educate pt that team will need to use lift equipment d/t change in condition. Pt insist that he is fine and able to move with out gladis steady. PT recommends that team uses frances lift. No BM had. PT/OT placed pt in chair w/ frances sling under him    0940: Rad onc MD in with pt, providing options for pt. Pt tearful at this time. RN in at bedside to aid in relaying conversation to sister when she arrives to bedside. MD made pt aware that there will be no more surgical interventions, there is options for radiation (provided risk and benefits), palliative will come in to aid pt. Told this RN that tylenol and oxy are probably not enough to aid w/ pts HA. Told to give morphine for HA once pt and sister have a chance to speak to palliative team. Will give oxy and tylenol alternating until then.    1020: Family at bedside. Informed family that Jose caceres MD was in w/ pt and this RN, MD was hoping to converse with pt and family but had to go to a procedure. Informed family of conversation - sister stated \"we have been hearing the same thing for months\". Sister believed that MD wanted to converse with family and have them all on board with a plan. 
1600:dischage    Pickup delayed to est 3:40 pm, sister called with review of AVS and need for pickup of meds from [facility].   She or  will be home for [facility] transport delivery.  2 bags, cane, phone, and patient wearing leg brace and clothing home.        
Clinical Pharmacy Note: IV to PO Automatic Conversion  Please note: Blas Delcid’s medication(s) (levetiracetam and famotidine) has/have been changed from IV to PO based on the following critiera:    Patient is tolerating oral medications  Patient is tolerating a diet more advanced than clear liquids  Patient is not requiring vasopressors    This IV to PO conversion is based on the [employer] approved automatic conversion policy for eligible patients.  Please call with questions.    
Courtesy visit: pt is awake alert  hemiplegia persists  s/p multiple surgeries for ependymoma  will follow as necessary  nothing further to add at this point  
I have heard that patient might want to consider radiation and chemo/pill. Dr. Saeed is out of town but can see him as an outpatient when he returns. Inpatient team can coordinate before discharge for him to see Dr. Saeed with [facility] as an outpatient. I can either perform CT simulation for radiation planning tomorrow, or we could wait and do that after he sees Dr. Saeed and hears what he suggests and then do CT simulation as an outpatient. Also, if patient decides to get a [facility] opinion, we can faciliate a consultation with medical oncology or radiation oncology at [facility]. Please let me know if anything I can do while he is an inpatient. I tried his sister and she did not answer, but I did leave her a message to let me know what they want to do.  
I spoke with Mr Delcid's sister yesterday , who is his primary caregiver, and both she and the patient said they do not wish any further surgery to the brain  I will defer to Rad onc and admitting team to direct his care  
Palliative Medicine      Code Status: Full Code    Advance Care Planning:    The patient has an AMD on file naming his sister as primary Valeria JACQUES secondary is mother Linh     Patient / Family Encounter Documentation    Participants (names): Dr. Crandall, Dr. Montero present for some time (we met together), sister/MPOA Valeria, brother Abraham, a friend, mother Linh, patient    Narrative: The Palliative Medicine SW and Dr. Crandall met with the patient at bedside - Radiation Oncology Dr. Montero also explaining benefits vs. [place] / risks of pursuing Radiation- we stay together and hear the pros/cons/risks of radiation as she explores it with patient and family.     Family also is interested in hearing from Medical Oncology if there is an oral chemotherapy that patient may be able to go on - their goals are clear to pursue aggressive interventions to prolong life as long as possible. Patient's care managed by Dr. Saeed.     See Dr. Crandall's note regarding symptom management, his biggest concern is very painful headaches and he also has difficulty sleeping.     We also discuss possible OP Palliative clinic - since managed by [facility], may make more sense for him to be seen by I NP (who does Palliative).     Following with you.     Psychosocial Issues Identified/ Resilience Factors: Limited psychosocial information today due to jumping straight into care goals/symptom management - psychosocial information received from 2023 visit: The patient is not . The patient's largest supports are his mother, and his sister Valeria. The shares he has 15 children- ranging from ages 5 (the youngest) to age 30. 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     Caregiver [place]:   Does the caregiver feel confident administering medication?   Does the caregiver need any help connecting with community resources? SW provided 
This pt is well known to me. 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 He has not always been good with following up for his medical care, and last month presented with hemiplegia and large recurrence of his tumor  The tumor invades deeper structures, crosses the midline and was not amenable to complete resection when I took him back to the OR last month. I explained to the ER MD yesterday that we presented Mr Delcid at the most recent neuro tumor board after his surgery and decided that more surgery would not improve his condition, would more likely do more harm than good and that he should consider palliative care. He unfortunately is in the terminal phases of his disease. There is no further neurosurgical intervention . We remain available for questions    Will discuss with his sister who generally cares for him  
options available. Offered presence and listened as he and others present spoke of what options would be presented. He has a strong desire to continue to live.     The family spoke of a strong Muslim bello, the brother present is a , and their hopes of healing for the patient.     Electronically signed by VASYL MCKEON on 5/14/2025 at 11:18 AM

## 2025-05-18 NOTE — PLAN OF CARE
Problem: Chronic Conditions and Co-morbidities  Goal: Patient's chronic conditions and co-morbidity symptoms are monitored and maintained or improved  5/14/2025 1153 by Fatuma Velez RN  Outcome: Progressing  5/13/2025 2339 by Shyla Mendoza RN  Outcome: Progressing     Problem: Discharge Planning  Goal: Discharge to home or other facility with appropriate resources  5/14/2025 1153 by Fatuma Velez RN  Outcome: Progressing  5/13/2025 2339 by Shyla Mendoza RN  Outcome: Progressing     Problem: Pain  Goal: Verbalizes/displays adequate comfort level or baseline comfort level  5/14/2025 1153 by Fatuma Velez RN  Outcome: Progressing  5/13/2025 2339 by Shyla Mendoza RN  Outcome: Progressing     Problem: Skin/Tissue Integrity  Goal: Skin integrity remains intact  Description: 1.  Monitor for areas of redness and/or skin breakdown2.  Assess vascular access sites hourly3.  Every 4-6 hours minimum:  Change oxygen saturation probe site4.  Every 4-6 hours:  If on nasal continuous positive airway pressure, respiratory therapy assess nares and determine need for appliance change or resting period  5/14/2025 1153 by Fatuma Velez RN  Outcome: Progressing  5/13/2025 2339 by Shyla Mendoza RN  Outcome: Progressing     Problem: Safety - Adult  Goal: Free from fall injury  5/14/2025 1153 by Fatuma Velez RN  Outcome: Progressing  5/13/2025 2339 by Shyla Mendoza RN  Outcome: Progressing     
  Problem: Chronic Conditions and Co-morbidities  Goal: Patient's chronic conditions and co-morbidity symptoms are monitored and maintained or improved  5/16/2025 2234 by Laura Kwon LPN  Outcome: Progressing  Flowsheets (Taken 5/16/2025 2000)  Care Plan - Patient's Chronic Conditions and Co-Morbidity Symptoms are Monitored and Maintained or Improved:   Monitor and assess patient's chronic conditions and comorbid symptoms for stability, deterioration, or improvement   Collaborate with multidisciplinary team to address chronic and comorbid conditions and prevent exacerbation or deterioration   Update acute care plan with appropriate goals if chronic or comorbid symptoms are exacerbated and prevent overall improvement and discharge  5/16/2025 1534 by Fatuma Velez RN  Outcome: Progressing     Problem: Discharge Planning  Goal: Discharge to home or other facility with appropriate resources  5/16/2025 2234 by Laura Kwon LPN  Outcome: Progressing  Flowsheets (Taken 5/16/2025 2000)  Discharge to home or other facility with appropriate resources:   Identify barriers to discharge with patient and caregiver   Arrange for needed discharge resources and transportation as appropriate   Identify discharge learning needs (meds, wound care, etc)   Refer to discharge planning if patient needs post-hospital services based on physician order or complex needs related to functional status, cognitive ability or social support system  5/16/2025 1534 by Fatuma Velez RN  Outcome: Progressing     Problem: Pain  Goal: Verbalizes/displays adequate comfort level or baseline comfort level  5/16/2025 2234 by Laura Kwon LPN  Outcome: Progressing  5/16/2025 1534 by Fatuma Velez RN  Outcome: Progressing     Problem: Skin/Tissue Integrity  Goal: Skin integrity remains intact  Description: 1.  Monitor for areas of redness and/or skin breakdown2.  Assess vascular access sites hourly3.  Every 4-6 hours minimum:  Change oxygen 
  Problem: Chronic Conditions and Co-morbidities  Goal: Patient's chronic conditions and co-morbidity symptoms are monitored and maintained or improved  5/17/2025 1041 by Elisabeth Ricks RN  Outcome: Progressing  5/16/2025 2234 by Laura Kwon LPN  Outcome: Progressing  Flowsheets (Taken 5/16/2025 2000)  Care Plan - Patient's Chronic Conditions and Co-Morbidity Symptoms are Monitored and Maintained or Improved:   Monitor and assess patient's chronic conditions and comorbid symptoms for stability, deterioration, or improvement   Collaborate with multidisciplinary team to address chronic and comorbid conditions and prevent exacerbation or deterioration   Update acute care plan with appropriate goals if chronic or comorbid symptoms are exacerbated and prevent overall improvement and discharge     Problem: Discharge Planning  Goal: Discharge to home or other facility with appropriate resources  5/17/2025 1041 by Elisabeth Ricks RN  Outcome: Progressing  5/16/2025 2234 by Laura Kwon LPN  Outcome: Progressing  Flowsheets (Taken 5/16/2025 2000)  Discharge to home or other facility with appropriate resources:   Identify barriers to discharge with patient and caregiver   Arrange for needed discharge resources and transportation as appropriate   Identify discharge learning needs (meds, wound care, etc)   Refer to discharge planning if patient needs post-hospital services based on physician order or complex needs related to functional status, cognitive ability or social support system     Problem: Pain  Goal: Verbalizes/displays adequate comfort level or baseline comfort level  5/17/2025 1041 by Elisabeth Ricks RN  Outcome: Progressing  5/16/2025 2234 by Laura Kwon LPN  Outcome: Progressing     Problem: Skin/Tissue Integrity  Goal: Skin integrity remains intact  Description: 1.  Monitor for areas of redness and/or skin breakdown2.  Assess vascular access sites hourly3.  Every 4-6 hours minimum:  Change oxygen 
  Problem: Chronic Conditions and Co-morbidities  Goal: Patient's chronic conditions and co-morbidity symptoms are monitored and maintained or improved  Outcome: Progressing     Problem: Discharge Planning  Goal: Discharge to home or other facility with appropriate resources  Outcome: Progressing     Problem: Pain  Goal: Verbalizes/displays adequate comfort level or baseline comfort level  Outcome: Progressing     Problem: Skin/Tissue Integrity  Goal: Skin integrity remains intact  Description: 1.  Monitor for areas of redness and/or skin breakdown2.  Assess vascular access sites hourly3.  Every 4-6 hours minimum:  Change oxygen saturation probe site4.  Every 4-6 hours:  If on nasal continuous positive airway pressure, respiratory therapy assess nares and determine need for appliance change or resting period  Outcome: Progressing     
  Problem: Chronic Conditions and Co-morbidities  Goal: Patient's chronic conditions and co-morbidity symptoms are monitored and maintained or improved  Outcome: Progressing     Problem: Discharge Planning  Goal: Discharge to home or other facility with appropriate resources  Outcome: Progressing     Problem: Pain  Goal: Verbalizes/displays adequate comfort level or baseline comfort level  Outcome: Progressing     Problem: Skin/Tissue Integrity  Goal: Skin integrity remains intact  Description: 1.  Monitor for areas of redness and/or skin breakdown2.  Assess vascular access sites hourly3.  Every 4-6 hours minimum:  Change oxygen saturation probe site4.  Every 4-6 hours:  If on nasal continuous positive airway pressure, respiratory therapy assess nares and determine need for appliance change or resting period  Outcome: Progressing     Problem: Safety - Adult  Goal: Free from fall injury  Outcome: Progressing     
  Problem: Chronic Conditions and Co-morbidities  Goal: Patient's chronic conditions and co-morbidity symptoms are monitored and maintained or improved  Outcome: Progressing     Problem: Discharge Planning  Goal: Discharge to home or other facility with appropriate resources  Outcome: Progressing     Problem: Pain  Goal: Verbalizes/displays adequate comfort level or baseline comfort level  Outcome: Progressing     Problem: Skin/Tissue Integrity  Goal: Skin integrity remains intact  Description: 1.  Monitor for areas of redness and/or skin breakdown2.  Assess vascular access sites hourly3.  Every 4-6 hours minimum:  Change oxygen saturation probe site4.  Every 4-6 hours:  If on nasal continuous positive airway pressure, respiratory therapy assess nares and determine need for appliance change or resting period  Outcome: Progressing     Problem: Safety - Adult  Goal: Free from fall injury  Outcome: Progressing     
  Problem: Chronic Conditions and Co-morbidities  Goal: Patient's chronic conditions and co-morbidity symptoms are monitored and maintained or improved  Outcome: Progressing  Flowsheets (Taken 5/15/2025 0815)  Care Plan - Patient's Chronic Conditions and Co-Morbidity Symptoms are Monitored and Maintained or Improved:   Monitor and assess patient's chronic conditions and comorbid symptoms for stability, deterioration, or improvement   Collaborate with multidisciplinary team to address chronic and comorbid conditions and prevent exacerbation or deterioration   Update acute care plan with appropriate goals if chronic or comorbid symptoms are exacerbated and prevent overall improvement and discharge     Problem: Discharge Planning  Goal: Discharge to home or other facility with appropriate resources  Outcome: Progressing  Flowsheets (Taken 5/15/2025 0815)  Discharge to home or other facility with appropriate resources: Identify barriers to discharge with patient and caregiver     Problem: Pain  Goal: Verbalizes/displays adequate comfort level or baseline comfort level  Outcome: Progressing     Problem: Skin/Tissue Integrity  Goal: Skin integrity remains intact  Description: 1.  Monitor for areas of redness and/or skin breakdown2.  Assess vascular access sites hourly3.  Every 4-6 hours minimum:  Change oxygen saturation probe site4.  Every 4-6 hours:  If on nasal continuous positive airway pressure, respiratory therapy assess nares and determine need for appliance change or resting period  Outcome: Progressing  Flowsheets (Taken 5/15/2025 0815)  Skin Integrity Remains Intact: Monitor for areas of redness and/or skin breakdown     Problem: Safety - Adult  Goal: Free from fall injury  Outcome: Progressing     
  Problem: Occupational Therapy - Adult  Goal: By Discharge: Performs self-care activities at highest level of function for planned discharge setting.  See evaluation for individualized goals.  Description: Occupational Therapy Goals  Initiated: 5/14/2025   1.  Patient will perform grooming with setup/supervision within 7 day(s).  2.  Patient will perform bathing with mod A for upper body bathing within 7 day(s).  3.  Patient will perform upper body dressing with mod A within 7 days.  4.  Patient will perform lower body dressing with max A within 7 day(s).  5.  Patient will perform toilet transfers with mod A x 2 within 7 day(s).  6.  Patient will perform all aspects of toileting with mod A x 2  within 7 day(s).      FUNCTIONAL STATUS PRIOR TO ADMISSION:   ,  ,  ,  ,  ,  ,  ,  ,  ,  ,       HOME SUPPORT: Pt has been in and out of the hospital numerous times in the recent months/weeks. He was discharged home from rehab within the last week.  Sister is the primary care giver at home but reports this has been an increased burden and she needed to hire outside support to help  her care for him.  No family present to provide PLOF.  Pt tearful this date and unable to answer further questions.  Pt was recently in [facility] where he was able to mobilize using a SPC with  min A 30'.  Pt was in rehab for about 2 weeks at  and discharged home 1 week ago.      Outcome: Progressing     OCCUPATIONAL THERAPY EVALUATION    Patient: Blas Delcid (50 y.o. male)  Date: 5/14/2025  Primary Diagnosis: Cerebral edema (HCC) [G93.6]  Glioblastoma multiforme (HCC) [C71.9]  Generalized weakness [R53.1]  Failure to thrive in adult [R62.7]  Hypertensive urgency [I16.0]         Precautions: Fall Risk                  ASSESSMENT :  The patient is limited by decreased independence with all functional mobility, ADL activities, left hemiparesis, left inattention, decreased visual tracking to left side, decreased safety awareness, and decreased insight 
  Problem: Occupational Therapy - Adult  Goal: By Discharge: Performs self-care activities at highest level of function for planned discharge setting.  See evaluation for individualized goals.  Description: Occupational Therapy Goals  Initiated: 5/14/2025   1.  Patient will perform grooming with setup/supervision within 7 day(s).  2.  Patient will perform bathing with mod A for upper body bathing within 7 day(s).  3.  Patient will perform upper body dressing with mod A within 7 days.  4.  Patient will perform lower body dressing with max A within 7 day(s).  5.  Patient will perform toilet transfers with mod A x 2 within 7 day(s).  6.  Patient will perform all aspects of toileting with mod A x 2  within 7 day(s).      FUNCTIONAL STATUS PRIOR TO ADMISSION:   ,  ,  ,  ,  ,  ,  ,  ,  ,  ,       HOME SUPPORT: Pt has been in and out of the hospital numerous times in the recent months/weeks. He was discharged home from rehab within the last week.  Sister is the primary care giver at home but reports this has been an increased burden and she needed to hire outside support to help  her care for him.  No family present to provide PLOF.  Pt tearful this date and unable to answer further questions.  Pt was recently in [facility] where he was able to mobilize using a SPC with  min A 30'.  Pt was in rehab for about 2 weeks at  and discharged home 1 week ago.      Outcome: Progressing     OCCUPATIONAL THERAPY TREATMENT  Patient: Blas Delcid (50 y.o. male)  Date: 5/15/2025  Primary Diagnosis: Cerebral edema (HCC) [G93.6]  Glioblastoma multiforme (HCC) [C71.9]  Generalized weakness [R53.1]  Failure to thrive in adult [R62.7]  Hypertensive urgency [I16.0]       Precautions: Fall Risk                Chart, occupational therapy assessment, plan of care, and goals were reviewed.    ASSESSMENT  Patient continues to benefit from skilled OT services and is slowly progressing towards goals. He continues with significant left hemiparesis 
  Problem: Physical Therapy - Adult  Goal: By Discharge: Performs mobility at highest level of function for planned discharge setting.  See evaluation for individualized goals.  Description: FUNCTIONAL STATUS PRIOR TO ADMISSION: Patient was requiring assist for stand pivot transfers with L AFO and SPC, unclear how much assist sister was providing just prior to admission as patient has decreased insight into deficits at today's eval. Had just discharged from rehab within the last month and was ambulatory for 30 ft with min A and SPC during last admission after most recent resection.     HOME SUPPORT PRIOR TO ADMISSION: The patient lived with his sister who was providing increasingly more assistance and looking into needing more hired help to care for him at home.    Physical Therapy Goals  Initiated 5/14/2025  1.  Patient will move from supine to sit and sit to supine, scoot up and down, and roll side to side in bed with minimal assistance within 7 day(s).    2.  Patient will perform sit to stand with minimal assistance within 7 day(s).  3.  Patient will transfer from bed to chair and chair to bed with moderate assistance using the least restrictive device within 7 day(s).  4.  Patient will ambulate with moderate assistance for 5 feet with the least restrictive device within 7 day(s).     Outcome: Progressing       PHYSICAL THERAPY TREATMENT    Patient: Blas Delcid (50 y.o. male)  Date: 5/15/2025  Diagnosis: Cerebral edema (HCC) [G93.6]  Glioblastoma multiforme (HCC) [C71.9]  Generalized weakness [R53.1]  Failure to thrive in adult [R62.7]  Hypertensive urgency [I16.0] Cerebral edema (HCC)      Precautions: Restrictions/Precautions  Restrictions/Precautions: Fall Risk            ASSESSMENT:  Patient continues to benefit from skilled PT services and is progressing towards goals. Patient afo donned with total assist. Patient performed sit to stand using bed rail for support for 2 reps with min assist. Patient 
  Problem: Physical Therapy - Adult  Goal: By Discharge: Performs mobility at highest level of function for planned discharge setting.  See evaluation for individualized goals.  Description: FUNCTIONAL STATUS PRIOR TO ADMISSION: Patient was requiring assist for stand pivot transfers with L AFO and SPC, unclear how much assist sister was providing just prior to admission as patient has decreased insight into deficits at today's eval. Had just discharged from rehab within the last month and was ambulatory for 30 ft with min A and SPC during last admission after most recent resection.     HOME SUPPORT PRIOR TO ADMISSION: The patient lived with his sister who was providing increasingly more assistance and looking into needing more hired help to care for him at home.    Physical Therapy Goals  Initiated 5/14/2025  1.  Patient will move from supine to sit and sit to supine, scoot up and down, and roll side to side in bed with minimal assistance within 7 day(s).    2.  Patient will perform sit to stand with minimal assistance within 7 day(s).  3.  Patient will transfer from bed to chair and chair to bed with moderate assistance using the least restrictive device within 7 day(s).  4.  Patient will ambulate with moderate assistance for 5 feet with the least restrictive device within 7 day(s).     Outcome: Progressing   PHYSICAL THERAPY EVALUATION    Patient: Blas Delcid (50 y.o. male)  Date: 5/14/2025  Primary Diagnosis: Cerebral edema (HCC) [G93.6]  Glioblastoma multiforme (HCC) [C71.9]  Generalized weakness [R53.1]  Failure to thrive in adult [R62.7]  Hypertensive urgency [I16.0]       Precautions: Restrictions/Precautions  Restrictions/Precautions: Fall Risk            ASSESSMENT :   DEFICITS/IMPAIRMENTS:   The patient is limited by decreased functional mobility, ROM, strength, sensation, activity tolerance, safety awareness, cognition, command following, attention/concentration, coordination, balance, vision/visual 
0800: in for bedside report. Pt noted to have pulled purewick off, urinated on self, and pulled tele off. Pt rude to staff in room. Stating \"all your mouth does is run\" to night RN. CHG bath done, condom cath placed on pt, tele reconnected, gown changed. No family at bedside at this time. Smear of BM noted when cleaning. Bundled and call bell placed near pt.     0830: Tech notified RN that pt ripped off gown, condom cath, tele leads, etc. Tech able to place tele leads on. RN okay w/ no gown or condom cath at this time. Will reattempt later.    0921: Pt A&Ox4 at this time. Attempted to start IV on on pt x2 times. Pt verbally abusive towards nurse telling this RN that \"you should go back to school because you're not good at your job. You guys need to figure out something else. You woke me up, I havent slept for a whole day\". Placed consult for dynamic access for secondary PIV. After medications pt began to vomit, about 150 mL of light brown liquid with white bits. Cleaned pt to best of ability (refusing to put gown on trying to remove tele leads). Will attempt with oral meds at later time d/t pt condition. Sister, Valeria, called for update. Notifed that hospitalist has rounded, Dr. Ayala was consulted, pt is scheduled for MRI, pt is refusing to eat or wear clothing. Sister notified RN that she there are many family members calling pt and would like to not allow them to speak with pt d/t condition. Feels that it will frustrate him more. RN notifed pt, stated \"let her know what is going on\" and then asked RN \"so she told people to stop calling me right?\"    0945: Episode of vomiting. Girlfriend attempted to call. Notified that sister would like to limit pts communication to others at this time. Cleaned pt, gave zofran     1105: Gave PO medications whole in AP sauce. Pt chewing on them, but able to swallow fine. Pt yelling at RN. \"Go away, leave me alone\". Able to replace condom cath. Called MRI to take pt for scan. 
and/or skin breakdown2.  Assess vascular access sites hourly3.  Every 4-6 hours minimum:  Change oxygen saturation probe site4.  Every 4-6 hours:  If on nasal continuous positive airway pressure, respiratory therapy assess nares and determine need for appliance change or resting period  5/16/2025 0000 by Annette Pérez RN  Outcome: Progressing  Flowsheets (Taken 5/15/2025 2000)  Skin Integrity Remains Intact: Monitor for areas of redness and/or skin breakdown  5/15/2025 1904 by Celia Epstein RN  Outcome: Progressing  Flowsheets (Taken 5/15/2025 0815)  Skin Integrity Remains Intact: Monitor for areas of redness and/or skin breakdown     Problem: Safety - Adult  Goal: Free from fall injury  5/16/2025 0000 by Annette Pérez RN  Outcome: Progressing  Flowsheets (Taken 5/15/2025 2000)  Free From Fall Injury: Instruct family/caregiver on patient safety  5/15/2025 1904 by Celia Epstein RN  Outcome: Progressing     Problem: Occupational Therapy - Adult  Goal: By Discharge: Performs self-care activities at highest level of function for planned discharge setting.  See evaluation for individualized goals.  Description: Occupational Therapy Goals  Initiated: 5/14/2025   1.  Patient will perform grooming with setup/supervision within 7 day(s).  2.  Patient will perform bathing with mod A for upper body bathing within 7 day(s).  3.  Patient will perform upper body dressing with mod A within 7 days.  4.  Patient will perform lower body dressing with max A within 7 day(s).  5.  Patient will perform toilet transfers with mod A x 2 within 7 day(s).  6.  Patient will perform all aspects of toileting with mod A x 2  within 7 day(s).      FUNCTIONAL STATUS PRIOR TO ADMISSION:   ,  ,  ,  ,  ,  ,  ,  ,  ,  ,       HOME SUPPORT: Pt has been in and out of the hospital numerous times in the recent months/weeks. He was discharged home from rehab within the last week.  Sister is the primary care giver at home but reports this 
comfort level  5/18/2025 1013 by Elisabeth Ricks RN  Outcome: Adequate for Discharge  5/18/2025 1013 by Elisabeth Ricks RN  Outcome: Progressing  5/18/2025 0352 by Neema Gonzalez, RN  Outcome: Progressing  Flowsheets (Taken 5/17/2025 2000)  Verbalizes/displays adequate comfort level or baseline comfort level:   Encourage patient to monitor pain and request assistance   Assess pain using appropriate pain scale   Administer analgesics based on type and severity of pain and evaluate response   Notify Licensed Independent Practitioner if interventions unsuccessful or patient reports new pain     Problem: Skin/Tissue Integrity  Goal: Skin integrity remains intact  Description: 1.  Monitor for areas of redness and/or skin breakdown2.  Assess vascular access sites hourly3.  Every 4-6 hours minimum:  Change oxygen saturation probe site4.  Every 4-6 hours:  If on nasal continuous positive airway pressure, respiratory therapy assess nares and determine need for appliance change or resting period  5/18/2025 1013 by Elisabeth Ricks RN  Outcome: Adequate for Discharge  5/18/2025 1013 by Elisabeth Ricks RN  Outcome: Progressing  Flowsheets (Taken 5/18/2025 0800)  Skin Integrity Remains Intact: Monitor for areas of redness and/or skin breakdown  5/18/2025 0352 by Neema Gonzalez, RN  Outcome: Progressing  Flowsheets (Taken 5/17/2025 2000)  Skin Integrity Remains Intact:   Monitor for areas of redness and/or skin breakdown   Pressure redistribution bed/mattress (bed type)   Monitor skin under medical devices     Problem: Safety - Adult  Goal: Free from fall injury  5/18/2025 1013 by Elisabeth Ricks RN  Outcome: Adequate for Discharge  5/18/2025 1013 by Elisabeth Ricks RN  Outcome: Progressing  Flowsheets (Taken 5/18/2025 1012)  Free From Fall Injury: Instruct family/caregiver on patient safety  5/18/2025 0352 by Neema Gonzalez, RN  Outcome: Progressing  Flowsheets (Taken 5/17/2025 2000)  Free From

## 2025-05-18 NOTE — DISCHARGE INSTRUCTIONS
Discharge Instructions       PATIENT ID: Blas Delcid  MRN: [id number]   YOB: 1975    DATE OF ADMISSION: [unfilled]    DATE OF DISCHARGE: 5/18/2025    PRIMARY CARE PROVIDER: @PCP@     ATTENDING PHYSICIAN: [unfilled]  DISCHARGING PROVIDER: Dorcas Buenrostro MD    To contact this individual call [phone] and ask the  to page.   If unavailable ask to be transferred the Adult Hospitalist Department.    DISCHARGE DIAGNOSES brain tumor, GBM     CONSULTATIONS: [unfilled]    PROCEDURES/SURGERIES: * No surgery found *    PENDING TEST RESULTS:   At the time of discharge the following test results are still pending: none     FOLLOW UP APPOINTMENTS:   [unfilled]     ADDITIONAL CARE RECOMMENDATIONS:   Please follow up your oncologist Dr. Saeed and radiation oncologist, to start radiation treatment   You may take OTC miralax  if  having constipation due to pain meds, should get pain medicine refill from your oncologist   Taper decadron to be off in 10 days, you may need extend the dose if  your radiation treatment postponed, further dose may be adjusted by your oncologist   Fall precaution   Stop the diuretics for now including spironolactone and torsemide, follow up your primary care physician in 1-2 weeks, your doctor will decide when to resume    DIET: regular diet      ACTIVITY: activity as tolerated        Radiology      DISCHARGE MEDICATIONS:   See Medication Reconciliation Form    It is important that you take the medication exactly as they are prescribed.   Keep your medication in the bottles provided by the pharmacist and keep a list of the medication names, dosages, and times to be taken in your wallet.   Do not take other medications without consulting your doctor.       NOTIFY YOUR PHYSICIAN FOR ANY OF THE FOLLOWING:   Fever over 101 degrees for 24 hours.   Chest pain, shortness of breath, fever, chills, nausea, vomiting, diarrhea, change in mentation, falling, weakness, bleeding. Severe pain or

## 2025-05-18 NOTE — CARE COORDINATION
Transition of Care Plan:    HOME w/ [facility] 5/18  SOC 5/19  - [phone]       Transport: [facility] 2pm non     RUR:   20%  Prior Level of Functioning:  needed some assist w/ ADLs  Disposition: home   ANKUSH:  5/18  If SNF or IPR: Date FOC offered: SNF/LTC discussed 5/16, Sister declined   Follow up appointments: PCP, Onc/Radiation at [place]  DME needed: WC recommended, sister declined to provide payment 5/18  Transportation at discharge: [facility]  IM/IMM Medicare/ letter given: yes; 5/15  Caregiver Contact: GEOFFREY MANRIQUEZ (Sister)  [phone] (Mobile)   Discharge Caregiver contacted prior to discharge? yes  Care Conference needed? no  Barriers to discharge: none-dc    1pm: Pt stable for dc 5/18 - Discussed w/ Pt sister Geoffrey at length. Sister confirmed she wanted Pt to come home despite concern for caring for him in the home (she is informed that he has needed more assistance in the last few days). Note that she is considering LTC in the future but wants to try to care for Pt in the home and focus on radiation first. Discussed WC order through [facility] - sister declined providing payment, thus order cancelled. Sister endorsed reaching out to DSS to try and obtain free WC. [facility] scheduled w/ sister permission - 2pm non .   [facility] updated on dc and will SOC tomorrow 5/19. No other CARY needs indicated.     Fabiana Blair, BSW

## 2025-06-02 ENCOUNTER — HOSPITAL ENCOUNTER (OUTPATIENT)
Facility: HOSPITAL | Age: 50
Discharge: HOME | End: 2025-06-05
Attending: RADIOLOGY

## 2025-06-02 DIAGNOSIS — C71.0: Primary | ICD-10-CM

## 2025-06-02 DIAGNOSIS — G89.3 CANCER RELATED PAIN: ICD-10-CM

## 2025-06-02 LAB
RAD ONC ARIA COURSE FIRST TREATMENT DATE: NORMAL
RAD ONC ARIA COURSE ID: NORMAL
RAD ONC ARIA COURSE INTENT: NORMAL
RAD ONC ARIA COURSE LAST TREATMENT DATE: NORMAL
RAD ONC ARIA COURSE SESSION NUMBER: 1
RAD ONC ARIA COURSE START DATE: NORMAL
RAD ONC ARIA COURSE TREATMENT ELAPSED DAYS: 0
RAD ONC ARIA PLAN FRACTIONS TREATED TO DATE: 1
RAD ONC ARIA PLAN ID: NORMAL
RAD ONC ARIA PLAN PRESCRIBED DOSE PER FRACTION: 3.5 GY
RAD ONC ARIA PLAN PRIMARY REFERENCE POINT: NORMAL
RAD ONC ARIA PLAN TOTAL FRACTIONS PRESCRIBED: 10
RAD ONC ARIA PLAN TOTAL PRESCRIBED DOSE: 3500 CGY
RAD ONC ARIA REFERENCE POINT DOSAGE GIVEN TO DATE: 3.5 GY
RAD ONC ARIA REFERENCE POINT DOSAGE GIVEN TO DATE: 3.57 GY
RAD ONC ARIA REFERENCE POINT ID: NORMAL
RAD ONC ARIA REFERENCE POINT ID: NORMAL
RAD ONC ARIA REFERENCE POINT SESSION DOSAGE GIVEN: 3.5 GY
RAD ONC ARIA REFERENCE POINT SESSION DOSAGE GIVEN: 3.57 GY

## 2025-06-02 RX ORDER — OXYCODONE HYDROCHLORIDE 5 MG/1
5 TABLET ORAL EVERY 8 HOURS PRN
Qty: 60 TABLET | Refills: 0 | Status: SHIPPED | OUTPATIENT
Start: 2025-06-02 | End: 2025-06-22

## 2025-06-02 NOTE — PROGRESS NOTES
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    AJCC Staging has been reviewed    ASSESSMENT:   50 y.o. male with recurrent brain tumor, GBM vs supratentorial ependymoma s/p initial resection in 2022 with adjuvant chemoradiation with 60Gy and Temodar, re-resection in 2023, and now STR 4/15/25 now admitted with more confusion and weakness and concern for interval progression on CT and MRI.      TREATMENT DETAILS:     Treatment Site Dose/Fx (cGy) #Fx Current Dose (cGy) Total Planned Dose (cGy) Start Date End Date   Brain 350 1/10 350 3500 6/2/25 On going     No concurrent systemic therapy    SUBJECTIVE   Mr. Delcid is a 50 y.o. male seen today for his weekly on-treatment evaluation    6/2/25:  First OTV, at fraction 1, tolerated well.  Went over RT schedule and answered questions.      OBJECTIVE/PHYSICAL EXAM:   VITAL SIGNS: There were no vitals taken for this visit.  Wt Readings from Last 5 Encounters:   05/18/25 116.7 kg (257 lb 4.4 oz)   05/13/25 119.7 kg (264 lb)   04/18/25 124.8 kg (275 lb 1.6 oz)   04/16/25 119.5 kg (263 lb 7.2 oz)   10/21/24 128.3 kg (282 lb 14.4 oz)          Performance status:  ECOG 3, Capable of only limited self-care, confined to bed or chair more than 50% of waking hours  General: Alert and conversant, in NAD      LABS:  Personally reviewed    MEDICATIONS:    Current Outpatient Medications:     levETIRAcetam (KEPPRA) 500 MG tablet, Take 2 tablets by mouth 2 times daily, Disp: , Rfl:     ammonium lactate (LAC-HYDRIN) 12 % lotion, Apply topically as needed., Disp: 225 g, Rfl: 0    amLODIPine (NORVASC) 5 MG tablet, Take 2

## 2025-06-04 ENCOUNTER — HOSPITAL ENCOUNTER (OUTPATIENT)
Facility: HOSPITAL | Age: 50
Discharge: HOME | End: 2025-06-07
Attending: RADIOLOGY

## 2025-06-04 LAB
RAD ONC ARIA COURSE FIRST TREATMENT DATE: NORMAL
RAD ONC ARIA COURSE ID: NORMAL
RAD ONC ARIA COURSE INTENT: NORMAL
RAD ONC ARIA COURSE LAST TREATMENT DATE: NORMAL
RAD ONC ARIA COURSE SESSION NUMBER: 2
RAD ONC ARIA COURSE START DATE: NORMAL
RAD ONC ARIA COURSE TREATMENT ELAPSED DAYS: 2
RAD ONC ARIA PLAN FRACTIONS TREATED TO DATE: 2
RAD ONC ARIA PLAN ID: NORMAL
RAD ONC ARIA PLAN PRESCRIBED DOSE PER FRACTION: 3.5 GY
RAD ONC ARIA PLAN PRIMARY REFERENCE POINT: NORMAL
RAD ONC ARIA PLAN TOTAL FRACTIONS PRESCRIBED: 10
RAD ONC ARIA PLAN TOTAL PRESCRIBED DOSE: 3500 CGY
RAD ONC ARIA REFERENCE POINT DOSAGE GIVEN TO DATE: 7 GY
RAD ONC ARIA REFERENCE POINT DOSAGE GIVEN TO DATE: 7.15 GY
RAD ONC ARIA REFERENCE POINT ID: NORMAL
RAD ONC ARIA REFERENCE POINT ID: NORMAL
RAD ONC ARIA REFERENCE POINT SESSION DOSAGE GIVEN: 3.5 GY
RAD ONC ARIA REFERENCE POINT SESSION DOSAGE GIVEN: 3.57 GY

## 2025-06-05 ENCOUNTER — HOSPITAL ENCOUNTER (OUTPATIENT)
Facility: HOSPITAL | Age: 50
Discharge: HOME | End: 2025-06-08
Attending: RADIOLOGY

## 2025-06-05 LAB
RAD ONC ARIA COURSE FIRST TREATMENT DATE: NORMAL
RAD ONC ARIA COURSE ID: NORMAL
RAD ONC ARIA COURSE INTENT: NORMAL
RAD ONC ARIA COURSE LAST TREATMENT DATE: NORMAL
RAD ONC ARIA COURSE SESSION NUMBER: 3
RAD ONC ARIA COURSE START DATE: NORMAL
RAD ONC ARIA COURSE TREATMENT ELAPSED DAYS: 3
RAD ONC ARIA PLAN FRACTIONS TREATED TO DATE: 3
RAD ONC ARIA PLAN ID: NORMAL
RAD ONC ARIA PLAN PRESCRIBED DOSE PER FRACTION: 3.5 GY
RAD ONC ARIA PLAN PRIMARY REFERENCE POINT: NORMAL
RAD ONC ARIA PLAN TOTAL FRACTIONS PRESCRIBED: 10
RAD ONC ARIA PLAN TOTAL PRESCRIBED DOSE: 3500 CGY
RAD ONC ARIA REFERENCE POINT DOSAGE GIVEN TO DATE: 10.5 GY
RAD ONC ARIA REFERENCE POINT DOSAGE GIVEN TO DATE: 10.72 GY
RAD ONC ARIA REFERENCE POINT ID: NORMAL
RAD ONC ARIA REFERENCE POINT ID: NORMAL
RAD ONC ARIA REFERENCE POINT SESSION DOSAGE GIVEN: 3.5 GY
RAD ONC ARIA REFERENCE POINT SESSION DOSAGE GIVEN: 3.57 GY

## 2025-06-06 ENCOUNTER — HOSPITAL ENCOUNTER (OUTPATIENT)
Facility: HOSPITAL | Age: 50
Discharge: HOME | End: 2025-06-09
Attending: RADIOLOGY

## 2025-06-06 LAB
RAD ONC ARIA COURSE FIRST TREATMENT DATE: NORMAL
RAD ONC ARIA COURSE ID: NORMAL
RAD ONC ARIA COURSE INTENT: NORMAL
RAD ONC ARIA COURSE LAST TREATMENT DATE: NORMAL
RAD ONC ARIA COURSE SESSION NUMBER: 4
RAD ONC ARIA COURSE START DATE: NORMAL
RAD ONC ARIA COURSE TREATMENT ELAPSED DAYS: 4
RAD ONC ARIA PLAN FRACTIONS TREATED TO DATE: 4
RAD ONC ARIA PLAN ID: NORMAL
RAD ONC ARIA PLAN PRESCRIBED DOSE PER FRACTION: 3.5 GY
RAD ONC ARIA PLAN PRIMARY REFERENCE POINT: NORMAL
RAD ONC ARIA PLAN TOTAL FRACTIONS PRESCRIBED: 10
RAD ONC ARIA PLAN TOTAL PRESCRIBED DOSE: 3500 CGY
RAD ONC ARIA REFERENCE POINT DOSAGE GIVEN TO DATE: 14 GY
RAD ONC ARIA REFERENCE POINT DOSAGE GIVEN TO DATE: 14.3 GY
RAD ONC ARIA REFERENCE POINT ID: NORMAL
RAD ONC ARIA REFERENCE POINT ID: NORMAL
RAD ONC ARIA REFERENCE POINT SESSION DOSAGE GIVEN: 3.5 GY
RAD ONC ARIA REFERENCE POINT SESSION DOSAGE GIVEN: 3.57 GY

## 2025-06-09 ENCOUNTER — HOSPITAL ENCOUNTER (OUTPATIENT)
Facility: HOSPITAL | Age: 50
Discharge: HOME | End: 2025-06-12
Attending: RADIOLOGY

## 2025-06-09 DIAGNOSIS — C71.0: Primary | ICD-10-CM

## 2025-06-09 LAB
RAD ONC ARIA COURSE FIRST TREATMENT DATE: NORMAL
RAD ONC ARIA COURSE ID: NORMAL
RAD ONC ARIA COURSE INTENT: NORMAL
RAD ONC ARIA COURSE LAST TREATMENT DATE: NORMAL
RAD ONC ARIA COURSE SESSION NUMBER: 5
RAD ONC ARIA COURSE START DATE: NORMAL
RAD ONC ARIA COURSE TREATMENT ELAPSED DAYS: 7
RAD ONC ARIA PLAN FRACTIONS TREATED TO DATE: 5
RAD ONC ARIA PLAN ID: NORMAL
RAD ONC ARIA PLAN PRESCRIBED DOSE PER FRACTION: 3.5 GY
RAD ONC ARIA PLAN PRIMARY REFERENCE POINT: NORMAL
RAD ONC ARIA PLAN TOTAL FRACTIONS PRESCRIBED: 10
RAD ONC ARIA PLAN TOTAL PRESCRIBED DOSE: 3500 CGY
RAD ONC ARIA REFERENCE POINT DOSAGE GIVEN TO DATE: 17.5 GY
RAD ONC ARIA REFERENCE POINT DOSAGE GIVEN TO DATE: 17.87 GY
RAD ONC ARIA REFERENCE POINT ID: NORMAL
RAD ONC ARIA REFERENCE POINT ID: NORMAL
RAD ONC ARIA REFERENCE POINT SESSION DOSAGE GIVEN: 3.5 GY
RAD ONC ARIA REFERENCE POINT SESSION DOSAGE GIVEN: 3.57 GY

## 2025-06-09 ASSESSMENT — PAIN SCALES - GENERAL: PAINLEVEL_OUTOF10: 0

## 2025-06-09 NOTE — PROGRESS NOTES
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    AJCC Staging has been reviewed    ASSESSMENT:   50 y.o. male with recurrent brain tumor, GBM vs supratentorial ependymoma s/p initial resection in 2022 with adjuvant chemoradiation with 60Gy and Temodar, re-resection in 2023, and now STR 4/15/25 now admitted with more confusion and weakness and concern for interval progression on CT and MRI.      TREATMENT DETAILS:     Treatment Site Dose/Fx (cGy) #Fx Current Dose (cGy) Total Planned Dose (cGy) Start Date End Date   Brain 350 5/10 1750 3500 6/2/25 On going     No concurrent systemic therapy    SUBJECTIVE   Mr. Delcid is a 50 y.o. male seen today for his weekly on-treatment evaluation    6/2/25:  First OTV, at fraction 1, tolerated well.  Went over RT schedule and answered questions.  6/9/25: at fraction 5, doing well overall though reports intermittent ear fullness and resultant headaches. No nausea or weakness. He's trying to get in touch with Dr. Saeed team with regards to concurrent chemotherapy which we will assist with.    OBJECTIVE/PHYSICAL EXAM:   VITAL SIGNS: There were no vitals taken for this visit.  Wt Readings from Last 5 Encounters:   05/18/25 116.7 kg (257 lb 4.4 oz)   05/13/25 119.7 kg (264 lb)   04/18/25 124.8 kg (275 lb 1.6 oz)   04/16/25 119.5 kg (263 lb 7.2 oz)   10/21/24 128.3 kg (282 lb 14.4 oz)   Pain Level: 0      Performance status:  ECOG 3, Capable of only limited self-care, confined to bed or chair more than 50% of waking hours  General: Alert and conversant, in NAD      LABS:  Personally reviewed    MEDICATIONS:    Current

## 2025-06-09 NOTE — PROGRESS NOTES
Message was left for Alena Frederick RN at Dr. Saeed to call to set up an appointment for chemo pills.  Alena Frederick RN called back and stated that the patient's sister had canceled his appointment on 05/22/2025 and was advised that since Dr. Saeed was out of the office, she was unsure when she could schedule the patient since he had not been seen since March 2023.  She advised that she would put him on the schedule for 06/10/2025 following his radiation treatment.  She stated that she would call the patient's sister to inform her of the appointment.

## 2025-06-10 ENCOUNTER — HOSPITAL ENCOUNTER (OUTPATIENT)
Facility: HOSPITAL | Age: 50
Discharge: HOME | End: 2025-06-13
Attending: RADIOLOGY

## 2025-06-10 LAB
RAD ONC ARIA COURSE FIRST TREATMENT DATE: NORMAL
RAD ONC ARIA COURSE ID: NORMAL
RAD ONC ARIA COURSE INTENT: NORMAL
RAD ONC ARIA COURSE LAST TREATMENT DATE: NORMAL
RAD ONC ARIA COURSE SESSION NUMBER: 6
RAD ONC ARIA COURSE START DATE: NORMAL
RAD ONC ARIA COURSE TREATMENT ELAPSED DAYS: 8
RAD ONC ARIA PLAN FRACTIONS TREATED TO DATE: 6
RAD ONC ARIA PLAN ID: NORMAL
RAD ONC ARIA PLAN PRESCRIBED DOSE PER FRACTION: 3.5 GY
RAD ONC ARIA PLAN PRIMARY REFERENCE POINT: NORMAL
RAD ONC ARIA PLAN TOTAL FRACTIONS PRESCRIBED: 10
RAD ONC ARIA PLAN TOTAL PRESCRIBED DOSE: 3500 CGY
RAD ONC ARIA REFERENCE POINT DOSAGE GIVEN TO DATE: 21 GY
RAD ONC ARIA REFERENCE POINT DOSAGE GIVEN TO DATE: 21.44 GY
RAD ONC ARIA REFERENCE POINT ID: NORMAL
RAD ONC ARIA REFERENCE POINT ID: NORMAL
RAD ONC ARIA REFERENCE POINT SESSION DOSAGE GIVEN: 3.5 GY
RAD ONC ARIA REFERENCE POINT SESSION DOSAGE GIVEN: 3.57 GY

## 2025-06-11 ENCOUNTER — HOSPITAL ENCOUNTER (OUTPATIENT)
Facility: HOSPITAL | Age: 50
Discharge: HOME | End: 2025-06-14
Attending: RADIOLOGY

## 2025-06-11 LAB
RAD ONC ARIA COURSE FIRST TREATMENT DATE: NORMAL
RAD ONC ARIA COURSE ID: NORMAL
RAD ONC ARIA COURSE INTENT: NORMAL
RAD ONC ARIA COURSE LAST TREATMENT DATE: NORMAL
RAD ONC ARIA COURSE SESSION NUMBER: 7
RAD ONC ARIA COURSE START DATE: NORMAL
RAD ONC ARIA COURSE TREATMENT ELAPSED DAYS: 9
RAD ONC ARIA PLAN FRACTIONS TREATED TO DATE: 7
RAD ONC ARIA PLAN ID: NORMAL
RAD ONC ARIA PLAN PRESCRIBED DOSE PER FRACTION: 3.5 GY
RAD ONC ARIA PLAN PRIMARY REFERENCE POINT: NORMAL
RAD ONC ARIA PLAN TOTAL FRACTIONS PRESCRIBED: 10
RAD ONC ARIA PLAN TOTAL PRESCRIBED DOSE: 3500 CGY
RAD ONC ARIA REFERENCE POINT DOSAGE GIVEN TO DATE: 24.5 GY
RAD ONC ARIA REFERENCE POINT DOSAGE GIVEN TO DATE: 25.02 GY
RAD ONC ARIA REFERENCE POINT ID: NORMAL
RAD ONC ARIA REFERENCE POINT ID: NORMAL
RAD ONC ARIA REFERENCE POINT SESSION DOSAGE GIVEN: 3.5 GY
RAD ONC ARIA REFERENCE POINT SESSION DOSAGE GIVEN: 3.57 GY

## 2025-06-12 ENCOUNTER — HOSPITAL ENCOUNTER (OUTPATIENT)
Facility: HOSPITAL | Age: 50
Discharge: HOME | End: 2025-06-15
Attending: RADIOLOGY

## 2025-06-12 LAB
RAD ONC ARIA COURSE FIRST TREATMENT DATE: NORMAL
RAD ONC ARIA COURSE ID: NORMAL
RAD ONC ARIA COURSE INTENT: NORMAL
RAD ONC ARIA COURSE LAST TREATMENT DATE: NORMAL
RAD ONC ARIA COURSE SESSION NUMBER: 8
RAD ONC ARIA COURSE START DATE: NORMAL
RAD ONC ARIA COURSE TREATMENT ELAPSED DAYS: 10
RAD ONC ARIA PLAN FRACTIONS TREATED TO DATE: 8
RAD ONC ARIA PLAN ID: NORMAL
RAD ONC ARIA PLAN PRESCRIBED DOSE PER FRACTION: 3.5 GY
RAD ONC ARIA PLAN PRIMARY REFERENCE POINT: NORMAL
RAD ONC ARIA PLAN TOTAL FRACTIONS PRESCRIBED: 10
RAD ONC ARIA PLAN TOTAL PRESCRIBED DOSE: 3500 CGY
RAD ONC ARIA REFERENCE POINT DOSAGE GIVEN TO DATE: 28 GY
RAD ONC ARIA REFERENCE POINT DOSAGE GIVEN TO DATE: 28.59 GY
RAD ONC ARIA REFERENCE POINT ID: NORMAL
RAD ONC ARIA REFERENCE POINT ID: NORMAL
RAD ONC ARIA REFERENCE POINT SESSION DOSAGE GIVEN: 3.5 GY
RAD ONC ARIA REFERENCE POINT SESSION DOSAGE GIVEN: 3.57 GY

## 2025-06-13 ENCOUNTER — HOSPITAL ENCOUNTER (OUTPATIENT)
Facility: HOSPITAL | Age: 50
Discharge: HOME | End: 2025-06-16
Attending: RADIOLOGY

## 2025-06-13 LAB
RAD ONC ARIA COURSE FIRST TREATMENT DATE: NORMAL
RAD ONC ARIA COURSE ID: NORMAL
RAD ONC ARIA COURSE INTENT: NORMAL
RAD ONC ARIA COURSE LAST TREATMENT DATE: NORMAL
RAD ONC ARIA COURSE SESSION NUMBER: 9
RAD ONC ARIA COURSE START DATE: NORMAL
RAD ONC ARIA COURSE TREATMENT ELAPSED DAYS: 11
RAD ONC ARIA PLAN FRACTIONS TREATED TO DATE: 9
RAD ONC ARIA PLAN ID: NORMAL
RAD ONC ARIA PLAN PRESCRIBED DOSE PER FRACTION: 3.5 GY
RAD ONC ARIA PLAN PRIMARY REFERENCE POINT: NORMAL
RAD ONC ARIA PLAN TOTAL FRACTIONS PRESCRIBED: 10
RAD ONC ARIA PLAN TOTAL PRESCRIBED DOSE: 3500 CGY
RAD ONC ARIA REFERENCE POINT DOSAGE GIVEN TO DATE: 31.5 GY
RAD ONC ARIA REFERENCE POINT DOSAGE GIVEN TO DATE: 32.17 GY
RAD ONC ARIA REFERENCE POINT ID: NORMAL
RAD ONC ARIA REFERENCE POINT ID: NORMAL
RAD ONC ARIA REFERENCE POINT SESSION DOSAGE GIVEN: 3.5 GY
RAD ONC ARIA REFERENCE POINT SESSION DOSAGE GIVEN: 3.57 GY

## 2025-06-16 ENCOUNTER — HOSPITAL ENCOUNTER (OUTPATIENT)
Facility: HOSPITAL | Age: 50
Discharge: HOME | End: 2025-06-19
Attending: RADIOLOGY

## 2025-06-16 DIAGNOSIS — C71.0: Primary | ICD-10-CM

## 2025-06-16 LAB
RAD ONC ARIA COURSE FIRST TREATMENT DATE: NORMAL
RAD ONC ARIA COURSE ID: NORMAL
RAD ONC ARIA COURSE INTENT: NORMAL
RAD ONC ARIA COURSE LAST TREATMENT DATE: NORMAL
RAD ONC ARIA COURSE SESSION NUMBER: 10
RAD ONC ARIA COURSE START DATE: NORMAL
RAD ONC ARIA COURSE TREATMENT ELAPSED DAYS: 14
RAD ONC ARIA PLAN FRACTIONS TREATED TO DATE: 10
RAD ONC ARIA PLAN ID: NORMAL
RAD ONC ARIA PLAN PRESCRIBED DOSE PER FRACTION: 3.5 GY
RAD ONC ARIA PLAN PRIMARY REFERENCE POINT: NORMAL
RAD ONC ARIA PLAN TOTAL FRACTIONS PRESCRIBED: 10
RAD ONC ARIA PLAN TOTAL PRESCRIBED DOSE: 3500 CGY
RAD ONC ARIA REFERENCE POINT DOSAGE GIVEN TO DATE: 35 GY
RAD ONC ARIA REFERENCE POINT DOSAGE GIVEN TO DATE: 35.74 GY
RAD ONC ARIA REFERENCE POINT ID: NORMAL
RAD ONC ARIA REFERENCE POINT ID: NORMAL
RAD ONC ARIA REFERENCE POINT SESSION DOSAGE GIVEN: 3.5 GY
RAD ONC ARIA REFERENCE POINT SESSION DOSAGE GIVEN: 3.57 GY

## 2025-06-16 RX ORDER — SENNOSIDES 15 MG
TABLET ORAL
COMMUNITY

## 2025-06-16 ASSESSMENT — PATIENT HEALTH QUESTIONNAIRE - PHQ9
2. FEELING DOWN, DEPRESSED OR HOPELESS: MORE THAN HALF THE DAYS
SUM OF ALL RESPONSES TO PHQ QUESTIONS 1-9: 2
1. LITTLE INTEREST OR PLEASURE IN DOING THINGS: NOT AT ALL
SUM OF ALL RESPONSES TO PHQ QUESTIONS 1-9: 2

## 2025-06-16 ASSESSMENT — PAIN DESCRIPTION - ORIENTATION: ORIENTATION: LOWER

## 2025-06-16 ASSESSMENT — PAIN DESCRIPTION - DESCRIPTORS: DESCRIPTORS: DISCOMFORT

## 2025-06-16 ASSESSMENT — PAIN SCALES - GENERAL
PAINLEVEL_OUTOF10: 0
PAINLEVEL_OUTOF10: 0

## 2025-06-16 ASSESSMENT — PAIN DESCRIPTION - LOCATION: LOCATION: SACRUM;BACK

## 2025-06-16 NOTE — PROGRESS NOTES
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    AJCC Staging has been reviewed    ASSESSMENT:   50 y.o. male with recurrent brain tumor, GBM vs supratentorial ependymoma s/p initial resection in 2022 with adjuvant chemoradiation with 60Gy and Temodar, re-resection in 2023, and now STR 4/15/25 now admitted with more confusion and weakness and concern for interval progression on CT and MRI.      TREATMENT DETAILS:     Treatment Site Dose/Fx (cGy) #Fx Current Dose (cGy) Total Planned Dose (cGy) Start Date End Date   Brain 350 10/10 3500 3500 06/02/25 06/16/25     No concurrent systemic therapy    SUBJECTIVE   Mr. Delcid is a 50 y.o. male seen today for his weekly on-treatment evaluation    6/2/25:  First OTV, at fraction 1, tolerated well.  Went over RT schedule and answered questions.  6/9/25: at fraction 5, doing well overall though reports intermittent ear fullness and resultant headaches. No nausea or weakness. He's trying to get in touch with Dr. Saeed team with regards to concurrent chemotherapy which we will assist with.  06/16/25: at fraction 10, completed treatment today, intermittent headaches managed with oxycodone. He states his brother just picked up his temozolomide and was told to start it on 6/23. He will follow up with Dr. Montero as scheduled.    OBJECTIVE/PHYSICAL EXAM:   VITAL SIGNS: There were no vitals taken for this visit.  Wt Readings from Last 5 Encounters:   05/18/25 116.7 kg (257 lb 4.4 oz)   05/13/25 119.7 kg (264 lb)   04/18/25 124.8 kg (275 lb 1.6 oz)   04/16/25 119.5 kg (263 lb 7.2 oz)   10/21/24 128.3 kg (282 lb 14.4 oz)

## 2025-09-03 ENCOUNTER — HOSPITAL ENCOUNTER (OUTPATIENT)
Facility: HOSPITAL | Age: 50
Discharge: HOME | End: 2025-09-06

## (undated) DEVICE — AGENT HEMOSTATIC SURG ORIGINAL ABS 2X14IN LOOSE KNIT 12/CA

## (undated) DEVICE — SUTURE VICRYL RAPIDE SZ 3-0 L18IN ABSRB UD PS-2 L19MM 3/8 CIR VR497

## (undated) DEVICE — TOOL F2/8TA23 LEGEND 8CM 2.3MM TAPER: Brand: MIDAS REX™

## (undated) DEVICE — KIT HND CTRL 3 W STPCOCK ROT END 54IN PREM HI PRSS TBNG AT

## (undated) DEVICE — PACK PROCEDURE SURG CRANIOTOMY

## (undated) DEVICE — SOLUTION IV 250ML 0.9% SOD CHL CLR INJ FLX BG CONT PRT CLSR

## (undated) DEVICE — 1.5MM BLADE

## (undated) DEVICE — IRRIGATION SUCTION CASSETTE: Brand: SONOPET IQ

## (undated) DEVICE — DRAPE FLD WRM W44XL66IN C6L FOR INTRATEMP SYS THERMABASIN

## (undated) DEVICE — SYRINGE 20ML LL S/C 50

## (undated) DEVICE — SUTURE VCRL SZ 2-0 L18IN ABSRB UD L26MM CP-2 1/2 CIR REV J762D

## (undated) DEVICE — GLOVE ORANGE PI 7 1/2   MSG9075

## (undated) DEVICE — SURGICAL PROCEDURE KIT CRANIOTOMY

## (undated) DEVICE — COVER LT HNDL PLAS RIG 1 PER PK

## (undated) DEVICE — PACK PROCEDURE SURG HRT CATH

## (undated) DEVICE — SOLUTION IRRIG 1000ML 0.9% SOD CHL USP POUR PLAS BTL

## (undated) DEVICE — SUTURE NRLN SZ 4-0 L18IN NONABSORBABLE BLK L13MM TF 1/2 CIR C584D

## (undated) DEVICE — KIT MED IMAG CNTRST AGNT W/ IOPAMIDOL REUSE

## (undated) DEVICE — GLOVE SURG SZ 65 L12IN FNGR THK79MIL GRN LTX FREE

## (undated) DEVICE — CODMAN® SURGICAL PATTIES 1/4" X 1/4" (0.64CM X 0.64CM): Brand: CODMAN®

## (undated) DEVICE — SURGIFOAM SPNG SZ 100

## (undated) DEVICE — GLIDESHEATH SLENDER STAINLESS STEEL KIT: Brand: GLIDESHEATH SLENDER

## (undated) DEVICE — ANGIOGRAPHY KIT

## (undated) DEVICE — CODMAN® SURGICAL PATTIES 1/2" X 3" (1.27CM X 7.62CM): Brand: CODMAN®

## (undated) DEVICE — GARMENT,MEDLINE,DVT,INT,CALF,MED, GEN2: Brand: MEDLINE

## (undated) DEVICE — FLOSEAL HEMOSTATIC MATRIX, 10ML: Brand: FLOSEAL HEMOSTATIC MATRIX

## (undated) DEVICE — 12CM IQ STANDARD: Brand: SONOPET IQ

## (undated) DEVICE — SCALPFIX STERILE: Brand: AESCULAP

## (undated) DEVICE — MARKER SKIN XR REFLCT LF SPHR DISP

## (undated) DEVICE — GLOVE SURG SZ 65 L12IN FNGR THK94MIL STD WHT LTX FREE

## (undated) DEVICE — SOLUTION IRRIG 1000ML 09% SOD CHL USP PIC PLAS CONTAINER

## (undated) DEVICE — FLOSEAL WITH RECOTHROM - 10ML.: Brand: FLOSEAL HEMOSTATIC MATRIX

## (undated) DEVICE — AGENT HEMSTAT W2XL14IN OXIDIZED REGENERATED CELOS ABSRB FOR

## (undated) DEVICE — SOLUTION SURG PREP 26 CC PURPREP

## (undated) DEVICE — CODMAN® SURGICAL PATTIES 1/2" X 1/2" (1.27CM X 1.27CM): Brand: CODMAN®

## (undated) DEVICE — TOOL 9AC90 LEGEND 9CM 9MM AC: Brand: MIDAS REX

## (undated) DEVICE — TR BAND RADIAL ARTERY COMPRESSION DEVICE: Brand: TR BAND

## (undated) DEVICE — Device

## (undated) DEVICE — CATH 5F 100CM JR40 -- DXTERITY

## (undated) DEVICE — 3M™ TEGADERM™ TRANSPARENT FILM DRESSING FRAME STYLE, 1626W, 4 IN X 4-3/4 IN (10 CM X 12 CM), 50/CT 4CT/CASE: Brand: 3M™ TEGADERM™

## (undated) DEVICE — WASTE KIT - ST MARY: Brand: MEDLINE INDUSTRIES, INC.

## (undated) DEVICE — TOOL 8TA11 LEGEND 8CM 1.1MM TA: Brand: MIDAS REX ™

## (undated) DEVICE — COVER,MAYO STAND,STERILE: Brand: MEDLINE

## (undated) DEVICE — KIT MFLD ISOLATN NACL CNTRST PRT TBNG SPIK W/ PRSS TRNSDUC

## (undated) DEVICE — CATH 5F 100CM JL35 -- DXTERITY